# Patient Record
Sex: MALE | Race: WHITE | NOT HISPANIC OR LATINO | Employment: OTHER | URBAN - METROPOLITAN AREA
[De-identification: names, ages, dates, MRNs, and addresses within clinical notes are randomized per-mention and may not be internally consistent; named-entity substitution may affect disease eponyms.]

---

## 2017-01-03 RX ORDER — AMLODIPINE BESYLATE 5 MG/1
TABLET ORAL
Qty: 30 TABLET | Refills: 1 | Status: SHIPPED | OUTPATIENT
Start: 2017-01-03 | End: 2017-10-05 | Stop reason: SDUPTHER

## 2017-01-31 RX ORDER — AMLODIPINE BESYLATE 5 MG/1
5 TABLET ORAL DAILY
Qty: 90 TABLET | Refills: 3 | Status: SHIPPED | OUTPATIENT
Start: 2017-01-31 | End: 2017-03-24 | Stop reason: SDUPTHER

## 2017-02-13 RX ORDER — ALLOPURINOL 300 MG/1
TABLET ORAL
Qty: 90 TABLET | Refills: 2 | Status: SHIPPED | OUTPATIENT
Start: 2017-02-13 | End: 2017-10-05 | Stop reason: SDUPTHER

## 2017-03-24 ENCOUNTER — OFFICE VISIT (OUTPATIENT)
Dept: FAMILY MEDICINE CLINIC | Facility: CLINIC | Age: 77
End: 2017-03-24

## 2017-03-24 VITALS
DIASTOLIC BLOOD PRESSURE: 70 MMHG | HEART RATE: 59 BPM | SYSTOLIC BLOOD PRESSURE: 110 MMHG | OXYGEN SATURATION: 96 % | BODY MASS INDEX: 29.23 KG/M2 | TEMPERATURE: 98.4 F | HEIGHT: 71 IN | WEIGHT: 208.8 LBS

## 2017-03-24 DIAGNOSIS — E78.49 OTHER HYPERLIPIDEMIA: ICD-10-CM

## 2017-03-24 DIAGNOSIS — R79.89 LOW VITAMIN D LEVEL: ICD-10-CM

## 2017-03-24 DIAGNOSIS — R05.9 COUGH: ICD-10-CM

## 2017-03-24 DIAGNOSIS — R13.10 DIFFICULTY SWALLOWING LIQUIDS: ICD-10-CM

## 2017-03-24 DIAGNOSIS — Z00.00 MEDICARE ANNUAL WELLNESS VISIT, SUBSEQUENT: Primary | ICD-10-CM

## 2017-03-24 DIAGNOSIS — I10 HYPERTENSION, ESSENTIAL: ICD-10-CM

## 2017-03-24 DIAGNOSIS — R13.10 DYSPHAGIA, UNSPECIFIED TYPE: ICD-10-CM

## 2017-03-24 DIAGNOSIS — N40.1 BENIGN NODULAR PROSTATIC HYPERPLASIA WITH LOWER URINARY TRACT SYMPTOMS: ICD-10-CM

## 2017-03-24 LAB
25(OH)D3 SERPL-MCNC: 34.3 NG/ML (ref 30–100)
ALBUMIN SERPL-MCNC: 4 G/DL (ref 3.5–5.2)
ALBUMIN/GLOB SERPL: 1.3 G/DL
ALP SERPL-CCNC: 109 U/L (ref 39–117)
ALT SERPL W P-5'-P-CCNC: 16 U/L (ref 1–41)
ANION GAP SERPL CALCULATED.3IONS-SCNC: 13.5 MMOL/L
AST SERPL-CCNC: 17 U/L (ref 1–40)
BILIRUB SERPL-MCNC: 0.8 MG/DL (ref 0.1–1.2)
BUN BLD-MCNC: 19 MG/DL (ref 8–23)
BUN/CREAT SERPL: 17.9 (ref 7–25)
CALCIUM SPEC-SCNC: 9.7 MG/DL (ref 8.6–10.5)
CHLORIDE SERPL-SCNC: 103 MMOL/L (ref 98–107)
CHOLEST SERPL-MCNC: 118 MG/DL (ref 0–200)
CO2 SERPL-SCNC: 22.5 MMOL/L (ref 22–29)
CREAT BLD-MCNC: 1.06 MG/DL (ref 0.76–1.27)
GFR SERPL CREATININE-BSD FRML MDRD: 68 ML/MIN/1.73
GLOBULIN UR ELPH-MCNC: 3.1 GM/DL
GLUCOSE BLD-MCNC: 100 MG/DL (ref 65–99)
HDLC SERPL-MCNC: 37 MG/DL (ref 40–60)
LDLC SERPL CALC-MCNC: 64 MG/DL (ref 0–100)
LDLC/HDLC SERPL: 1.74 {RATIO}
POTASSIUM BLD-SCNC: 4.2 MMOL/L (ref 3.5–5.2)
PROT SERPL-MCNC: 7.1 G/DL (ref 6–8.5)
SODIUM BLD-SCNC: 139 MMOL/L (ref 136–145)
TRIGL SERPL-MCNC: 83 MG/DL (ref 0–150)
VLDLC SERPL-MCNC: 16.6 MG/DL (ref 5–40)

## 2017-03-24 PROCEDURE — G0439 PPPS, SUBSEQ VISIT: HCPCS | Performed by: INTERNAL MEDICINE

## 2017-03-24 PROCEDURE — 90670 PCV13 VACCINE IM: CPT | Performed by: INTERNAL MEDICINE

## 2017-03-24 PROCEDURE — 80053 COMPREHEN METABOLIC PANEL: CPT | Performed by: INTERNAL MEDICINE

## 2017-03-24 PROCEDURE — 71020 XR CHEST PA AND LATERAL: CPT | Performed by: INTERNAL MEDICINE

## 2017-03-24 PROCEDURE — 90471 IMMUNIZATION ADMIN: CPT | Performed by: INTERNAL MEDICINE

## 2017-03-24 PROCEDURE — 80061 LIPID PANEL: CPT | Performed by: INTERNAL MEDICINE

## 2017-03-24 PROCEDURE — 99213 OFFICE O/P EST LOW 20 MIN: CPT | Performed by: INTERNAL MEDICINE

## 2017-03-24 PROCEDURE — 82306 VITAMIN D 25 HYDROXY: CPT | Performed by: INTERNAL MEDICINE

## 2017-03-24 NOTE — PATIENT INSTRUCTIONS
Medicare Wellness  Personal Prevention Plan of Service     Date of Office Visit:  2017  Encounter Provider:  Stanton Sotomayor MD  Place of Service:  [facility] INTERNAL [facility]  Patient Name: Javier Grant YOB: 1940    As part of the Medicare Wellness portion of your visit today, we are providing you with this personalized preventive plan of services (PPPS). This plan is based upon recommendations of the [place] Preventive Services Task Force (USPSTF) and the Advisory Committee on Immunization Practices (ACIP).    This lists the preventive care services that should be considered, and provides dates of when you are due. Items listed as completed are up-to-date and do not require any further intervention.    Health Maintenance   Topic Date Due   • TDAP/TD VACCINES (1 - Tdap) 1959   • PNEUMOCOCCAL VACCINES (65+ LOW/MEDIUM RISK) (2 of 2 - PPSV23) 2013   • ZOSTER VACCINE  2017   • MEDICARE ANNUAL WELLNESS  2017   • LIPID PANEL  2017              Medicare Wellness  Personal Prevention Plan of Service     Date of Office Visit:  2017  Encounter Provider:  Stanton Sotomayor MD  Place of Service:  [facility]  Patient Name: Javier Grant  :  1940    As part of the Medicare Wellness portion of your visit today, we are providing you with this personalized preventive plan of services (PPPS). This plan is based upon recommendations of the [place] Preventive Services Task Force (USPSTF) and the Advisory Committee on Immunization Practices (ACIP).    This lists the preventive care services that should be considered, and provides dates of when you are due. Items listed as completed are up-to-date and do not require any further intervention.    Health Maintenance   Topic Date Due   • TDAP/TD VACCINES (1 - Tdap) 1959   • PNEUMOCOCCAL VACCINES (65+ LOW/MEDIUM RISK) (2 of 2 - PPSV23) 2013   •  ZOSTER VACCINE  03/22/2017   • MEDICARE ANNUAL WELLNESS  03/22/2017   • LIPID PANEL  11/19/2017

## 2017-03-24 NOTE — PROGRESS NOTES
QUICK REFERENCE INFORMATION:  The ABCs of the Annual Wellness Visit    Subsequent Medicare Wellness Visit    HEALTH RISK ASSESSMENT    1940    Recent Hospitalizations:  No recent hospitalization(s)..        Current Medical Providers:  Patient Care Team:  Stanton Sotomayor Jr., MD as PCP - General  No Known Provider as PCP - Family Medicine        Smoking Status:  History   Smoking Status   • Not on file   Smokeless Tobacco   • Not on file       Alcohol Consumption:  History   Alcohol use Not on file       Depression Screen:   PHQ-9 Depression Screening 3/24/2017   Little interest or pleasure in doing things 0   Feeling down, depressed, or hopeless 0   Trouble falling or staying asleep, or sleeping too much 3   Feeling tired or having little energy 0   Poor appetite or overeating 0   Feeling bad about yourself - or that you are a failure or have let yourself or your family down 0   Trouble concentrating on things, such as reading the newspaper or watching television 0   Moving or speaking so slowly that other people could have noticed. Or the opposite - being so fidgety or restless that you have been moving around a lot more than usual 0   Thoughts that you would be better off dead, or of hurting yourself in some way 0   PHQ-9 Total Score 3   If you checked off any problems, how difficult have these problems made it for you to do your work, take care of things at home, or get along with other people? Somewhat difficult       Health Habits and Functional and Cognitive Screening:  No flowsheet data found.           Does the patient have evidence of cognitive impairment? No    Asprin use counseling:yes      Recent Lab Results:  CMP:  Lab Results   Component Value Date    BUN 19 11/19/2016    CREATININE 1.20 11/19/2016    EGFRIFNONA 59 (L) 11/19/2016    BCR 15.8 11/19/2016     11/19/2016    K 4.4 11/19/2016    CO2 23.1 11/19/2016    CALCIUM 9.8 11/19/2016    ALBUMIN 4.20 11/19/2016    LABIL2 1.5 11/19/2016     BILITOT 1.0 11/19/2016    ALKPHOS 111 11/19/2016    AST 24 11/19/2016    ALT 21 11/19/2016     Lipid Panel:  Lab Results   Component Value Date    CHLPL 113 10/01/2015    TRIG 83 11/19/2016    HDL 41 11/19/2016    VLDL 16.6 11/19/2016    LDL 56 10/01/2015     LDL:     HbA1c:     Urine Microalbumin:     Visual Acuity:  No exam data present    Age-appropriate Screening Schedule:  Refer to the list below for future screening recommendations based on patient's age, sex and/or medical conditions. Orders for these recommended tests are listed in the plan section. The patient has been provided with a written plan.    Health Maintenance   Topic Date Due   • TDAP/TD VACCINES (1 - Tdap) 04/12/1959   • PNEUMOCOCCAL VACCINES (65+ LOW/MEDIUM RISK) (2 of 2 - PPSV23) 06/01/2013   • ZOSTER VACCINE  03/22/2017   • LIPID PANEL  11/19/2017        Subjective   History of Present Illness    Javier Grant is a 76 y.o. male who presents for an Subsequent Wellness Visit.    The following portions of the patient's history were reviewed and updated as appropriate: allergies, current medications, past family history, past medical history, past social history, past surgical history and problem list.    Outpatient Medications Prior to Visit   Medication Sig Dispense Refill   • allopurinol (ZYLOPRIM) 300 MG tablet TAKE 1 TABLET BY MOUTH EVERY DAY 90 tablet 2   • amLODIPine (NORVASC) 5 MG tablet TAKE ONE TABLET BY MOUTH DAILY 30 tablet 1   • aspirin 81 MG tablet Take  by mouth daily.     • atorvastatin (LIPITOR) 80 MG tablet TAKE 1 TABLET AT BEDTIME 90 tablet 3   • carvedilol (COREG) 3.125 MG tablet TAKE ONE TABLET BY MOUTH TWICE A DAY WITH FOOD 180 tablet 2   • clopidogrel (PLAVIX) 75 MG tablet TAKE 4 TABLETS BY MOUTH STARTING TOMORROW MORNING, THEN TAKE ONE TABLET BY MOUTH DAILY 90 tablet 2   • Famotidine (PEPCID AC MAXIMUM STRENGTH) 20 MG chewable tablet Chew.     • nitroglycerin (NITROSTAT) 0.4 MG SL tablet Place  under the tongue.     • omega-3  "acid ethyl esters (LOVAZA) 1 G capsule TAKE 4 CAPSULES DAILY 360 capsule 3   • potassium chloride (MICRO-K) 10 MEQ CR capsule TAKE 1 CAPSULE DAILY 90 capsule 3   • ramipril (ALTACE) 10 MG capsule TAKE 1 CAPSULE DAILY 90 capsule 3   • amLODIPine (NORVASC) 5 MG tablet Take 1 tablet by mouth Daily. 90 tablet 3   • Cholecalciferol crystals        No facility-administered medications prior to visit.        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    Review of Systems   All other systems reviewed and are negative.      Compared to one year ago, the patient feels his physical health is the same.  Compared to one year ago, the patient feels his mental health is the same.    Objective     Physical Exam    Vitals:    03/24/17 0828   BP: 110/70   BP Location: Left arm   Patient Position: Sitting   Cuff Size: Large Adult   Pulse: 59   Temp: 98.4 °F (36.9 °C)   TempSrc: Oral   SpO2: 96%   Weight: 208 lb 12.8 oz (94.7 kg)   Height: 71\" (180.3 cm)   PainSc: 0-No pain       Body mass index is 29.12 kg/(m^2).  Discussed the patient's BMI with him. The BMI is above average; BMI management plan is completed.    Assessment/Plan   Patient Self-Management and Personalized Health Advice  The patient has been provided with information about: diet, exercise, weight management and prevention of cardiac or vascular disease and preventive services including:   · Exercise counseling provided, Influenza vaccine, Urinary Incontinence " assessment done, Zostavax vaccine (Herpes Zoster).    Visit Diagnoses:  No diagnosis found.    No orders of the defined types were placed in this encounter.      Outpatient Encounter Prescriptions as of 3/24/2017   Medication Sig Dispense Refill   • allopurinol (ZYLOPRIM) 300 MG tablet TAKE 1 TABLET BY MOUTH EVERY DAY 90 tablet 2   • amLODIPine (NORVASC) 5 MG tablet TAKE ONE TABLET BY MOUTH DAILY 30 tablet 1   • aspirin 81 MG tablet Take  by mouth daily.     • atorvastatin (LIPITOR) 80 MG tablet TAKE 1 TABLET AT BEDTIME 90 tablet 3   • carvedilol (COREG) 3.125 MG tablet TAKE ONE TABLET BY MOUTH TWICE A DAY WITH FOOD 180 tablet 2   • clopidogrel (PLAVIX) 75 MG tablet TAKE 4 TABLETS BY MOUTH STARTING TOMORROW MORNING, THEN TAKE ONE TABLET BY MOUTH DAILY 90 tablet 2   • Famotidine (PEPCID AC MAXIMUM STRENGTH) 20 MG chewable tablet Chew.     • MULTIPLE VITAMIN PO Take  by mouth Daily.     • nitroglycerin (NITROSTAT) 0.4 MG SL tablet Place  under the tongue.     • omega-3 acid ethyl esters (LOVAZA) 1 G capsule TAKE 4 CAPSULES DAILY 360 capsule 3   • potassium chloride (MICRO-K) 10 MEQ CR capsule TAKE 1 CAPSULE DAILY 90 capsule 3   • ramipril (ALTACE) 10 MG capsule TAKE 1 CAPSULE DAILY 90 capsule 3   • [DISCONTINUED] amLODIPine (NORVASC) 5 MG tablet Take 1 tablet by mouth Daily. 90 tablet 3   • [DISCONTINUED] Cholecalciferol crystals        No facility-administered encounter medications on file as of 3/24/2017.        Reviewed use of high risk medication in the elderly: yes  Reviewed for potential of harmful drug interactions in the elderly: yes    Follow Up:  No Follow-up on file.     An After Visit Summary and PPPS with all of these plans were given to the patient.

## 2017-03-24 NOTE — PROGRESS NOTES
Subjective   aJvier Grant is a 76 y.o. male.   High blood pressure lipids low vitamin D  History of Present Illness   Hypertension, lipids, low vitamin D, history coronary disease doing well stable a Justine having some troubles with both swallowing liquids seem to get choked a little bit and have a separate issue where he has things hang mid chest we'll hang from minute to 2 minutes for goes through.  This is a new problem does have mild GERD but no history of esophageal stricture etc.    Review of Systems    Objective   Vitals:    03/24/17 0828   BP: 110/70   Pulse: 59   Temp: 98.4 °F (36.9 °C)   SpO2: 96%   Weight: 208 lb 12.8 oz (94.7 kg)     Physical Exam   Constitutional: He appears well-developed and well-nourished.   Cardiovascular: Normal rate, regular rhythm and normal heart sounds.    Pulmonary/Chest: Effort normal and breath sounds normal.   Abdominal: Soft. Bowel sounds are normal.   Nursing note and vitals reviewed.      Lab Results   Component Value Date    INR 1.0 10/08/2015       Procedures  Chest x-ray PA and lateral obtained.  No comparison available.  No active parenchymal infiltrates.  Cardiomegaly present evidence of prior sternotomy present no other abnormalities noted.  Assessment/Plan   1.  Hypertension controlled plan continue present medicine follow-up in 6-12 months    2.  Hyperlipidemia plan await lab recheck 6 months if satisfactory.    3.  Low vitamin D plan await lab for further disposition    4.  BPH with bowel symptoms trial of saw palmetto.  With further follow-up with urology if not improvement    5.  Dysphagia symptoms food hanging up mid chest we'll refer to GI DrRm renal    6.  Mild choking with swallowing plan speech therapy evaluation.    Much of this encounter note is an electronic transcription/translation of spoken language to printed text.  The electronic translation of spoken language may permit erroneous, or at times, nonsensical words or phrases to be inadvertently  transcribed.  Although I have reviewed the note for such errors, some may still exist.

## 2017-03-30 RX ORDER — ATORVASTATIN CALCIUM 80 MG/1
TABLET, FILM COATED ORAL
Qty: 90 TABLET | Refills: 2 | Status: SHIPPED | OUTPATIENT
Start: 2017-03-30 | End: 2017-10-05 | Stop reason: SDUPTHER

## 2017-04-18 ENCOUNTER — OFFICE VISIT (OUTPATIENT)
Dept: GASTROENTEROLOGY | Facility: CLINIC | Age: 77
End: 2017-04-18

## 2017-04-18 VITALS
BODY MASS INDEX: 29.54 KG/M2 | HEIGHT: 71 IN | SYSTOLIC BLOOD PRESSURE: 148 MMHG | WEIGHT: 211 LBS | DIASTOLIC BLOOD PRESSURE: 100 MMHG

## 2017-04-18 DIAGNOSIS — K21.9 GASTROESOPHAGEAL REFLUX DISEASE, ESOPHAGITIS PRESENCE NOT SPECIFIED: ICD-10-CM

## 2017-04-18 DIAGNOSIS — K63.5 COLON POLYPS: ICD-10-CM

## 2017-04-18 DIAGNOSIS — R13.10 DYSPHAGIA, UNSPECIFIED TYPE: Primary | ICD-10-CM

## 2017-04-18 PROCEDURE — 99214 OFFICE O/P EST MOD 30 MIN: CPT | Performed by: INTERNAL MEDICINE

## 2017-04-18 RX ORDER — SODIUM CHLORIDE 0.9 % (FLUSH) 0.9 %
1-10 SYRINGE (ML) INJECTION AS NEEDED
Status: CANCELLED | OUTPATIENT
Start: 2017-04-18

## 2017-04-18 RX ORDER — SODIUM CHLORIDE, SODIUM LACTATE, POTASSIUM CHLORIDE, CALCIUM CHLORIDE 600; 310; 30; 20 MG/100ML; MG/100ML; MG/100ML; MG/100ML
30 INJECTION, SOLUTION INTRAVENOUS CONTINUOUS
Status: CANCELLED | OUTPATIENT
Start: 2017-04-18

## 2017-04-18 NOTE — PROGRESS NOTES
Chief Complaint   Patient presents with   • Difficulty Swallowing   • Heartburn   • Cough        Javier Grant is a  77 y.o. male here for a follow up visit for Dysphagia, GERD, cough, history of polyps    HPI This 77-year-old white male patient of Dr. Stanton Sotomayor returns in follow-up since his colonoscopic examination of February 2015.  That study was performed due to his history of colon polyps.  He had diverticulosis and hemorrhoids but otherwise negative examination.  He presents now with several months of reflux-like issues and associated cough.  He is concerned with aspiration and potential pneumonia.  This occurs with liquids about every 2 weeks and has been more difficult to clear his lungs then in the past.  He has complained of some dysphagia to solid foods usually meats in the past and now this seems to occur every 2-3 months when he is eating too fast.  His requires belching and eventual clearance but he does not require regurgitation or vomiting.  His weight is been stable.  He's been on Pepcid for reflux that affords variable relief.  He mentioned a family history of gastric cancer involving a brother at the age of 39.  We discussed possible option for evaluation to include video fluoroscopy swallow study and upper endoscopy.    Past Medical History:   Diagnosis Date   • Arthritis    • CAD (coronary artery disease)    • Colon polyp    • Gastritis    • Hyperlipidemia    • Hypertension    • Myocardial infarction    • Sleep apnea    • Vitamin D deficiency        Current Outpatient Prescriptions   Medication Sig Dispense Refill   • allopurinol (ZYLOPRIM) 300 MG tablet TAKE 1 TABLET BY MOUTH EVERY DAY 90 tablet 2   • amLODIPine (NORVASC) 5 MG tablet TAKE ONE TABLET BY MOUTH DAILY 30 tablet 1   • aspirin 81 MG tablet Take  by mouth daily.     • atorvastatin (LIPITOR) 80 MG tablet TAKE 1 TABLET AT BEDTIME 90 tablet 2   • carvedilol (COREG) 3.125 MG tablet TAKE ONE TABLET BY MOUTH TWICE A DAY WITH FOOD 180  tablet 2   • clopidogrel (PLAVIX) 75 MG tablet TAKE 4 TABLETS BY MOUTH STARTING TOMORROW MORNING, THEN TAKE ONE TABLET BY MOUTH DAILY 90 tablet 2   • Famotidine (PEPCID AC MAXIMUM STRENGTH) 20 MG chewable tablet Chew.     • MULTIPLE VITAMIN PO Take  by mouth Daily.     • nitroglycerin (NITROSTAT) 0.4 MG SL tablet Place  under the tongue.     • omega-3 acid ethyl esters (LOVAZA) 1 G capsule TAKE 4 CAPSULES DAILY 360 capsule 3   • potassium chloride (MICRO-K) 10 MEQ CR capsule TAKE 1 CAPSULE DAILY 90 capsule 3   • ramipril (ALTACE) 10 MG capsule TAKE 1 CAPSULE DAILY 90 capsule 3     No current facility-administered medications for this visit.        PRN Meds:.    Allergies   Allergen Reactions   • Ciprofloxacin        Social History     Social History   • Marital status:      Spouse name: N/A   • Number of children: N/A   • Years of education: N/A     Occupational History   • Not on file.     Social History Main Topics   • Smoking status: Former Smoker     Types: Cigarettes   • Smokeless tobacco: Not on file   • Alcohol use No   • Drug use: Not on file   • Sexual activity: Not on file     Other Topics Concern   • Not on file     Social History Narrative       Family History   Problem Relation Age of Onset   • Depression Mother    • Heart attack Father    • Aneurysm Father    • Stomach cancer Brother        Review of Systems   Constitutional: Negative for activity change, appetite change, chills, fatigue, fever and unexpected weight change.   HENT: Positive for postnasal drip. Negative for congestion, ear pain, facial swelling, rhinorrhea, sore throat, trouble swallowing and voice change.    Eyes: Negative for photophobia and visual disturbance.   Respiratory: Positive for cough. Negative for choking, shortness of breath and wheezing.    Cardiovascular: Negative for chest pain.   Gastrointestinal: Negative for abdominal distention, abdominal pain, anal bleeding, blood in stool, constipation, diarrhea, nausea,  rectal pain and vomiting.        GERD  Dysphagia   Endocrine: Negative for polyphagia.   Musculoskeletal: Negative for arthralgias, gait problem, joint swelling and myalgias.   Skin: Negative for color change, pallor and rash.   Allergic/Immunologic: Negative for food allergies.   Neurological: Negative for speech difficulty and headaches.   Hematological: Does not bruise/bleed easily.   Psychiatric/Behavioral: Negative for agitation, confusion and sleep disturbance.       Vitals:    04/18/17 0940   BP: 148/100       Physical Exam   Constitutional: He is oriented to person, place, and time. He appears well-developed and well-nourished.   HENT:   Head: Normocephalic.   Mouth/Throat: Oropharynx is clear and moist.   Eyes: Conjunctivae and EOM are normal.   Neck: Normal range of motion.   Cardiovascular: Normal rate and regular rhythm.    Pulmonary/Chest: Breath sounds normal.   Abdominal: Soft. Bowel sounds are normal.   Musculoskeletal: Normal range of motion.   Neurological: He is alert and oriented to person, place, and time.   Skin: Skin is warm and dry.   Psychiatric: He has a normal mood and affect. His behavior is normal.       ASSESSMENT   #1 dysphagia to solids: Chronic issue occurring every several months may be some component of esophageal stricture  #2 transfer dysphagia to liquids:  cough with concern of possible aspiration.  #3 history of polyps      PLAN  Schedule EGD  Schedule video fluoroscopy swallow study with speech pathology      ICD-10-CM ICD-9-CM   1. Dysphagia, unspecified type R13.10 787.20   2. Gastroesophageal reflux disease, esophagitis presence not specified K21.9 530.81   3. Colon polyps K63.5 211.3          Answers for HPI/ROS submitted by the patient on 4/18/2017   Cough  Chronicity: chronic  Onset: more than 1 month ago  Progression since onset: unchanged  Cough characteristics: productive of sputum  ear congestion: No  heartburn: Yes  hemoptysis: No  nasal congestion: No  sweats:  No  weight loss: No  Aggravated by: other

## 2017-04-25 ENCOUNTER — HOSPITAL ENCOUNTER (OUTPATIENT)
Dept: GENERAL RADIOLOGY | Facility: HOSPITAL | Age: 77
Discharge: HOME | End: 2017-04-25
Attending: INTERNAL MEDICINE | Admitting: INTERNAL MEDICINE

## 2017-04-25 DIAGNOSIS — K21.9 GASTROESOPHAGEAL REFLUX DISEASE, ESOPHAGITIS PRESENCE NOT SPECIFIED: ICD-10-CM

## 2017-04-25 DIAGNOSIS — R13.10 DYSPHAGIA, UNSPECIFIED TYPE: ICD-10-CM

## 2017-04-25 PROCEDURE — G8997 SWALLOW GOAL STATUS: HCPCS

## 2017-04-25 PROCEDURE — 92611 MOTION FLUOROSCOPY/SWALLOW: CPT

## 2017-04-25 PROCEDURE — 74230 X-RAY XM SWLNG FUNCJ C+: CPT

## 2017-04-25 PROCEDURE — G8998 SWALLOW D/C STATUS: HCPCS

## 2017-04-25 PROCEDURE — G8996 SWALLOW CURRENT STATUS: HCPCS

## 2017-04-25 NOTE — PROGRESS NOTES
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 Dysphagia, unspecified type R13.10 787.20   2. Gastroesophageal reflux disease, esophagitis presence not specified K21.9 530.81        SPEECH-LANGUAGE PATHOLOGY EVALUTION - VFSS  Subjective: The patient was seen on this date for a VFSS(Videofluoroscopic Swallowing Study).  Patient was alert and cooperative.    Objective: Risks/benefits were reviewed with the patient, and consent was obtained. The study was completed with SLP present and Radiologist review. The patient was seen in lateral view(s). Textures given included thin liquid, nectar thick liquid, puree consistency, mechanical soft consistency and regular consistency.  Assessment: Difficulties were noted with none of the above consistencies. Pt presents with functional swallow. Pt with trace transient penetration X1 consecutive sips. Oral residue with mech soft mixed consistency, suspect under dentures. Pt able to clear oral residue independently. Esophageal scan WFL.  SLP Findings:  Patient presents with functional swallow.   Recommendations: Diet Textures: thin liquid, regular consistency food. Medications should be taken whole with thin liquids.   Recommended Strategies: Upright for PO, small bites and sips and double swallow with solids, check for oral residue.. Oral care before breakfast, after all meals and PRN.  Other Recommended Evaluations:     Dysphagia therapy is not recommended. Rationale: Not warranted at this time.                Adult Dysphagia - 04/25/17 0327     Adult Dysphagia Background    Patient Description of Complaint Coughing, difficulty coughing  -OC    Current Diet (Solids) Regular  -OC    Diet Level (Liquids) Thin Liquid  -OC    SLP Communication to Radiology    Severity Level of Dysphagia other (see comments)   Functional swallow  -OC    Consistencies Aspirated/Penetrated penetrated:;thin   trace transient X1  -OC    Summary Statement Pt presents with functional swallow. Pt with trace transient penetration X1 and oral residue with mech soft mixed consistency, suspect under dentures. Pt able to clear oral residue.  -OC      User Key  (r) = Recorded By, (t) = Taken By, (c) = Cosigned By    Initials Name Provider Type    OC [facility] Speech and Language Pathologist                            OP SLP Education       04/25/17 1351    Education    Barriers to Learning No barriers identified  -OC    Education Provided Described results of evaluation;Patient expressed understanding of evaluation  -OC    Assessed Learning needs;Learning motivation;Learning preferences  -OC    Learning Motivation Strong  -OC    Learning Method Explanation  -OC    Teaching Response Verbalized understanding  -OC      User Key  (r) = Recorded By, (t) = Taken By, (c) = Cosigned By    Initials Name Effective Dates    OC Constanza Templeton MA, CCC-SLP 04/05/17 -                     OP SLP Assessment/Plan - 04/25/17 1351     SLP Assessment    Functional Problems Swallowing  -OC    Impact on  Function: Swallowing Risk of aspiration;Risk of pneumonia  -OC    Clinical Impression: Swallowing WNL  -OC    Please refer to paper survey for additional self-reported information No  -OC    Please refer to items scanned into chart for additional diagnostic informaiton and handouts as provided by clinician No  -OC    Prognosis Excellent (comment)  -OC    Patient/caregiver participated in establishment of treatment plan and goals Yes  -OC    Patient would benefit from skilled therapy intervention No  -OC      User Key  (r) = Recorded By, (t) = Taken By, (c) = Cosigned By    Initials Name Provider Type    OC [facility] Speech and Language Pathologist              SLP Outcome Measures (last 72 hours)      SLP Outcome Measures       04/25/17 1351          SLP Outcome Measures    Outcome Measure Used? Adult NOMS  -OC      FCM Scores    FCM Chosen Swallowing  -OC      Swallowing FCM Score 7  -OC        User Key  (r) = Recorded By, (t) = Taken By, (c) = Cosigned By    Initials Name Effective Dates    SUDHEER Templeton MA, CCC-SLP 04/05/17 -                Time Calculation:   SLP Start Time: 1310  SLP Stop Time: 1355  SLP Time Calculation (min): 45 min    Therapy Charges for Today     Code Description Service Date Service Provider Modifiers Qty    38015176561 HC ST SWALLOWING CURRENT STATUS 4/25/2017 Constanza Templeton MA, CCC-SLP GN, CH 1    50623850805 HC ST SWALLOWING PROJECTED 4/25/2017 Constanza Templeton MACCC-SLP GN, CH 1    15601919808 HC ST SWALLOWING DISCHARGE 4/25/2017 Constanza Templeton MA, CCC-SLP GN, CH 1    41995267847 HC ST MOTION FLUORO EVAL SWALLOW 3 4/25/2017 Constanza Templeton MA, CCC-SLP GN, KX 1          SLP G-Codes  SLP NOMS Used?: Yes  Functional Limitations: Swallowing  Swallow Current Status (): 0 percent impaired, limited or restricted  Swallow Goal Status (): 0 percent impaired, limited or restricted  Swallow Discharge Status (): 0 percent impaired, limited or restricted        Constanza Templeton  MA,CCC-SLP  4/25/2017

## 2017-04-26 ENCOUNTER — TELEPHONE (OUTPATIENT)
Dept: GASTROENTEROLOGY | Facility: CLINIC | Age: 77
End: 2017-04-26

## 2017-04-26 NOTE — TELEPHONE ENCOUNTER
----- Message from Jerry SNOWDEN MD sent at 4/26/2017 12:31 PM EDT -----  Regarding: VFSS results  Okay to call results, would proceed with upper endoscopy as planned.  ----- Message -----     From: Dora, Rad Results [place]     Sent: 4/26/2017   6:14 AM       To: Jerry SNOWDEN MD

## 2017-04-26 NOTE — TELEPHONE ENCOUNTER
Call to pt. Advise per VFSS findings that swallowing mechanism is WNL.  Advise per Dr Hogue to proceed with EGD as planned.  PT verb understanding.

## 2017-05-26 ENCOUNTER — ANESTHESIA (OUTPATIENT)
Dept: GASTROENTEROLOGY | Facility: HOSPITAL | Age: 77
End: 2017-05-26

## 2017-05-26 ENCOUNTER — HOSPITAL ENCOUNTER (OUTPATIENT)
Facility: HOSPITAL | Age: 77
Setting detail: HOSPITAL OUTPATIENT SURGERY
Discharge: HOME | End: 2017-05-26
Attending: INTERNAL MEDICINE | Admitting: INTERNAL MEDICINE

## 2017-05-26 ENCOUNTER — ANESTHESIA EVENT (OUTPATIENT)
Dept: GASTROENTEROLOGY | Facility: HOSPITAL | Age: 77
End: 2017-05-26

## 2017-05-26 VITALS
HEIGHT: 71 IN | WEIGHT: 206 LBS | TEMPERATURE: 98.5 F | OXYGEN SATURATION: 95 % | RESPIRATION RATE: 15 BRPM | DIASTOLIC BLOOD PRESSURE: 81 MMHG | HEART RATE: 57 BPM | SYSTOLIC BLOOD PRESSURE: 111 MMHG | BODY MASS INDEX: 28.84 KG/M2

## 2017-05-26 DIAGNOSIS — K21.9 GASTROESOPHAGEAL REFLUX DISEASE, ESOPHAGITIS PRESENCE NOT SPECIFIED: ICD-10-CM

## 2017-05-26 DIAGNOSIS — R13.10 DYSPHAGIA, UNSPECIFIED TYPE: ICD-10-CM

## 2017-05-26 PROCEDURE — 43249 ESOPH EGD DILATION <30 MM: CPT | Performed by: INTERNAL MEDICINE

## 2017-05-26 PROCEDURE — 88305 TISSUE EXAM BY PATHOLOGIST: CPT | Performed by: INTERNAL MEDICINE

## 2017-05-26 PROCEDURE — 88312 SPECIAL STAINS GROUP 1: CPT | Performed by: INTERNAL MEDICINE

## 2017-05-26 PROCEDURE — C1726 CATH, BAL DIL, NON-VASCULAR: HCPCS | Performed by: INTERNAL MEDICINE

## 2017-05-26 PROCEDURE — S0260 H&P FOR SURGERY: HCPCS | Performed by: INTERNAL MEDICINE

## 2017-05-26 PROCEDURE — 43239 EGD BIOPSY SINGLE/MULTIPLE: CPT | Performed by: INTERNAL MEDICINE

## 2017-05-26 PROCEDURE — 25010000002 PROPOFOL 10 MG/ML EMULSION: Performed by: ANESTHESIOLOGY

## 2017-05-26 PROCEDURE — 87081 CULTURE SCREEN ONLY: CPT | Performed by: INTERNAL MEDICINE

## 2017-05-26 PROCEDURE — 25010000002 MIDAZOLAM PER 1 MG: Performed by: ANESTHESIOLOGY

## 2017-05-26 RX ORDER — LIDOCAINE HYDROCHLORIDE 20 MG/ML
INJECTION, SOLUTION INFILTRATION; PERINEURAL AS NEEDED
Status: DISCONTINUED | OUTPATIENT
Start: 2017-05-26 | End: 2017-05-26 | Stop reason: SURG

## 2017-05-26 RX ORDER — SODIUM CHLORIDE 0.9 % (FLUSH) 0.9 %
1-10 SYRINGE (ML) INJECTION AS NEEDED
Status: DISCONTINUED | OUTPATIENT
Start: 2017-05-26 | End: 2017-05-26 | Stop reason: HOSPADM

## 2017-05-26 RX ORDER — MIDAZOLAM HYDROCHLORIDE 1 MG/ML
INJECTION INTRAMUSCULAR; INTRAVENOUS AS NEEDED
Status: DISCONTINUED | OUTPATIENT
Start: 2017-05-26 | End: 2017-05-26 | Stop reason: SURG

## 2017-05-26 RX ORDER — PROPOFOL 10 MG/ML
VIAL (ML) INTRAVENOUS AS NEEDED
Status: DISCONTINUED | OUTPATIENT
Start: 2017-05-26 | End: 2017-05-26 | Stop reason: SURG

## 2017-05-26 RX ORDER — PROPOFOL 10 MG/ML
VIAL (ML) INTRAVENOUS CONTINUOUS PRN
Status: DISCONTINUED | OUTPATIENT
Start: 2017-05-26 | End: 2017-05-26 | Stop reason: SURG

## 2017-05-26 RX ORDER — SODIUM CHLORIDE, SODIUM LACTATE, POTASSIUM CHLORIDE, CALCIUM CHLORIDE 600; 310; 30; 20 MG/100ML; MG/100ML; MG/100ML; MG/100ML
30 INJECTION, SOLUTION INTRAVENOUS CONTINUOUS
Status: DISCONTINUED | OUTPATIENT
Start: 2017-05-26 | End: 2017-05-26 | Stop reason: HOSPADM

## 2017-05-26 RX ADMIN — MIDAZOLAM HYDROCHLORIDE 1 MG: 1 INJECTION, SOLUTION INTRAMUSCULAR; INTRAVENOUS at 14:14

## 2017-05-26 RX ADMIN — ALFENTANIL HYDROCHLORIDE 250 MCG: 500 INJECTION, SOLUTION INTRAVENOUS at 14:14

## 2017-05-26 RX ADMIN — PROPOFOL 100 MCG/KG/MIN: 10 INJECTION, EMULSION INTRAVENOUS at 14:15

## 2017-05-26 RX ADMIN — LIDOCAINE HYDROCHLORIDE 60 MG: 20 INJECTION, SOLUTION INFILTRATION; PERINEURAL at 14:15

## 2017-05-26 RX ADMIN — PROPOFOL 100 MG: 10 INJECTION, EMULSION INTRAVENOUS at 14:15

## 2017-05-26 RX ADMIN — SODIUM CHLORIDE, POTASSIUM CHLORIDE, SODIUM LACTATE AND CALCIUM CHLORIDE 30 ML/HR: 600; 310; 30; 20 INJECTION, SOLUTION INTRAVENOUS at 13:05

## 2017-05-28 LAB — UREASE TISS QL: NEGATIVE

## 2017-05-30 LAB
CYTO UR: NORMAL
LAB AP CASE REPORT: NORMAL
Lab: NORMAL
PATH REPORT.FINAL DX SPEC: NORMAL
PATH REPORT.GROSS SPEC: NORMAL

## 2017-06-08 ENCOUNTER — TELEPHONE (OUTPATIENT)
Dept: GASTROENTEROLOGY | Facility: CLINIC | Age: 77
End: 2017-06-08

## 2017-06-08 NOTE — TELEPHONE ENCOUNTER
----- Message from Jerry SNOWDEN MD sent at 6/1/2017  6:18 PM EDT -----  Regarding: Biopsy results  Okay to call results, if swallowing difficulties persist can offer esophageal manometry.  Patient can follow-up in office to discuss options if he wishes  ----- Message -----     From: Lab, Background User     Sent: 5/28/2017   8:21 AM       To: Jerry SNOWDEN MD

## 2017-06-08 NOTE — TELEPHONE ENCOUNTER
Called pt and spoke with pt's wife Mirella and advised per Dr Hogue that her husbands duodenum bx was normal and was negative for celiac sprue.  The stomach bx showed mild chroninc gastritis and was neg for h pylori.  The ge junction bx showed min chronic inflammation and was neg for intestinal metaplasia.  He recommends if swallowing difficulties persist can offer esophageal manometry or can f/u to discuss .  Pt's wife verb understanding and reports her  has been doing very well since his egd.

## 2017-07-17 RX ORDER — CLOPIDOGREL BISULFATE 75 MG/1
TABLET ORAL
Qty: 90 TABLET | Refills: 1 | Status: SHIPPED | OUTPATIENT
Start: 2017-07-17 | End: 2017-10-05 | Stop reason: SDUPTHER

## 2017-07-18 RX ORDER — CARVEDILOL 3.12 MG/1
TABLET ORAL
Qty: 180 TABLET | Refills: 1 | Status: SHIPPED | OUTPATIENT
Start: 2017-07-18 | End: 2017-10-05 | Stop reason: SDUPTHER

## 2017-07-31 RX ORDER — OMEGA-3-ACID ETHYL ESTERS 1 G/1
CAPSULE, LIQUID FILLED ORAL
Qty: 360 CAPSULE | Refills: 2 | Status: SHIPPED | OUTPATIENT
Start: 2017-07-31 | End: 2017-10-05 | Stop reason: SDUPTHER

## 2017-09-12 ENCOUNTER — OFFICE VISIT (OUTPATIENT)
Dept: FAMILY MEDICINE CLINIC | Facility: CLINIC | Age: 77
End: 2017-09-12

## 2017-09-12 VITALS
DIASTOLIC BLOOD PRESSURE: 76 MMHG | OXYGEN SATURATION: 98 % | BODY MASS INDEX: 29.68 KG/M2 | HEIGHT: 71 IN | HEART RATE: 62 BPM | WEIGHT: 212 LBS | SYSTOLIC BLOOD PRESSURE: 148 MMHG | TEMPERATURE: 97.9 F

## 2017-09-12 DIAGNOSIS — K42.9 UMBILICAL HERNIA WITHOUT OBSTRUCTION AND WITHOUT GANGRENE: Primary | ICD-10-CM

## 2017-09-12 PROCEDURE — 99213 OFFICE O/P EST LOW 20 MIN: CPT | Performed by: INTERNAL MEDICINE

## 2017-09-12 NOTE — PROGRESS NOTES
Subjective   Javier Grant is a 77 y.o. male.   umbillical hernia with increasing pain  History of Present Illness   Had umbilical hernia 15 yrs, now w pain bulge for the last 3 weeks bulge and is comfortable disc.  Patient lays down.  Is not having physically push the visible hernia into position.  Review of Systems   Constitutional: Negative.    HENT: Negative.    Eyes: Negative.    Respiratory: Negative.    Cardiovascular: Negative.    Gastrointestinal: Positive for abdominal pain.   Endocrine: Negative.    Genitourinary: Positive for frequency.   Musculoskeletal: Negative.    Skin: Negative.    Allergic/Immunologic: Negative.    Neurological: Negative.    Hematological: Negative.    Psychiatric/Behavioral: Negative.        Objective   Vitals:    09/12/17 0853   BP: 148/76   Pulse: 62   Temp: 97.9 °F (36.6 °C)   SpO2: 98%   Weight: 212 lb (96.2 kg)     Physical Exam   Constitutional: He appears well-developed and well-nourished.   Cardiovascular: Normal rate, regular rhythm and normal heart sounds.    Pulmonary/Chest: Effort normal and breath sounds normal.   Abdominal: Soft. Bowel sounds are normal.   Easily palpated umbilical hernia just minimally above the umbilicus measuring about 3 cm with this most easily palpated and patient standing supine there is a lesser bulge with Valsalva.  No evidence of incarceration this point in time.   Nursing note and vitals reviewed.      Lab Results   Component Value Date    INR 1.0 10/08/2015       Procedures    Assessment/Plan   Umbilical hernia plan refer to surgeon patient request Dr. Ayush Reynolds check see if he is currently practicing or if he retired he has retired we have an alternative.    We did discuss umbilical arteries what look for doubt this is likely within the next few months time.    Much of this encounter note is an electronic transcription/translation of spoken language to printed text.  The electronic translation of spoken language may permit erroneous, or  at times, nonsensical words or phrases to be inadvertently transcribed.  Although I have reviewed the note for such errors, some may still exist.

## 2017-09-26 ENCOUNTER — OFFICE VISIT (OUTPATIENT)
Dept: SURGERY | Facility: CLINIC | Age: 77
End: 2017-09-26

## 2017-09-26 VITALS
OXYGEN SATURATION: 97 % | DIASTOLIC BLOOD PRESSURE: 72 MMHG | BODY MASS INDEX: 29.31 KG/M2 | HEART RATE: 60 BPM | SYSTOLIC BLOOD PRESSURE: 140 MMHG | HEIGHT: 71 IN | WEIGHT: 209.4 LBS

## 2017-09-26 DIAGNOSIS — K42.9 UMBILICAL HERNIA WITHOUT OBSTRUCTION AND WITHOUT GANGRENE: Primary | ICD-10-CM

## 2017-09-26 PROCEDURE — 99203 OFFICE O/P NEW LOW 30 MIN: CPT | Performed by: SURGERY

## 2017-09-26 RX ORDER — CEFAZOLIN SODIUM 2 G/100ML
2 INJECTION, SOLUTION INTRAVENOUS ONCE
Status: CANCELLED | OUTPATIENT
Start: 2017-10-09 | End: 2017-09-26

## 2017-09-26 NOTE — PROGRESS NOTES
Subjective   Javier Grant is a 77 y.o. male who presents to the office in surgical consultation from Stanton Sotomayor MD for an umbilical hernia.    History of Present Illness     The patient has developed a bulge just above the umbilicus that is slowly getting larger and increasingly symptomatic.  He has pain with activity and that pain is progressively worsening.  He has had no change in his bowel or bladder function.    Review of Systems   Constitutional: Negative for activity change, appetite change, fatigue and fever.   HENT: Negative for trouble swallowing and voice change.    Respiratory: Negative for chest tightness and shortness of breath.    Cardiovascular: Negative for chest pain and palpitations.   Gastrointestinal: Positive for abdominal pain. Negative for blood in stool, constipation, diarrhea, nausea and vomiting.   Endocrine: Negative for cold intolerance and heat intolerance.   Genitourinary: Negative for dysuria and flank pain.   Neurological: Negative for dizziness and light-headedness.   Hematological: Negative for adenopathy. Does not bruise/bleed easily.   Psychiatric/Behavioral: Negative for agitation and confusion.     Past Medical History:   Diagnosis Date   • Arthritis    • CAD (coronary artery disease)    • Colon polyp    • Gastritis    • Gout    • Hyperlipidemia    • Hypertension    • Myocardial infarction    • Sleep apnea    • Umbilical hernia    • Vitamin D deficiency      Past Surgical History:   Procedure Laterality Date   • CARDIAC SURGERY  1990    triple bypass   • CORONARY ANGIOPLASTY WITH STENT PLACEMENT     • ENDOSCOPY N/A 5/26/2017    Procedure: ESOPHAGOGASTRODUODENOSCOPY WITH COLD BIOIPSIES AND BALLOON DILITATION SIZE 15, 16.5, 18;  Surgeon: Jerry SNOWDEN MD;  Location: [facility] ENDOSCOPY;  Service:    • TONSILLECTOMY       Family History   Problem Relation Age of Onset   • Depression Mother    • Colon polyps Mother    • Heart attack Father    • Aneurysm Father    •  Stomach cancer Brother      Social History     Social History   • Marital status:      Spouse name: N/A   • Number of children: N/A   • Years of education: N/A     Occupational History   • retired      Social History Main Topics   • Smoking status: Former Smoker     Types: Cigarettes   • Smokeless tobacco: Never Used   • Alcohol use No   • Drug use: No   • Sexual activity: Defer     Other Topics Concern   • Not on file     Social History Narrative       Objective   Physical Exam   Constitutional: He is oriented to person, place, and time. He appears well-developed and well-nourished.  Non-toxic appearance.   Eyes: EOM are normal. No scleral icterus.   Pulmonary/Chest: Effort normal. No respiratory distress.   Abdominal: Soft. Normal appearance. There is no tenderness. A hernia is present. Hernia confirmed positive in the ventral area (Moderately sized reducible umbilical hernia).   Neurological: He is alert and oriented to person, place, and time.   Skin: Skin is warm and dry.   Psychiatric: He has a normal mood and affect. His behavior is normal. Judgment and thought content normal.       Assessment/Plan       The encounter diagnosis was Umbilical hernia without obstruction and without gangrene.    The patient has a symptomatic umbilical hernia that is getting larger.  He has been scheduled for an umbilical hernia repair with mesh.  The patient understands the indications, alternatives, risks, and benefits of the procedure and wishes to proceed.

## 2017-10-05 ENCOUNTER — APPOINTMENT (OUTPATIENT)
Dept: PREADMISSION TESTING | Facility: HOSPITAL | Age: 77
End: 2017-10-05

## 2017-10-05 VITALS
DIASTOLIC BLOOD PRESSURE: 80 MMHG | HEART RATE: 53 BPM | WEIGHT: 208.8 LBS | HEIGHT: 72 IN | OXYGEN SATURATION: 98 % | SYSTOLIC BLOOD PRESSURE: 153 MMHG | TEMPERATURE: 96.8 F | BODY MASS INDEX: 28.28 KG/M2

## 2017-10-05 LAB
ANION GAP SERPL CALCULATED.3IONS-SCNC: 13.8 MMOL/L
BUN BLD-MCNC: 16 MG/DL (ref 8–23)
BUN/CREAT SERPL: 15 (ref 7–25)
CALCIUM SPEC-SCNC: 10 MG/DL (ref 8.6–10.5)
CHLORIDE SERPL-SCNC: 102 MMOL/L (ref 98–107)
CO2 SERPL-SCNC: 25.2 MMOL/L (ref 22–29)
CREAT BLD-MCNC: 1.07 MG/DL (ref 0.76–1.27)
DEPRECATED RDW RBC AUTO: 53.5 FL (ref 37–54)
ERYTHROCYTE [DISTWIDTH] IN BLOOD BY AUTOMATED COUNT: 14.6 % (ref 11.5–14.5)
GFR SERPL CREATININE-BSD FRML MDRD: 67 ML/MIN/1.73
GLUCOSE BLD-MCNC: 87 MG/DL (ref 65–99)
HCT VFR BLD AUTO: 45.5 % (ref 40.4–52.2)
HGB BLD-MCNC: 15 G/DL (ref 13.7–17.6)
MCH RBC QN AUTO: 32.8 PG (ref 27–32.7)
MCHC RBC AUTO-ENTMCNC: 33 G/DL (ref 32.6–36.4)
MCV RBC AUTO: 99.3 FL (ref 79.8–96.2)
PLATELET # BLD AUTO: 210 10*3/MM3 (ref 140–500)
PMV BLD AUTO: 10.9 FL (ref 6–12)
POTASSIUM BLD-SCNC: 4.8 MMOL/L (ref 3.5–5.2)
RBC # BLD AUTO: 4.58 10*6/MM3 (ref 4.6–6)
SODIUM BLD-SCNC: 141 MMOL/L (ref 136–145)
WBC NRBC COR # BLD: 7.75 10*3/MM3 (ref 4.5–10.7)

## 2017-10-05 PROCEDURE — 85027 COMPLETE CBC AUTOMATED: CPT | Performed by: SURGERY

## 2017-10-05 PROCEDURE — 36415 COLL VENOUS BLD VENIPUNCTURE: CPT

## 2017-10-05 PROCEDURE — 93010 ELECTROCARDIOGRAM REPORT: CPT | Performed by: INTERNAL MEDICINE

## 2017-10-05 PROCEDURE — 80048 BASIC METABOLIC PNL TOTAL CA: CPT | Performed by: SURGERY

## 2017-10-05 PROCEDURE — 93005 ELECTROCARDIOGRAM TRACING: CPT

## 2017-10-05 RX ORDER — RAMIPRIL 10 MG/1
10 CAPSULE ORAL EVERY EVENING
COMMUNITY
End: 2020-12-23 | Stop reason: SDUPTHER

## 2017-10-05 RX ORDER — AMLODIPINE BESYLATE 5 MG/1
5 TABLET ORAL EVERY EVENING
COMMUNITY
End: 2020-07-31

## 2017-10-05 RX ORDER — OMEGA-3-ACID ETHYL ESTERS 1 G/1
4 CAPSULE, LIQUID FILLED ORAL DAILY
COMMUNITY
End: 2020-12-23 | Stop reason: SDUPTHER

## 2017-10-05 RX ORDER — CHOLECALCIFEROL (VITAMIN D3) 125 MCG
2000 TABLET ORAL DAILY
COMMUNITY
End: 2018-12-14

## 2017-10-05 RX ORDER — ATORVASTATIN CALCIUM 80 MG/1
80 TABLET, FILM COATED ORAL NIGHTLY
COMMUNITY
End: 2020-12-23 | Stop reason: SDUPTHER

## 2017-10-05 RX ORDER — POTASSIUM CHLORIDE 750 MG/1
10 CAPSULE, EXTENDED RELEASE ORAL DAILY
COMMUNITY
End: 2020-12-23 | Stop reason: SDUPTHER

## 2017-10-05 RX ORDER — ALLOPURINOL 300 MG/1
300 TABLET ORAL DAILY
COMMUNITY
End: 2017-11-20 | Stop reason: SDUPTHER

## 2017-10-05 RX ORDER — FAMOTIDINE 20 MG/1
20 TABLET, FILM COATED ORAL EVERY EVENING
COMMUNITY
End: 2021-02-18

## 2017-10-05 RX ORDER — CARVEDILOL 3.12 MG/1
3.12 TABLET ORAL 2 TIMES DAILY WITH MEALS
COMMUNITY
End: 2020-12-23 | Stop reason: SDUPTHER

## 2017-10-05 RX ORDER — CLOPIDOGREL BISULFATE 75 MG/1
75 TABLET ORAL DAILY
COMMUNITY
End: 2018-10-23

## 2017-10-05 NOTE — DISCHARGE INSTRUCTIONS
Take the following medications the morning of surgery with a small sip of water:  COREG    Arrive to hospital on your day of surgery at 11:30 AM.    General Instructions:  • Do not eat solid food after midnight the night before surgery.  • You may drink clear liquids day of surgery but must stop at least one hour before your hospital arrival time.  • It is beneficial for you to have a clear drink that contains carbohydrates the day of surgery.  We suggest a 20 ounce bottle of Gatorade or Powerade for non-diabetic patients or a 20 ounce bottle of G2 or Powerade Zero for diabetic patients. (Pediatric patients, are not advised to drink a 20 ounce carbohydrate drink)    Clear liquids are liquids you can see through.  Nothing red in color.     Plain water                               Sports drinks  Sodas                                   Gelatin (Jell-O)  Fruit juices without pulp such as white grape juice and apple juice  Popsicles that contain no fruit or yogurt  Tea or coffee (no cream or milk added)  Gatorade / Powerade  G2 / Powerade Zero    • Infants may have breast milk up to four hours before surgery.  • Infants drinking formula may drink formula up to six hours before surgery.   • Patients who avoid smoking, chewing tobacco and alcohol for 4 weeks prior to surgery have a reduced risk of post-operative complications.  Quit smoking as many days before surgery as you can.  • Do not smoke, use chewing tobacco or drink alcohol the day of surgery.   • If applicable bring your C-PAP/ BI-PAP machine.  • Bring any papers given to you in the doctor’s office.  • Wear clean comfortable clothes and socks.  • Do not wear contact lenses or make-up.  Bring a case for your glasses.   • Bring crutches or walker if applicable.  • Leave all other valuables and jewelry at home.  • The Pre-Admission Testing nurse will instruct you to bring medications if unable to obtain an accurate list in Pre-Admission Testing.        If you were  given a blood bank ID arm band remember to bring it with you the day of surgery.    Preventing a Surgical Site Infection:  • For 2 to 3 days before surgery, avoid shaving with a razor because the razor can irritate skin and make it easier to develop an infection.  • The night prior to surgery sleep in a clean bed with clean clothing.  Do not allow pets to sleep with you.  • Shower on the morning of surgery using a fresh bar of anti-bacterial soap (such as Dial) and clean washcloth.  Dry with a clean towel and dress in clean clothing.  • Ask your surgeon if you will be receiving antibiotics prior to surgery.  • Make sure you, your family, and all healthcare providers clean their hands with soap and water or an alcohol based hand  before caring for you or your wound.    Day of surgery:  Upon arrival, a Pre-op nurse and Anesthesiologist will review your health history, obtain vital signs, and answer questions you may have.  The only belongings needed at this time will be your home medications and if applicable your C-PAP/BI-PAP machine.  If you are staying overnight your family can leave the rest of your belongings in the car and bring them to your room later.  A Pre-op nurse will start an IV and you may receive medication in preparation for surgery, including something to help you relax.  Your family will be able to see you in the Pre-op area.  While you are in surgery your family should notify the waiting room  if they leave the waiting room area and provide a contact phone number.    Please be aware that surgery does come with discomfort.  We want to make every effort to control your discomfort so please discuss any uncontrolled symptoms with your nurse.   Your doctor will most likely have prescribed pain medications.      If you are going home after surgery you will receive individualized written care instructions before being discharged.  A responsible adult must drive you to and from the  hospital on the day of your surgery and stay with you for 24 hours.    If you are staying overnight following surgery, you will be transported to your hospital room following the recovery period.  [facility] has all private rooms.    If you have any questions please call Pre-Admission Testing at [phone].  Deductibles and co-payments are collected on the day of service. Please be prepared to pay the required co-pay, deductible or deposit on the day of service as defined by your plan.

## 2017-10-09 ENCOUNTER — ANESTHESIA EVENT (OUTPATIENT)
Dept: PERIOP | Facility: HOSPITAL | Age: 77
End: 2017-10-09

## 2017-10-09 ENCOUNTER — ANESTHESIA (OUTPATIENT)
Dept: PERIOP | Facility: HOSPITAL | Age: 77
End: 2017-10-09

## 2017-10-09 ENCOUNTER — HOSPITAL ENCOUNTER (OUTPATIENT)
Facility: HOSPITAL | Age: 77
Setting detail: HOSPITAL OUTPATIENT SURGERY
Discharge: HOME | End: 2017-10-09
Attending: SURGERY | Admitting: SURGERY

## 2017-10-09 VITALS
TEMPERATURE: 97.2 F | RESPIRATION RATE: 16 BRPM | SYSTOLIC BLOOD PRESSURE: 121 MMHG | HEART RATE: 54 BPM | DIASTOLIC BLOOD PRESSURE: 79 MMHG | OXYGEN SATURATION: 98 %

## 2017-10-09 DIAGNOSIS — K42.9 UMBILICAL HERNIA WITHOUT OBSTRUCTION AND WITHOUT GANGRENE: ICD-10-CM

## 2017-10-09 PROCEDURE — 49585 PR REPAIR UMBILICAL HERN,5+Y/O,REDUC: CPT | Performed by: SURGERY

## 2017-10-09 PROCEDURE — 25010000002 PROPOFOL 10 MG/ML EMULSION: Performed by: NURSE ANESTHETIST, CERTIFIED REGISTERED

## 2017-10-09 PROCEDURE — 25010000002 SUCCINYLCHOLINE PER 20 MG: Performed by: NURSE ANESTHETIST, CERTIFIED REGISTERED

## 2017-10-09 PROCEDURE — C1781 MESH (IMPLANTABLE): HCPCS | Performed by: SURGERY

## 2017-10-09 PROCEDURE — 25010000002 ONDANSETRON PER 1 MG: Performed by: NURSE ANESTHETIST, CERTIFIED REGISTERED

## 2017-10-09 PROCEDURE — 25010000002 NEOSTIGMINE PER 0.5 MG: Performed by: NURSE ANESTHETIST, CERTIFIED REGISTERED

## 2017-10-09 PROCEDURE — 25010000002 DEXAMETHASONE PER 1 MG: Performed by: NURSE ANESTHETIST, CERTIFIED REGISTERED

## 2017-10-09 PROCEDURE — 25010000002 FENTANYL CITRATE (PF) 100 MCG/2ML SOLUTION: Performed by: NURSE ANESTHETIST, CERTIFIED REGISTERED

## 2017-10-09 PROCEDURE — 25010000002 MIDAZOLAM PER 1 MG

## 2017-10-09 PROCEDURE — 25010000002 PHENYLEPHRINE PER 1 ML: Performed by: NURSE ANESTHETIST, CERTIFIED REGISTERED

## 2017-10-09 DEVICE — VENTRALEX ST HERNIA PATCH
Type: IMPLANTABLE DEVICE | Site: ABDOMEN | Status: FUNCTIONAL
Brand: VENTRALEX ST HERNIA PATCH

## 2017-10-09 RX ORDER — PROMETHAZINE HYDROCHLORIDE 25 MG/1
12.5 TABLET ORAL ONCE AS NEEDED
Status: DISCONTINUED | OUTPATIENT
Start: 2017-10-09 | End: 2017-10-09 | Stop reason: HOSPADM

## 2017-10-09 RX ORDER — FAMOTIDINE 10 MG/ML
INJECTION, SOLUTION INTRAVENOUS
Status: COMPLETED
Start: 2017-10-09 | End: 2017-10-09

## 2017-10-09 RX ORDER — OXYCODONE AND ACETAMINOPHEN 7.5; 325 MG/1; MG/1
1 TABLET ORAL ONCE AS NEEDED
Status: COMPLETED | OUTPATIENT
Start: 2017-10-09 | End: 2017-10-09

## 2017-10-09 RX ORDER — ROCURONIUM BROMIDE 10 MG/ML
INJECTION, SOLUTION INTRAVENOUS AS NEEDED
Status: DISCONTINUED | OUTPATIENT
Start: 2017-10-09 | End: 2017-10-09 | Stop reason: SURG

## 2017-10-09 RX ORDER — PROPOFOL 10 MG/ML
VIAL (ML) INTRAVENOUS AS NEEDED
Status: DISCONTINUED | OUTPATIENT
Start: 2017-10-09 | End: 2017-10-09 | Stop reason: SURG

## 2017-10-09 RX ORDER — GLYCOPYRROLATE 0.2 MG/ML
INJECTION INTRAMUSCULAR; INTRAVENOUS AS NEEDED
Status: DISCONTINUED | OUTPATIENT
Start: 2017-10-09 | End: 2017-10-09 | Stop reason: SURG

## 2017-10-09 RX ORDER — ONDANSETRON 2 MG/ML
INJECTION INTRAMUSCULAR; INTRAVENOUS AS NEEDED
Status: DISCONTINUED | OUTPATIENT
Start: 2017-10-09 | End: 2017-10-09 | Stop reason: SURG

## 2017-10-09 RX ORDER — OXYCODONE HYDROCHLORIDE AND ACETAMINOPHEN 5; 325 MG/1; MG/1
0.5 TABLET ORAL ONCE
Status: COMPLETED | OUTPATIENT
Start: 2017-10-09 | End: 2017-10-09

## 2017-10-09 RX ORDER — FENTANYL CITRATE 50 UG/ML
50 INJECTION, SOLUTION INTRAMUSCULAR; INTRAVENOUS
Status: DISCONTINUED | OUTPATIENT
Start: 2017-10-09 | End: 2017-10-09 | Stop reason: HOSPADM

## 2017-10-09 RX ORDER — PROMETHAZINE HYDROCHLORIDE 25 MG/1
25 TABLET ORAL ONCE AS NEEDED
Status: DISCONTINUED | OUTPATIENT
Start: 2017-10-09 | End: 2017-10-09 | Stop reason: HOSPADM

## 2017-10-09 RX ORDER — FENTANYL CITRATE 50 UG/ML
INJECTION, SOLUTION INTRAMUSCULAR; INTRAVENOUS AS NEEDED
Status: DISCONTINUED | OUTPATIENT
Start: 2017-10-09 | End: 2017-10-09 | Stop reason: SURG

## 2017-10-09 RX ORDER — EPHEDRINE SULFATE 50 MG/ML
5 INJECTION, SOLUTION INTRAVENOUS ONCE AS NEEDED
Status: DISCONTINUED | OUTPATIENT
Start: 2017-10-09 | End: 2017-10-09 | Stop reason: HOSPADM

## 2017-10-09 RX ORDER — HYDRALAZINE HYDROCHLORIDE 20 MG/ML
5 INJECTION INTRAMUSCULAR; INTRAVENOUS
Status: DISCONTINUED | OUTPATIENT
Start: 2017-10-09 | End: 2017-10-09 | Stop reason: HOSPADM

## 2017-10-09 RX ORDER — DEXAMETHASONE SODIUM PHOSPHATE 10 MG/ML
INJECTION INTRAMUSCULAR; INTRAVENOUS AS NEEDED
Status: DISCONTINUED | OUTPATIENT
Start: 2017-10-09 | End: 2017-10-09 | Stop reason: SURG

## 2017-10-09 RX ORDER — MIDAZOLAM HYDROCHLORIDE 1 MG/ML
2 INJECTION INTRAMUSCULAR; INTRAVENOUS
Status: DISCONTINUED | OUTPATIENT
Start: 2017-10-09 | End: 2017-10-09 | Stop reason: HOSPADM

## 2017-10-09 RX ORDER — MAGNESIUM HYDROXIDE 1200 MG/15ML
LIQUID ORAL AS NEEDED
Status: DISCONTINUED | OUTPATIENT
Start: 2017-10-09 | End: 2017-10-09 | Stop reason: HOSPADM

## 2017-10-09 RX ORDER — OXYCODONE HYDROCHLORIDE AND ACETAMINOPHEN 5; 325 MG/1; MG/1
TABLET ORAL
Qty: 30 TABLET | Refills: 0 | Status: SHIPPED | OUTPATIENT
Start: 2017-10-09 | End: 2017-10-24

## 2017-10-09 RX ORDER — FAMOTIDINE 10 MG/ML
20 INJECTION, SOLUTION INTRAVENOUS ONCE
Status: COMPLETED | OUTPATIENT
Start: 2017-10-09 | End: 2017-10-09

## 2017-10-09 RX ORDER — BUPIVACAINE HYDROCHLORIDE AND EPINEPHRINE 5; 5 MG/ML; UG/ML
INJECTION, SOLUTION PERINEURAL AS NEEDED
Status: DISCONTINUED | OUTPATIENT
Start: 2017-10-09 | End: 2017-10-09 | Stop reason: HOSPADM

## 2017-10-09 RX ORDER — PROMETHAZINE HYDROCHLORIDE 25 MG/ML
12.5 INJECTION, SOLUTION INTRAMUSCULAR; INTRAVENOUS ONCE AS NEEDED
Status: DISCONTINUED | OUTPATIENT
Start: 2017-10-09 | End: 2017-10-09 | Stop reason: HOSPADM

## 2017-10-09 RX ORDER — PROMETHAZINE HYDROCHLORIDE 25 MG/1
25 SUPPOSITORY RECTAL ONCE AS NEEDED
Status: DISCONTINUED | OUTPATIENT
Start: 2017-10-09 | End: 2017-10-09 | Stop reason: HOSPADM

## 2017-10-09 RX ORDER — SODIUM CHLORIDE 0.9 % (FLUSH) 0.9 %
1-10 SYRINGE (ML) INJECTION AS NEEDED
Status: DISCONTINUED | OUTPATIENT
Start: 2017-10-09 | End: 2017-10-09 | Stop reason: HOSPADM

## 2017-10-09 RX ORDER — FLUMAZENIL 0.1 MG/ML
0.2 INJECTION INTRAVENOUS AS NEEDED
Status: DISCONTINUED | OUTPATIENT
Start: 2017-10-09 | End: 2017-10-09 | Stop reason: HOSPADM

## 2017-10-09 RX ORDER — HYDROCODONE BITARTRATE AND ACETAMINOPHEN 7.5; 325 MG/1; MG/1
1 TABLET ORAL ONCE AS NEEDED
Status: DISCONTINUED | OUTPATIENT
Start: 2017-10-09 | End: 2017-10-09 | Stop reason: HOSPADM

## 2017-10-09 RX ORDER — NALOXONE HCL 0.4 MG/ML
0.2 VIAL (ML) INJECTION AS NEEDED
Status: DISCONTINUED | OUTPATIENT
Start: 2017-10-09 | End: 2017-10-09 | Stop reason: HOSPADM

## 2017-10-09 RX ORDER — MIDAZOLAM HYDROCHLORIDE 1 MG/ML
1 INJECTION INTRAMUSCULAR; INTRAVENOUS
Status: DISCONTINUED | OUTPATIENT
Start: 2017-10-09 | End: 2017-10-09 | Stop reason: HOSPADM

## 2017-10-09 RX ORDER — HYDROMORPHONE HYDROCHLORIDE 1 MG/ML
0.5 INJECTION, SOLUTION INTRAMUSCULAR; INTRAVENOUS; SUBCUTANEOUS
Status: DISCONTINUED | OUTPATIENT
Start: 2017-10-09 | End: 2017-10-09 | Stop reason: HOSPADM

## 2017-10-09 RX ORDER — SODIUM CHLORIDE, SODIUM LACTATE, POTASSIUM CHLORIDE, CALCIUM CHLORIDE 600; 310; 30; 20 MG/100ML; MG/100ML; MG/100ML; MG/100ML
9 INJECTION, SOLUTION INTRAVENOUS CONTINUOUS
Status: DISCONTINUED | OUTPATIENT
Start: 2017-10-09 | End: 2017-10-09 | Stop reason: HOSPADM

## 2017-10-09 RX ORDER — SUCCINYLCHOLINE CHLORIDE 20 MG/ML
INJECTION INTRAMUSCULAR; INTRAVENOUS AS NEEDED
Status: DISCONTINUED | OUTPATIENT
Start: 2017-10-09 | End: 2017-10-09 | Stop reason: SURG

## 2017-10-09 RX ORDER — ONDANSETRON 2 MG/ML
4 INJECTION INTRAMUSCULAR; INTRAVENOUS ONCE AS NEEDED
Status: DISCONTINUED | OUTPATIENT
Start: 2017-10-09 | End: 2017-10-09 | Stop reason: HOSPADM

## 2017-10-09 RX ORDER — CEFAZOLIN SODIUM 2 G/100ML
2 INJECTION, SOLUTION INTRAVENOUS ONCE
Status: DISCONTINUED | OUTPATIENT
Start: 2017-10-09 | End: 2017-10-09 | Stop reason: HOSPADM

## 2017-10-09 RX ORDER — MIDAZOLAM HYDROCHLORIDE 1 MG/ML
INJECTION INTRAMUSCULAR; INTRAVENOUS
Status: COMPLETED
Start: 2017-10-09 | End: 2017-10-09

## 2017-10-09 RX ORDER — LABETALOL HYDROCHLORIDE 5 MG/ML
5 INJECTION, SOLUTION INTRAVENOUS
Status: DISCONTINUED | OUTPATIENT
Start: 2017-10-09 | End: 2017-10-09 | Stop reason: HOSPADM

## 2017-10-09 RX ORDER — DIPHENHYDRAMINE HYDROCHLORIDE 50 MG/ML
12.5 INJECTION INTRAMUSCULAR; INTRAVENOUS
Status: DISCONTINUED | OUTPATIENT
Start: 2017-10-09 | End: 2017-10-09 | Stop reason: HOSPADM

## 2017-10-09 RX ADMIN — ONDANSETRON 4 MG: 2 INJECTION INTRAMUSCULAR; INTRAVENOUS at 12:53

## 2017-10-09 RX ADMIN — PROPOFOL 120 MG: 10 INJECTION, EMULSION INTRAVENOUS at 12:18

## 2017-10-09 RX ADMIN — PHENYLEPHRINE HYDROCHLORIDE 100 MCG: 10 INJECTION INTRAVENOUS at 12:35

## 2017-10-09 RX ADMIN — PHENYLEPHRINE HYDROCHLORIDE 100 MCG: 10 INJECTION INTRAVENOUS at 12:30

## 2017-10-09 RX ADMIN — DEXAMETHASONE SODIUM PHOSPHATE 8 MG: 10 INJECTION INTRAMUSCULAR; INTRAVENOUS at 12:29

## 2017-10-09 RX ADMIN — MIDAZOLAM 1 MG: 1 INJECTION INTRAMUSCULAR; INTRAVENOUS at 12:06

## 2017-10-09 RX ADMIN — GLYCOPYRROLATE 0.4 MG: 0.2 INJECTION INTRAMUSCULAR; INTRAVENOUS at 12:53

## 2017-10-09 RX ADMIN — SODIUM CHLORIDE, POTASSIUM CHLORIDE, SODIUM LACTATE AND CALCIUM CHLORIDE 9 ML/HR: 600; 310; 30; 20 INJECTION, SOLUTION INTRAVENOUS at 12:04

## 2017-10-09 RX ADMIN — MIDAZOLAM HYDROCHLORIDE 1 MG: 1 INJECTION INTRAMUSCULAR; INTRAVENOUS at 12:06

## 2017-10-09 RX ADMIN — OXYCODONE HYDROCHLORIDE AND ACETAMINOPHEN 1 TABLET: 7.5; 325 TABLET ORAL at 14:05

## 2017-10-09 RX ADMIN — ROCURONIUM BROMIDE 5 MG: 10 INJECTION INTRAVENOUS at 12:18

## 2017-10-09 RX ADMIN — OXYCODONE HYDROCHLORIDE AND ACETAMINOPHEN 0.5 TABLET: 5; 325 TABLET ORAL at 15:30

## 2017-10-09 RX ADMIN — FAMOTIDINE 20 MG: 10 INJECTION INTRAVENOUS at 12:05

## 2017-10-09 RX ADMIN — FENTANYL CITRATE 50 MCG: 50 INJECTION INTRAMUSCULAR; INTRAVENOUS at 12:30

## 2017-10-09 RX ADMIN — SUCCINYLCHOLINE CHLORIDE 100 MG: 20 INJECTION, SOLUTION INTRAMUSCULAR; INTRAVENOUS; PARENTERAL at 12:18

## 2017-10-09 RX ADMIN — FAMOTIDINE 20 MG: 10 INJECTION, SOLUTION INTRAVENOUS at 12:05

## 2017-10-09 RX ADMIN — ROCURONIUM BROMIDE 15 MG: 10 INJECTION INTRAVENOUS at 12:26

## 2017-10-09 RX ADMIN — NEOSTIGMINE METHYLSULFATE 2 MG: 1 INJECTION INTRAMUSCULAR; INTRAVENOUS; SUBCUTANEOUS at 12:53

## 2017-10-09 NOTE — PLAN OF CARE
Problem: Patient Care Overview (Adult)  Goal: Plan of Care Review  Outcome: Outcome(s) achieved Date Met:  10/09/17    10/09/17 1619   Coping/Psychosocial Response Interventions   Plan Of Care Reviewed With patient;spouse   Patient Care Overview   Progress progress toward functional goals as expected   Outcome Evaluation   Outcome Summary/Follow up Plan ready for discharge       Goal: Adult Individualization and Mutuality  Outcome: Outcome(s) achieved Date Met:  10/09/17  Goal: Discharge Needs Assessment  Outcome: Outcome(s) achieved Date Met:  10/09/17    10/09/17 1619   Discharge Needs Assessment   Concerns To Be Addressed no discharge needs identified         Problem: Perioperative Period (Adult)  Goal: Signs and Symptoms of Listed Potential Problems Will be Absent or Manageable (Perioperative Period)  Outcome: Outcome(s) achieved Date Met:  10/09/17    10/09/17 1619   Perioperative Period   Problems Assessed (Perioperative Period) all   Problems Present (Perioperative Period) other (see comments)  (pt discharged with mild lip swelling. see notes)

## 2017-10-09 NOTE — PERIOPERATIVE NURSING NOTE
Dr. Morris notified that patient has swelling to lips. He is at bedside to evaluate pt. Dr. Morris believes the lips are swollen due to oral airway use during the procedure. Orders received to administer 2.5 mg percocet and apply ice pack to lips. Nurse to call Dr. Morris at 16:00 to reevaluate pt.

## 2017-10-09 NOTE — H&P (VIEW-ONLY)
Subjective   Javier Grant is a 77 y.o. male who presents to the office in surgical consultation from Stanton Sotomayor MD for an umbilical hernia.    History of Present Illness     The patient has developed a bulge just above the umbilicus that is slowly getting larger and increasingly symptomatic.  He has pain with activity and that pain is progressively worsening.  He has had no change in his bowel or bladder function.    Review of Systems   Constitutional: Negative for activity change, appetite change, fatigue and fever.   HENT: Negative for trouble swallowing and voice change.    Respiratory: Negative for chest tightness and shortness of breath.    Cardiovascular: Negative for chest pain and palpitations.   Gastrointestinal: Positive for abdominal pain. Negative for blood in stool, constipation, diarrhea, nausea and vomiting.   Endocrine: Negative for cold intolerance and heat intolerance.   Genitourinary: Negative for dysuria and flank pain.   Neurological: Negative for dizziness and light-headedness.   Hematological: Negative for adenopathy. Does not bruise/bleed easily.   Psychiatric/Behavioral: Negative for agitation and confusion.     Past Medical History:   Diagnosis Date   • Arthritis    • CAD (coronary artery disease)    • Colon polyp    • Gastritis    • Gout    • Hyperlipidemia    • Hypertension    • Myocardial infarction    • Sleep apnea    • Umbilical hernia    • Vitamin D deficiency      Past Surgical History:   Procedure Laterality Date   • CARDIAC SURGERY  1990    triple bypass   • CORONARY ANGIOPLASTY WITH STENT PLACEMENT     • ENDOSCOPY N/A 5/26/2017    Procedure: ESOPHAGOGASTRODUODENOSCOPY WITH COLD BIOIPSIES AND BALLOON DILITATION SIZE 15, 16.5, 18;  Surgeon: Jerry SNOWDEN MD;  Location: [facility] ENDOSCOPY;  Service:    • TONSILLECTOMY       Family History   Problem Relation Age of Onset   • Depression Mother    • Colon polyps Mother    • Heart attack Father    • Aneurysm Father    •  Stomach cancer Brother      Social History     Social History   • Marital status:      Spouse name: N/A   • Number of children: N/A   • Years of education: N/A     Occupational History   • retired      Social History Main Topics   • Smoking status: Former Smoker     Types: Cigarettes   • Smokeless tobacco: Never Used   • Alcohol use No   • Drug use: No   • Sexual activity: Defer     Other Topics Concern   • Not on file     Social History Narrative       Objective   Physical Exam   Constitutional: He is oriented to person, place, and time. He appears well-developed and well-nourished.  Non-toxic appearance.   Eyes: EOM are normal. No scleral icterus.   Pulmonary/Chest: Effort normal. No respiratory distress.   Abdominal: Soft. Normal appearance. There is no tenderness. A hernia is present. Hernia confirmed positive in the ventral area (Moderately sized reducible umbilical hernia).   Neurological: He is alert and oriented to person, place, and time.   Skin: Skin is warm and dry.   Psychiatric: He has a normal mood and affect. His behavior is normal. Judgment and thought content normal.       Assessment/Plan       The encounter diagnosis was Umbilical hernia without obstruction and without gangrene.    The patient has a symptomatic umbilical hernia that is getting larger.  He has been scheduled for an umbilical hernia repair with mesh.  The patient understands the indications, alternatives, risks, and benefits of the procedure and wishes to proceed.

## 2017-10-09 NOTE — ANESTHESIA PROCEDURE NOTES
Airway  Urgency: elective    Date/Time: 10/9/2017 12:19 PM  Airway not difficult    General Information and Staff    Patient location during procedure: OR  Anesthesiologist: SORAIDA BOBBY  CRNA: MISHA GRIFFITH    Indications and Patient Condition  Indications for airway management: airway protection    Preoxygenated: yes  Mask difficulty assessment: 1 - vent by mask    Final Airway Details  Final airway type: endotracheal airway      Successful airway: ETT  Cuffed: yes   Successful intubation technique: direct laryngoscopy  Facilitating devices/methods: intubating stylet  Blade: Bajwa  Blade size: #2  ETT size: 8.0 mm  Cormack-Lehane Classification: grade I - full view of glottis  Placement verified by: chest auscultation and capnometry   Cuff volume (mL): 7  Measured from: lips  ETT to lips (cm): 22  Number of attempts at approach: 1

## 2017-10-09 NOTE — NURSING NOTE
Patient arrived to OR room with dentures in, removed and placed in denture cup. Returned with patient to recovery room.

## 2017-10-09 NOTE — ANESTHESIA POSTPROCEDURE EVALUATION
Patient: Javier Grant    Procedure Summary     Date Anesthesia Start Anesthesia Stop Room / Location    10/09/17 1212 1316  [facility] / [facility]       Procedure Diagnosis Surgeon Provider    UMBILICAL HERNIA REPAIR (N/A Abdomen) Umbilical hernia without obstruction and without gangrene  (Umbilical hernia without obstruction and without gangrene [K42.9]) MD Shelton Sky Jr., MD          Anesthesia Type: general  Last vitals  BP   141/81 (10/09/17 1445)    Temp   36.2 °C (97.2 °F) (10/09/17 1430)    Pulse   54 (10/09/17 1445)   Resp   16 (10/09/17 1445)    SpO2   99 % (10/09/17 1445)      Post Anesthesia Care and Evaluation    Patient location during evaluation: PACU  Patient participation: complete - patient participated  Level of consciousness: awake and alert  Pain management: adequate  Airway patency: patent  Anesthetic complications: No anesthetic complications    Cardiovascular status: acceptable  Respiratory status: acceptable  Hydration status: acceptable    Comments: --------------------            10/09/17               1445     --------------------   BP:       141/81     Pulse:      54       Resp:       16       Temp:                SpO2:      99%      --------------------

## 2017-10-09 NOTE — ANESTHESIA PREPROCEDURE EVALUATION
Anesthesia Evaluation     no history of anesthetic complications:         Airway   Mallampati: II  no difficulty expected  Dental    (+) lower dentures and upper dentures    Pulmonary - normal exam   (+) a smoker Former, shortness of breath, sleep apnea,   (-) COPD, asthma    PE comment: nonlabored  Cardiovascular - normal exam    Rhythm: regular  Rate: normal    (+) hypertension well controlled, valvular problems/murmurs (non-rheumatic AV stenosis-->moderate) AS, past MI , CAD, CABG (LV EF 52%), cardiac stents (2yrsago) dysrhythmias (RAFB), PVD, hyperlipidemia  (-) angina      Neuro/Psych- negative ROS  (-) seizures, TIA, CVA  GI/Hepatic/Renal/Endo    (+)  hiatal hernia,   (-) GERD, liver disease, diabetes, hypothyroidism, hyperthyroidism    Musculoskeletal     (+) arthralgias,   Abdominal    Substance History      OB/GYN          Other   (+) arthritis                                   Anesthesia Plan    ASA 3     general     intravenous induction   Anesthetic plan and risks discussed with patient.

## 2017-10-09 NOTE — DISCHARGE INSTRUCTIONS
Dr. Blake Kelly and Dr. Ayush Reynolds  [address]  [place] [address]  [phone]    Discharge Instructions for Hernia Surgery      1. Go home, rest and take it easy today; however, you should get up and move about several times today to reduce the risk of developing a clot in your legs.      2. You may experience some dizziness or memory loss from the anesthesia.  This may last for the next 24 hours.  Someone should plan on staying with you for the first 24 hours for your safety.    3. Do not make any important legal decisions or sign any legal papers for the next 24 hours.      4. Eat and drink lightly today.  Start off with liquids, jello, soup, crackers or other bland foods at first. You may advance your diet tomorrow as tolerated as long as you do not experience any nausea or vomiting.     5. You may remove your outer dressings in 2 days.  The white tapes called steri-strips should stay in place.  They will fall off on their own in 1-2 weeks.  Do not worry if they come off sooner.      6. You may notice some bleeding/drainage on your outer dressings. A little bloody drainage is normal. If the bleeding/drainage is such that the bandage cannot absorb it, remove the dressing, apply clean gauze and apply firm pressure for a full 15 minutes.  If the bleeding continues, please call me.    7. You may shower tomorrow.  No tub baths until your incisions are completely healed.     8. No lifting > 20 lbs. until you are seen at your follow-up visit.         9. You have received a prescription for a narcotic pain medicine, as you will have some pain following surgery.   You will not be totally pain free, but your pain medicine should make the pain tolerable.  Please take your pain medicine as prescribed and always take your pills with food to prevent nausea. If you are having severe pain that cannot be controlled by the pain medicine, please contact me.      10. If you had a laparoscopic surgery, it is  not unusual to experience pain/discomfort in your shoulders or under your ribs after surgery.  It is from the gas used during the laparoscopic procedure and usually lasts 1-3 days.  The prescription pain medicine is used to treat the surgical pain and does not typically alleviate this “gassy” pain.     11. No driving for 24 hours and for as long as you are taking your prescription pain medicine.      12. You will need to call the office at [phone] to schedule a follow-up appointment in 2 weeks.           13. Remember to contact me for any of the following:    • Fever > 101 degrees  • Severe pain that cannot be controlled by taking your pain pills  • Severe nausea or vomiting   • Significant bleeding of your incisions  • Drainage that has a bad smell or is yellow or green in appearance  • Any other questions or concerns

## 2017-10-09 NOTE — PLAN OF CARE
Problem: Patient Care Overview (Adult)  Goal: Plan of Care Review  Outcome: Ongoing (interventions implemented as appropriate)    10/09/17 1158   Coping/Psychosocial Response Interventions   Plan Of Care Reviewed With patient   Patient Care Overview   Progress no change       Goal: Adult Individualization and Mutuality  Outcome: Ongoing (interventions implemented as appropriate)    10/09/17 1158   Individualization   Patient Specific Preferences PT GOES BY RODGER.       Goal: Discharge Needs Assessment  Outcome: Ongoing (interventions implemented as appropriate)    Problem: Perioperative Period (Adult)  Goal: Signs and Symptoms of Listed Potential Problems Will be Absent or Manageable (Perioperative Period)  Outcome: Ongoing (interventions implemented as appropriate)    10/09/17 1158   Perioperative Period   Problems Assessed (Perioperative Period) pain;infection   Problems Present (Perioperative Period) none

## 2017-10-09 NOTE — PLAN OF CARE
Problem: Patient Care Overview (Adult)  Goal: Plan of Care Review  Outcome: Ongoing (interventions implemented as appropriate)    10/09/17 1431   Coping/Psychosocial Response Interventions   Plan Of Care Reviewed With patient   Patient Care Overview   Progress improving   Outcome Evaluation   Outcome Summary/Follow up Plan VSS, Dressing CDI, pain level 2, tolerating ginger ale and crackers and first pain pill- pt ready to transport to phase 2         Problem: Perioperative Period (Adult)  Goal: Signs and Symptoms of Listed Potential Problems Will be Absent or Manageable (Perioperative Period)  Outcome: Ongoing (interventions implemented as appropriate)

## 2017-10-11 ENCOUNTER — APPOINTMENT (OUTPATIENT)
Dept: SLEEP MEDICINE | Facility: HOSPITAL | Age: 77
End: 2017-10-11

## 2017-10-24 ENCOUNTER — OFFICE VISIT (OUTPATIENT)
Dept: SURGERY | Facility: CLINIC | Age: 77
End: 2017-10-24

## 2017-10-24 VITALS
DIASTOLIC BLOOD PRESSURE: 78 MMHG | HEART RATE: 56 BPM | OXYGEN SATURATION: 98 % | WEIGHT: 208.2 LBS | HEIGHT: 71 IN | SYSTOLIC BLOOD PRESSURE: 123 MMHG | BODY MASS INDEX: 29.15 KG/M2

## 2017-10-24 DIAGNOSIS — Z48.89 POSTOPERATIVE VISIT: Primary | ICD-10-CM

## 2017-10-24 PROCEDURE — 99024 POSTOP FOLLOW-UP VISIT: CPT | Performed by: SURGERY

## 2017-10-24 NOTE — PROGRESS NOTES
Rosetta Grant is a 77 y.o. male who returns to the office after undergoing an umbilical hernia repair with mesh on 10/9/2017.     History of Present Illness     The patient is recovering well with no significant postop symptoms.  He is having no abdominal pain.  He has a good appetite and normal bowel function.  His energy level is good.      Review of Systems   Constitutional: Negative for activity change, appetite change, fatigue and fever.   Respiratory: Negative for chest tightness and shortness of breath.    Cardiovascular: Negative for chest pain and palpitations.   Gastrointestinal: Negative for abdominal pain, constipation, diarrhea and nausea.   Skin: Negative for rash and wound.   Psychiatric/Behavioral: Negative for agitation and confusion.       Objective   Physical Exam   Constitutional:  Non-toxic appearance. He does not appear ill. No distress.   Pulmonary/Chest: Effort normal. No respiratory distress.   Abdominal: Soft. Normal appearance. There is no tenderness.   Neurological: He is alert.   Skin:   Incision: intact with no evidence of infection.   Psychiatric: He has a normal mood and affect. His behavior is normal.       Assessment/Plan   The encounter diagnosis was Postoperative visit.    The patient is recovering well from his umbilical hernia repair with mesh.  He was advised to avoid heavy lifting for another 1 month.  He will follow-up on an as-needed basis.

## 2017-11-20 ENCOUNTER — OFFICE VISIT (OUTPATIENT)
Dept: SLEEP MEDICINE | Facility: HOSPITAL | Age: 77
End: 2017-11-20
Attending: INTERNAL MEDICINE

## 2017-11-20 VITALS
HEART RATE: 55 BPM | WEIGHT: 209 LBS | SYSTOLIC BLOOD PRESSURE: 153 MMHG | DIASTOLIC BLOOD PRESSURE: 70 MMHG | BODY MASS INDEX: 29.26 KG/M2 | HEIGHT: 71 IN

## 2017-11-20 DIAGNOSIS — Z99.89 OSA ON CPAP: Primary | Chronic | ICD-10-CM

## 2017-11-20 DIAGNOSIS — I10 ESSENTIAL HYPERTENSION: ICD-10-CM

## 2017-11-20 DIAGNOSIS — E66.9 OBESITY (BMI 30-39.9): ICD-10-CM

## 2017-11-20 DIAGNOSIS — G47.14 HYPERSOMNIA DUE TO MEDICAL CONDITION: Chronic | ICD-10-CM

## 2017-11-20 DIAGNOSIS — G47.33 OSA ON CPAP: Primary | Chronic | ICD-10-CM

## 2017-11-20 PROCEDURE — G0463 HOSPITAL OUTPT CLINIC VISIT: HCPCS

## 2017-11-20 RX ORDER — ALLOPURINOL 300 MG/1
300 TABLET ORAL DAILY
Qty: 90 TABLET | Refills: 1 | Status: SHIPPED | OUTPATIENT
Start: 2017-11-20 | End: 2018-05-19 | Stop reason: SDUPTHER

## 2018-05-21 RX ORDER — ALLOPURINOL 300 MG/1
TABLET ORAL
Qty: 90 TABLET | Refills: 1 | Status: SHIPPED | OUTPATIENT
Start: 2018-05-21 | End: 2018-11-17 | Stop reason: SDUPTHER

## 2018-07-04 ENCOUNTER — HOSPITAL ENCOUNTER (EMERGENCY)
Facility: HOSPITAL | Age: 78
Discharge: HOME | End: 2018-07-04
Attending: EMERGENCY MEDICINE | Admitting: EMERGENCY MEDICINE

## 2018-07-04 VITALS
TEMPERATURE: 98.4 F | RESPIRATION RATE: 18 BRPM | HEIGHT: 71 IN | HEART RATE: 69 BPM | BODY MASS INDEX: 28.7 KG/M2 | OXYGEN SATURATION: 98 % | SYSTOLIC BLOOD PRESSURE: 141 MMHG | WEIGHT: 205 LBS | DIASTOLIC BLOOD PRESSURE: 84 MMHG

## 2018-07-04 DIAGNOSIS — R04.0 EPISTAXIS: Primary | ICD-10-CM

## 2018-07-04 PROCEDURE — 99283 EMERGENCY DEPT VISIT LOW MDM: CPT

## 2018-07-04 RX ADMIN — PHENYLEPHRINE HYDROCHLORIDE 2 SPRAY: 0.5 SPRAY NASAL at 14:11

## 2018-07-04 NOTE — DISCHARGE INSTRUCTIONS
Use saline nasal spray 2 times daily and lanolin daily for 1 week then as needed to keep nose moist  Little Noses or Ocean Nasal Spray are both options for the saline nasal spray  Drink plenty of fluids  Avoid blowing or picking nose. Do not stick anything in the nose.   If bleeding resumes, apply afrin spray to the nose and clamp for 20 minutes. If bleeding does not stop or is severe, go to the emergency department  Follow up with your ENT doctor   Return if worse or new concerns

## 2018-07-04 NOTE — ED PROVIDER NOTES
EMERGENCY DEPARTMENT ENCOUNTER    CHIEF COMPLAINT  Chief Complaint: Epistaxis  History given by:Patient  History limited by:None  Room Number: 04/04  PMD: Stanton Sotomayor MD      HPI:  Pt is a 78 y.o. male who presents w/ cc of epistaxis onset 2 hr pta. It began on the right side and then began bleeding on the left side as well. He tried afrin and clamping it without success. Hx of frequent epistaxis, takes plavix and asp. Dr Macdonald is his ENT. No additional symptoms. No syncope, weakness or dizziness.     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 10/29/2016   • Nonrheumatic aortic valve stenosis 11/16/2016   • Nonrheumatic aortic valve insufficiency 11/16/2016   • Cor pulmonale (CMS/HCC) 11/16/2016   • Umbilical hernia without obstruction and without gangrene 09/26/2017   • Obesity (BMI 30-39.9) 11/20/2017     Resolved Ambulatory Problems     Diagnosis Date Noted   • No Resolved Ambulatory Problems     Past Medical History:   Diagnosis Date   • Arthritis    • Bleeds easily (CMS/HCC)    • CAD (coronary artery disease)    • Colon polyp    • Enlarged prostate    • Gastritis    • Gout    • Hiatal hernia    • History of MI (myocardial infarction)    • Hyperlipidemia    • Hypertension    • Sleep apnea    • Umbilical hernia    • Vitamin D deficiency        PAST SURGICAL HISTORY  Past Surgical History:   Procedure Laterality Date   • CARDIAC SURGERY  1990    triple bypass   • CATARACT EXTRACTION Bilateral    • CORONARY ANGIOPLASTY WITH STENT PLACEMENT  2015   • CORONARY ARTERY BYPASS GRAFT  1990    3 VESSELS   •  ENDOSCOPY N/A 5/26/2017    Procedure: ESOPHAGOGASTRODUODENOSCOPY WITH COLD BIOIPSIES AND BALLOON DILITATION SIZE 15, 16.5, 18;  Surgeon: Jerry SNOWDEN MD;  Location: [facility] ENDOSCOPY;  Service:    • MCCOY'S NEUROMA EXCISION Right    • TONSILLECTOMY     • UMBILICAL HERNIA REPAIR N/A 10/9/2017    Procedure: UMBILICAL HERNIA REPAIR;  Surgeon: Blake Kelly Jr., MD;  Location: [facility] MAIN OR;  Service:        FAMILY HISTORY  Family History   Problem Relation Age of Onset   • Depression Mother    • Colon polyps Mother    • Heart attack Father    • Aneurysm Father    • Stomach cancer Brother    • Malig Hyperthermia Neg Hx        SOCIAL HISTORY  Social History     Social History   • Marital status:      Spouse name: N/A   • Number of children: N/A   • Years of education: N/A     Occupational History   • retired      Social History Main Topics   • Smoking status: Former Smoker     Packs/day: 2.00     Years: 20.00     Types: Cigarettes     Quit date: 1980   • Smokeless tobacco: Never Used   • Alcohol use No   • Drug use: No   • Sexual activity: Defer     Other Topics Concern   • Not on file     Social History Narrative   • No narrative on file       ALLERGIES  Ciprofloxacin    REVIEW OF SYSTEMS  Review of Systems   Constitutional: Negative for fatigue and fever.   HENT: Positive for nosebleeds.    Respiratory: Negative for cough, shortness of breath and wheezing.    Cardiovascular: Negative for chest pain.   Gastrointestinal: Negative for abdominal pain, diarrhea, nausea and vomiting.   Genitourinary: Negative for dysuria, frequency and urgency.   Musculoskeletal: Negative for arthralgias, back pain and myalgias.   Skin: Negative for rash.   Neurological: Negative for dizziness, syncope, weakness and headaches.   Psychiatric/Behavioral: Negative for confusion and self-injury. The patient is not nervous/anxious.        PHYSICAL EXAM  ED Triage Vitals   Temp Heart Rate Resp BP SpO2   07/04/18 1415 07/04/18 1400  "07/04/18 1400 07/04/18 1415 07/04/18 1400   98.4 °F (36.9 °C) 69 18 141/84 98 %      Temp src Heart Rate Source Patient Position BP Location FiO2 (%)   07/04/18 1415 07/04/18 1400 07/04/18 1415 07/04/18 1415 --   Tympanic Monitor Sitting Right arm        Physical Exam   Constitutional: He is well-developed, well-nourished, and in no distress.   HENT:   Head: Normocephalic and atraumatic.   Blood in bilateral nares, slowly oozing. Blood present in posterior OP   Eyes: Pupils are equal, round, and reactive to light.   Neck: Neck supple.   Cardiovascular: Normal rate and regular rhythm.    Pulmonary/Chest: Effort normal and breath sounds normal.         PROCEDURES      COURSE & MEDICAL DECISION MAKING  Pertinent Labs and Imaging studies that were ordered and reviewed are noted above.  Results were reviewed/discussed with the patient and they were also made aware of online assess.  Pt also made aware that some labs, such as cultures, will not be resulted during ER visit and follow up with PMD is necessary.       PROGRESS AND CONSULTS    Progress Notes:  1400 Discussed pt with Dr Jacob.  1410 Dr Jacob at bedside   1435 afrin applied, nose clamped.  1500. No s/s bleeding.   1515 no bleeding. Pt awake, alert. No distress or current complaints. Discussed plan for discharge. Continue Lanolin as ENT recommends, may also use saline nasal spray. Recommend using both for the next few days to 1 week and f/u with ENT. Return for bleeding. He states understanding.       MEDICATIONS GIVEN IN ER  Medications   phenylephrine (MOE-SYNEPHRINE) 0.5 % nasal spray 2 spray (2 sprays Nasal Given by Other 7/4/18 1411)       /84 (BP Location: Right arm, Patient Position: Sitting)   Pulse 69   Temp 98.4 °F (36.9 °C) (Tympanic)   Resp 18   Ht 180.3 cm (71\")   Wt 93 kg (205 lb)   SpO2 98%   BMI 28.59 kg/m²       DIAGNOSIS  Final diagnoses:   Epistaxis       FOLLOW UP   Stanton Sotomayor Jr., MD  [address] [place] [address] " [id number]  [phone]            RX     Medication List      No changes were made to your prescriptions during this visit.          Anamika Huitron, APRN  07/04/18 1689

## 2018-07-04 NOTE — ED PROVIDER NOTES
Pt presents to the ED c/o epistaxis which began on the R side and then began bleeding on the L side for the past 2 hours. Pt reports he tried to control the bleeding with a clamp without relief. Pt reports hx of epistaxis and states he is anticoagulated on plavix and ASA.  Pt f/u with ENT.  Pt has no other complaints at this time.     On exam, pt's heart was RRR and lungs are CTAB.  Pt's nose has small amount dried blood to R nostril, scant amount L nostril.  Pt's mouth has minimal blood to his posterior oropharynx.  Pt's neck is supple with no lymphadenopathy.    Plan to use nancy-synephrine drops and clamp and reassess to evaluate if nasal packing is required if bleeding persists.    MD ATTESTATION NOTE    The MATT and I have discussed this patient's history, physical exam, and treatment plan.  I have reviewed the documentation and personally had a face to face interaction with the patient. I affirm the documentation and agree with the treatment and plan.  The attached note describes my personal findings.      Documentation assistance provided by fiordaliza Adames for Dr. Jacob Information recorded by the fiordaliza was done at my direction and has been verified and validated by me.     Maine Adames  07/04/18 7044       Bala Jacob MD  07/04/18 6097

## 2018-07-04 NOTE — ED NOTES
Went in to pt's room to complete triage. Pt not in room. No personal belongings noted in room. Discharge papers placed at triage in case pt returns.      Isamar Regan RN  07/04/18 9558

## 2018-07-06 ENCOUNTER — TELEPHONE (OUTPATIENT)
Dept: SOCIAL WORK | Facility: HOSPITAL | Age: 78
End: 2018-07-06

## 2018-10-23 ENCOUNTER — HOSPITAL ENCOUNTER (EMERGENCY)
Facility: HOSPITAL | Age: 78
Discharge: HOME | End: 2018-10-23
Attending: EMERGENCY MEDICINE | Admitting: EMERGENCY MEDICINE

## 2018-10-23 VITALS
DIASTOLIC BLOOD PRESSURE: 85 MMHG | BODY MASS INDEX: 28.98 KG/M2 | OXYGEN SATURATION: 97 % | WEIGHT: 207 LBS | SYSTOLIC BLOOD PRESSURE: 140 MMHG | HEART RATE: 60 BPM | TEMPERATURE: 96.6 F | HEIGHT: 71 IN | RESPIRATION RATE: 16 BRPM

## 2018-10-23 DIAGNOSIS — R04.0 EPISTAXIS: Primary | ICD-10-CM

## 2018-10-23 PROCEDURE — 99283 EMERGENCY DEPT VISIT LOW MDM: CPT

## 2018-10-23 RX ORDER — AMOXICILLIN 875 MG/1
875 TABLET, COATED ORAL 2 TIMES DAILY
Qty: 10 TABLET | Refills: 0 | Status: SHIPPED | OUTPATIENT
Start: 2018-10-23 | End: 2018-12-14

## 2018-10-23 RX ADMIN — COCAINE HYDROCHLORIDE: 40 SOLUTION TOPICAL at 09:41

## 2018-10-23 NOTE — ED PROVIDER NOTES
EMERGENCY DEPARTMENT ENCOUNTER    CHIEF COMPLAINT  Chief Complaint: epistaxis  History given by: patient  History limited by: nothing  Room Number: 12/12  PMD: Stanton Sotomayor Jr., MD      HPI:  Pt is a 78 y.o. male who presents complaining of a nosebleed that began last night. Pt was able to get it to stop before he went to bed last night by using afrin and applying pressure. However, when he woke up this morning, it began again. Afrin did not stop the bleeding at this time so he came to the ER. He is bleeding from both nares, however it is worse on the right side. Pt was in the ER for a nosebleed in July but did not follow up with an ENT at that time. He began using nasal saline after his nosebleed and had only one or two moderate nosebleeds between now and then. Pt denies fever, CP, and SOA. He takes plavix and ASA daily but he did not take his plavix last night.     Duration/Onset/Timing: several hours, sudden, constant  Location: bilateral nares, right greater than left  Radiation: none  Quality: bleeding  Intensity/Severity: moderate  Associated Symptoms: none  Aggravating or Alleviating Factors: none  Previous Episodes: Pt was seen in the ER in July for a nosebleed.      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 10/29/2016   • Nonrheumatic aortic valve stenosis 11/16/2016   • Nonrheumatic aortic valve insufficiency 11/16/2016   • Cor pulmonale (CMS/HCC) 11/16/2016   • Umbilical hernia without obstruction and without gangrene 09/26/2017   • Obesity (BMI 30-39.9) 11/20/2017     Resolved Ambulatory Problems     Diagnosis Date Noted    • No Resolved Ambulatory Problems     Past Medical History:   Diagnosis Date   • Arthritis    • Bleeds easily (CMS/HCC)    • CAD (coronary artery disease)    • Colon polyp    • Enlarged prostate    • Gastritis    • Gout    • Hiatal hernia    • History of MI (myocardial infarction)    • Hyperlipidemia    • Hypertension    • Sleep apnea    • Umbilical hernia    • Vitamin D deficiency        PAST SURGICAL HISTORY  Past Surgical History:   Procedure Laterality Date   • CARDIAC SURGERY  1990    triple bypass   • CATARACT EXTRACTION Bilateral    • CORONARY ANGIOPLASTY WITH STENT PLACEMENT  2015   • CORONARY ARTERY BYPASS GRAFT  1990    3 VESSELS   • ENDOSCOPY N/A 5/26/2017    Procedure: ESOPHAGOGASTRODUODENOSCOPY WITH COLD BIOIPSIES AND BALLOON DILITATION SIZE 15, 16.5, 18;  Surgeon: Jerry SNOWDEN MD;  Location: [facility] ENDOSCOPY;  Service:    • MCCOY'S NEUROMA EXCISION Right    • TONSILLECTOMY     • UMBILICAL HERNIA REPAIR N/A 10/9/2017    Procedure: UMBILICAL HERNIA REPAIR;  Surgeon: Blake Kelly Jr., MD;  Location: [facility] MAIN OR;  Service:        FAMILY HISTORY  Family History   Problem Relation Age of Onset   • Depression Mother    • Colon polyps Mother    • Heart attack Father    • Aneurysm Father    • Stomach cancer Brother    • Malig Hyperthermia Neg Hx        SOCIAL HISTORY  Social History     Social History   • Marital status:      Spouse name: N/A   • Number of children: N/A   • Years of education: N/A     Occupational History   • retired      Social History Main Topics   • Smoking status: Former Smoker     Packs/day: 2.00     Years: 20.00     Types: Cigarettes     Quit date: 1980   • Smokeless tobacco: Never Used   • Alcohol use No   • Drug use: No   • Sexual activity: Defer     Other Topics Concern   • Not on file     Social History Narrative   • No narrative on file       ALLERGIES  Ciprofloxacin    REVIEW OF SYSTEMS  Review of Systems   Constitutional: Negative for fever.   HENT: Positive  for nosebleeds (bilateral nares).    Respiratory: Negative for shortness of breath.    Cardiovascular: Negative for chest pain.       PHYSICAL EXAM  ED Triage Vitals [10/23/18 0936]   Temp Heart Rate Resp BP SpO2   96.6 °F (35.9 °C) 68 18 -- 97 %      Temp src Heart Rate Source Patient Position BP Location FiO2 (%)   Tympanic Monitor -- -- --       Physical Exam   Constitutional: No distress.   HENT:   Head: Normocephalic and atraumatic.   Nose: Epistaxis (both nares) is observed.   Cardiovascular: Regular rhythm.    Pulmonary/Chest: No respiratory distress.   Abdominal: There is no tenderness.   Musculoskeletal: He exhibits no tenderness.   Skin: No rash noted.   Nursing note and vitals reviewed.      PROCEDURES  Epistaxis Management  Date/Time: 10/23/2018 9:46 AM  Performed by: MARIELA NOGUEIRA  Authorized by: MARIELA NOGUEIRA     Consent:     Consent obtained:  Verbal    Consent given by:  Patient    Risks discussed:  Bleeding, nasal injury and pain  Anesthesia (see MAR for exact dosages):     Anesthesia method:  Topical application    Topical anesthetic:  Cocaine  Procedure details:     Treatment site:  R septum    Treatment method:  Nasal balloon    Treatment complexity:  Limited    Treatment episode: recurring    Post-procedure details:     Patient tolerance of procedure:  Tolerated well, no immediate complications            PROGRESS AND CONSULTS     0948  Discussed plans to numb patient's nose and then re-evaluate the status of his nosebleed. Then we can determine which treatment plan will be best for him. Pt understands and agrees to all plans. All questions answered.     1020  Rechecked Pt who is resting comfortably but his nose has continued to bleed. Discussed more aggressive forms of treatment including a nasal balloon.    1025  Placement of nasal balloon complete. Discussed plan to discharge Pt and instructed him to follow up with his ENT and his cardiologist to determine treatment plan regarding his  nosebleeds and use of blood thinners.     1040  Rechecked Pt and he is doing well with the nasal balloon in place. Discussed plans for discharge and instructed him to see his ENT in 2-3 days to have the nasal balloon removed. Pt understands and agrees to all plans. All questions answered.     1042  Placed call to cardiology for consult.     1053  Discussed Pt's case with Dr. Kingston (Cardiology) who thinks it is ok for Pt to stop taking the plavix.     1056  Rechecked Pt who is resting comfortably. Informed Pt that Dr. Kingston is ok with discontinuing the plavix but continuing the ASA. He will see Pt in follow up as needed. Pt understands and agrees to all plans. All questions answered.       MEDICAL DECISION MAKING  Results were reviewed/discussed with the patient and they were also made aware of online access. Pt also made aware that some labs, such as cultures, will not be resulted during ER visit and follow up with PMD is necessary.     MDM  Number of Diagnoses or Management Options  Epistaxis:   Patient Progress  Patient progress: stable         DIAGNOSIS  Final diagnoses:   Epistaxis       DISPOSITION  DISCHARGE    Patient discharged in stable condition.    Reviewed implications of results, diagnosis, meds, responsibility to follow up, warning signs and symptoms of possible worsening, potential complications and reasons to return to ER, including new or worsening symptoms.    Patient/Family voiced understanding of above instructions.    Discussed plan for discharge, as there is no emergent indication for admission. Patient referred to primary care provider for BP management due to today's BP. Pt/family is agreeable and understands need for follow up and repeat testing.  Pt is aware that discharge does not mean that nothing is wrong but it indicates no emergency is present that requires admission and they must continue care with follow-up as given below or physician of their choice.     FOLLOW-UP  Johanne Leal  MD Willy  [address]  [address]  [facility] [id number] [phone]    In 2 days  for removal of nasal balloon    Robin Kingston Jr., MD  [address]  [address]  [facility] [id number] [phone]      As needed to discuss use of blood thinners         Medication List      No changes were made to your prescriptions during this visit.           Latest Documented Vital Signs:  As of 10:58 AM  BP- 161/92 HR- 68 Temp- 96.6 °F (35.9 °C) (Tympanic) O2 sat- 97%    --  Documentation assistance provided by fiordaliza Josue for Dr. Langston.  Information recorded by the scribe was done at my direction and has been verified and validated by me.       Adry Josue  10/23/18 1100       Leonard Langston MD  10/23/18 7844

## 2018-11-19 RX ORDER — ALLOPURINOL 300 MG/1
TABLET ORAL
Qty: 90 TABLET | Refills: 1 | Status: SHIPPED | OUTPATIENT
Start: 2018-11-19 | End: 2019-05-18 | Stop reason: SDUPTHER

## 2018-12-14 ENCOUNTER — APPOINTMENT (OUTPATIENT)
Dept: CT IMAGING | Facility: HOSPITAL | Age: 78
End: 2018-12-14

## 2018-12-14 ENCOUNTER — HOSPITAL ENCOUNTER (OUTPATIENT)
Facility: HOSPITAL | Age: 78
Setting detail: OBSERVATION
Discharge: HOME | End: 2018-12-15
Attending: EMERGENCY MEDICINE | Admitting: PHYSICIAN ASSISTANT

## 2018-12-14 DIAGNOSIS — R42 DIZZINESS: Primary | ICD-10-CM

## 2018-12-14 DIAGNOSIS — R26.89 BALANCE DISORDER: ICD-10-CM

## 2018-12-14 DIAGNOSIS — I10 HYPERTENSION, UNSPECIFIED TYPE: ICD-10-CM

## 2018-12-14 DIAGNOSIS — R42 VERTIGO: ICD-10-CM

## 2018-12-14 LAB
ALBUMIN SERPL-MCNC: 4.1 G/DL (ref 3.5–5.2)
ALBUMIN/GLOB SERPL: 1.4 G/DL
ALP SERPL-CCNC: 118 U/L (ref 39–117)
ALT SERPL W P-5'-P-CCNC: 18 U/L (ref 1–41)
ANION GAP SERPL CALCULATED.3IONS-SCNC: 13.2 MMOL/L
AST SERPL-CCNC: 23 U/L (ref 1–40)
BASOPHILS # BLD AUTO: 0.03 10*3/MM3 (ref 0–0.2)
BASOPHILS NFR BLD AUTO: 0.3 % (ref 0–1.5)
BILIRUB SERPL-MCNC: 0.9 MG/DL (ref 0.1–1.2)
BUN BLD-MCNC: 17 MG/DL (ref 8–23)
BUN/CREAT SERPL: 16.2 (ref 7–25)
CALCIUM SPEC-SCNC: 9.1 MG/DL (ref 8.6–10.5)
CHLORIDE SERPL-SCNC: 104 MMOL/L (ref 98–107)
CO2 SERPL-SCNC: 24.8 MMOL/L (ref 22–29)
CREAT BLD-MCNC: 1.05 MG/DL (ref 0.76–1.27)
DEPRECATED RDW RBC AUTO: 51.5 FL (ref 37–54)
EOSINOPHIL # BLD AUTO: 0.42 10*3/MM3 (ref 0–0.7)
EOSINOPHIL NFR BLD AUTO: 4.5 % (ref 0.3–6.2)
ERYTHROCYTE [DISTWIDTH] IN BLOOD BY AUTOMATED COUNT: 14.6 % (ref 11.5–14.5)
GFR SERPL CREATININE-BSD FRML MDRD: 68 ML/MIN/1.73
GLOBULIN UR ELPH-MCNC: 2.9 GM/DL
GLUCOSE BLD-MCNC: 89 MG/DL (ref 65–99)
HCT VFR BLD AUTO: 39.3 % (ref 40.4–52.2)
HGB BLD-MCNC: 13.4 G/DL (ref 13.7–17.6)
IMM GRANULOCYTES # BLD: 0.02 10*3/MM3 (ref 0–0.03)
IMM GRANULOCYTES NFR BLD: 0.2 % (ref 0–0.5)
LYMPHOCYTES # BLD AUTO: 2.08 10*3/MM3 (ref 0.9–4.8)
LYMPHOCYTES NFR BLD AUTO: 22.5 % (ref 19.6–45.3)
MCH RBC QN AUTO: 32.8 PG (ref 27–32.7)
MCHC RBC AUTO-ENTMCNC: 34.1 G/DL (ref 32.6–36.4)
MCV RBC AUTO: 96.1 FL (ref 79.8–96.2)
MONOCYTES # BLD AUTO: 0.62 10*3/MM3 (ref 0.2–1.2)
MONOCYTES NFR BLD AUTO: 6.7 % (ref 5–12)
NEUTROPHILS # BLD AUTO: 6.1 10*3/MM3 (ref 1.9–8.1)
NEUTROPHILS NFR BLD AUTO: 66 % (ref 42.7–76)
NRBC BLD MANUAL-RTO: 0 /100 WBC (ref 0–0)
PLATELET # BLD AUTO: 192 10*3/MM3 (ref 140–500)
PMV BLD AUTO: 10.8 FL (ref 6–12)
POTASSIUM BLD-SCNC: 4 MMOL/L (ref 3.5–5.2)
PROT SERPL-MCNC: 7 G/DL (ref 6–8.5)
RBC # BLD AUTO: 4.09 10*6/MM3 (ref 4.6–6)
SODIUM BLD-SCNC: 142 MMOL/L (ref 136–145)
TROPONIN T SERPL-MCNC: <0.01 NG/ML (ref 0–0.03)
WBC NRBC COR # BLD: 9.25 10*3/MM3 (ref 4.5–10.7)

## 2018-12-14 PROCEDURE — 93010 ELECTROCARDIOGRAM REPORT: CPT | Performed by: INTERNAL MEDICINE

## 2018-12-14 PROCEDURE — 85025 COMPLETE CBC W/AUTO DIFF WBC: CPT | Performed by: PHYSICIAN ASSISTANT

## 2018-12-14 PROCEDURE — 93005 ELECTROCARDIOGRAM TRACING: CPT | Performed by: PHYSICIAN ASSISTANT

## 2018-12-14 PROCEDURE — 80053 COMPREHEN METABOLIC PANEL: CPT | Performed by: PHYSICIAN ASSISTANT

## 2018-12-14 PROCEDURE — 70450 CT HEAD/BRAIN W/O DYE: CPT

## 2018-12-14 PROCEDURE — G0378 HOSPITAL OBSERVATION PER HR: HCPCS

## 2018-12-14 PROCEDURE — 84484 ASSAY OF TROPONIN QUANT: CPT | Performed by: PHYSICIAN ASSISTANT

## 2018-12-14 PROCEDURE — 99284 EMERGENCY DEPT VISIT MOD MDM: CPT

## 2018-12-14 PROCEDURE — 36415 COLL VENOUS BLD VENIPUNCTURE: CPT

## 2018-12-14 RX ORDER — SODIUM CHLORIDE 0.9 % (FLUSH) 0.9 %
10 SYRINGE (ML) INJECTION AS NEEDED
Status: DISCONTINUED | OUTPATIENT
Start: 2018-12-14 | End: 2018-12-15 | Stop reason: HOSPADM

## 2018-12-14 RX ORDER — ACETAMINOPHEN 325 MG/1
650 TABLET ORAL NIGHTLY
COMMUNITY

## 2018-12-14 RX ADMIN — SODIUM CHLORIDE 500 ML: 9 INJECTION, SOLUTION INTRAVENOUS at 23:14

## 2018-12-15 ENCOUNTER — APPOINTMENT (OUTPATIENT)
Dept: MRI IMAGING | Facility: HOSPITAL | Age: 78
End: 2018-12-15

## 2018-12-15 VITALS
TEMPERATURE: 97.1 F | OXYGEN SATURATION: 93 % | SYSTOLIC BLOOD PRESSURE: 129 MMHG | DIASTOLIC BLOOD PRESSURE: 75 MMHG | HEART RATE: 60 BPM | HEIGHT: 71 IN | RESPIRATION RATE: 16 BRPM | BODY MASS INDEX: 28.52 KG/M2 | WEIGHT: 203.71 LBS

## 2018-12-15 PROBLEM — I67.4 HYPERTENSIVE ENCEPHALOPATHY: Status: RESOLVED | Noted: 2018-12-15 | Resolved: 2018-12-15

## 2018-12-15 PROBLEM — R42 VERTIGO: Status: ACTIVE | Noted: 2018-12-15

## 2018-12-15 PROBLEM — R42 DIZZINESS: Status: ACTIVE | Noted: 2018-12-15

## 2018-12-15 PROBLEM — I67.4 HYPERTENSIVE ENCEPHALOPATHY: Status: ACTIVE | Noted: 2018-12-15

## 2018-12-15 LAB
BILIRUB UR QL STRIP: NEGATIVE
CHOLEST SERPL-MCNC: 100 MG/DL (ref 0–200)
CLARITY UR: CLEAR
COLOR UR: YELLOW
GLUCOSE BLDC GLUCOMTR-MCNC: 114 MG/DL (ref 70–130)
GLUCOSE UR STRIP-MCNC: NEGATIVE MG/DL
HBA1C MFR BLD: 5.3 % (ref 4.8–5.6)
HDLC SERPL-MCNC: 35 MG/DL (ref 40–60)
HGB UR QL STRIP.AUTO: NEGATIVE
KETONES UR QL STRIP: NEGATIVE
LDLC SERPL CALC-MCNC: 50 MG/DL (ref 0–100)
LDLC/HDLC SERPL: 1.43 {RATIO}
LEUKOCYTE ESTERASE UR QL STRIP.AUTO: NEGATIVE
NITRITE UR QL STRIP: NEGATIVE
PH UR STRIP.AUTO: 6.5 [PH] (ref 5–8)
PROT UR QL STRIP: NEGATIVE
SP GR UR STRIP: 1.01 (ref 1–1.03)
TRIGL SERPL-MCNC: 75 MG/DL (ref 0–150)
UROBILINOGEN UR QL STRIP: NORMAL
VLDLC SERPL-MCNC: 15 MG/DL (ref 5–40)

## 2018-12-15 PROCEDURE — 99204 OFFICE O/P NEW MOD 45 MIN: CPT | Performed by: NURSE PRACTITIONER

## 2018-12-15 PROCEDURE — 97161 PT EVAL LOW COMPLEX 20 MIN: CPT

## 2018-12-15 PROCEDURE — G8979 MOBILITY GOAL STATUS: HCPCS

## 2018-12-15 PROCEDURE — 82962 GLUCOSE BLOOD TEST: CPT

## 2018-12-15 PROCEDURE — 70547 MR ANGIOGRAPHY NECK W/O DYE: CPT

## 2018-12-15 PROCEDURE — 83036 HEMOGLOBIN GLYCOSYLATED A1C: CPT | Performed by: HOSPITALIST

## 2018-12-15 PROCEDURE — 70551 MRI BRAIN STEM W/O DYE: CPT

## 2018-12-15 PROCEDURE — 81003 URINALYSIS AUTO W/O SCOPE: CPT | Performed by: PHYSICIAN ASSISTANT

## 2018-12-15 PROCEDURE — G0378 HOSPITAL OBSERVATION PER HR: HCPCS

## 2018-12-15 PROCEDURE — G8978 MOBILITY CURRENT STATUS: HCPCS

## 2018-12-15 PROCEDURE — 70544 MR ANGIOGRAPHY HEAD W/O DYE: CPT

## 2018-12-15 PROCEDURE — G8980 MOBILITY D/C STATUS: HCPCS

## 2018-12-15 PROCEDURE — 80061 LIPID PANEL: CPT | Performed by: HOSPITALIST

## 2018-12-15 RX ORDER — ACETAMINOPHEN 325 MG/1
650 TABLET ORAL NIGHTLY
Status: DISCONTINUED | OUTPATIENT
Start: 2018-12-15 | End: 2018-12-15 | Stop reason: HOSPADM

## 2018-12-15 RX ORDER — ACETAMINOPHEN 650 MG/1
650 SUPPOSITORY RECTAL EVERY 4 HOURS PRN
Status: DISCONTINUED | OUTPATIENT
Start: 2018-12-15 | End: 2018-12-15 | Stop reason: HOSPADM

## 2018-12-15 RX ORDER — ASPIRIN 81 MG/1
81 TABLET, CHEWABLE ORAL DAILY
Status: DISCONTINUED | OUTPATIENT
Start: 2018-12-16 | End: 2018-12-15 | Stop reason: HOSPADM

## 2018-12-15 RX ORDER — SODIUM CHLORIDE 0.9 % (FLUSH) 0.9 %
3 SYRINGE (ML) INJECTION EVERY 12 HOURS SCHEDULED
Status: DISCONTINUED | OUTPATIENT
Start: 2018-12-15 | End: 2018-12-15 | Stop reason: HOSPADM

## 2018-12-15 RX ORDER — ATORVASTATIN CALCIUM 80 MG/1
80 TABLET, FILM COATED ORAL NIGHTLY
Status: DISCONTINUED | OUTPATIENT
Start: 2018-12-15 | End: 2018-12-15

## 2018-12-15 RX ORDER — ACETAMINOPHEN 325 MG/1
650 TABLET ORAL EVERY 4 HOURS PRN
Status: DISCONTINUED | OUTPATIENT
Start: 2018-12-15 | End: 2018-12-15 | Stop reason: HOSPADM

## 2018-12-15 RX ORDER — SODIUM CHLORIDE 0.9 % (FLUSH) 0.9 %
3-10 SYRINGE (ML) INJECTION AS NEEDED
Status: DISCONTINUED | OUTPATIENT
Start: 2018-12-15 | End: 2018-12-15 | Stop reason: HOSPADM

## 2018-12-15 RX ORDER — CARVEDILOL 3.12 MG/1
3.12 TABLET ORAL 2 TIMES DAILY WITH MEALS
Status: DISCONTINUED | OUTPATIENT
Start: 2018-12-15 | End: 2018-12-15 | Stop reason: HOSPADM

## 2018-12-15 RX ORDER — CARVEDILOL 3.12 MG/1
3.12 TABLET ORAL ONCE
Status: COMPLETED | OUTPATIENT
Start: 2018-12-15 | End: 2018-12-15

## 2018-12-15 RX ORDER — CLONIDINE HYDROCHLORIDE 0.1 MG/1
0.2 TABLET ORAL ONCE
Status: DISCONTINUED | OUTPATIENT
Start: 2018-12-15 | End: 2018-12-15 | Stop reason: HOSPADM

## 2018-12-15 RX ORDER — AMLODIPINE BESYLATE 5 MG/1
5 TABLET ORAL ONCE
Status: COMPLETED | OUTPATIENT
Start: 2018-12-15 | End: 2018-12-15

## 2018-12-15 RX ORDER — AMLODIPINE BESYLATE 5 MG/1
5 TABLET ORAL EVERY EVENING
Status: DISCONTINUED | OUTPATIENT
Start: 2018-12-15 | End: 2018-12-15 | Stop reason: HOSPADM

## 2018-12-15 RX ORDER — ALLOPURINOL 300 MG/1
300 TABLET ORAL DAILY
Status: DISCONTINUED | OUTPATIENT
Start: 2018-12-15 | End: 2018-12-15 | Stop reason: HOSPADM

## 2018-12-15 RX ORDER — ASPIRIN 300 MG/1
300 SUPPOSITORY RECTAL DAILY
Status: DISCONTINUED | OUTPATIENT
Start: 2018-12-15 | End: 2018-12-15

## 2018-12-15 RX ORDER — FAMOTIDINE 20 MG/1
20 TABLET, FILM COATED ORAL EVERY EVENING
Status: DISCONTINUED | OUTPATIENT
Start: 2018-12-15 | End: 2018-12-15 | Stop reason: HOSPADM

## 2018-12-15 RX ORDER — RAMIPRIL 10 MG/1
10 CAPSULE ORAL EVERY EVENING
Status: DISCONTINUED | OUTPATIENT
Start: 2018-12-15 | End: 2018-12-15 | Stop reason: HOSPADM

## 2018-12-15 RX ORDER — ASPIRIN 325 MG
325 TABLET ORAL DAILY
Status: DISCONTINUED | OUTPATIENT
Start: 2018-12-15 | End: 2018-12-15

## 2018-12-15 RX ORDER — ATORVASTATIN CALCIUM 20 MG/1
40 TABLET, FILM COATED ORAL NIGHTLY
Status: DISCONTINUED | OUTPATIENT
Start: 2018-12-15 | End: 2018-12-15 | Stop reason: HOSPADM

## 2018-12-15 RX ADMIN — ASPIRIN 325 MG: 325 TABLET ORAL at 10:57

## 2018-12-15 RX ADMIN — ALLOPURINOL 300 MG: 300 TABLET ORAL at 10:57

## 2018-12-15 RX ADMIN — CARVEDILOL 3.12 MG: 3.12 TABLET, FILM COATED ORAL at 10:57

## 2018-12-15 RX ADMIN — Medication 3 ML: at 11:07

## 2018-12-15 RX ADMIN — CARVEDILOL 3.12 MG: 3.12 TABLET, FILM COATED ORAL at 04:20

## 2018-12-15 RX ADMIN — ACETAMINOPHEN 650 MG: 325 TABLET, FILM COATED ORAL at 04:11

## 2018-12-15 RX ADMIN — AMLODIPINE BESYLATE 5 MG: 5 TABLET ORAL at 04:20

## 2018-12-15 NOTE — ED PROVIDER NOTES
Pt presents to the ED c/o dizziness that began this evening while he was driving. Pt describes the dizziness as a spinning sensation, and an off-balance sensation with walking. He states that currently the dizziness has improved. He denies CP, SOA, or any other associated sx. On exam, Pt is resting comfortably, in no distress, and without focal neurologic deficit. Cardiovascular exam is within normal limits with a 3/6 systolic murmur present. Plan for labs and CT head..     Attestation:  The MATT and I have discussed this patient's history, physical exam, and treatment plan.  I have reviewed the documentation and personally had a face to face interaction with the patient. I affirm the documentation and agree with the treatment and plan.  The attached note describes my personal findings.      Documentation assistance provided by fiordaliza Sultana for Dr Bajwa. Information recorded by the scribe was done at my direction and has been verified and validated by me.     Mahnaz Sultana  12/14/18 [id number]       Tra Bajwa MD  12/15/18 1868

## 2018-12-15 NOTE — PLAN OF CARE
Problem: Patient Care Overview  Goal: Plan of Care Review  Outcome: Ongoing (interventions implemented as appropriate)   12/15/18 0623   Coping/Psychosocial   Plan of Care Reviewed With patient   Plan of Care Review   Progress no change   OTHER   Outcome Summary meds per order, see nih, no falls, see vs and labs     Goal: Individualization and Mutuality  Outcome: Ongoing (interventions implemented as appropriate)    Goal: Discharge Needs Assessment  Outcome: Ongoing (interventions implemented as appropriate)    Goal: Interprofessional Rounds/Family Conf  Outcome: Ongoing (interventions implemented as appropriate)      Problem: Fall Risk (Adult)  Goal: Identify Related Risk Factors and Signs and Symptoms  Outcome: Outcome(s) achieved Date Met: 12/15/18    Goal: Absence of Fall  Outcome: Ongoing (interventions implemented as appropriate)      Problem: Stroke (Ischemic) (Adult)  Goal: Signs and Symptoms of Listed Potential Problems Will be Absent, Minimized or Managed (Stroke)  Outcome: Ongoing (interventions implemented as appropriate)

## 2018-12-15 NOTE — SIGNIFICANT NOTE
12/15/18 0929   Rehab Time/Intention   Evaluation Not Performed other (see comments)  (Pt PATY for testing, passed RN SS. SLP to follow up as time permits. )   Rehab Treatment   Discipline speech language pathologist

## 2018-12-15 NOTE — PLAN OF CARE
Problem: Patient Care Overview  Goal: Plan of Care Review   12/15/18 0901   Coping/Psychosocial   Plan of Care Reviewed With patient;daughter   Plan of Care Review   Progress improving   Pt APPEARS TO BE PROGRESSING WELL AND IS FUNCTIONING AT HIS PRE-ADMIT STATUS.  HE. NOR HIS DAUGTHER, HAVE CONCERNS ABOUT HIS MOBILITY AT THIS TIME. MOST SYMPTOMS APPEAR TO HAVE BEEN RESOLVED.

## 2018-12-15 NOTE — SIGNIFICANT NOTE
12/15/18 1040   Rehab Time/Intention   Evaluation Not Performed other (see comments)  (MRI negative for acute infarct per report. Pt passed RN SS. SLP to sign off. SLP discussed with RNFrancisco. )   Rehab Treatment   Discipline speech language pathologist

## 2018-12-15 NOTE — THERAPY EVALUATION
Acute Care - Physical Therapy Initial Evaluation  [facility]     Patient Name: Javier Grant  : 1940  MRN: [id number]  Today's Date: 12/15/2018   Onset of Illness/Injury or Date of Surgery: 18  Date of Referral to PT: 18  Referring Physician: DR. WORKMAN      Admit Date: 2018    Visit Dx:     ICD-10-CM ICD-9-CM   1. Dizziness R42 780.4   2. Hypertension, unspecified type I10 401.9   3. Vertigo R42 780.4   4. Balance disorder R26.89 781.99     Patient Active Problem List   Diagnosis   • Abnormal electrocardiogram   • Disorder of aorta (CMS/HCC)   • Coronary arteriosclerosis in native artery   • Dyspnea on exertion   • Hypertension   • Right fascicular block   • Hyperlipidemia   • CRISTY on autoCPAP   • Hypersomnia due to medical condition - CRISTY   • Nonrheumatic aortic valve stenosis   • Nonrheumatic aortic valve insufficiency   • Cor pulmonale (CMS/HCC)   • Umbilical hernia without obstruction and without gangrene   • Obesity (BMI 30-39.9)   • Dizziness   • Vertigo     Past Medical History:   Diagnosis Date   • Arthritis    • Bleeds easily (CMS/HCC)     WAS TOLD THAT AFTER CABG   • CAD (coronary artery disease)    • Colon polyp    • Enlarged prostate    • Gastritis    • Gout    • Hiatal hernia    • History of MI (myocardial infarction)        • Hyperlipidemia    • Hypertension    • Sleep apnea     USES CPAP   • Umbilical hernia    • Vitamin D deficiency      Past Surgical History:   Procedure Laterality Date   • CARDIAC SURGERY      triple bypass   • CATARACT EXTRACTION Bilateral    • CORONARY ANGIOPLASTY WITH STENT PLACEMENT     • CORONARY ARTERY BYPASS GRAFT      3 VESSELS   • MCCOY'S NEUROMA EXCISION Right    • TONSILLECTOMY          PT ASSESSMENT (last 12 hours)      Physical Therapy Evaluation     Row Name 12/15/18 0852          PT Evaluation Time/Intention    Subjective Information  no complaints  -JR     Document Type  evaluation  -JR     Mode of Treatment  physical  therapy  -JR     Total Evaluation Minutes, Physical Therapy  20  -JR     Patient Effort  good  -JR     Symptoms Noted During/After Treatment  none  -JR     Comment  -- NO REPORT OF DIZZINESS OR WEAKNESS REPORTED IN ANY EXTREMITY  -JR     Row Name 12/15/18 0852          General Information    Patient Profile Reviewed?  yes  -JR     Onset of Illness/Injury or Date of Surgery  12/14/18  -JR     Referring Physician  DR. WORKMAN  -JR     Patient Observations  alert;cooperative;agree to therapy  -JR     General Observations of Patient  SUPINE IN BED, REQUESTING TO GO TO THE BATHROOM.   -JR     Prior Level of Function  independent:  -JR     Equipment Currently Used at Home  none  -JR     Pertinent History of Current Functional Problem  HAS HAD PAST HISTORY OF CAD.  ON PLAVIX.    -JR     Existing Precautions/Restrictions  no known precautions/restrictions  -JR     Risks Reviewed  patient and family:;LOB;dizziness;nausea/vomiting;increased discomfort;change in vital signs;increased drainage;lines disloged  -JR     Benefits Reviewed  patient and family:;improve function;increase independence;increase strength;increase balance;decrease pain;decrease risk of DVT;improve skin integrity;increase knowledge  -JR     Barriers to Rehab  none identified  -JR     Row Name 12/15/18 0852          Relationship/Environment    Primary Source of Support/Comfort  child(dontrell);spouse  -JR     Lives With  spouse  -JR     Row Name 12/15/18 0852          Cognitive Assessment/Intervention- PT/OT    Orientation Status (Cognition)  oriented x 4  -JR     Follows Commands (Cognition)  WNL  -JR     Row Name 12/15/18 0852          Bed Mobility Assessment/Treatment    Bed Mobility Assessment/Treatment  bed mobility (all) activities  -JR     [place] Level (Bed Mobility)  independent  -     Row Name 12/15/18 0852          Transfer Assessment/Treatment    Transfer Assessment/Treatment  sit-stand transfer;stand-sit transfer  -JR     Sit-Stand [place]  (Transfers)  independent  -     Stand-Sit [place] (Transfers)  independent  -[facility] Name 12/15/18 0852          Gait/Stairs Assessment/Training    Gait/Stairs Assessment/Training  gait/ambulation independence;distance ambulated;gait pattern;gait deviations  -     [place] Level (Gait)  independent  -     Distance in Feet (Gait)  50 AMBULATED TO BATHROOM INDEPENDENTLY.  NO SYMPTOMS REPORTED.  -[facility] Name 12/15/18 0852          General ROM    GENERAL ROM COMMENTS  WFLs FOR ALL EXTREMITIES.   -[facility] Name 12/15/18 0852          MMT (Manual Muscle Testing)    General MMT Comments  5/5 IN ALL EXTREMITIES.   -[facility] Name 12/15/18 0852          Dynamic Standing Balance    Level of [place], Reaches Outside Midline (Standing, Dynamic Balance)  independent  -     Time Able to Maintain Position, Reaches Outside Midline (Standing, Dynamic Balance)  3 to 4 minutes  -JR     Row Name 12/15/18 0852          Pain Scale: Numbers Pre/Post-Treatment    Pain Scale: Numbers, Pretreatment  0/10 - no pain  -     Pain Scale: Numbers, Post-Treatment  0/10 - no pain  -JR     Row Name 12/15/18 0852          Plan of Care Review    Plan of Care Reviewed With  patient;sibling  -JR     Row Name 12/15/18 0852          Physical Therapy Clinical Impression    Date of Referral to PT  12/14/18  -     PT Diagnosis (PT Clinical Impression)  BALANCE DISORDER.   -     Functional Level at Time of Evaluation (PT Clinical Impression)  INDEPENDENT.   -     Patient/Family Goals Statement (PT Clinical Impression)  GO HOME AND RETURN TO PRIOR FUNCTION.   -     Criteria for Skilled Interventions Met (PT Clinical Impression)  no;other (see comments) FUNCTIONING AT BASELINE LEVEL NOW.   -JR     Predicted Duration of Therapy (PT)  ONE TIME VISIT.   -JR     Patient/Family Concerns, Anticipated Discharge Disposition (PT)  NO CONCERNS FROM DAUGHTER OR PATIENT.    -[facility] Name 12/15/18 0852          Vital Signs    O2  Delivery Pre Treatment  room air  -     Row Name 12/15/18 0852          Positioning and Restraints    Pre-Treatment Position  in bed  -     Post Treatment Position  other  -JR     Other Position  -- TRANSPORT PRESENT TO TAKE PATIENT TO MRI.   -       User Key  (r) = Recorded By, (t) = Taken By, (c) = Cosigned By    Initials Name Provider Type    Corbin Weber, PT Physical Therapist        Physical Therapy Education     Title: PT OT SLP Therapies (Done)     Topic: Physical Therapy (Done)     Point: Mobility training (Done)     Learning Progress Summary           Patient Acceptance, E,D, VU,DU by  at 12/15/2018  9:00 AM                   Point: Home exercise program (Done)     Learning Progress Summary           Patient Acceptance, E,D, VU,DU by  at 12/15/2018  9:00 AM                   Point: Body mechanics (Done)     Learning Progress Summary           Patient Acceptance, E,D, VU,DU by  at 12/15/2018  9:00 AM                   Point: Precautions (Done)     Learning Progress Summary           Patient Acceptance, E,D, VU,DU by  at 12/15/2018  9:00 AM                               User Key     Initials Effective Dates Name Provider Type [facility] 04/03/18 -  Corbin Sullivan, PT Physical Therapist PT              PT Recommendation and Plan  Anticipated Discharge Disposition (PT): home  Planned Therapy Interventions (PT Eval): other (see comments)(ONE TIME VISIT.  SKILLED PT IS NOT NECESSARY AT THIS TIME. )  Outcome Summary/Treatment Plan (PT)  Anticipated Discharge Disposition (PT): home  Patient/Family Concerns, Anticipated Discharge Disposition (PT): NO CONCERNS FROM DAUGHTER OR PATIENT.    Plan of Care Reviewed With: patient, daughter  Progress: improving  Outcome Measures     Row Name 12/15/18 0902             How much help from another person do you currently need...    Turning from your back to your side while in flat bed without using bedrails?  4  -JR      Moving from lying on back to  sitting on the side of a flat bed without bedrails?  4  -JR      Moving to and from a bed to a chair (including a wheelchair)?  4  -JR      Standing up from a chair using your arms (e.g., wheelchair, bedside chair)?  4  -JR      Climbing 3-5 steps with a railing?  4  -JR      To walk in hospital room?  4  -JR      AM-PAC 6 Clicks Score  24  -JR         Functional Assessment    Outcome Measure Options  AM-PAC 6 Clicks Basic Mobility (PT)  (Significant)   -JR        User Key  (r) = Recorded By, (t) = Taken By, (c) = Cosigned By    Initials Name Provider Type    Corbin Weber, PT Physical Therapist         Time Calculation:   PT Charges     Row Name 12/15/18 0902             Time Calculation    Start Time  0838  -JR      Stop Time  0902  -      Time Calculation (min)  24 min  -JR      PT Received On  12/15/18  -        User Key  (r) = Recorded By, (t) = Taken By, (c) = Cosigned By    Initials Name Provider Type    Corbin Weber, JOANNE Physical Therapist        Therapy Suggested Charges     Code   Minutes Charges    None           Therapy Charges for Today     Code Description Service Date Service Provider Modifiers Qty    98197949451 HC PT EVAL LOW COMPLEXITY 1 12/15/2018 Corbin Sullivan, PT GP 1          PT G-Codes  Outcome Measure Options: (S) AM-PAC 6 Clicks Basic Mobility (PT)  AM-PAC 6 Clicks Score: 24      Corbin Sullivan, PT  12/15/2018
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
## 2018-12-15 NOTE — CONSULTS
"Neurology Consult Note    Consult Date: 12/15/2018    Referring MD: Maikel Sams MD    Reason for Consult I have been asked to see the patient in neurological consultation to render advice and opinion regarding patient complaining of dizziness and unsteady gait and balance.    Javier Grant is a 78 y.o. male with PMH of CAD, HLD, CRISTY (wears CPAP) CABG, MI, previously on ASA and Plavix for his 2 cardiac stents but recently was taken off of Plavix per his Cardiologist 30 days ago due to having frequent nose bleeds.  He was admitted with c/o dizziness and unsteady balance and gait.  Yesterday around 5:00pm patient had left his daughter's house and on the way home he states he was coming up to a red light and as he was looking at the stoplight he noticed he had this overwhelming feeling of \"loss of focus\". He denies any numbness, weakness, slurred speech, or visual disturbance during this episode he just felt like he could not look at the light and became confused.  This lasted for a few seconds, he began to drive again and then he had another episode that again lasted 4-5 seconds.  When he got home he states he was getting out of his car and noticed he was very unsteady and staggering while walking into his house.  He sat down and the unsteadiness went away but came back when he would stand up.  He checked his BP and it was 197/96.  He also had a mild headache all of yesterday and has been having mild headaches the past couple of weeks.  He takes a couple of Tylenol tablets and it usually goes away.  His BP has also been running a little high the last couple of days.  He states he takes all of his medication everyday and has not missed a dose. He did notice yesterday he had some heart palpitations around the time of the episode.  Last night after he was admitted in the hospital he noticed some left arm tingling while he was lying in bed but this has since resolved.      Past Medical/Surgical Hx:  Past Medical History: "   Diagnosis Date   • Arthritis    • Bleeds easily (CMS/HCC)     WAS TOLD THAT AFTER CABG   • CAD (coronary artery disease)    • Colon polyp    • Enlarged prostate    • Gastritis    • Gout    • Hiatal hernia    • History of MI (myocardial infarction)     1990   • Hyperlipidemia    • Hypertension    • Sleep apnea     USES CPAP   • Umbilical hernia    • Vitamin D deficiency      Past Surgical History:   Procedure Laterality Date   • CARDIAC SURGERY  1990    triple bypass   • CATARACT EXTRACTION Bilateral    • CORONARY ANGIOPLASTY WITH STENT PLACEMENT  2015   • CORONARY ARTERY BYPASS GRAFT  1990    3 VESSELS   • MCCOY'S NEUROMA EXCISION Right    • TONSILLECTOMY         Medications On Admission  Medications Prior to Admission   Medication Sig Dispense Refill Last Dose   • acetaminophen (TYLENOL) 325 MG tablet Take 650 mg by mouth Every Night.      • allopurinol (ZYLOPRIM) 300 MG tablet TAKE 1 TABLET DAILY 90 tablet 1    • amLODIPine (NORVASC) 5 MG tablet Take 5 mg by mouth Every Evening.   10/8/2017 at 2100   • aspirin 81 MG tablet Take 81 mg by mouth Daily.   10/3/2017   • atorvastatin (LIPITOR) 80 MG tablet Take 80 mg by mouth Every Night.   10/8/2017 at 2100   • carvedilol (COREG) 3.125 MG tablet Take 3.125 mg by mouth 2 (Two) Times a Day With Meals.   10/9/2017 at 0730   • Cholecalciferol 2000 units capsule Take 2,000 Units by mouth Daily.      • famotidine (PEPCID) 20 MG tablet Take 20 mg by mouth Every Evening.   10/8/2017 at 2100   • Multiple Vitamin (MULTI VITAMIN DAILY PO) Take 10 tablets by mouth Daily.      • nitroglycerin (NITROSTAT) 0.4 MG SL tablet Take 0.4 mg by mouth Every 5 (Five) Minutes As Needed.   Taking   • omega-3 acid ethyl esters (LOVAZA) 1 g capsule Take 4 g by mouth Daily. PT TO HOLD MED PRIOR TO OR   10/3/2017   • potassium chloride (MICRO-K) 10 MEQ CR capsule Take 10 mEq by mouth Daily.   10/8/2017 at 0900   • ramipril (ALTACE) 10 MG capsule Take 10 mg by mouth Every Evening.   10/8/2017 at  "       Allergies:  Allergies   Allergen Reactions   • Ciprofloxacin Diarrhea       Social Hx:  Social History     Socioeconomic History   • Marital status:      Spouse name: Not on file   • Number of children: Not on file   • Years of education: Not on file   • Highest education level: Not on file   Social Needs   • Financial resource strain: Not on file   • Food insecurity - worry: Not on file   • Food insecurity - inability: Not on file   • Transportation needs - medical: Not on file   • Transportation needs - non-medical: Not on file   Occupational History   • Occupation: retired   Tobacco Use   • Smoking status: Former Smoker     Packs/day: 2.00     Years: 20.00     Pack years: 40.00     Types: Cigarettes     Last attempt to quit: 1980     Years since quittin.9   • Smokeless tobacco: Never Used   Substance and Sexual Activity   • Alcohol use: No   • Drug use: No   • Sexual activity: Defer   Other Topics Concern   • Not on file   Social History Narrative   • Not on file       Family Hx:  Family History   Problem Relation Age of Onset   • Depression Mother    • Colon polyps Mother    • Heart attack Father    • Aneurysm Father    • Stomach cancer Brother    • Malig Hyperthermia Neg Hx        Review of Systems   Constitutional: Negative.    HENT: Negative.    Eyes: Negative.    Respiratory: Negative.    Cardiovascular: Negative.    Gastrointestinal: Negative.    Endocrine: Negative.    Genitourinary: Negative.    Musculoskeletal: Negative.    Skin: Negative.    Allergic/Immunologic: Negative.    Neurological: Negative.    Hematological: Negative.    Psychiatric/Behavioral: Negative.      Exam    /75 (BP Location: Right arm, Patient Position: Lying)   Pulse 60   Temp 97.1 °F (36.2 °C) (Oral)   Resp 16   Ht 180.3 cm (71\")   Wt 92.4 kg (203 lb 11.3 oz)   SpO2 93%   BMI 28.41 kg/m²   gen: NAD, vitals reviewed  Eyes: fundus sharp with no papilledema or retinal hemorrhages  CVS: RRR, S1, " S2  MS: oriented x3, recent/remote memory intact, normal attention/concentration, language intact, no neglect, normal fund of knowledge  CN: visual acuity grossly normal, visual fields full, PERRL, EOMI, facial sensation equal, no facial droop, hearing symmetric, palate elevates symmetrically, shoulder shrug equal, tongue midline  Motor: 5/5 throughout upper and lower extremities, normal tone  Sensation: intact to vibration and temperature throughout  Reflexes: 2+ throughout upper and lower extremities, downgoing plantars  Coordination: no dysmetria with finger to nose bilaterally  Gait: no ataxia    DATA:    Lab Results   Component Value Date    GLUCOSE 89 12/14/2018    CALCIUM 9.1 12/14/2018     12/14/2018    K 4.0 12/14/2018    CO2 24.8 12/14/2018     12/14/2018    BUN 17 12/14/2018    CREATININE 1.05 12/14/2018    EGFRIFNONA 68 12/14/2018    BCR 16.2 12/14/2018    ANIONGAP 13.2 12/14/2018     Lab Results   Component Value Date    WBC 9.25 12/14/2018    HGB 13.4 (L) 12/14/2018    HCT 39.3 (L) 12/14/2018    MCV 96.1 12/14/2018     12/14/2018     Lab Results   Component Value Date    CHOL 100 12/15/2018    CHOL 118 03/24/2017    CHOL 122 11/19/2016     Lab Results   Component Value Date    HDL 35 (L) 12/15/2018    HDL 37 (L) 03/24/2017    HDL 41 11/19/2016     Lab Results   Component Value Date    LDL 50 12/15/2018    LDL 64 03/24/2017    LDL 64 11/19/2016     Lab Results   Component Value Date    TRIG 75 12/15/2018    TRIG 83 03/24/2017    TRIG 83 11/19/2016     Lab Results   Component Value Date    HGBA1C 5.30 12/15/2018     Lab Results   Component Value Date    INR 1.0 10/08/2015    PROTIME 11.0 10/08/2015     No results found for: [id number]  Lab Results   Component Value Date    TSH 1.360 11/19/2016       Lab review: I have reviewed all laboratory results.    Imaging review: Ct Head Without Contrast    Result Date: 12/14/2018  No acute intracranial process identified.  Radiation dose  reduction techniques were utilized, including automated exposure control and exposure modulation based on body size.  This report was finalized on 12/14/2018 11:39 PM by Dr. Bia Vidal M.D.      MRI/MRA head/neck pending    Impression:  1) TIA  2) Hypertensive Encephalopathy  3) Ataxia  4) Dizziness    Comment: This is a 79 yo male with PMH of CAD, MI s/p CABG with 2 stents on ASA 325mg, HTN, HLD who was admitted with c/o dizziness and unsteady gait and balance.  He has recently been taken off of his Plavix due to frequent nose bleeds. Has been having some hypertension for the past couple of days as well as headaches for a couple of weeks.  CT head negative for any acute intracranial process. On exam today, all of his symptoms have resolved.  He was able to walk to the bathroom unassisted with no gait or balance issues as well as no c/o dizziness.  I suspect patient had hypertensive encephalopathy vs. TIA. Given patient was hypertensive and became confused with balance issue but symptoms resolved when patient sat down and became normotensive. Given his history, he may have had a small vessel TIA due to his HTN. We will see what the MRI/MRA head and neck and 2D Echo show.  Will keep patient on ASA 325mg and Lipitor 80mg for now.     PLAN:   MRI/MRA pending  2D echo pending  Normalize BP slowly, Goal sys. 120-140  Continue ASA 325mg  Continue Lipitor 80mg  Neurochecks per stroke protocol  PT/OT/ST  TEDS/SCDS  Falls precautions.  Will follow.    COLLIN Garza    Addendum: I personally saw this patient and confirmed the history. I completed the neuro exam (documented above) myself. I personally reviewed his MRI/MRA which appear essentially normal except for mild left ICA stenosis. Radiology reports reviewed. History is consistent with hypertensive encephalopathy. I do not think this was a TIA. I would recommend he continue ASA 81mg and Lipitor for primary stroke prevention. Will decrease Lipitor to 40mg. No  further stroke workup needed and he is cleared for discharge from a neurology standpoint.    Chema Doshi MD

## 2018-12-15 NOTE — H&P
HISTORY AND PHYSICAL   [facility]        Patient Identification:  Name: Javier Grant  Age: 78 y.o.  Sex: male  :  1940  MRN: [id number]                     Primary Care Physician: Stanton Sotomayor Jr., MD    Chief Complaint:  vertigo  History of Present Illness:         The patient 78-year-old white male with history of arthritis, coronary artery disease, gastritis, gout, hiatal hernia, history of MI, hypertension, hyperlipidemia and sleep apnea whose admitted with history of having some vertigo and dizziness on the day of admission so Bianka was driving and then when he was changing position up walking around.  He also notices blood pressure was very high home.  He been having a lot of nosebleeds recently any stopped taking his Plavix.  He does take aspirin.  The patient was brought to emergency room for further evaluation and his dizziness seemed to have cleared up quite a bit and he was feeling better.  He is being admitted for further evaluation treatment for workup for possible stroke or TIA symptoms with vertigo and dizziness.  He denies having any chest pain or shortness of air.  He's not had any vomiting but a little bit of nausea.  He did have some headaches.    Past Medical History:  Past Medical History:   Diagnosis Date   • Arthritis    • Bleeds easily (CMS/HCC)     WAS TOLD THAT AFTER CABG   • CAD (coronary artery disease)    • Colon polyp    • Enlarged prostate    • Gastritis    • Gout    • Hiatal hernia    • History of MI (myocardial infarction)        • Hyperlipidemia    • Hypertension    • Sleep apnea     USES CPAP   • Umbilical hernia    • Vitamin D deficiency      Past Surgical History:  Past Surgical History:   Procedure Laterality Date   • CARDIAC SURGERY      triple bypass   • CATARACT EXTRACTION Bilateral    • CORONARY ANGIOPLASTY WITH STENT PLACEMENT     • CORONARY ARTERY BYPASS GRAFT      3 VESSELS   • MCCOY'S NEUROMA EXCISION Right    • TONSILLECTOMY         Home Meds:  Medications Prior to Admission   Medication Sig Dispense Refill Last Dose   • acetaminophen (TYLENOL) 325 MG tablet Take 650 mg by mouth Every Night.      • allopurinol (ZYLOPRIM) 300 MG tablet TAKE 1 TABLET DAILY 90 tablet 1    • amLODIPine (NORVASC) 5 MG tablet Take 5 mg by mouth Every Evening.   10/8/2017 at 2100   • aspirin 81 MG tablet Take 81 mg by mouth Daily.   10/3/2017   • atorvastatin (LIPITOR) 80 MG tablet Take 80 mg by mouth Every Night.   10/8/2017 at 2100   • carvedilol (COREG) 3.125 MG tablet Take 3.125 mg by mouth 2 (Two) Times a Day With Meals.   10/9/2017 at 0730   • Cholecalciferol 2000 units capsule Take 2,000 Units by mouth Daily.      • famotidine (PEPCID) 20 MG tablet Take 20 mg by mouth Every Evening.   10/8/2017 at 2100   • Multiple Vitamin (MULTI VITAMIN DAILY PO) Take 10 tablets by mouth Daily.      • nitroglycerin (NITROSTAT) 0.4 MG SL tablet Take 0.4 mg by mouth Every 5 (Five) Minutes As Needed.   Taking   • omega-3 acid ethyl esters (LOVAZA) 1 g capsule Take 4 g by mouth Daily. PT TO HOLD MED PRIOR TO OR   10/3/2017   • potassium chloride (MICRO-K) 10 MEQ CR capsule Take 10 mEq by mouth Daily.   10/8/2017 at 0900   • ramipril (ALTACE) 10 MG capsule Take 10 mg by mouth Every Evening.   10/8/2017 at 2100     Current meds    Current Facility-Administered Medications:   •  CloNIDine (CATAPRES) tablet 0.2 mg, 0.2 mg, Oral, Once, Sav Marshall III, PA  •  [COMPLETED] Insert peripheral IV, , , Once **AND** sodium chloride 0.9 % flush 10 mL, 10 mL, Intravenous, PRN, Sav Marshall III, PA  Allergies:  Allergies   Allergen Reactions   • Ciprofloxacin Diarrhea     Immunizations:  Immunization History   Administered Date(s) Administered   • Influenza, Unspecified 09/01/2016   • PEDS-Pneumococcal Conjugate (PCV7) 06/01/2012   • Pneumococcal Conjugate 13-Valent (PCV13) 03/24/2017     Social History:   Social History     Social History Narrative   • Not on file      Social History     Socioeconomic History   • Marital status:      Spouse name: Not on file   • Number of children: Not on file   • Years of education: Not on file   • Highest education level: Not on file   Social Needs   • Financial resource strain: Not on file   • Food insecurity - worry: Not on file   • Food insecurity - inability: Not on file   • Transportation needs - medical: Not on file   • Transportation needs - non-medical: Not on file   Occupational History   • Occupation: retired   Tobacco Use   • Smoking status: Former Smoker     Packs/day: 2.00     Years: 20.00     Pack years: 40.00     Types: Cigarettes     Last attempt to quit:      Years since quittin.9   • Smokeless tobacco: Never Used   Substance and Sexual Activity   • Alcohol use: No   • Drug use: No   • Sexual activity: Defer   Other Topics Concern   • Not on file   Social History Narrative   • Not on file       Family History:  Family History   Problem Relation Age of Onset   • Depression Mother    • Colon polyps Mother    • Heart attack Father    • Aneurysm Father    • Stomach cancer Brother    • Malig Hyperthermia Neg Hx         Review of Systems  See history of present illness and past medical history.  Patient denies syncope, falls, trauma, change in vision, change in hearing, change in taste, changes in weight, changes in appetite, focal weakness, numbness, or paresthesia.  Patient denies chest pain, palpitations, dyspnea, orthopnea, PND, cough, sinus pressure, rhinorrhea, epistaxis, hemoptysis, nausea, vomiting, hematemesis, diarrhea, constipation or hematchezia.  Denies cold or heat intolerance, polydipsia, polyuria, polyphagia. Denies hematuria, pyuria, dysuria, hesitancy, frequency or urgency. Denies fever, chills, sweats, night sweats.  Denies missing any routine medications. Remainder of ROS is negative.    Objective:  tMax 24 hrs: Temp (24hrs), Av.2 °F (36.8 °C), Min:97.8 °F (36.6 °C), Max:98.6 °F (37  "°C)    Vitals Ranges:   Temp:  [97.8 °F (36.6 °C)-98.6 °F (37 °C)] 97.8 °F (36.6 °C)  Heart Rate:  [64-77] 76  Resp:  [16-18] 16  BP: (152-198)/() 159/84      Exam:  /84 (BP Location: Left arm, Patient Position: Lying)   Pulse 76   Temp 97.8 °F (36.6 °C) (Oral)   Resp 16   Ht 180.3 cm (71\")   Wt 92.4 kg (203 lb 11.3 oz)   SpO2 95%   BMI 28.41 kg/m²     General Appearance:    Alert, cooperative, no distress, appears stated age   Head:    Normocephalic, without obvious abnormality, atraumatic   Eyes:    PERRL, conjunctiva/corneas clear, EOM's intact, both eyes   Ears:    Normal external ear canals, both ears   Nose:   Nares normal, septum midline, mucosa normal, no drainage    or sinus tenderness   Throat:   Lips, mucosa, and tongue normal   Neck:   Supple, symmetrical, trachea midline, no adenopathy;     thyroid:  no enlargement/tenderness/nodules; no carotid    bruit or JVD   Back:     Symmetric, no curvature, ROM normal, no CVA tenderness   Lungs:     Clear to auscultation bilaterally, respirations unlabored   Chest Wall:    No tenderness or deformity    Heart:    Regular rate and rhythm, S1 and S2 normal, no murmur, rub   or gallop   Abdomen:     Soft, non-tender, bowel sounds active all four quadrants,     no masses, no hepatomegaly, no splenomegaly   Extremities:   Extremities normal, atraumatic, no cyanosis or edema   Pulses:   2+ and symmetric all extremities   Skin:   Skin color, texture, turgor normal, no rashes or lesions   Lymph nodes:   Cervical, supraclavicular, and axillary nodes normal   Neurologic:   CNII-XII intact, normal strength, sensation intact throughout. some nystagmus on lateral gaze and he is a little bit off balance when he tries to walk.        .    Data Review:  Lab Results (last 72 hours)     Procedure Component Value Units Date/Time    Urinalysis With Microscopic If Indicated (No Culture) - Urine, Clean Catch [[id number]]  (Normal) Collected:  12/15/18 0005    " Specimen:  Urine, Clean Catch Updated:  12/15/18 0013     Color, UA Yellow     Appearance, UA Clear     pH, UA 6.5     Specific Gravity, UA 1.013     Glucose, UA Negative     Ketones, UA Negative     Bilirubin, UA Negative     Blood, UA Negative     Protein, UA Negative     Leuk Esterase, UA Negative     Nitrite, UA Negative     Urobilinogen, UA 1.0 E.U./dL    Narrative:       Urine microscopic not indicated.    Comprehensive Metabolic Panel [[id number]]  (Abnormal) Collected:  12/14/18 2244    Specimen:  Blood Updated:  12/14/18 2320     Glucose 89 mg/dL      BUN 17 mg/dL      Creatinine 1.05 mg/dL      Sodium 142 mmol/L      Potassium 4.0 mmol/L      Chloride 104 mmol/L      CO2 24.8 mmol/L      Calcium 9.1 mg/dL      Total Protein 7.0 g/dL      Albumin 4.10 g/dL      ALT (SGPT) 18 U/L      AST (SGOT) 23 U/L      Alkaline Phosphatase 118 U/L      Total Bilirubin 0.9 mg/dL      eGFR Non African Amer 68 mL/min/1.73      Globulin 2.9 gm/dL      A/G Ratio 1.4 g/dL      BUN/Creatinine Ratio 16.2     Anion Gap 13.2 mmol/L     Narrative:       The MDRD GFR formula is only valid for adults with stable renal function between ages 18 and 70.    Troponin [[id number]]  (Normal) Collected:  12/14/18 2244    Specimen:  Blood Updated:  12/14/18 2320     Troponin T <0.010 ng/mL     Narrative:       Troponin T Reference Ranges:  Less than 0.03 ng/mL:    Negative for AMI  0.03 to 0.09 ng/mL:      Indeterminant for AMI  Greater than 0.09 ng/mL: Positive for AMI    CBC & Differential [[id number]] Collected:  12/14/18 2244    Specimen:  Blood Updated:  12/14/18 2307    Narrative:       The following orders were created for panel order CBC & Differential.  Procedure                               Abnormality         Status                     ---------                               -----------         ------                     CBC Auto Differential[[id number]]        Abnormal            Final result                 Please view results for  these tests on the individual orders.    CBC Auto Differential [[id number]]  (Abnormal) Collected:  12/14/18 2244    Specimen:  Blood Updated:  12/14/18 2307     WBC 9.25 10*3/mm3      RBC 4.09 10*6/mm3      Hemoglobin 13.4 g/dL      Hematocrit 39.3 %      MCV 96.1 fL      MCH 32.8 pg      MCHC 34.1 g/dL      RDW 14.6 %      RDW-SD 51.5 fl      MPV 10.8 fL      Platelets 192 10*3/mm3      Neutrophil % 66.0 %      Lymphocyte % 22.5 %      Monocyte % 6.7 %      Eosinophil % 4.5 %      Basophil % 0.3 %      Immature Grans % 0.2 %      Neutrophils, Absolute 6.10 10*3/mm3      Lymphocytes, Absolute 2.08 10*3/mm3      Monocytes, Absolute 0.62 10*3/mm3      Eosinophils, Absolute 0.42 10*3/mm3      Basophils, Absolute 0.03 10*3/mm3      Immature Grans, Absolute 0.02 10*3/mm3      nRBC 0.0 /100 WBC                    Imaging Results (all)     Procedure Component Value Units Date/Time    CT Head Without Contrast [[id number]] Collected:  12/14/18 2335     Updated:  12/14/18 2342    Narrative:       CT OF THE HEAD WITHOUT CONTRAST     HISTORY: Hypertension and dizziness     COMPARISON: None available.     TECHNIQUE: Axial CT imaging was obtained from the vertex of the skull to  the skull base. No IV contrast was administered.     FINDINGS:  No acute intracranial hemorrhage is identified. There is diffuse  cerebral atrophy, with compensatory ventricular dilatation, in keeping  with the age of 78. There is some periventricular and deep white matter  microangiopathic disease. There is no midline shift or mass effect. No  focal areas of decreased attenuation are identified. There is  atherosclerotic involvement of the cavernous carotid arteries. There is  mucosal thickening identified within the ethmoid sinuses. There is some  partial opacification of right mastoid air cells.. No aggressive osseous  abnormalities are seen.       Impression:       No acute intracranial process identified.     Radiation dose reduction techniques were  utilized, including automated  exposure control and exposure modulation based on body size.     This report was finalized on 12/14/2018 11:39 PM by Dr. Bia Vidal M.D.           Past Medical History:   Diagnosis Date   • Arthritis    • Bleeds easily (CMS/HCC)     WAS TOLD THAT AFTER CABG   • CAD (coronary artery disease)    • Colon polyp    • Enlarged prostate    • Gastritis    • Gout    • Hiatal hernia    • History of MI (myocardial infarction)     1990   • Hyperlipidemia    • Hypertension    • Sleep apnea     USES CPAP   • Umbilical hernia    • Vitamin D deficiency        Assessment:  Active Hospital Problems    Diagnosis Date Noted   • **Dizziness [R42] 12/15/2018   • Vertigo [R42] 12/15/2018   • Obesity (BMI 30-39.9) [E66.9] 11/20/2017   • Cor pulmonale (CMS/HCC) [I27.81] 11/16/2016   • CRISTY on autoCPAP [G47.33, Z99.89] 10/29/2016   • Hypertension [I10] 05/16/2016   • Coronary arteriosclerosis in native artery [I25.10] 05/16/2016   • Disorder of aorta (CMS/HCC) [I77.9] 05/16/2016   • Hyperlipidemia [E78.5] 05/16/2016      Resolved Hospital Problems   No resolved problems to display.       Plan:  The patient's admitted to hospital check MRI brain and MRA of head and neck.  We'll continue with aspirin and statin and also check echocardiogram.  We'll get PT OT and speech therapy evaluation.    Maikel Sams MD  12/15/2018  3:26 AM

## 2018-12-15 NOTE — ED NOTES
Nursing report ED to floor  Javier Grant  78 y.o.  male    HPI (triage note):   Chief Complaint   Patient presents with   • Dizziness     Patient arrived [facility]. Patient called due to feeling dizzy and weak. Symtoms started about 2 hours PTA. EMS states that patient would found to be hypertensive 211/107.        Admitting doctor:   Maikel Sams MD    Admitting diagnosis:   The primary encounter diagnosis was Dizziness. Diagnoses of Hypertension, unspecified type and Vertigo were also pertinent to this visit.    Code status:   Current Code Status     This patient does not have a recorded code status. Please follow your organizational policy for patients in this situation.          Allergies:   Ciprofloxacin    Weight:       12/14/18  2244   Weight: 92.6 kg (204 lb 3.2 oz)       Most recent vitals:   Vitals:    12/14/18 2328 12/14/18 2359 12/15/18 0028 12/15/18 0057   BP: (!) 183/98 (!) 177/123 170/92 173/94   BP Location:  Right arm Right arm    Patient Position:  Sitting Sitting    Pulse: 71 77 70 64   Resp:       Temp:       TempSrc:       SpO2: 95% 94% 92% 93%   Weight:       Height:           Active LDAs/IV Access:   Lines, Drains & Airways    Active LDAs     Name:   Placement date:   Placement time:   Site:   Days:    Peripheral IV 12/14/18 2145 Left Hand   12/14/18 2145    Hand   less than 1                Labs (abnormal labs have a star):   Labs Reviewed   COMPREHENSIVE METABOLIC PANEL - Abnormal; Notable for the following components:       Result Value    Alkaline Phosphatase 118 (*)     All other components within normal limits    Narrative:     The MDRD GFR formula is only valid for adults with stable renal function between ages 18 and 70.   CBC WITH AUTO DIFFERENTIAL - Abnormal; Notable for the following components:    RBC 4.09 (*)     Hemoglobin 13.4 (*)     Hematocrit 39.3 (*)     MCH 32.8 (*)     RDW 14.6 (*)     All other components within normal limits   TROPONIN (IN-HOUSE) - Normal     Narrative:     Troponin T Reference Ranges:  Less than 0.03 ng/mL:    Negative for AMI  0.03 to 0.09 ng/mL:      Indeterminant for AMI  Greater than 0.09 ng/mL: Positive for AMI   URINALYSIS W/ MICROSCOPIC IF INDICATED (NO CULTURE) - Normal    Narrative:     Urine microscopic not indicated.   CBC AND DIFFERENTIAL    Narrative:     The following orders were created for panel order CBC & Differential.  Procedure                               Abnormality         Status                     ---------                               -----------         ------                     CBC Auto Differential[[id number]]        Abnormal            Final result                 Please view results for these tests on the individual orders.       EKG:   ECG 12 Lead             Meds given in ED:   Medications   sodium chloride 0.9 % flush 10 mL (not administered)   CloNIDine (CATAPRES) tablet 0.2 mg (not administered)   sodium chloride 0.9 % bolus 500 mL (0 mL Intravenous Stopped 12/15/18 0002)       Imaging results:  Ct Head Without Contrast    Result Date: 12/14/2018  No acute intracranial process identified.  Radiation dose reduction techniques were utilized, including automated exposure control and exposure modulation based on body size.  This report was finalized on 12/14/2018 11:39 PM by Dr. Bia Vidal M.D.        Ambulatory status:   - up ad zoila    Social issues:   Social History     Socioeconomic History   • Marital status:      Spouse name: Not on file   • Number of children: Not on file   • Years of education: Not on file   • Highest education level: Not on file   Social Needs   • Financial resource strain: Not on file   • Food insecurity - worry: Not on file   • Food insecurity - inability: Not on file   • Transportation needs - medical: Not on file   • Transportation needs - non-medical: Not on file   Occupational History   • Occupation: retired   Tobacco Use   • Smoking status: Former Smoker     Packs/day: 2.00      Years: 20.00     Pack years: 40.00     Types: Cigarettes     Last attempt to quit:      Years since quittin.9   • Smokeless tobacco: Never Used   Substance and Sexual Activity   • Alcohol use: No   • Drug use: No   • Sexual activity: Defer   Other Topics Concern   • Not on file   Social History Narrative   • Not on file        Leelee Gilliland RN  12/15/18 0137

## 2018-12-15 NOTE — ED PROVIDER NOTES
" EMERGENCY DEPARTMENT ENCOUNTER    CHIEF COMPLAINT  Chief Complaint: Dizziness  History given by: Patient  History limited by:   Room Number: 09/09  PMD: Stanton Sotomayor Jr., MD      HPI:  Pt is a 78 y.o. male who presents complaining of dizziness that occurs when attempting to ambulate today at 1830. He describes the dizziness as \"unsure of myself.\" Pt reports that prior to the onset of dizziness he was having some nausea and a spinning sensation that was caused by headlights in traffic. While at home pt states checking his BP and was found to be HTN. Pt denies any CP, SOA. He was previously on Plavix, but was stopped due to recurrent epistaxis. Pt has not taken Plavix in 30 days. Pt also reports some generalized weakness, but denies any focal weakness. He denies any facial droop or speech problems.    Duration: 4 hours  Onset: sudden  Timing: intermittent  Location: generalized  Radiation: none  Quality: \"unsure of myself\"  Intensity/Severity: moderate  Progression: unchanged  Associated Symptoms: nausea, generalized weakness  Aggravating Factors: ambulation  Alleviating Factors: none  Previous Episodes: none  Treatment before arrival: none    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 10/29/2016   • Nonrheumatic aortic valve stenosis 11/16/2016   • Nonrheumatic aortic valve insufficiency 11/16/2016   • Cor pulmonale (CMS/HCC) 11/16/2016   • Umbilical hernia without obstruction and without gangrene 09/26/2017   • Obesity (BMI 30-39.9) 11/20/2017     Resolved Ambulatory Problems     Diagnosis Date Noted   • No Resolved Ambulatory " Problems     Past Medical History:   Diagnosis Date   • Arthritis    • Bleeds easily (CMS/HCC)    • CAD (coronary artery disease)    • Colon polyp    • Enlarged prostate    • Gastritis    • Gout    • Hiatal hernia    • History of MI (myocardial infarction)    • Hyperlipidemia    • Hypertension    • Sleep apnea    • Umbilical hernia    • Vitamin D deficiency        PAST SURGICAL HISTORY  Past Surgical History:   Procedure Laterality Date   • CARDIAC SURGERY      triple bypass   • CATARACT EXTRACTION Bilateral    • CORONARY ANGIOPLASTY WITH STENT PLACEMENT     • CORONARY ARTERY BYPASS GRAFT      3 VESSELS   • MCCOY'S NEUROMA EXCISION Right    • TONSILLECTOMY         FAMILY HISTORY  Family History   Problem Relation Age of Onset   • Depression Mother    • Colon polyps Mother    • Heart attack Father    • Aneurysm Father    • Stomach cancer Brother    • Malig Hyperthermia Neg Hx        SOCIAL HISTORY  Social History     Socioeconomic History   • Marital status:      Spouse name: Not on file   • Number of children: Not on file   • Years of education: Not on file   • Highest education level: Not on file   Social Needs   • Financial resource strain: Not on file   • Food insecurity - worry: Not on file   • Food insecurity - inability: Not on file   • Transportation needs - medical: Not on file   • Transportation needs - non-medical: Not on file   Occupational History   • Occupation: retired   Tobacco Use   • Smoking status: Former Smoker     Packs/day: 2.00     Years: 20.00     Pack years: 40.00     Types: Cigarettes     Last attempt to quit: 1980     Years since quittin.9   • Smokeless tobacco: Never Used   Substance and Sexual Activity   • Alcohol use: No   • Drug use: No   • Sexual activity: Defer   Other Topics Concern   • Not on file   Social History Narrative   • Not on file       ALLERGIES  Ciprofloxacin    REVIEW OF SYSTEMS  Review of Systems   Constitutional: Negative for activity change,  appetite change and fever.   HENT: Negative for congestion and sore throat.    Eyes: Negative.    Respiratory: Negative for cough and shortness of breath.    Cardiovascular: Negative for chest pain and leg swelling.   Gastrointestinal: Positive for nausea. Negative for abdominal pain, diarrhea and vomiting.   Endocrine: Negative.    Genitourinary: Negative for decreased urine volume and dysuria.   Musculoskeletal: Negative for neck pain.   Skin: Negative for rash and wound.   Allergic/Immunologic: Negative.    Neurological: Positive for dizziness and weakness (generalized). Negative for numbness and headaches.   Hematological: Negative.    Psychiatric/Behavioral: Negative.    All other systems reviewed and are negative.      PHYSICAL EXAM  ED Triage Vitals [12/14/18 2146]   Temp Heart Rate Resp BP SpO2   -- 65 16 152/95 97 %      Temp src Heart Rate Source Patient Position BP Location FiO2 (%)   -- Monitor Lying Right arm --       Physical Exam   Constitutional: He is oriented to person, place, and time. No distress.   HENT:   Head: Normocephalic and atraumatic.   Eyes: EOM are normal. Pupils are equal, round, and reactive to light.   No visual field deficit   Neck: Normal range of motion. Neck supple.   Cardiovascular: Normal rate, regular rhythm and normal heart sounds.   Pulmonary/Chest: Effort normal and breath sounds normal. No respiratory distress.   Abdominal: Soft. There is no tenderness. There is no rebound and no guarding.   Musculoskeletal: Normal range of motion. He exhibits no edema.   Neurological: He is alert and oriented to person, place, and time. He has normal sensation, normal strength and intact cranial nerves. He displays no weakness, facial symmetry and normal speech. No sensory deficit.   Skin: Skin is warm and dry.   Psychiatric: Mood and affect normal.   Nursing note and vitals reviewed.      LAB RESULTS  Lab Results (last 24 hours)     Procedure Component Value Units Date/Time    CBC &  Differential [[id number]] Collected:  12/14/18 2244    Specimen:  Blood Updated:  12/14/18 2307    Narrative:       The following orders were created for panel order CBC & Differential.  Procedure                               Abnormality         Status                     ---------                               -----------         ------                     CBC Auto Differential[[id number]]        Abnormal            Final result                 Please view results for these tests on the individual orders.    Comprehensive Metabolic Panel [[id number]]  (Abnormal) Collected:  12/14/18 2244    Specimen:  Blood Updated:  12/14/18 2320     Glucose 89 mg/dL      BUN 17 mg/dL      Creatinine 1.05 mg/dL      Sodium 142 mmol/L      Potassium 4.0 mmol/L      Chloride 104 mmol/L      CO2 24.8 mmol/L      Calcium 9.1 mg/dL      Total Protein 7.0 g/dL      Albumin 4.10 g/dL      ALT (SGPT) 18 U/L      AST (SGOT) 23 U/L      Alkaline Phosphatase 118 U/L      Total Bilirubin 0.9 mg/dL      eGFR Non African Amer 68 mL/min/1.73      Globulin 2.9 gm/dL      A/G Ratio 1.4 g/dL      BUN/Creatinine Ratio 16.2     Anion Gap 13.2 mmol/L     Narrative:       The MDRD GFR formula is only valid for adults with stable renal function between ages 18 and 70.    Troponin [[id number]]  (Normal) Collected:  12/14/18 2244    Specimen:  Blood Updated:  12/14/18 2320     Troponin T <0.010 ng/mL     Narrative:       Troponin T Reference Ranges:  Less than 0.03 ng/mL:    Negative for AMI  0.03 to 0.09 ng/mL:      Indeterminant for AMI  Greater than 0.09 ng/mL: Positive for AMI    CBC Auto Differential [[id number]]  (Abnormal) Collected:  12/14/18 2244    Specimen:  Blood Updated:  12/14/18 2307     WBC 9.25 10*3/mm3      RBC 4.09 10*6/mm3      Hemoglobin 13.4 g/dL      Hematocrit 39.3 %      MCV 96.1 fL      MCH 32.8 pg      MCHC 34.1 g/dL      RDW 14.6 %      RDW-SD 51.5 fl      MPV 10.8 fL      Platelets 192 10*3/mm3      Neutrophil % 66.0 %       Lymphocyte % 22.5 %      Monocyte % 6.7 %      Eosinophil % 4.5 %      Basophil % 0.3 %      Immature Grans % 0.2 %      Neutrophils, Absolute 6.10 10*3/mm3      Lymphocytes, Absolute 2.08 10*3/mm3      Monocytes, Absolute 0.62 10*3/mm3      Eosinophils, Absolute 0.42 10*3/mm3      Basophils, Absolute 0.03 10*3/mm3      Immature Grans, Absolute 0.02 10*3/mm3      nRBC 0.0 /100 WBC     Urinalysis With Microscopic If Indicated (No Culture) - Urine, Clean Catch [[id number]]  (Normal) Collected:  12/15/18 0005    Specimen:  Urine, Clean Catch Updated:  12/15/18 0013     Color, UA Yellow     Appearance, UA Clear     pH, UA 6.5     Specific Gravity, UA 1.013     Glucose, UA Negative     Ketones, UA Negative     Bilirubin, UA Negative     Blood, UA Negative     Protein, UA Negative     Leuk Esterase, UA Negative     Nitrite, UA Negative     Urobilinogen, UA 1.0 E.U./dL    Narrative:       Urine microscopic not indicated.          I ordered the above labs and reviewed the results    RADIOLOGY  CT Head Without Contrast   Final Result   No acute intracranial process identified.       Radiation dose reduction techniques were utilized, including automated   exposure control and exposure modulation based on body size.       This report was finalized on 12/14/2018 11:39 PM by Dr. Bia Vidal M.D.               I ordered the above noted radiological studies. Interpreted by radiologist. Reviewed by me in PACS.       PROCEDURES  Procedures      PROGRESS AND CONSULTS     10:18 PM  Ordered CT head, EKG, blood work, and UA. Pt is not a tPA candidate. NIHSS of 0.  11:26 AM  Reviewed pt's history and workup with Dr. Bajwa.  After a bedside evaluation; Dr Bajwa agrees with the plan of care  Time 12:53 AM  Discussed case with Dr Sams (Hospitalist)  Reviewed history, exam, results and treatments.  Discussed concerns and plan of care. Dr Sams accepts pt to be admitted to telemetry.          MEDICAL DECISION MAKING  Results were  reviewed/discussed with the patient and they were also made aware of online access. Pt also made aware that some labs, such as cultures, will not be resulted during ER visit and follow up with PMD is necessary.     MDM  Number of Diagnoses or Management Options     Amount and/or Complexity of Data Reviewed  Clinical lab tests: reviewed and ordered (unremarkable)  Tests in the radiology section of CPT®: ordered and reviewed (negative)  Discuss the patient with other providers: yes (Dr. Sams)           DIAGNOSIS  Final diagnoses:   Dizziness   Hypertension, unspecified type   Vertigo       DISPOSITION  DISCHARGE    Patient discharged in stable condition.    Reviewed implications of results, diagnosis, meds, responsibility to follow up, warning signs and symptoms of possible worsening, potential complications and reasons to return to ER.    Patient/Family voiced understanding of above instructions.    Discussed plan for discharge, as there is no emergent indication for admission. Patient referred to primary care provider for BP management due to today's BP. Pt/family is agreeable and understands need for follow up and repeat testing.  Pt is aware that discharge does not mean that nothing is wrong but it indicates no emergency is present that requires admission and they must continue care with follow-up as given below or physician of their choice.     FOLLOW-UP  No follow-up provider specified.       Medication List      No changes were made to your prescriptions during this visit.           Latest Documented Vital Signs:  As of 1:11 AM  BP- 170/92 HR- 70 Temp- 98.6 °F (37 °C) (Oral) O2 sat- 92%    --  Documentation assistance provided by fiordaliza Gibbs for Jong Marshall PA-C.  Information recorded by the scribe was done at my direction and has been verified and validated by me.     Heladio Gibbs  12/14/18 2222       Heladio Gibbs  12/15/18 0111       Sav Marshall III, PA  12/15/18 9013

## 2018-12-17 NOTE — PROGRESS NOTES
Case Management Discharge Note    Final Note: Pt was dc'd home over weekend    Destination      No service has been selected for the patient.      Durable Medical Equipment      No service has been selected for the patient.      Dialysis/Infusion      No service has been selected for the patient.      Home Medical Care      No service has been selected for the patient.      Community Resources      No service has been selected for the patient.        Other: Other    Final Discharge Disposition Code: 01 - home or self-care

## 2018-12-18 ENCOUNTER — TELEPHONE (OUTPATIENT)
Dept: FAMILY MEDICINE CLINIC | Facility: CLINIC | Age: 78
End: 2018-12-18

## 2018-12-18 ENCOUNTER — OFFICE VISIT (OUTPATIENT)
Dept: FAMILY MEDICINE CLINIC | Facility: CLINIC | Age: 78
End: 2018-12-18

## 2018-12-18 VITALS
BODY MASS INDEX: 29.12 KG/M2 | HEIGHT: 71 IN | DIASTOLIC BLOOD PRESSURE: 72 MMHG | WEIGHT: 208 LBS | TEMPERATURE: 97.8 F | HEART RATE: 58 BPM | SYSTOLIC BLOOD PRESSURE: 168 MMHG | OXYGEN SATURATION: 98 %

## 2018-12-18 DIAGNOSIS — E78.49 OTHER HYPERLIPIDEMIA: ICD-10-CM

## 2018-12-18 DIAGNOSIS — H35.3110 NONEXUDATIVE AGE-RELATED MACULAR DEGENERATION OF RIGHT EYE, UNSPECIFIED STAGE: ICD-10-CM

## 2018-12-18 DIAGNOSIS — Z00.00 MEDICARE ANNUAL WELLNESS VISIT, SUBSEQUENT: Primary | ICD-10-CM

## 2018-12-18 DIAGNOSIS — I25.10 CORONARY ARTERIOSCLEROSIS IN NATIVE ARTERY: ICD-10-CM

## 2018-12-18 DIAGNOSIS — R42 DIZZINESS: ICD-10-CM

## 2018-12-18 DIAGNOSIS — I67.4 HYPERTENSIVE ENCEPHALOPATHY: ICD-10-CM

## 2018-12-18 DIAGNOSIS — I25.2 HISTORY OF HEART ATTACK: ICD-10-CM

## 2018-12-18 DIAGNOSIS — I10 ESSENTIAL HYPERTENSION: ICD-10-CM

## 2018-12-18 PROBLEM — E66.9 OBESITY (BMI 30-39.9): Status: RESOLVED | Noted: 2017-11-20 | Resolved: 2018-12-18

## 2018-12-18 PROCEDURE — 99214 OFFICE O/P EST MOD 30 MIN: CPT | Performed by: NURSE PRACTITIONER

## 2018-12-18 PROCEDURE — G0439 PPPS, SUBSEQ VISIT: HCPCS | Performed by: NURSE PRACTITIONER

## 2018-12-18 NOTE — PATIENT INSTRUCTIONS
medicare wellness today, reviewed [facility] admission records, imaging and labs, check BP at home and increase amlodipine 10 mg daily, will call Dr Kingston cardiology regarding fluctuating BP, Current outpatient and discharge medications have been reconciled for the patient.  Reviewed by: COLILN Vila  Medicare Wellness  Personal Prevention Plan of Service     Date of Office Visit:  2018  Encounter Provider:  COLLIN Vila  Place of Service:  [facility]  Patient Name: Javier Grant  :  1940    As part of the Medicare Wellness portion of your visit today, we are providing you with this personalized preventive plan of services (PPPS). This plan is based upon recommendations of the [place] Preventive Services Task Force (USPSTF) and the Advisory Committee on Immunization Practices (ACIP).    This lists the preventive care services that should be considered, and provides dates of when you are due. Items listed as completed are up-to-date and do not require any further intervention.    Health Maintenance   Topic Date Due   • HEPATITIS A VACCINE ADULT (1 of 2) 1958   • TDAP/TD VACCINES (1 - Tdap) 1959   • ZOSTER VACCINE (2 of 2) 2012   • MEDICARE ANNUAL WELLNESS  2018   • LIPID PANEL  12/15/2019   • PNEUMOCOCCAL VACCINES (65+ LOW/MEDIUM RISK)  Completed       No orders of the defined types were placed in this encounter.      No Follow-up on file.

## 2018-12-18 NOTE — PROGRESS NOTES
Rosetta Grant is a 78 y.o. male.     History of Present Illness   Here to FU on chronic HTN, chol, CAD s/p MI with 2 stents and CRISTY on amlodipine 5 mg, ASA 81 mg, coreg 3.125 mg BID, ramipril 10 mg, lipitor 80 mg, with BP checks at home ranges 129/73 -176/94, 186/92 this AM, Recently went to [facility], [place], operated by [facility] 12/14/18-12/15/18 admitted for dizziness, vision change, unsteady balance, and elevated BP, but no CP HA LE edema, with normal CT head, MRI and MRA head and seen by Dr Doshi neuro eval possible TIA but negative and dx hypertensive encephalopathy, notes was recently taken off plavix d/t nosebleeds by cardiology Dr Kingston and pending FU 02/19, labs CMP troponin A1Cand chol normal, notes was dx macular degeneration last week and pending apt with retinal specialist later this week and some increased stress, hasn't had to use nitroglycerin and dizziness now resolved    The following portions of the patient's history were reviewed and updated as appropriate: allergies, current medications, past family history, past medical history, past social history, past surgical history and problem list.    Review of Systems   Constitutional: Negative for fever.   Eyes: Positive for visual disturbance.   Respiratory: Negative for cough, shortness of breath and wheezing.    Cardiovascular: Negative for chest pain, palpitations and leg swelling.   Musculoskeletal: Positive for gait problem. Negative for arthralgias, back pain, joint swelling, myalgias, neck pain and neck stiffness.   Neurological: Positive for dizziness and headaches. Negative for tremors, seizures, syncope, facial asymmetry, speech difficulty, weakness, light-headedness and numbness.   Psychiatric/Behavioral: Negative for agitation, behavioral problems, confusion, decreased concentration, dysphoric mood, hallucinations, self-injury, sleep disturbance and suicidal ideas. The patient is nervous/anxious. The patient is not hyperactive.    All other systems  reviewed and are negative.      Objective   Physical Exam   Constitutional: He is oriented to person, place, and time. He appears well-developed and well-nourished.   HENT:   Head: Normocephalic and atraumatic.   Eyes: Conjunctivae and EOM are normal. Pupils are equal, round, and reactive to light.   Cardiovascular: Normal rate and regular rhythm.   Murmur heard.  Pulmonary/Chest: Effort normal and breath sounds normal.   Musculoskeletal: Normal range of motion.   Neurological: He is alert and oriented to person, place, and time.   Skin: Skin is warm and dry.   Psychiatric: He has a normal mood and affect. His behavior is normal. Judgment and thought content normal.   Vitals reviewed.      Assessment/Plan   Javier was seen today for hypertension.    Diagnoses and all orders for this visit:    Medicare annual wellness visit, subsequent    Hypertensive encephalopathy    Essential hypertension    Other hyperlipidemia    Dizziness    History of heart attack    Coronary arteriosclerosis in native artery    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, unspecified stage    medicare wellness today, reviewed [facility] admission records, imaging and labs, check BP at home and increase amlodipine 10 mg daily, will call Dr Kingston cardiology regarding fluctuating BP, Current outpatient and discharge medications have been reconciled for the patient.  Reviewed by: COLLIN Vila

## 2018-12-18 NOTE — TELEPHONE ENCOUNTER
PATIENT WAS IN [facility] OVER THE WEEKEND FOR BLOOD PRESSURE BEING LOW AND ITS HIGH. PATIENT WANTS TO KNOW IF HE CAN BE SEEN TODAY

## 2018-12-18 NOTE — PROGRESS NOTES
..QUICK REFERENCE INFORMATION:  The ABCs of the Annual Wellness Visit    Subsequent Medicare Wellness Visit    HEALTH RISK ASSESSMENT    1940    Recent Hospitalizations:  Recently treated at the following:  [facility] 18    Current Medical Providers:  Patient Care Team:  Stanton Sotomayor Jr., MD as PCP - General (Internal Medicine)  Robin Kingston Jr., MD as Consulting Physician (Cardiology)  Suzanna Perry MD (Inactive) as Consulting Physician (Pediatric Urology)  Leonard Byrne MD as Consulting Physician (Ophthalmology)    Smoking Status:  Social History     Tobacco Use   Smoking Status Former Smoker   • Packs/day: 2.00   • Years: 20.00   • Pack years: 40.00   • Types: Cigarettes   • Last attempt to quit:    • Years since quittin.9   Smokeless Tobacco Never Used     Alcohol Consumption:  Social History     Substance and Sexual Activity   Alcohol Use No     Depression Screen:   PHQ-2/PHQ-9 Depression Screening 2018   Little interest or pleasure in doing things 0   Feeling down, depressed, or hopeless 0   Trouble falling or staying asleep, or sleeping too much 2   Feeling tired or having little energy 2   Poor appetite or overeating 2   Feeling bad about yourself - or that you are a failure or have let yourself or your family down 0   Trouble concentrating on things, such as reading the newspaper or watching television 0   Moving or speaking so slowly that other people could have noticed. Or the opposite - being so fidgety or restless that you have been moving around a lot more than usual 0   Thoughts that you would be better off dead, or of hurting yourself in some way 0   Total Score 6   If you checked off any problems, how difficult have these problems made it for you to do your work, take care of things at home, or get along with other people? Not difficult at all       Health Habits and Functional and Cognitive Screening:  Functional & Cognitive Status 2018    Do you have difficulty preparing food and eating? No   Do you have difficulty bathing yourself, getting dressed or grooming yourself? No   Do you have difficulty using the toilet? No   Do you have difficulty moving around from place to place? No   Do you have trouble with steps or getting out of a bed or a chair? No   In the past year have you fallen or experienced a near fall? No   Current Diet Well Balanced Diet   Dental Exam Up to date   Eye Exam Up to date   Exercise (times per week) 3 times per week   Current Exercise Activities Include Walking   Do you need help using the phone?  No   Are you deaf or do you have serious difficulty hearing?  Yes   Do you need help with transportation? No   Do you need help shopping? No   Do you need help preparing meals?  No   Do you need help with housework?  -   Do you need help with laundry? No   Do you need help taking your medications? No   Do you need help managing money? No   Do you ever drive or ride in a car without wearing a seat belt? No   Have you felt unusual stress, anger or loneliness in the last month? No   Who do you live with? Spouse   If you need help, do you have trouble finding someone available to you? No   Have you been bothered in the last four weeks by sexual problems? No   Do you have difficulty concentrating, remembering or making decisions? No       Does the patient have evidence of cognitive impairment? No    Aspirin use counseling: Taking ASA appropriately as indicated      Recent Lab Results:  CMP:  Lab Results   Component Value Date    BUN 17 12/14/2018    CREATININE 1.05 12/14/2018    EGFRIFNONA 68 12/14/2018    BCR 16.2 12/14/2018     12/14/2018    K 4.0 12/14/2018    CO2 24.8 12/14/2018    CALCIUM 9.1 12/14/2018    ALBUMIN 4.10 12/14/2018    BILITOT 0.9 12/14/2018    ALKPHOS 118 (H) 12/14/2018    AST 23 12/14/2018    ALT 18 12/14/2018     Lipid Panel:  Lab Results   Component Value Date    CHOL 100 12/15/2018    TRIG 75 12/15/2018     HDL 35 (L) 12/15/2018    VLDL 15 12/15/2018    LDLHDL 1.43 12/15/2018     HbA1c:  Lab Results   Component Value Date    HGBA1C 5.30 12/15/2018       Visual Acuity:  No exam data present    Age-appropriate Screening Schedule:  Refer to the list below for future screening recommendations based on patient's age, sex and/or medical conditions. Orders for these recommended tests are listed in the plan section. The patient has been provided with a written plan.    Health Maintenance   Topic Date Due   • TDAP/TD VACCINES (1 - Tdap) 04/12/1959   • ZOSTER VACCINE (2 of 2) 02/26/2012   • LIPID PANEL  12/15/2019   • PNEUMOCOCCAL VACCINES (65+ LOW/MEDIUM RISK)  Completed        Subjective   History of Present Illness    Javier Grant is a 78 y.o. male who presents for an Subsequent Wellness Visit.    The following portions of the patient's history were reviewed and updated as appropriate: allergies, current medications, past family history, past medical history, past social history, past surgical history and problem list.    Outpatient Medications Prior to Visit   Medication Sig Dispense Refill   • acetaminophen (TYLENOL) 325 MG tablet Take 650 mg by mouth Every Night.     • allopurinol (ZYLOPRIM) 300 MG tablet TAKE 1 TABLET DAILY 90 tablet 1   • amLODIPine (NORVASC) 5 MG tablet Take 5 mg by mouth Every Evening.     • aspirin 81 MG tablet Take 81 mg by mouth Daily.     • atorvastatin (LIPITOR) 80 MG tablet Take 80 mg by mouth Every Night.     • carvedilol (COREG) 3.125 MG tablet Take 3.125 mg by mouth 2 (Two) Times a Day With Meals.     • Cholecalciferol 2000 units capsule Take 2,000 Units by mouth Daily.     • famotidine (PEPCID) 20 MG tablet Take 20 mg by mouth Every Evening.     • Multiple Vitamin (MULTI VITAMIN DAILY PO) Take 10 tablets by mouth Daily.     • nitroglycerin (NITROSTAT) 0.4 MG SL tablet Take 0.4 mg by mouth Every 5 (Five) Minutes As Needed.     • omega-3 acid ethyl esters (LOVAZA) 1 g capsule Take 4 g by mouth  "Daily. PT TO HOLD MED PRIOR TO OR     • potassium chloride (MICRO-K) 10 MEQ CR capsule Take 10 mEq by mouth Daily.     • ramipril (ALTACE) 10 MG capsule Take 10 mg by mouth Every Evening.       No facility-administered medications prior to visit.        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    Review of Systems    Compared to one year ago, the patient feels his physical health is worse.  Compared to one year ago, the patient feels his mental health is the same.    Objective     Physical Exam    Vitals:    12/18/18 1708   BP: 168/72   BP Location: Left arm   Patient Position: Sitting   Cuff Size: Large Adult   Pulse: 58   Temp: 97.8 °F (36.6 °C)   TempSrc: Oral   SpO2: 98%   Weight: 94.3 kg (208 lb)   Height: 180.3 cm (71\")   PainSc: 0-No pain       Patient's Body mass index is 29.01 kg/m². BMI is above normal parameters. Recommendations include: exercise counseling and nutrition counseling.      Assessment/Plan   Patient Self-Management and Personalized Health Advice  The patient has been provided with information about: diet, exercise, weight management and prevention of cardiac or vascular disease and preventive services including:   · Fall Risk " assessment done.    Visit Diagnoses:    ICD-10-CM ICD-9-CM   1. Medicare annual wellness visit, subsequent Z00.00 V70.0   2. Essential hypertension I10 401.9   3. Other hyperlipidemia E78.49 272.4   4. Dizziness R42 780.4   5. History of heart attack I25.2 412   6. Coronary arteriosclerosis in native artery I25.10 414.01   7. Hypertensive encephalopathy I67.4 437.2   8. 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      Outpatient Encounter Medications as of 12/18/2018   Medication Sig Dispense Refill   • acetaminophen (TYLENOL) 325 MG tablet Take 650 mg by mouth Every Night.     • allopurinol (ZYLOPRIM) 300 MG tablet TAKE 1 TABLET DAILY 90 tablet 1   • amLODIPine (NORVASC) 5 MG tablet Take 5 mg by mouth Every Evening.     • aspirin 81 MG tablet Take 81 mg by mouth Daily.     • atorvastatin (LIPITOR) 80 MG tablet Take 80 mg by mouth Every Night.     • carvedilol (COREG) 3.125 MG tablet Take 3.125 mg by mouth 2 (Two) Times a Day With Meals.     • Cholecalciferol 2000 units capsule Take 2,000 Units by mouth Daily.     • famotidine (PEPCID) 20 MG tablet Take 20 mg by mouth Every Evening.     • Multiple Vitamin (MULTI VITAMIN DAILY PO) Take 10 tablets by mouth Daily.     • nitroglycerin (NITROSTAT) 0.4 MG SL tablet Take 0.4 mg by mouth Every 5 (Five) Minutes As Needed.     • omega-3 acid ethyl esters (LOVAZA) 1 g capsule Take 4 g by mouth Daily. PT TO HOLD MED PRIOR TO OR     • potassium chloride (MICRO-K) 10 MEQ CR capsule Take 10 mEq by mouth Daily.     • ramipril (ALTACE) 10 MG capsule Take 10 mg by mouth Every Evening.       No facility-administered encounter medications on file as of 12/18/2018.        Reviewed use of high risk medication in the elderly: yes  Reviewed for potential of harmful drug interactions in the elderly: yes    Follow Up:  No Follow-up on file.     An After Visit Summary and PPPS  with all of these plans were given to the patient.

## 2018-12-19 ENCOUNTER — TELEPHONE (OUTPATIENT)
Dept: FAMILY MEDICINE CLINIC | Facility: CLINIC | Age: 78
End: 2018-12-19

## 2018-12-19 NOTE — TELEPHONE ENCOUNTER
Called [facility] 4:15 to discuss care plan with Dr Kingston who is on call, awaiting call back regarding increase amlodipine and pending FU apt

## 2018-12-21 ENCOUNTER — TELEPHONE (OUTPATIENT)
Dept: FAMILY MEDICINE CLINIC | Facility: CLINIC | Age: 78
End: 2018-12-21

## 2019-05-20 RX ORDER — ALLOPURINOL 300 MG/1
TABLET ORAL
Qty: 90 TABLET | Refills: 1 | Status: SHIPPED | OUTPATIENT
Start: 2019-05-20 | End: 2019-12-26

## 2019-08-01 ENCOUNTER — OFFICE VISIT (OUTPATIENT)
Dept: SLEEP MEDICINE | Facility: HOSPITAL | Age: 79
End: 2019-08-01

## 2019-08-01 VITALS
OXYGEN SATURATION: 98 % | HEART RATE: 60 BPM | SYSTOLIC BLOOD PRESSURE: 166 MMHG | DIASTOLIC BLOOD PRESSURE: 77 MMHG | WEIGHT: 207.8 LBS | BODY MASS INDEX: 29.09 KG/M2 | HEIGHT: 71 IN

## 2019-08-01 DIAGNOSIS — G47.14 HYPERSOMNIA DUE TO MEDICAL CONDITION: Primary | Chronic | ICD-10-CM

## 2019-08-01 DIAGNOSIS — Z99.89 OSA ON CPAP: Chronic | ICD-10-CM

## 2019-08-01 DIAGNOSIS — G47.33 OSA ON CPAP: Chronic | ICD-10-CM

## 2019-08-01 PROCEDURE — 99214 OFFICE O/P EST MOD 30 MIN: CPT | Performed by: INTERNAL MEDICINE

## 2019-08-01 PROCEDURE — G0463 HOSPITAL OUTPT CLINIC VISIT: HCPCS

## 2019-08-01 NOTE — PROGRESS NOTES
Follow Up Sleep Disorders Center Note     Chief Complaint:  CRISTY     Primary Care Physician: Stanton Sotomayor Jr., MD    Interval History:   I last saw the patient in 2016.  Patient was seen in the sleep disorder center 2017.  The patient is here for reevaluation and updated supplies.  He goes to bed at 10 PM and awakens at 5:30 AM.  If he awakens after 4:30 AM, he has difficulty going back to sleep.  He has somewhat tired upon awakening.  [place] Sleepiness Scale is borderline abnormal at 8.  He does have a dry mouth in the morning.  He will have sudden episodes of sleep during the daytime.  He has difficulty staying asleep and his sleep is generally restless.  He will use the restroom once or twice during the nighttime.    The patient states he was diagnosed with macular degeneration since last seen.    Review of Systems:    A complete review of systems was done and all were negative with the exception of frequent urination, occasional nosebleed, nasal congestion and postnasal drip, painful joints, swollen ankles toward the end of the day, SKELTON, at times he feels faint    I reviewed his integrated medical records for past medical history and family history.    Social History:    Social History     Socioeconomic History   • Marital status:      Spouse name: Not on file   • Number of children: Not on file   • Years of education: Not on file   • Highest education level: Not on file   Occupational History   • Occupation: retired   Tobacco Use   • Smoking status: Former Smoker     Packs/day: 2.00     Years: 20.00     Pack years: 40.00     Types: Cigarettes     Last attempt to quit: 1980     Years since quittin.6   • Smokeless tobacco: Never Used   Substance and Sexual Activity   • Alcohol use: No   • Drug use: No   • Sexual activity: Defer       Allergies:  Ciprofloxacin     Medication Review: His list was reviewed.      Vital Signs:    Vitals:    19 0900   BP: 166/77   BP Location: Left  "arm   Patient Position: Sitting   Pulse: 60   SpO2: 98%   Weight: 94.3 kg (207 lb 12.8 oz)   Height: 180.3 cm (71\")     Body mass index is 28.98 kg/m².    Physical Exam:    Constitutional:  Well developed 79 y.o. male that appears in no apparent distress.  Awake & oriented times 3.  Normal mood with normal recent and remote memory and normal judgement.  Eyes:  Conjunctivae normal.  Oropharynx:  moist mucous membranes without exudate and a large tongue and uvula and narrow posterior pharyngeal opening  Neck: Trachea midline  Respiratory: Effort not labored  Musculoskeletal: Gait appears normal with no evidence of clubbing and no significant edema presently     Results Review:  DME is [employer] and he uses a nasal mask.  Downloads between 5/3 and 7/31/2019 compliance 100%.  Average usage is 6 hours and 50 minutes.  Average AHI is normal without leak.  Average AutoCPAP pressure is 10.7 and his auto CPAP is 8-20.       Impression:   Obstructive sleep apnea adequately treated with auto CPAP with good compliance and usage and no some persistent complaints of hypersomnolence.    Plan:  Good sleep hygiene measures should be maintained.  Weight loss would be beneficial in this patient who is nearly obese by BMI.  The patient is benefiting from the treatment being provided.     I reviewed all with the patient.  The patient will continue auto CPAP.  When the patient takes a nap, I encouraged him to sleep with his CPAP.  A new order will be sent to his DME.    The patient will call for any problems and will follow up in 1 year.      Kofi Jackson MD  Sleep Medicine  08/01/19  10:21 AM        "

## 2019-08-17 ENCOUNTER — HOSPITAL ENCOUNTER (EMERGENCY)
Facility: HOSPITAL | Age: 79
Discharge: HOME | End: 2019-08-17
Attending: EMERGENCY MEDICINE | Admitting: EMERGENCY MEDICINE

## 2019-08-17 ENCOUNTER — APPOINTMENT (OUTPATIENT)
Dept: GENERAL RADIOLOGY | Facility: HOSPITAL | Age: 79
End: 2019-08-17

## 2019-08-17 VITALS
OXYGEN SATURATION: 96 % | DIASTOLIC BLOOD PRESSURE: 93 MMHG | HEART RATE: 66 BPM | WEIGHT: 208 LBS | SYSTOLIC BLOOD PRESSURE: 151 MMHG | BODY MASS INDEX: 29.12 KG/M2 | TEMPERATURE: 97.2 F | HEIGHT: 71 IN | RESPIRATION RATE: 18 BRPM

## 2019-08-17 DIAGNOSIS — R07.9 CHEST PAIN, UNSPECIFIED TYPE: Primary | ICD-10-CM

## 2019-08-17 LAB
ALBUMIN SERPL-MCNC: 4.2 G/DL (ref 3.5–5.2)
ALBUMIN/GLOB SERPL: 1.5 G/DL
ALP SERPL-CCNC: 112 U/L (ref 39–117)
ALT SERPL W P-5'-P-CCNC: 19 U/L (ref 1–41)
ANION GAP SERPL CALCULATED.3IONS-SCNC: 13.7 MMOL/L (ref 5–15)
AST SERPL-CCNC: 23 U/L (ref 1–40)
BASOPHILS # BLD AUTO: 0.04 10*3/MM3 (ref 0–0.2)
BASOPHILS NFR BLD AUTO: 0.4 % (ref 0–1.5)
BILIRUB SERPL-MCNC: 0.6 MG/DL (ref 0.2–1.2)
BUN BLD-MCNC: 19 MG/DL (ref 8–23)
BUN/CREAT SERPL: 14.5 (ref 7–25)
CALCIUM SPEC-SCNC: 9.2 MG/DL (ref 8.6–10.5)
CHLORIDE SERPL-SCNC: 104 MMOL/L (ref 98–107)
CO2 SERPL-SCNC: 23.3 MMOL/L (ref 22–29)
CREAT BLD-MCNC: 1.31 MG/DL (ref 0.76–1.27)
DEPRECATED RDW RBC AUTO: 49.1 FL (ref 37–54)
EOSINOPHIL # BLD AUTO: 0.43 10*3/MM3 (ref 0–0.4)
EOSINOPHIL NFR BLD AUTO: 4.8 % (ref 0.3–6.2)
ERYTHROCYTE [DISTWIDTH] IN BLOOD BY AUTOMATED COUNT: 14 % (ref 12.3–15.4)
GFR SERPL CREATININE-BSD FRML MDRD: 53 ML/MIN/1.73
GLOBULIN UR ELPH-MCNC: 2.8 GM/DL
GLUCOSE BLD-MCNC: 120 MG/DL (ref 65–99)
HCT VFR BLD AUTO: 40.8 % (ref 37.5–51)
HGB BLD-MCNC: 13.5 G/DL (ref 13–17.7)
HOLD SPECIMEN: NORMAL
HOLD SPECIMEN: NORMAL
IMM GRANULOCYTES # BLD AUTO: 0.02 10*3/MM3 (ref 0–0.05)
IMM GRANULOCYTES NFR BLD AUTO: 0.2 % (ref 0–0.5)
LYMPHOCYTES # BLD AUTO: 1.66 10*3/MM3 (ref 0.7–3.1)
LYMPHOCYTES NFR BLD AUTO: 18.4 % (ref 19.6–45.3)
MCH RBC QN AUTO: 31.5 PG (ref 26.6–33)
MCHC RBC AUTO-ENTMCNC: 33.1 G/DL (ref 31.5–35.7)
MCV RBC AUTO: 95.3 FL (ref 79–97)
MONOCYTES # BLD AUTO: 0.7 10*3/MM3 (ref 0.1–0.9)
MONOCYTES NFR BLD AUTO: 7.8 % (ref 5–12)
NEUTROPHILS # BLD AUTO: 6.18 10*3/MM3 (ref 1.7–7)
NEUTROPHILS NFR BLD AUTO: 68.4 % (ref 42.7–76)
NRBC BLD AUTO-RTO: 0 /100 WBC (ref 0–0.2)
PLATELET # BLD AUTO: 226 10*3/MM3 (ref 140–450)
PMV BLD AUTO: 10.8 FL (ref 6–12)
POTASSIUM BLD-SCNC: 4.2 MMOL/L (ref 3.5–5.2)
PROT SERPL-MCNC: 7 G/DL (ref 6–8.5)
RBC # BLD AUTO: 4.28 10*6/MM3 (ref 4.14–5.8)
SODIUM BLD-SCNC: 141 MMOL/L (ref 136–145)
TROPONIN T SERPL-MCNC: <0.01 NG/ML (ref 0–0.03)
TROPONIN T SERPL-MCNC: <0.01 NG/ML (ref 0–0.03)
WBC NRBC COR # BLD: 9.03 10*3/MM3 (ref 3.4–10.8)
WHOLE BLOOD HOLD SPECIMEN: NORMAL
WHOLE BLOOD HOLD SPECIMEN: NORMAL

## 2019-08-17 PROCEDURE — 93005 ELECTROCARDIOGRAM TRACING: CPT | Performed by: EMERGENCY MEDICINE

## 2019-08-17 PROCEDURE — 99285 EMERGENCY DEPT VISIT HI MDM: CPT

## 2019-08-17 PROCEDURE — 85025 COMPLETE CBC W/AUTO DIFF WBC: CPT | Performed by: EMERGENCY MEDICINE

## 2019-08-17 PROCEDURE — 84484 ASSAY OF TROPONIN QUANT: CPT | Performed by: EMERGENCY MEDICINE

## 2019-08-17 PROCEDURE — 93010 ELECTROCARDIOGRAM REPORT: CPT | Performed by: INTERNAL MEDICINE

## 2019-08-17 PROCEDURE — 80053 COMPREHEN METABOLIC PANEL: CPT | Performed by: EMERGENCY MEDICINE

## 2019-08-17 PROCEDURE — 71045 X-RAY EXAM CHEST 1 VIEW: CPT

## 2019-08-17 RX ORDER — SODIUM CHLORIDE 0.9 % (FLUSH) 0.9 %
10 SYRINGE (ML) INJECTION AS NEEDED
Status: DISCONTINUED | OUTPATIENT
Start: 2019-08-17 | End: 2019-08-17 | Stop reason: HOSPADM

## 2019-08-17 RX ORDER — ASPIRIN 81 MG/1
324 TABLET, CHEWABLE ORAL ONCE
Status: COMPLETED | OUTPATIENT
Start: 2019-08-17 | End: 2019-08-17

## 2019-08-17 RX ADMIN — ASPIRIN 324 MG: 81 TABLET, CHEWABLE ORAL at 17:38

## 2019-08-17 NOTE — ED PROVIDER NOTES
EMERGENCY DEPARTMENT ENCOUNTER    CHIEF COMPLAINT  Chief Complaint: Chest pain  History given by: Patient  History limited by: Nothing  Room Number: 39/39  PMD: Stanton Sotomayor Jr., MD      HPI:  Pt is a 79 y.o. male who presents via EMS complaining of L sided chest pain that began around 1500 today. Pt denies radiation of the pain and reports the pain was around a 4/10.   Pt denies nausea, vomiting, and SOA. Pt reports he took one NTG at home before EMS arrived which improved chest pain. Pt reports he was at a football game earlier today and had similar chest pain around 1000 but the pain resolved. Pt reports he had a triple bypass in 1990 and then had another stent placed in the last several years due to a blockage.     Duration:  Since 1500 today   Onset: Sudden  Timing: Constant  Location: L side chest   Radiation: None  Quality: Pain  Intensity/Severity: Moderate (4/10)  Progression: Improved  Associated Symptoms: None  Aggravating Factors: None  Alleviating Factors: None  Previous Episodes: Pt has a hx of blockages and stents being placed  Treatment before arrival: NTG at home which improved pain    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 10/29/2016   • Nonrheumatic aortic valve stenosis 11/16/2016   • Nonrheumatic aortic valve insufficiency 11/16/2016   • Cor pulmonale (CMS/HCC) 11/16/2016   • Umbilical hernia without obstruction and without gangrene 09/26/2017   • Dizziness 12/15/2018   • Vertigo 12/15/2018   • History of heart attack 12/18/2018   • 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/18/2018      Resolved Ambulatory Problems     Diagnosis Date Noted   • Obesity (BMI 30-39.9) 2017   • Hypertensive encephalopathy 12/15/2018     Past Medical History:   Diagnosis Date   • Arthritis    • Bleeds easily (CMS/HCC)    • CAD (coronary artery disease)    • Colon polyp    • Enlarged prostate    • Gastritis    • Gout    • Hiatal hernia    • History of MI (myocardial infarction)    • Hyperlipidemia    • Hypertension    • Sleep apnea    • Umbilical hernia    • Vitamin D deficiency        PAST SURGICAL HISTORY  Past Surgical History:   Procedure Laterality Date   • CARDIAC SURGERY      triple bypass   • CATARACT EXTRACTION Bilateral    • CORONARY ANGIOPLASTY WITH STENT PLACEMENT     • CORONARY ARTERY BYPASS GRAFT      3 VESSELS   • ENDOSCOPY N/A 2017    Procedure: ESOPHAGOGASTRODUODENOSCOPY WITH COLD BIOIPSIES AND BALLOON DILITATION SIZE 15, 16.5, 18;  Surgeon: Jerry SNOWDEN MD;  Location: [facility] ENDOSCOPY;  Service:    • MCCOY'S NEUROMA EXCISION Right    • TONSILLECTOMY     • UMBILICAL HERNIA REPAIR N/A 10/9/2017    Procedure: UMBILICAL HERNIA REPAIR;  Surgeon: Blake Kelly Jr., MD;  Location: [facility] MAIN OR;  Service:        FAMILY HISTORY  Family History   Problem Relation Age of Onset   • Depression Mother    • Colon polyps Mother    • Heart attack Father    • Aneurysm Father    • Stomach cancer Brother    • Malig Hyperthermia Neg Hx        SOCIAL HISTORY  Social History     Socioeconomic History   • Marital status:      Spouse name: Not on file   • Number of children: Not on file   • Years of education: Not on file   • Highest education level: Not on file   Occupational History   • Occupation: retired   Tobacco Use   • Smoking status: Former Smoker     Packs/day: 2.00     Years: 20.00     Pack years: 40.00     Types: Cigarettes     Last attempt to quit: 1980     Years since quittin.6   • Smokeless tobacco: Never Used   Substance and Sexual Activity   • Alcohol use: No    • Drug use: No   • Sexual activity: Defer       ALLERGIES  Ciprofloxacin    REVIEW OF SYSTEMS  Review of Systems   Constitutional: Negative for activity change, appetite change and fever.   HENT: Negative for congestion and sore throat.    Eyes: Negative.    Respiratory: Negative for cough and shortness of breath.    Cardiovascular: Positive for chest pain (L sided). Negative for leg swelling.   Gastrointestinal: Negative for abdominal pain, diarrhea, nausea and vomiting.   Endocrine: Negative.    Genitourinary: Negative for decreased urine volume and dysuria.   Musculoskeletal: Negative for neck pain.   Skin: Negative for rash and wound.   Allergic/Immunologic: Negative.    Neurological: Negative for weakness, numbness and headaches.   Hematological: Negative.    Psychiatric/Behavioral: Negative.    All other systems reviewed and are negative.      PHYSICAL EXAM  ED Triage Vitals [08/17/19 1705]   Temp Heart Rate Resp BP SpO2   97.2 °F (36.2 °C) 70 18 138/92 96 %      Temp src Heart Rate Source Patient Position BP Location FiO2 (%)   Tympanic Monitor Lying -- --       Physical Exam   Constitutional: He is oriented to person, place, and time and well-developed, well-nourished, and in no distress. No distress.   HENT:   Head: Normocephalic and atraumatic.   Eyes: Pupils are equal, round, and reactive to light.   Neck: Normal range of motion. Neck supple. No JVD present. No tracheal deviation present. No thyromegaly present.   Cardiovascular: Normal rate, regular rhythm and normal heart sounds. Exam reveals no gallop and no friction rub.   No murmur heard.  Pulmonary/Chest: Effort normal and breath sounds normal. No stridor. No respiratory distress. He has no wheezes. He has no rales.   Abdominal: Soft. Bowel sounds are normal. He exhibits no distension. There is no tenderness. There is no rebound and no guarding.   Musculoskeletal: Normal range of motion. He exhibits no edema, tenderness or deformity.   Mild pedal  edema   Neurological: He is alert and oriented to person, place, and time. No cranial nerve deficit. GCS score is 15.   Skin: Skin is warm and dry. No rash noted. He is not diaphoretic. No erythema.   Psychiatric: Mood, affect and judgment normal.   Nursing note and vitals reviewed.      LAB RESULTS  Lab Results (last 24 hours)     Procedure Component Value Units Date/Time    CBC & Differential [[id number]] Collected:  08/17/19 1720    Specimen:  Blood Updated:  08/17/19 1738    Narrative:       The following orders were created for panel order CBC & Differential.  Procedure                               Abnormality         Status                     ---------                               -----------         ------                     CBC Auto Differential[[id number]]        Abnormal            Final result                 Please view results for these tests on the individual orders.    Comprehensive Metabolic Panel [[id number]]  (Abnormal) Collected:  08/17/19 1720    Specimen:  Blood from Arm, Right Updated:  08/17/19 1818     Glucose 120 mg/dL      BUN 19 mg/dL      Creatinine 1.31 mg/dL      Sodium 141 mmol/L      Potassium 4.2 mmol/L      Chloride 104 mmol/L      CO2 23.3 mmol/L      Calcium 9.2 mg/dL      Total Protein 7.0 g/dL      Albumin 4.20 g/dL      ALT (SGPT) 19 U/L      AST (SGOT) 23 U/L      Alkaline Phosphatase 112 U/L      Total Bilirubin 0.6 mg/dL      eGFR Non African Amer 53 mL/min/1.73      Globulin 2.8 gm/dL      A/G Ratio 1.5 g/dL      BUN/Creatinine Ratio 14.5     Anion Gap 13.7 mmol/L     Narrative:       GFR Normal >60  Chronic Kidney Disease <60  Kidney Failure <15    CBC Auto Differential [[id number]]  (Abnormal) Collected:  08/17/19 1720    Specimen:  Blood from Arm, Right Updated:  08/17/19 1738     WBC 9.03 10*3/mm3      RBC 4.28 10*6/mm3      Hemoglobin 13.5 g/dL      Hematocrit 40.8 %      MCV 95.3 fL      MCH 31.5 pg      MCHC 33.1 g/dL      RDW 14.0 %      RDW-SD 49.1 fl      MPV 10.8  fL      Platelets 226 10*3/mm3      Neutrophil % 68.4 %      Lymphocyte % 18.4 %      Monocyte % 7.8 %      Eosinophil % 4.8 %      Basophil % 0.4 %      Immature Grans % 0.2 %      Neutrophils, Absolute 6.18 10*3/mm3      Lymphocytes, Absolute 1.66 10*3/mm3      Monocytes, Absolute 0.70 10*3/mm3      Eosinophils, Absolute 0.43 10*3/mm3      Basophils, Absolute 0.04 10*3/mm3      Immature Grans, Absolute 0.02 10*3/mm3      nRBC 0.0 /100 WBC     Troponin [[id number]]  (Normal) Collected:  08/17/19 1720    Specimen:  Blood from Arm, Right Updated:  08/17/19 1818     Troponin T <0.010 ng/mL     Narrative:       Troponin T Reference Range:  <= 0.03 ng/mL-   Negative for AMI  >0.03 ng/mL-     Abnormal for myocardial necrosis.  Clinicians would have to utilize clinical acumen, EKG, Troponin and serial changes to determine if it is an Acute Myocardial Infarction or myocardial injury due to an underlying chronic condition.     Troponin [[id number]]  (Normal) Collected:  08/17/19 1933    Specimen:  Blood Updated:  08/17/19 2010     Troponin T <0.010 ng/mL     Narrative:       Troponin T Reference Range:  <= 0.03 ng/mL-   Negative for AMI  >0.03 ng/mL-     Abnormal for myocardial necrosis.  Clinicians would have to utilize clinical acumen, EKG, Troponin and serial changes to determine if it is an Acute Myocardial Infarction or myocardial injury due to an underlying chronic condition.           I ordered the above labs and reviewed the results    RADIOLOGY  XR Chest 1 View   Final Result   No evidence for active disease in the chest.       This report was finalized on 8/17/2019 7:13 PM by Dr. Darius Dc M.D.               I ordered the above noted radiological studies. Interpreted by radiologist. Reviewed by me in PACS.       PROCEDURES  Procedures    EKG          EKG time: 1710  Rhythm/Rate: SR 72  P waves and NM: Nml  QRS, axis: RBBB   ST and T waves: Nml     Interpreted Contemporaneously by me, independently  viewed  Unchanged compared to prior 12/14/118      PROGRESS AND CONSULTS  ED Course as of Aug 17 2017   Sat Aug 17, 2019   2010 Labs, EKG and imaging as above.  My clinical suspicion for a serious underlying pulmonary or cardiac etiology is low.  There is no evidence to suggest pulmonary embolism, aortic dissection or acute coronary syndrome.  The patient was advised to return to the emergency department should the symptoms worsen or for any new concerns.  The patient can be safely followed as an outpatient for further workup as indicated.    Troponin negative X 2.  EKG unremarkable.  He was out in the heat for 2+ hours today watching his great grandson play football - I cautioned him strongly against this.  Can see Dr. Kingston as an outpatient.  [WC]      ED Course User Index  [WC] Bala Cox MD   1706: Ordered EKG for further evaluation    1723: Ordered asa for chest pain. Ordered XR chest and blood work for further evaluation.     1945: Rechecked pt who is resting comfortably and in NAD. Pt reports his chest pain has improved. Discussed plan to check second troponin for further evaluation.     2012: Rechecked pt who is resting comfortably and in NAD. Informed pt that both troponin's were negative. Discussed plan to discharge and follow up with cardiology. RTER pre-cautions given. Pt understands and agrees with the plan, all questions answered.        MEDICAL DECISION MAKING  Results were reviewed/discussed with the patient and they were also made aware of online access. Pt also made aware that some labs, such as cultures, will not be resulted during ER visit and follow up with PMD is necessary.     MDM  Number of Diagnoses or Management Options  Chest pain, unspecified type:      Amount and/or Complexity of Data Reviewed  Clinical lab tests: ordered and reviewed (Troponin negative x 2 )  Tests in the radiology section of CPT®: ordered and reviewed (Chest XR negative)  Tests in the medicine section of CPT®:  reviewed and ordered (See procedure notes)  Decide to obtain previous medical records or to obtain history from someone other than the patient: yes  Independent visualization of images, tracings, or specimens: yes    Patient Progress  Patient progress: stable         DIAGNOSIS  Final diagnoses:   Chest pain, unspecified type       DISPOSITION  DISCHARGE    Patient discharged in stable condition.    Reviewed implications of results, diagnosis, meds, responsibility to follow up, warning signs and symptoms of possible worsening, potential complications and reasons to return to ER, including.    Patient/Family voiced understanding of above instructions.    Discussed plan for discharge, as there is no emergent indication for admission. Patient referred to primary care provider for BP management due to today's BP. Pt/family is agreeable and understands need for follow up and repeat testing.  Pt is aware that discharge does not mean that nothing is wrong but it indicates no emergency is present that requires admission and they must continue care with follow-up as given below or physician of their choice.     FOLLOW-UP  Robin Kingston Jr., MD  [address] [facility]  [address] [place] [address] [id number] [phone]    Schedule an appointment as soon as possible for a visit       [facility] Emergency Department  [address] [facility] [address] [phone]    As needed, If symptoms worsen         Medication List      No changes were made to your prescriptions during this visit.           Latest Documented Vital Signs:  As of 8:17 PM  BP- 154/85 HR- 66 Temp- 97.2 °F (36.2 °C) (Tympanic) O2 sat- 98%    --  Documentation assistance provided by fiordaliza Potter for .  Information recorded by the scribe was done at my direction and has been verified and validated by me.         Rachelle Potter  08/17/19 2017       Bala Cox MD  08/17/19 7648

## 2019-08-19 ENCOUNTER — OFFICE VISIT (OUTPATIENT)
Dept: FAMILY MEDICINE CLINIC | Facility: CLINIC | Age: 79
End: 2019-08-19

## 2019-08-19 VITALS
BODY MASS INDEX: 29.12 KG/M2 | DIASTOLIC BLOOD PRESSURE: 70 MMHG | OXYGEN SATURATION: 96 % | HEART RATE: 53 BPM | HEIGHT: 71 IN | SYSTOLIC BLOOD PRESSURE: 130 MMHG | WEIGHT: 208 LBS | TEMPERATURE: 98.2 F

## 2019-08-19 DIAGNOSIS — H61.23 BILATERAL IMPACTED CERUMEN: ICD-10-CM

## 2019-08-19 DIAGNOSIS — I25.10 CORONARY ARTERY DISEASE INVOLVING NATIVE CORONARY ARTERY, ANGINA PRESENCE UNSPECIFIED, UNSPECIFIED WHETHER NATIVE OR TRANSPLANTED HEART: Primary | ICD-10-CM

## 2019-08-19 PROCEDURE — 99213 OFFICE O/P EST LOW 20 MIN: CPT | Performed by: INTERNAL MEDICINE

## 2019-08-19 PROCEDURE — 69209 REMOVE IMPACTED EAR WAX UNI: CPT | Performed by: INTERNAL MEDICINE

## 2019-08-19 RX ORDER — ISOSORBIDE MONONITRATE 30 MG/1
30 TABLET, EXTENDED RELEASE ORAL DAILY
Qty: 30 TABLET | Refills: 0 | Status: SHIPPED | OUTPATIENT
Start: 2019-08-19 | End: 2020-12-18

## 2019-08-19 NOTE — PROGRESS NOTES
Subjective   Javier Grant is a 79 y.o. male.   Patient with 2 reasons for visit was outside at a ball game when chest pain had taken nitroglycerin went to ER without specific findings they did very prominent x2 and EKG without findings there.  No recent exertional chest pain complaints also thinks he is ears are plugged with wax again recurrent cerumen impaction difficulties  History of Present Illness   Recent anginal type chest pain responded with one nitroglycerin leading to ER visit without specific findings he is going to follow-up with cardiologist he seen Dr. Kingston will wish to switch over to someone failure with [facility] we will route him to Dr. Calderón for this also no further chest pains did advise he get avoid getting overheated or be outside sized stays hot for any period of time I am going to add Imdur 30 mg to his regimen take that if he does have further chest pain either see us go to ER or we can expedite referral with Dr. Brothers's group.  Regarding the ears will check he generally does periodically blocked up with Serevent.    Review of Systems   All other systems reviewed and are negative.      Objective   Vitals:    08/19/19 1124   BP: 130/70   Pulse: 53   Temp: 98.2 °F (36.8 °C)   SpO2: 96%   Weight: 94.3 kg (208 lb)     Physical Exam   Constitutional: He appears well-developed and well-nourished.   HENT:   Head: Normocephalic and atraumatic.   Both ear canals blocked by cerumen irrigated with H2O with resolution of cerumen impaction.  Minimal cerumen impaction remaining right ear canal but is not clinically significant.   Eyes: Conjunctivae are normal. Pupils are equal, round, and reactive to light.   Cardiovascular: Normal rate, regular rhythm and normal heart sounds.   Pulmonary/Chest: Effort normal and breath sounds normal.   Neurological: He is alert.   Unremarkable gait and station   Skin: Skin is warm and dry.   Nursing note and vitals reviewed.      Lab Results   Component Value Date     INR 1.0 10/08/2015       Ear Cerumen Removal  Date/Time: 8/19/2019 1:15 PM  Performed by: Stanton Sotomayor Jr., MD  Authorized by: Stanton Sotomayor Jr., MD   Consent: Verbal consent obtained.  Consent given by: patient  Patient understanding: patient states understanding of the procedure being performed  Patient identity confirmed: verbally with patient  Location details: left ear and right ear  Patient tolerance: Patient tolerated the procedure well with no immediate complications  Comments: Patient with history of recurrent cerumen impaction presents with complaint of decreased hearing and sensation this is occurred again right ear is blocked by wax as was left both ears irrigated with H2O procedure tolerated well with basic resolution of cerumen impaction regarding left ear there is minimal irritation of the ear canal inferiorly at about 6:00 no overt bleeding TM is intact cerumen impaction resolved regarding the right ear no irritation ear canal noted minimal residual cerumen of a nonclinical nature remains at about 5:00 somewhat proximally.  Without further treatment recommended at this point in time procedure tolerated well will follow-up on as-needed basis as this is a recurrent problem for patient.  Procedure type: irrigation   Sedation:  Patient sedated: no          Irrigate both ears  Assessment/Plan   ..  1.  Coronary disease plan add Imdur 30 mg daily to medical regimen refer to Dr. Brothers patient was switched care to another cardiology well associate with [facility].  We will do this if he has further chest pain he is let us know we will go to ER.  We will try to expedite cardiology referral    2.  Bilateral cerumen impaction both ears irrigated with H2O with clinical resolution of cerumen impaction follow-up on as-needed basis.    Much of this encounter note is an electronic transcription/translation of spoken language to printed text.  The electronic translation of spoken language may permit  erroneous, or at times, nonsensical words or phrases to be inadvertently transcribed.  Although I have reviewed the note for such errors, some may still exist. If there are questions or for further clarification, please contact me.    Much of this encounter note is an electronic transcription/translation of spoken language to printed text.  The electronic translation of spoken language may permit erroneous, or at times, nonsensical words or phrases to be inadvertently transcribed.  Although I have reviewed the note for such errors, some may still exist. If there are questions or for further clarification, please contact me.

## 2019-10-26 NOTE — NURSING NOTE
Acute rehab referral received via stroke order set. Patient noted to be at baseline. Will sign off.    full weight-bearing

## 2019-12-26 RX ORDER — ALLOPURINOL 300 MG/1
TABLET ORAL
Qty: 90 TABLET | Refills: 4 | Status: SHIPPED | OUTPATIENT
Start: 2019-12-26 | End: 2020-07-31 | Stop reason: SDUPTHER

## 2019-12-26 RX ORDER — ALLOPURINOL 300 MG/1
TABLET ORAL
Qty: 90 TABLET | Refills: 4 | OUTPATIENT
Start: 2019-12-26

## 2020-07-23 ENCOUNTER — TRANSCRIBE ORDERS (OUTPATIENT)
Dept: ADMINISTRATIVE | Facility: HOSPITAL | Age: 80
End: 2020-07-23

## 2020-07-23 DIAGNOSIS — R79.89 ELEVATED D-DIMER: ICD-10-CM

## 2020-07-23 DIAGNOSIS — M79.604 PAIN IN BOTH LOWER EXTREMITIES: Primary | ICD-10-CM

## 2020-07-23 DIAGNOSIS — M79.605 PAIN IN BOTH LOWER EXTREMITIES: Primary | ICD-10-CM

## 2020-07-24 ENCOUNTER — HOSPITAL ENCOUNTER (OUTPATIENT)
Dept: CARDIOLOGY | Facility: HOSPITAL | Age: 80
Discharge: HOME | End: 2020-07-24
Admitting: INTERNAL MEDICINE

## 2020-07-24 DIAGNOSIS — M79.604 PAIN IN BOTH LOWER EXTREMITIES: ICD-10-CM

## 2020-07-24 DIAGNOSIS — M79.605 PAIN IN BOTH LOWER EXTREMITIES: ICD-10-CM

## 2020-07-24 DIAGNOSIS — R79.89 ELEVATED D-DIMER: ICD-10-CM

## 2020-07-24 LAB
BH CV LOWER VASCULAR LEFT COMMON FEMORAL AUGMENT: NORMAL
BH CV LOWER VASCULAR LEFT COMMON FEMORAL COMPETENT: NORMAL
BH CV LOWER VASCULAR LEFT COMMON FEMORAL COMPRESS: NORMAL
BH CV LOWER VASCULAR LEFT COMMON FEMORAL PHASIC: NORMAL
BH CV LOWER VASCULAR LEFT COMMON FEMORAL SPONT: NORMAL
BH CV LOWER VASCULAR LEFT DISTAL FEMORAL COMPRESS: NORMAL
BH CV LOWER VASCULAR LEFT GASTRONEMIUS COMPRESS: NORMAL
BH CV LOWER VASCULAR LEFT GREATER SAPH BK COMPRESS: NORMAL
BH CV LOWER VASCULAR LEFT MID FEMORAL AUGMENT: NORMAL
BH CV LOWER VASCULAR LEFT MID FEMORAL COMPETENT: NORMAL
BH CV LOWER VASCULAR LEFT MID FEMORAL COMPRESS: NORMAL
BH CV LOWER VASCULAR LEFT MID FEMORAL PHASIC: NORMAL
BH CV LOWER VASCULAR LEFT MID FEMORAL SPONT: NORMAL
BH CV LOWER VASCULAR LEFT PERONEAL COMPRESS: NORMAL
BH CV LOWER VASCULAR LEFT POPLITEAL AUGMENT: NORMAL
BH CV LOWER VASCULAR LEFT POPLITEAL COMPETENT: NORMAL
BH CV LOWER VASCULAR LEFT POPLITEAL COMPRESS: NORMAL
BH CV LOWER VASCULAR LEFT POPLITEAL PHASIC: NORMAL
BH CV LOWER VASCULAR LEFT POPLITEAL SPONT: NORMAL
BH CV LOWER VASCULAR LEFT POSTERIOR TIBIAL COMPRESS: NORMAL
BH CV LOWER VASCULAR LEFT PROFUNDA FEMORAL COMPRESS: NORMAL
BH CV LOWER VASCULAR LEFT PROXIMAL FEMORAL COMPRESS: NORMAL
BH CV LOWER VASCULAR LEFT SAPHENOFEMORAL JUNCTION COMPRESS: NORMAL
BH CV LOWER VASCULAR LEFT SOLEAL COMPRESS: NORMAL
BH CV LOWER VASCULAR RIGHT COMMON FEMORAL AUGMENT: NORMAL
BH CV LOWER VASCULAR RIGHT COMMON FEMORAL COMPETENT: NORMAL
BH CV LOWER VASCULAR RIGHT COMMON FEMORAL COMPRESS: NORMAL
BH CV LOWER VASCULAR RIGHT COMMON FEMORAL PHASIC: NORMAL
BH CV LOWER VASCULAR RIGHT COMMON FEMORAL SPONT: NORMAL
BH CV LOWER VASCULAR RIGHT DISTAL FEMORAL COMPRESS: NORMAL
BH CV LOWER VASCULAR RIGHT GASTRONEMIUS COMPRESS: NORMAL
BH CV LOWER VASCULAR RIGHT GREATER SAPH BK COMPRESS: NORMAL
BH CV LOWER VASCULAR RIGHT MID FEMORAL AUGMENT: NORMAL
BH CV LOWER VASCULAR RIGHT MID FEMORAL COMPETENT: NORMAL
BH CV LOWER VASCULAR RIGHT MID FEMORAL COMPRESS: NORMAL
BH CV LOWER VASCULAR RIGHT MID FEMORAL PHASIC: NORMAL
BH CV LOWER VASCULAR RIGHT MID FEMORAL SPONT: NORMAL
BH CV LOWER VASCULAR RIGHT PERONEAL COMPRESS: NORMAL
BH CV LOWER VASCULAR RIGHT POPLITEAL AUGMENT: NORMAL
BH CV LOWER VASCULAR RIGHT POPLITEAL COMPETENT: NORMAL
BH CV LOWER VASCULAR RIGHT POPLITEAL COMPRESS: NORMAL
BH CV LOWER VASCULAR RIGHT POPLITEAL PHASIC: NORMAL
BH CV LOWER VASCULAR RIGHT POPLITEAL SPONT: NORMAL
BH CV LOWER VASCULAR RIGHT POSTERIOR TIBIAL COMPRESS: NORMAL
BH CV LOWER VASCULAR RIGHT PROFUNDA FEMORAL COMPRESS: NORMAL
BH CV LOWER VASCULAR RIGHT PROXIMAL FEMORAL COMPRESS: NORMAL
BH CV LOWER VASCULAR RIGHT SAPHENOFEMORAL JUNCTION COMPRESS: NORMAL
BH CV LOWER VASCULAR RIGHT SOLEAL COMPRESS: NORMAL

## 2020-07-24 PROCEDURE — 93970 EXTREMITY STUDY: CPT

## 2020-07-30 ENCOUNTER — OFFICE VISIT (OUTPATIENT)
Dept: SLEEP MEDICINE | Facility: HOSPITAL | Age: 80
End: 2020-07-30

## 2020-07-30 VITALS — HEIGHT: 71 IN | BODY MASS INDEX: 28.84 KG/M2 | WEIGHT: 206 LBS

## 2020-07-30 DIAGNOSIS — Z99.89 OSA ON CPAP: Primary | Chronic | ICD-10-CM

## 2020-07-30 DIAGNOSIS — G47.33 OSA ON CPAP: Primary | Chronic | ICD-10-CM

## 2020-07-30 PROCEDURE — 99213 OFFICE O/P EST LOW 20 MIN: CPT | Performed by: INTERNAL MEDICINE

## 2020-07-30 PROCEDURE — G0463 HOSPITAL OUTPT CLINIC VISIT: HCPCS

## 2020-07-31 ENCOUNTER — OFFICE VISIT (OUTPATIENT)
Dept: FAMILY MEDICINE CLINIC | Facility: CLINIC | Age: 80
End: 2020-07-31

## 2020-07-31 VITALS
HEIGHT: 71 IN | RESPIRATION RATE: 20 BRPM | BODY MASS INDEX: 28.7 KG/M2 | OXYGEN SATURATION: 98 % | HEART RATE: 64 BPM | DIASTOLIC BLOOD PRESSURE: 70 MMHG | TEMPERATURE: 97.3 F | SYSTOLIC BLOOD PRESSURE: 130 MMHG | WEIGHT: 205 LBS

## 2020-07-31 DIAGNOSIS — Z00.00 MEDICARE ANNUAL WELLNESS VISIT, SUBSEQUENT: Primary | ICD-10-CM

## 2020-07-31 DIAGNOSIS — I10 HYPERTENSION, ESSENTIAL: ICD-10-CM

## 2020-07-31 DIAGNOSIS — D64.9 ANEMIA, UNSPECIFIED TYPE: ICD-10-CM

## 2020-07-31 DIAGNOSIS — F32.A DEPRESSION, UNSPECIFIED DEPRESSION TYPE: ICD-10-CM

## 2020-07-31 PROCEDURE — 99213 OFFICE O/P EST LOW 20 MIN: CPT | Performed by: INTERNAL MEDICINE

## 2020-07-31 PROCEDURE — G0439 PPPS, SUBSEQ VISIT: HCPCS | Performed by: INTERNAL MEDICINE

## 2020-07-31 RX ORDER — NIFEDIPINE 30 MG/1
30 TABLET, EXTENDED RELEASE ORAL DAILY
COMMUNITY
Start: 2020-07-07 | End: 2020-12-23 | Stop reason: SDUPTHER

## 2020-07-31 RX ORDER — ALLOPURINOL 300 MG/1
300 TABLET ORAL DAILY
Qty: 90 TABLET | Refills: 3 | Status: SHIPPED | OUTPATIENT
Start: 2020-07-31

## 2020-07-31 RX ORDER — ESCITALOPRAM OXALATE 10 MG/1
TABLET ORAL
Qty: 30 TABLET | Refills: 0 | Status: SHIPPED | OUTPATIENT
Start: 2020-07-31 | End: 2020-09-29

## 2020-07-31 NOTE — PROGRESS NOTES
The ABCs of the Annual Wellness Visit  Subsequent Medicare Wellness Visit    Chief Complaint   Patient presents with   • Anemia     referred by cardiology to have evaluated   • Medicare Wellness-subsequent     would prefer cologuard if possible   • discuss uses of CBD oil       Subjective   History of Present Illness:  Javier Grant is a 80 y.o. male who presents for a Subsequent Medicare Wellness Visit.    HEALTH RISK ASSESSMENT    Recent Hospitalizations:  No hospitalization(s) within the last year.    Current Medical Providers:  Patient Care Team:  Stanton Sotomayor Jr., MD as PCP - General (Internal Medicine)  Robin Kingston Jr., MD as Consulting Physician (Cardiology)  Leonard Byrne MD as Consulting Physician (Ophthalmology)    Smoking Status:  Social History     Tobacco Use   Smoking Status Former Smoker   • Packs/day: 2.00   • Years: 20.00   • Pack years: 40.00   • Types: Cigarettes   • Last attempt to quit:    • Years since quittin.6   Smokeless Tobacco Never Used       Alcohol Consumption:  Social History     Substance and Sexual Activity   Alcohol Use No       Depression Screen:   PHQ-2/PHQ-9 Depression Screening 2020   Little interest or pleasure in doing things 0   Feeling down, depressed, or hopeless 1   Trouble falling or staying asleep, or sleeping too much 0   Feeling tired or having little energy 1   Poor appetite or overeating 0   Feeling bad about yourself - or that you are a failure or have let yourself or your family down 0   Trouble concentrating on things, such as reading the newspaper or watching television 0   Moving or speaking so slowly that other people could have noticed. Or the opposite - being so fidgety or restless that you have been moving around a lot more than usual 0   Thoughts that you would be better off dead, or of hurting yourself in some way 0   Total Score 2   If you checked off any problems, how difficult have these problems made it for you to do your work,  take care of things at home, or get along with other people? Somewhat difficult       Fall Risk Screen:  SOURAV Fall Risk Assessment has not been completed.    Health Habits and Functional and Cognitive Screening:  Functional & Cognitive Status 7/31/2020   Do you have difficulty preparing food and eating? No   Do you have difficulty bathing yourself, getting dressed or grooming yourself? No   Do you have difficulty using the toilet? No   Do you have difficulty moving around from place to place? No   Do you have trouble with steps or getting out of a bed or a chair? No   Current Diet Well Balanced Diet   Dental Exam Up to date   Eye Exam Up to date   Exercise (times per week) 5 times per week   Current Exercise Activities Include Cardiovasular Workout on Exercise Equipment   Do you need help using the phone?  No   Are you deaf or do you have serious difficulty hearing?  Yes   Do you need help with transportation? No   Do you need help shopping? No   Do you need help preparing meals?  No   Do you need help with housework?  No   Do you need help with laundry? No   Do you need help taking your medications? No   Do you need help managing money? No   Do you ever drive or ride in a car without wearing a seat belt? No   Have you felt unusual stress, anger or loneliness in the last month? No   Who do you live with? Spouse   If you need help, do you have trouble finding someone available to you? No   Have you been bothered in the last four weeks by sexual problems? No   Do you have difficulty concentrating, remembering or making decisions? No         Does the patient have evidence of cognitive impairment?     Asprin use counseling:Taking ASA appropriately as indicated    Age-appropriate Screening Schedule:  Refer to the list below for future screening recommendations based on patient's age, sex and/or medical conditions. Orders for these recommended tests are listed in the plan section. The patient has been provided with a  written plan.    Health Maintenance   Topic Date Due   • TDAP/TD VACCINES (1 - Tdap) 04/12/1951   • ZOSTER VACCINE (2 of 2) 02/26/2012   • LIPID PANEL  07/17/2021          The following portions of the patient's history were reviewed and updated as appropriate: allergies, current medications, past family history, past medical history, past social history and problem list.    Outpatient Medications Prior to Visit   Medication Sig Dispense Refill   • acetaminophen (TYLENOL) 325 MG tablet Take 650 mg by mouth Every Night.     • allopurinol (ZYLOPRIM) 300 MG tablet TAKE 1 TABLET DAILY 90 tablet 4   • aspirin 81 MG tablet Take 81 mg by mouth Daily.     • atorvastatin (LIPITOR) 80 MG tablet Take 80 mg by mouth Every Night.     • carvedilol (COREG) 3.125 MG tablet Take 3.125 mg by mouth 2 (Two) Times a Day With Meals.     • Cholecalciferol 2000 units capsule Take 2,000 Units by mouth Daily.     • famotidine (PEPCID) 20 MG tablet Take 20 mg by mouth Every Evening.     • isosorbide mononitrate (IMDUR) 30 MG 24 hr tablet Take 1 tablet by mouth Daily. 30 tablet 0   • NIFEdipine XL (PROCARDIA XL) 30 MG 24 hr tablet Take 30 mg by mouth Daily.     • nitroglycerin (NITROSTAT) 0.4 MG SL tablet Take 0.4 mg by mouth Every 5 (Five) Minutes As Needed.     • omega-3 acid ethyl esters (LOVAZA) 1 g capsule Take 4 g by mouth Daily. PT TO HOLD MED PRIOR TO OR     • potassium chloride (MICRO-K) 10 MEQ CR capsule Take 10 mEq by mouth Daily.     • ramipril (ALTACE) 10 MG capsule Take 10 mg by mouth Every Evening.     • amLODIPine (NORVASC) 5 MG tablet Take 5 mg by mouth Every Evening.       No facility-administered medications prior to visit.        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 Patient has an advance directive (not in EMR), copy requested.    Review of Systems    Compared to one year ago, the patient feels his physical health is worse.  Compared to one year ago, the patient feels his mental health is worse.    Reviewed chart for potential of high risk medication in the elderly: yes  Reviewed chart for potential of harmful drug interactions in the elderly:yes    Objective         Vitals:    07/31/20 1015   BP: 130/70   BP Location: Left arm   Patient Position: Sitting   Pulse: 64   Resp: 20   Temp: 97.3 °F (36.3 °C)   TempSrc: Infrared   SpO2: 98%   Weight: 93 kg (205 lb)   Height: 180.3 cm (71\")   PainSc: 0-No pain       Body mass index is 28.59 kg/m².  Discussed the patient's BMI with him. The BMI is in the acceptable range.    Physical Exam    Lab Results   Component Value Date    GLU 95 07/17/2020    CHLPL 120 07/17/2020    TRIG 93 07/17/2020    HDL 36 (L) 07/17/2020    LDL 65 07/17/2020    VLDL 19 07/17/2020        Assessment/Plan   Medicare Risks and Personalized Health Plan  CMS Preventative Services Quick Reference  Advance Directive Discussion  Fall Risk  Immunizations Discussed/Encouraged (specific immunizations; Shingrix )    The above risks/problems have been discussed with the patient.  Pertinent information has been shared with the patient in the After Visit Summary.  Follow up plans and orders are seen below in the Assessment/Plan Section.    There are no diagnoses linked to this encounter.  Follow Up:  No follow-ups on file.     An After Visit Summary and PPPS were given to the patient.             "

## 2020-07-31 NOTE — PATIENT INSTRUCTIONS
Medicare Wellness  Personal Prevention Plan of Service     Date of Office Visit:  2020  Encounter Provider:  Stanton Sotomayor MD  Place of Service:  [facility]  Patient Name: Javier Grant  :  1940    As part of the Medicare Wellness portion of your visit today, we are providing you with this personalized preventive plan of services (PPPS). This plan is based upon recommendations of the [place] Preventive Services Task Force (USPSTF) and the Advisory Committee on Immunization Practices (ACIP).    This lists the preventive care services that should be considered, and provides dates of when you are due. Items listed as completed are up-to-date and do not require any further intervention.    Health Maintenance   Topic Date Due   • TDAP/TD VACCINES (1 - Tdap) 1951   • ZOSTER VACCINE (2 of 2) 2012   • MEDICARE ANNUAL WELLNESS  2019   • LIPID PANEL  2021   • Pneumococcal Vaccine Once at 65 Years Old  Completed       Orders Placed This Encounter   Procedures   • Ferritin   • Iron Profile   • Vitamin B12 & Folate   • Occult Blood X 1, Stool - Stool, Per Rectum     Order Specific Question:   Is this occult blood testing for Screening purposes?     Answer:   No   • CBC & Differential     Order Specific Question:   Manual Differential     Answer:   No       No follow-ups on file.

## 2020-07-31 NOTE — PROGRESS NOTES
Subjective   Javire Grant is a 80 y.o. male.  Medicare wellness visit subsequent also follow-up on anemia saw his cardiologist recently was borderline anemic with hemoglobin 13.0  Body mass index is 28.59 kg/m².  History of Present Illness   Medicare wellness visit subsequent borderline anemia very mild no history anemia seen in Dr. Kingston his cardiologist office he is doing well with his medicine of note they did switch him from his amlodipine to nifedipine thinking the former might be causing ankle edema as a medication change it looks like they check lipids in their office as well.  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 now has dementia he is feeling somewhat stressed and actually feeling little bit down there is a family history of depression we will which his mother dealt with that he is feeling down certainly no thoughts hurting self hurting others but we did discuss possibly doing antianxiety antidepressant medicine he is concerned that the medicines that might be used because a heavy degree of sedation and side effects did with his mother advised him that is unlikely with the new generation medication so we will use we will let him try little Lexapro see how well it works if there are side effects occurring stop and let us know otherwise let us know his status in 1 month's time with this I did send a prescription.  He still going back for the imperative start that but I deem it appropriate for him regarding mild anemia we will do anemia studies and when to get a Hemoccult on him he tentatively is due for colonoscopy we had colon polyps to him at age 50 he has had multiple colonoscopies since that time every 5 years they have all been negative no change in bowel habits or blood in stools or black stools there is no family history of colon cancer or colon polyps he is wondering if he can get a Cologuard instead I will probably run that through Dr. Rouse will be his gastroenterologist  at age 80 if you do a Cologuard with personal history of 2 polyps 30 years ago literally.  No other complaints no chest pain doing well from cardiac perspective per patient  Review of Systems   All other systems reviewed and are negative.      Objective   Vitals:    07/31/20 1015   BP: 130/70   Pulse: 64   Resp: 20   Temp: 97.3 °F (36.3 °C)   SpO2: 98%   Weight: 93 kg (205 lb)     Physical Exam   Constitutional: He appears well-developed and well-nourished.   HENT:   Head: Normocephalic and atraumatic.   Right Ear: External ear normal.   Left Ear: External ear normal.   Eyes: Pupils are equal, round, and reactive to light. Conjunctivae are normal.   EOMs within normal limits without nystagmus or sensation of dizziness.   Neck:   No carotid bruits   Cardiovascular: Normal rate, regular rhythm and normal heart sounds.   Pulmonary/Chest: Effort normal and breath sounds normal.   Abdominal: Soft. Bowel sounds are normal.   Neurological: He is alert.   Unremarkable gait and station going down hallway.  Negative Romberg   Skin: Skin is warm and dry.   Nursing note and vitals reviewed.      Lab Results   Component Value Date    INR 1.0 10/08/2015       Procedures    Assessment/Plan     1.  Medicare wellness visit subsequent current on most things is suggest patient consider Shingrix vaccine which would be available at pharmacy might be cost that she is 70 check with pharmacist cost as well    2.  Hypertension recent blood pressure medicine change blood pressure is excellent today continue present medicine recheck 6 months    3.  Depression mild the patient try Lexapro 10 mg half tablet daily for 1 week's time the whole tablet daily #30 with 3 refills call regarding status 1 month's time    4.  Anemia await lab we are getting a Hemoccult as well.  Further recommendations to follow due for colonoscopy update we will run that decision referenced above to by Dr. Hogue     Much of this encounter note is an electronic  transcription/translation of spoken language to printed text.  The electronic translation of spoken language may permit erroneous, or at times, nonsensical words or phrases to be inadvertently transcribed.  Although I have reviewed the note for such errors, some may still exist. If there are questions or for further clarification, please contact me.

## 2020-08-07 ENCOUNTER — TELEPHONE (OUTPATIENT)
Dept: FAMILY MEDICINE CLINIC | Facility: CLINIC | Age: 80
End: 2020-08-07

## 2020-08-07 NOTE — TELEPHONE ENCOUNTER
SPOKE WITH PT, HE SAYS HE WILL GET THE COLONOSCOPY DONE BUT HE DOESN'T WANT TO DO IT RIGHT NOW  ----- Message from Stanton Sotomayor Jr., MD sent at 8/6/2020  5:50 PM EDT -----  Okay thanks so patient does need colonoscopy if agreeable to do so  ----- Message -----  From: Bonnie Briscoe MA  Sent: 8/5/2020   3:34 PM EDT  To: Stanton Sotomayor Jr., MD        ----- Message -----  From: Jerry Hogue MD  Sent: 8/5/2020   3:27 PM EDT  To: Bonnie Briscoe MA    Hi Bonnie, in answer to your question, by definition, having had a history of polyps albeit none in the recent past, he should consider colonoscopic evaluation.  If he has medical issues that preclude this, then a Cologuard test would be an option although, if it tests positive then we are committed to endoscopic evaluation.  If he had no risk factors, then Cologuard would again be a viable option.  Would be happy to discuss this with the patient if he wishes.   ----- Message -----  From: Bonnie Briscoe MA  Sent: 7/31/2020  11:07 AM EDT  To: Jerry SNOWDEN MD    Hi Dr. Hogue, Dr. Sotomayor wanted me to check with you regarding Mr. Grant getting a colonoscopy. He said he had a colonoscopy at age 50 that showed 2 polyps but has had multiple since that did not show any polyps at all. He has no family history. He wants to know if you think he needs another colonoscopy or if you think a cologuard test would be ok?

## 2020-08-17 ENCOUNTER — PREP FOR SURGERY (OUTPATIENT)
Dept: OTHER | Facility: HOSPITAL | Age: 80
End: 2020-08-17

## 2020-08-17 DIAGNOSIS — Z86.010 HX OF COLONIC POLYPS: Primary | ICD-10-CM

## 2020-08-18 ENCOUNTER — TELEPHONE (OUTPATIENT)
Dept: GASTROENTEROLOGY | Facility: CLINIC | Age: 80
End: 2020-08-18

## 2020-08-18 NOTE — TELEPHONE ENCOUNTER
----- Message from Jerry SNOWDEN MD sent at 8/17/2020  3:05 PM EDT -----  Regarding: RE: RECALL PAPERWORK  Would encourage patient to schedule office follow-up to discuss the risk and benefits of further colonoscopic evaluation at this point.  ----- Message -----  From: Odalys Galvez RN  Sent: 8/17/2020   9:35 AM EDT  To: Jerry SNOWDEN MD  Subject: RECALL PAPERWORK                                 Open Access/Recall paperwork scanned in under the media tab.  Please review and return to Odalys.

## 2020-08-18 NOTE — TELEPHONE ENCOUNTER
Spoke with patient and informed him that Dr Hogue recommends an office appointment to discuss the risks vs benefits of further colonoscopic evaluation at this point.  Patient said that he has reconsidered and really does not want to have a colonoscopy at present secondary to the covid 19 virus. Insturcted patient to call and make an appointment with Dr Hogue if he decides he does want a colonoscopy.  Voiced understanding.

## 2020-08-20 ENCOUNTER — LAB (OUTPATIENT)
Dept: FAMILY MEDICINE CLINIC | Facility: CLINIC | Age: 80
End: 2020-08-20

## 2020-08-20 LAB
ERYTHROCYTE [DISTWIDTH] IN BLOOD BY AUTOMATED COUNT: 14.7 % (ref 12.3–15.4)
FERRITIN SERPL-MCNC: 383 NG/ML (ref 30–400)
FOLATE SERPL-MCNC: 9.13 NG/ML (ref 4.78–24.2)
HCT VFR BLD AUTO: 40.7 % (ref 37.5–51)
HGB BLD-MCNC: 13.2 G/DL (ref 13–17.7)
IRON 24H UR-MRATE: 111 MCG/DL (ref 59–158)
IRON SATN MFR SERPL: 39 % (ref 20–50)
LYMPHOCYTES # BLD AUTO: 1.6 10*3/MM3 (ref 0.7–3.1)
LYMPHOCYTES NFR BLD AUTO: 24.7 % (ref 19.6–45.3)
MCH RBC QN AUTO: 31.1 PG (ref 26.6–33)
MCHC RBC AUTO-ENTMCNC: 32.4 G/DL (ref 31.5–35.7)
MCV RBC AUTO: 96.2 FL (ref 79–97)
MONOCYTES # BLD AUTO: 0.3 10*3/MM3 (ref 0.1–0.9)
MONOCYTES NFR BLD AUTO: 4.5 % (ref 5–12)
NEUTROPHILS NFR BLD AUTO: 4.5 10*3/MM3 (ref 1.7–7)
NEUTROPHILS NFR BLD AUTO: 70.8 % (ref 42.7–76)
PLATELET # BLD AUTO: 197 10*3/MM3 (ref 140–450)
PMV BLD AUTO: 8.2 FL (ref 6–12)
RBC # BLD AUTO: 4.23 10*6/MM3 (ref 4.14–5.8)
TIBC SERPL-MCNC: 286 MCG/DL (ref 298–536)
TRANSFERRIN SERPL-MCNC: 192 MG/DL (ref 200–360)
VIT B12 BLD-MCNC: 458 PG/ML (ref 211–946)
WBC # BLD AUTO: 6.4 10*3/MM3 (ref 3.4–10.8)

## 2020-08-20 PROCEDURE — 82746 ASSAY OF FOLIC ACID SERUM: CPT | Performed by: INTERNAL MEDICINE

## 2020-08-20 PROCEDURE — 82607 VITAMIN B-12: CPT | Performed by: INTERNAL MEDICINE

## 2020-08-20 PROCEDURE — 36415 COLL VENOUS BLD VENIPUNCTURE: CPT | Performed by: INTERNAL MEDICINE

## 2020-08-20 PROCEDURE — 83540 ASSAY OF IRON: CPT | Performed by: INTERNAL MEDICINE

## 2020-08-20 PROCEDURE — 84466 ASSAY OF TRANSFERRIN: CPT | Performed by: INTERNAL MEDICINE

## 2020-08-20 PROCEDURE — 85025 COMPLETE CBC W/AUTO DIFF WBC: CPT | Performed by: INTERNAL MEDICINE

## 2020-08-20 PROCEDURE — 82728 ASSAY OF FERRITIN: CPT | Performed by: INTERNAL MEDICINE

## 2020-09-29 RX ORDER — ESCITALOPRAM OXALATE 10 MG/1
TABLET ORAL
Qty: 30 TABLET | Refills: 2 | Status: SHIPPED | OUTPATIENT
Start: 2020-09-29 | End: 2022-02-24

## 2020-12-18 ENCOUNTER — OFFICE VISIT (OUTPATIENT)
Dept: CARDIOLOGY | Facility: CLINIC | Age: 80
End: 2020-12-18

## 2020-12-18 VITALS
SYSTOLIC BLOOD PRESSURE: 114 MMHG | HEART RATE: 69 BPM | WEIGHT: 200 LBS | HEIGHT: 71 IN | OXYGEN SATURATION: 98 % | DIASTOLIC BLOOD PRESSURE: 72 MMHG | BODY MASS INDEX: 28 KG/M2

## 2020-12-18 DIAGNOSIS — I35.0 NONRHEUMATIC AORTIC VALVE STENOSIS: ICD-10-CM

## 2020-12-18 DIAGNOSIS — I45.10 RBBB (RIGHT BUNDLE BRANCH BLOCK): ICD-10-CM

## 2020-12-18 DIAGNOSIS — I10 ESSENTIAL HYPERTENSION: ICD-10-CM

## 2020-12-18 DIAGNOSIS — I25.810 CORONARY ARTERY DISEASE INVOLVING CORONARY BYPASS GRAFT OF NATIVE HEART WITHOUT ANGINA PECTORIS: Primary | ICD-10-CM

## 2020-12-18 PROCEDURE — 99204 OFFICE O/P NEW MOD 45 MIN: CPT | Performed by: INTERNAL MEDICINE

## 2020-12-23 RX ORDER — CARVEDILOL 3.12 MG/1
3.12 TABLET ORAL 2 TIMES DAILY WITH MEALS
Qty: 180 TABLET | Refills: 3 | Status: SHIPPED | OUTPATIENT
Start: 2020-12-23 | End: 2022-01-17

## 2020-12-23 RX ORDER — NIFEDIPINE 30 MG/1
30 TABLET, EXTENDED RELEASE ORAL DAILY
Qty: 90 TABLET | Refills: 3 | Status: SHIPPED | OUTPATIENT
Start: 2020-12-23 | End: 2021-12-30

## 2020-12-23 RX ORDER — ATORVASTATIN CALCIUM 80 MG/1
80 TABLET, FILM COATED ORAL NIGHTLY
Qty: 90 TABLET | Refills: 3 | Status: SHIPPED | OUTPATIENT
Start: 2020-12-23 | End: 2021-12-21

## 2020-12-23 RX ORDER — NITROGLYCERIN 0.4 MG/1
0.4 TABLET SUBLINGUAL
Qty: 25 TABLET | Refills: 6 | Status: SHIPPED | OUTPATIENT
Start: 2020-12-23 | End: 2022-07-07 | Stop reason: SDUPTHER

## 2020-12-23 RX ORDER — RAMIPRIL 10 MG/1
10 CAPSULE ORAL EVERY EVENING
Qty: 90 CAPSULE | Refills: 3 | Status: SHIPPED | OUTPATIENT
Start: 2020-12-23 | End: 2021-12-21

## 2020-12-23 RX ORDER — OMEGA-3-ACID ETHYL ESTERS 1 G/1
4 CAPSULE, LIQUID FILLED ORAL DAILY
Qty: 360 CAPSULE | Refills: 3 | Status: SHIPPED | OUTPATIENT
Start: 2020-12-23 | End: 2021-02-15 | Stop reason: SDUPTHER

## 2020-12-23 RX ORDER — POTASSIUM CHLORIDE 750 MG/1
10 CAPSULE, EXTENDED RELEASE ORAL DAILY
Qty: 90 CAPSULE | Refills: 3 | Status: SHIPPED | OUTPATIENT
Start: 2020-12-23 | End: 2022-02-24

## 2020-12-23 NOTE — TELEPHONE ENCOUNTER
12/23/20   Pt called [facility]   1) Nitroglycerin 0.4 mg.   2) Nifedipine 30 mg      [facility]  1) Ramipril 10 mg qd   2) Potassium 10 meq 1 qd  3)Carvedilol 3.125mb Bid.  4) omega -3  4 tablet qd   5) Atrovastatin 80 mg qhs   Mukund PORTILLO

## 2021-01-10 NOTE — PROGRESS NOTES
Date of Office Visit: 2020  Encounter Provider: Андрей Carmona MD  Place of Service: [facility] CARDIOLOGY  Patient Name: Javier Grant  :1940    Chief complaint: Coronary artery disease, right bundle branch block, aortic stenosis, and hypertension, PVC's.    History of Present Illness:    I had the pleasure of seeing the patient in cardiology office on 2020.  He is a very pleasant 80 year-old male with a history of coronary artery disease, right bundle branch block, aortic stenosis, and hypertension who presents to establish care with me.  The patient has formerly seen Dr. Robin Kingston with [facility].    The patient has a history of coronary artery disease, and had an MI around .  At that time, he underwent a three-vessel CABG with an SVG to LAD, SVG to OM1, and SVG to OM2.  His anginal equivalent has always been chest pain, mainly in the center of his chest.  He also underwent a left heart catheterization on 10/9/2015 which showed occlusion of all native vessels proximally, although there was a 70% stenosis in the mid SVG to OM2 he underwent placement of a 4.0 x 15 mm resolute JORGE to this lesion at that time, as well as placement of a 3.0 x 12 mm Xience Alpine JORGE to the anastomotic site of one of the grafts to the OM branches (although which one is not clear from the cath report).  The LAD and saphenous vein graft to LAD collateralized all 3 other major branches distally (circumflex, RCA, and ramus).  He did also have fairly severe nosebleeds on Plavix in the past.    The patient also has a history of a right bundle branch block with PVCs.  He is also had a history of aortic stenosis and hypertension.  He did have an echocardiogram and Lexiscan nuclear stress test performed on 2020.  His nuclear stress test was normal.  His echocardiogram showed an ejection fraction of 60%, grade 1A diastolic dysfunction, mild to moderate aortic  stenosis, and an aortic root mildly enlarged at 4.1 cm.    Again, the patient presents today to establish care with me.  He states that his blood pressure has been running about 130/70 at home.  He has not had any chest pain.  He denied any palpitations or significant shortness of breath.  His only other complaint is that he has had bilateral pain in his thighs when going up steps, but no claudication-like symptoms.    Past Medical History:   Diagnosis Date   • Arthritis    • Bleeds easily (CMS/[facility])     WAS TOLD THAT AFTER CABG   • CAD (coronary artery disease)    • Colon polyp    • Disorder of aorta (CMS/HCC)    • Dizziness    • SKELTON (dyspnea on exertion)    • Enlarged prostate    • Gastritis    • Gout    • Hiatal hernia    • History of MI (myocardial infarction)     1990   • HL (hearing loss)    • Hyperlipidemia    • Hypertension    • Macular degeneration     rt eye   • Nonrheumatic aortic (valve) insufficiency    • Nonrheumatic aortic (valve) stenosis    • RBBB (right bundle branch block)    • Sleep apnea     USES CPAP   • Umbilical hernia    • Vitamin D deficiency        Past Surgical History:   Procedure Laterality Date   • CARDIAC CATHETERIZATION Left 10/09/2015    Dr. Robin Kingston   • CARDIAC SURGERY  1990    triple bypass   • CATARACT EXTRACTION Bilateral    • CORONARY ANGIOPLASTY WITH STENT PLACEMENT  2015   • CORONARY ARTERY BYPASS GRAFT  1990    3 VESSELS   • ENDOSCOPY N/A 5/26/2017    Procedure: ESOPHAGOGASTRODUODENOSCOPY WITH COLD BIOIPSIES AND BALLOON DILITATION SIZE 15, 16.5, 18;  Surgeon: Jerry SNOWDEN MD;  Location: [facility] ENDOSCOPY;  Service:    • MCCOY'S NEUROMA EXCISION Right    • TONSILLECTOMY     • UMBILICAL HERNIA REPAIR N/A 10/9/2017    Procedure: UMBILICAL HERNIA REPAIR;  Surgeon: Blake Kelly Jr., MD;  Location: [facility] MAIN OR;  Service:        Current Outpatient Medications on File Prior to Visit   Medication Sig Dispense Refill   • acetaminophen (TYLENOL) 325 MG tablet Take 650  "mg by mouth Every Night.     • allopurinol (ZYLOPRIM) 300 MG tablet Take 1 tablet by mouth Daily. 90 tablet 3   • aspirin 81 MG tablet Take 81 mg by mouth Daily.     • Cholecalciferol 2000 units capsule Take 2,000 Units by mouth Daily.     • escitalopram (LEXAPRO) 10 MG tablet FOR STRESS TAKE HALF TABLETDAILY FOR 1 WEEK'S TIME    THEN GO TO  TABLET 30 tablet 2   • famotidine (PEPCID) 20 MG tablet Take 20 mg by mouth Every Evening.       No current facility-administered medications on file prior to visit.      Allergies as of 2020 - Reviewed 2020   Allergen Reaction Noted   • Ciprofloxacin Diarrhea 2016     Social History     Socioeconomic History   • Marital status:      Spouse name: Not on file   • Number of children: Not on file   • Years of education: Not on file   • Highest education level: Not on file   Occupational History   • Occupation: retired   Tobacco Use   • Smoking status: Former Smoker     Packs/day: 2.00     Years: 20.00     Pack years: 40.00     Types: Cigarettes     Quit date:      Years since quittin.0   • Smokeless tobacco: Never Used   Substance and Sexual Activity   • Alcohol use: No   • Drug use: No   • Sexual activity: Defer     Family History   Problem Relation Age of Onset   • Depression Mother    • Colon polyps Mother    • Heart attack Father    • Aneurysm Father    • Stomach cancer Brother    • Malig Hyperthermia Neg Hx        Review of Systems   Constitution: Positive for malaise/fatigue.   All other systems reviewed and are negative.     Objective:     Vitals:    20 1218   BP: 114/72   Pulse: 69   SpO2: 98%   Weight: 90.7 kg (200 lb)   Height: 180.3 cm (70.98\")     Body mass index is 27.91 kg/m².    Constitutional:       Appearance: Healthy appearance. Well-developed.   Eyes:      Conjunctiva/sclera: Conjunctivae normal.   HENT:      Head: Normocephalic and atraumatic.   Neck:      Musculoskeletal: Neck supple.   Pulmonary:      Effort: Pulmonary " effort is normal.      Breath sounds: Normal breath sounds.   Cardiovascular:      Normal rate. Regular rhythm.      Murmurs: There is a grade 3/6 harsh systolic murmur at the URSB and ULSB.      No gallop.   Edema:     Peripheral edema absent.   Abdominal:      Palpations: Abdomen is soft.      Tenderness: There is no abdominal tenderness.   Skin:     General: Skin is warm.   Neurological:      Mental Status: Alert and oriented to person, place, and time.   Psychiatric:         Behavior: Behavior normal.       Lab Review:   Procedures    Cardiac Procedures:  1.  Status post three-vessel CABG in 1990 (SVG to LAD, SVG to OM1, and SVG to OM2).  2.  Left heart catheterization ordered 10/9/2015: All native coronary arteries were 100% occluded proximally.  The SVG to LAD had no disease, and collateralized the distal aspects of the ramus, left circumflex, and RCA.  The SVG to OM1 had 60% distal disease.  The SVG to OM2 had 70% mid disease.  The patient underwent placement of a 3.0 x 12 mm Xience Alpine JORGE to the distal limb of one of the vein graft branches to the OMs (which one is not clear from the cath report).  The patient also underwent placement of a 4.0 x 15 mm Resolute JORGE to the mid SVG to OM2.  3.  Echocardiogram on 7/17/2020: The ejection fraction was 60%.  There was mild LVH.  There was grade 1A diastolic dysfunction.  There was mild LVOT obstruction.  There was mild to moderate left atrial enlargement.  The right ventricle was mildly enlarged.  There was mild to moderate aortic stenosis, although the mean gradient was only 12 mmHg and peak gradient was 22 mmHg.  The aortic root was mildly dilated at 4.1 cm.  4.  Lexiscan nuclear stress test on 7/17/2020: No evidence of ischemia.    Assessment:       Diagnosis Plan   1. Coronary artery disease involving coronary bypass graft of native heart without angina pectoris     2. Nonrheumatic aortic valve stenosis     3. Essential hypertension     4. RBBB (right  bundle branch block)       Plan:       Overall, he seems to be fairly stable.  Again, he had a stress test and echocardiogram in July 2020.  His stress test showed no ischemia.  His echocardiogram showed a normal ejection fraction with mild to moderate aortic stenosis.  His mean gradient was only 12 mmHg, and his peak gradient was 22 mmHg.  He has not had any symptoms to suggest progression of the aortic stenosis.    His coronary artery disease is stable.  He will continue on the aspirin for life.  He did tell me that he had substantial nosebleeds with Plavix in the past after his stents.  This would be important to remember for the future should he ever need further intervention.  He will continue on the Lovaza.  He is holding his Lipitor given his muscle pain in his thighs, to see if this helps.  If not, he will resume this.  Statin therapy is obviously important if at all possible.  His blood pressure has mainly been about 130/70 at home.  He will continue on the carvedilol, ramipril, and nifedipine.  He is asymptomatic from the PVCs.    For now, I made no new changes to his medications.  I will see him back in the office in 6 months unless other issues arise.

## 2021-01-14 ENCOUNTER — TELEPHONE (OUTPATIENT)
Dept: FAMILY MEDICINE CLINIC | Facility: CLINIC | Age: 81
End: 2021-01-14

## 2021-01-14 NOTE — TELEPHONE ENCOUNTER
PATIENT CALLED IN TO ADVISE THAT HE WOULD LIKE TO HAVE KARELY NAIR AS HIS PCP SINCE DR ZAMORA OFFBOARDING. THIS IS FOR HIS MEDICATION REFILLS AS WELL    PLEASE ADVISE    [phone]

## 2021-01-25 ENCOUNTER — TELEPHONE (OUTPATIENT)
Dept: CARDIOLOGY | Facility: CLINIC | Age: 81
End: 2021-01-25

## 2021-01-25 NOTE — TELEPHONE ENCOUNTER
01/25/2021   Pt called wanting to know if we could help he ans his wife get sined up for covid vaccine quicker that what they are able to do.   I spoke with Mrs Grant and let her know that we can knot get anyone in any sooner.   I let her know I would email her that sign up information for [facility].   I was not able to email through my chart so, I emailed info to their AOL email that is in his chart.   Mukund PORTILLO

## 2021-02-15 RX ORDER — OMEGA-3-ACID ETHYL ESTERS 1 G/1
4 CAPSULE, LIQUID FILLED ORAL DAILY
Qty: 360 CAPSULE | Refills: 3 | Status: SHIPPED | OUTPATIENT
Start: 2021-02-15 | End: 2021-08-26 | Stop reason: SDUPTHER

## 2021-02-18 NOTE — PROGRESS NOTES
"Reviewed/reconciled medication list after phone call for clarification about pt's lovaza instructions. After the signature it states \"hold prior to OR\" after discussing with Joann Robles called in clarification.    DA  "

## 2021-02-21 ENCOUNTER — APPOINTMENT (OUTPATIENT)
Dept: VACCINE CLINIC | Facility: HOSPITAL | Age: 81
End: 2021-02-21

## 2021-03-09 DIAGNOSIS — Z23 IMMUNIZATION DUE: ICD-10-CM

## 2021-06-11 ENCOUNTER — OFFICE VISIT (OUTPATIENT)
Dept: CARDIOLOGY | Facility: CLINIC | Age: 81
End: 2021-06-11

## 2021-06-11 VITALS
DIASTOLIC BLOOD PRESSURE: 78 MMHG | HEART RATE: 60 BPM | BODY MASS INDEX: 27.72 KG/M2 | HEIGHT: 71 IN | OXYGEN SATURATION: 98 % | SYSTOLIC BLOOD PRESSURE: 118 MMHG | WEIGHT: 198 LBS

## 2021-06-11 DIAGNOSIS — I49.3 PVC (PREMATURE VENTRICULAR CONTRACTION): ICD-10-CM

## 2021-06-11 DIAGNOSIS — I25.810 CORONARY ARTERY DISEASE INVOLVING CORONARY BYPASS GRAFT OF NATIVE HEART WITHOUT ANGINA PECTORIS: ICD-10-CM

## 2021-06-11 DIAGNOSIS — I45.10 RBBB (RIGHT BUNDLE BRANCH BLOCK): ICD-10-CM

## 2021-06-11 DIAGNOSIS — I35.0 NONRHEUMATIC AORTIC VALVE STENOSIS: Primary | ICD-10-CM

## 2021-06-11 DIAGNOSIS — I10 ESSENTIAL HYPERTENSION: ICD-10-CM

## 2021-06-11 DIAGNOSIS — R06.09 DOE (DYSPNEA ON EXERTION): ICD-10-CM

## 2021-06-11 PROCEDURE — 99214 OFFICE O/P EST MOD 30 MIN: CPT | Performed by: INTERNAL MEDICINE

## 2021-06-11 RX ORDER — TAMSULOSIN HYDROCHLORIDE 0.4 MG/1
CAPSULE ORAL
Status: ON HOLD | COMMUNITY
Start: 2021-05-27 | End: 2022-03-04

## 2021-06-16 ENCOUNTER — HOSPITAL ENCOUNTER (OUTPATIENT)
Dept: CARDIOLOGY | Facility: HOSPITAL | Age: 81
Discharge: HOME | End: 2021-06-16
Admitting: INTERNAL MEDICINE

## 2021-06-16 VITALS
BODY MASS INDEX: 27.72 KG/M2 | DIASTOLIC BLOOD PRESSURE: 78 MMHG | SYSTOLIC BLOOD PRESSURE: 118 MMHG | HEIGHT: 71 IN | HEART RATE: 54 BPM | WEIGHT: 198 LBS

## 2021-06-16 DIAGNOSIS — R06.09 DOE (DYSPNEA ON EXERTION): ICD-10-CM

## 2021-06-16 DIAGNOSIS — I35.0 NONRHEUMATIC AORTIC VALVE STENOSIS: ICD-10-CM

## 2021-06-16 PROCEDURE — 93306 TTE W/DOPPLER COMPLETE: CPT | Performed by: INTERNAL MEDICINE

## 2021-06-16 PROCEDURE — 93306 TTE W/DOPPLER COMPLETE: CPT

## 2021-06-17 LAB
AORTIC ARCH: 2.5 CM
AORTIC DIMENSIONLESS INDEX: 0.4 (DI)
BH CV ECHO MEAS - ACS: 1.4 CM
BH CV ECHO MEAS - AO MAX PG (FULL): 17.7 MMHG
BH CV ECHO MEAS - AO MAX PG: 21.3 MMHG
BH CV ECHO MEAS - AO MEAN PG (FULL): 10 MMHG
BH CV ECHO MEAS - AO MEAN PG: 12 MMHG
BH CV ECHO MEAS - AO ROOT AREA (BSA CORRECTED): 1.9
BH CV ECHO MEAS - AO ROOT AREA: 12.6 CM^2
BH CV ECHO MEAS - AO ROOT DIAM: 4 CM
BH CV ECHO MEAS - AO V2 MAX: 231 CM/SEC
BH CV ECHO MEAS - AO V2 MEAN: 159 CM/SEC
BH CV ECHO MEAS - AO V2 VTI: 58 CM
BH CV ECHO MEAS - AVA(I,A): 1.7 CM^2
BH CV ECHO MEAS - AVA(I,D): 1.7 CM^2
BH CV ECHO MEAS - AVA(V,A): 1.7 CM^2
BH CV ECHO MEAS - AVA(V,D): 1.7 CM^2
BH CV ECHO MEAS - BSA(HAYCOCK): 2.1 M^2
BH CV ECHO MEAS - BSA: 2.1 M^2
BH CV ECHO MEAS - BZI_BMI: 27.6 KILOGRAMS/M^2
BH CV ECHO MEAS - BZI_METRIC_HEIGHT: 180.3 CM
BH CV ECHO MEAS - BZI_METRIC_WEIGHT: 89.8 KG
BH CV ECHO MEAS - EDV(MOD-SP2): 84 ML
BH CV ECHO MEAS - EDV(MOD-SP4): 96 ML
BH CV ECHO MEAS - EDV(TEICH): 173.2 ML
BH CV ECHO MEAS - EF(CUBED): 80.9 %
BH CV ECHO MEAS - EF(MOD-BP): 61.4 %
BH CV ECHO MEAS - EF(MOD-SP2): 59.5 %
BH CV ECHO MEAS - EF(MOD-SP4): 60.4 %
BH CV ECHO MEAS - EF(TEICH): 72.6 %
BH CV ECHO MEAS - ESV(MOD-SP2): 34 ML
BH CV ECHO MEAS - ESV(MOD-SP4): 38 ML
BH CV ECHO MEAS - ESV(TEICH): 47.4 ML
BH CV ECHO MEAS - FS: 42.4 %
BH CV ECHO MEAS - IVS/LVPW: 1
BH CV ECHO MEAS - IVSD: 1.2 CM
BH CV ECHO MEAS - LAT PEAK E' VEL: 8.3 CM/SEC
BH CV ECHO MEAS - LV DIASTOLIC VOL/BSA (35-75): 45.7 ML/M^2
BH CV ECHO MEAS - LV MASS(C)D: 305.5 GRAMS
BH CV ECHO MEAS - LV MASS(C)DI: 145.5 GRAMS/M^2
BH CV ECHO MEAS - LV MAX PG: 3.7 MMHG
BH CV ECHO MEAS - LV MEAN PG: 2 MMHG
BH CV ECHO MEAS - LV SYSTOLIC VOL/BSA (12-30): 18.1 ML/M^2
BH CV ECHO MEAS - LV V1 MAX: 96.1 CM/SEC
BH CV ECHO MEAS - LV V1 MEAN: 66.6 CM/SEC
BH CV ECHO MEAS - LV V1 VTI: 24.3 CM
BH CV ECHO MEAS - LVIDD: 5.9 CM
BH CV ECHO MEAS - LVIDS: 3.4 CM
BH CV ECHO MEAS - LVLD AP2: 8.7 CM
BH CV ECHO MEAS - LVLD AP4: 8.2 CM
BH CV ECHO MEAS - LVLS AP2: 7.4 CM
BH CV ECHO MEAS - LVLS AP4: 7.4 CM
BH CV ECHO MEAS - LVOT AREA (M): 4.2 CM^2
BH CV ECHO MEAS - LVOT AREA: 4.2 CM^2
BH CV ECHO MEAS - LVOT DIAM: 2.3 CM
BH CV ECHO MEAS - LVPWD: 1.2 CM
BH CV ECHO MEAS - MED PEAK E' VEL: 8.5 CM/SEC
BH CV ECHO MEAS - MR MAX PG: 40.2 MMHG
BH CV ECHO MEAS - MR MAX VEL: 317 CM/SEC
BH CV ECHO MEAS - MV A DUR: 0.18 SEC
BH CV ECHO MEAS - MV A MAX VEL: 101 CM/SEC
BH CV ECHO MEAS - MV DEC SLOPE: 289 CM/SEC^2
BH CV ECHO MEAS - MV DEC TIME: 0.26 SEC
BH CV ECHO MEAS - MV E MAX VEL: 77.6 CM/SEC
BH CV ECHO MEAS - MV E/A: 0.77
BH CV ECHO MEAS - MV MAX PG: 5.3 MMHG
BH CV ECHO MEAS - MV MEAN PG: 2 MMHG
BH CV ECHO MEAS - MV P1/2T MAX VEL: 76.2 CM/SEC
BH CV ECHO MEAS - MV P1/2T: 77.2 MSEC
BH CV ECHO MEAS - MV V2 MAX: 115 CM/SEC
BH CV ECHO MEAS - MV V2 MEAN: 64.7 CM/SEC
BH CV ECHO MEAS - MV V2 VTI: 30.5 CM
BH CV ECHO MEAS - MVA P1/2T LCG: 2.9 CM^2
BH CV ECHO MEAS - MVA(P1/2T): 2.8 CM^2
BH CV ECHO MEAS - MVA(VTI): 3.3 CM^2
BH CV ECHO MEAS - PA MAX PG (FULL): 2.8 MMHG
BH CV ECHO MEAS - PA MAX PG: 4.4 MMHG
BH CV ECHO MEAS - PA V2 MAX: 105 CM/SEC
BH CV ECHO MEAS - PULM A REVS DUR: 0.11 SEC
BH CV ECHO MEAS - PULM A REVS VEL: 20 CM/SEC
BH CV ECHO MEAS - PULM DIAS VEL: 35.2 CM/SEC
BH CV ECHO MEAS - PULM S/D: 1.2
BH CV ECHO MEAS - PULM SYS VEL: 43.3 CM/SEC
BH CV ECHO MEAS - PVA(V,A): 1.9 CM^2
BH CV ECHO MEAS - PVA(V,D): 1.9 CM^2
BH CV ECHO MEAS - QP/QS: 0.49
BH CV ECHO MEAS - RAP SYSTOLE: 3 MMHG
BH CV ECHO MEAS - RV MAX PG: 1.6 MMHG
BH CV ECHO MEAS - RV MEAN PG: 1 MMHG
BH CV ECHO MEAS - RV V1 MAX: 63.3 CM/SEC
BH CV ECHO MEAS - RV V1 MEAN: 42.7 CM/SEC
BH CV ECHO MEAS - RV V1 VTI: 15.6 CM
BH CV ECHO MEAS - RVOT AREA: 3.1 CM^2
BH CV ECHO MEAS - RVOT DIAM: 2 CM
BH CV ECHO MEAS - RVSP: 24 MMHG
BH CV ECHO MEAS - SI(AO): 347.1 ML/M^2
BH CV ECHO MEAS - SI(CUBED): 79.1 ML/M^2
BH CV ECHO MEAS - SI(LVOT): 48.1 ML/M^2
BH CV ECHO MEAS - SI(MOD-SP2): 23.8 ML/M^2
BH CV ECHO MEAS - SI(MOD-SP4): 27.6 ML/M^2
BH CV ECHO MEAS - SI(TEICH): 59.9 ML/M^2
BH CV ECHO MEAS - SV(AO): 728.8 ML
BH CV ECHO MEAS - SV(CUBED): 166.1 ML
BH CV ECHO MEAS - SV(LVOT): 101 ML
BH CV ECHO MEAS - SV(MOD-SP2): 50 ML
BH CV ECHO MEAS - SV(MOD-SP4): 58 ML
BH CV ECHO MEAS - SV(RVOT): 49 ML
BH CV ECHO MEAS - SV(TEICH): 125.8 ML
BH CV ECHO MEAS - TAPSE (>1.6): 1.8 CM
BH CV ECHO MEAS - TR MAX VEL: 228 CM/SEC
BH CV ECHO MEASUREMENTS AVERAGE E/E' RATIO: 9.24
BH CV XLRA - RV BASE: 3.3 CM
BH CV XLRA - RV LENGTH: 8.4 CM
BH CV XLRA - RV MID: 2.6 CM
BH CV XLRA - TDI S': 7.9 CM/SEC
LEFT ATRIUM VOLUME INDEX: 31 ML/M2
LV EF 2D ECHO EST: 61 %
MAXIMAL PREDICTED HEART RATE: 139 BPM
SINUS: 4 CM
STJ: 3.5 CM
STRESS TARGET HR: 118 BPM

## 2021-06-28 NOTE — PROGRESS NOTES
Date of Office Visit:  2021  Encounter Provider: Андрей Carmona MD  Place of Service: [facility] CARDIOLOGY  Patient Name: Javier Grant  :1940    Chief complaint: Coronary artery disease, right bundle branch block, aortic stenosis, and hypertension, PVC's.    History of Present Illness:    I had the pleasure of seeing the patient in cardiology office on 2021.  He is a very pleasant 81 year-old male with a history of coronary artery disease, right bundle branch block, aortic stenosis, and hypertension who presents for follow-up.  The patient has formerly seen Dr. Robin Kingston with [facility], but established care with me on 2020.    The patient has a history of coronary artery disease, and had an MI around .  At that time, he underwent a three-vessel CABG with an SVG to LAD, SVG to OM1, and SVG to OM2.  His anginal equivalent has always been chest pain, mainly in the center of his chest.  He also underwent a left heart catheterization on 10/9/2015 which showed occlusion of all native vessels proximally, although there was a 70% stenosis in the mid SVG to OM2 he underwent placement of a 4.0 x 15 mm resolute JORGE to this lesion at that time, as well as placement of a 3.0 x 12 mm Xience Alpine JORGE to the anastomotic site of one of the grafts to the OM branches (although which one is not clear from the cath report).  The LAD and saphenous vein graft to LAD collateralized all 3 other major branches distally (circumflex, RCA, and ramus).  He did also have fairly severe nosebleeds on Plavix in the past.    The patient also has a history of a right bundle branch block with PVCs.  He is also had a history of aortic stenosis and hypertension.  He did have an echocardiogram and Lexiscan nuclear stress test performed on 2020.  His nuclear stress test was normal.  His echocardiogram showed an ejection fraction of 60%, grade 1a diastolic dysfunction,  mild to moderate aortic stenosis, and an aortic root mildly enlarged at 4.1 cm.    The patient presents today for follow-up.  He does tell me that over the last several months, he has had gradually worsening dyspnea on exertion.  He states that activities such as taking the trash out and then walking back will cause him to be short of breath.  He has not had any chest discomfort over this time.  His blood pressure is 118/78.    Past Medical History:   Diagnosis Date   • Arthritis    • Bleeds easily (CMS/HCC)     WAS TOLD THAT AFTER CABG   • CAD (coronary artery disease)    • Colon polyp    • Disorder of aorta (CMS/HCC)    • Dizziness    • SKELTON (dyspnea on exertion)    • Enlarged prostate    • Gastritis    • Gout    • Hiatal hernia    • History of MI (myocardial infarction)     1990   • HL (hearing loss)    • Hyperlipidemia    • Hypertension    • Macular degeneration     rt eye   • Nonrheumatic aortic (valve) insufficiency    • Nonrheumatic aortic (valve) stenosis    • RBBB (right bundle branch block)    • Sleep apnea     USES CPAP   • Umbilical hernia    • Vitamin D deficiency        Past Surgical History:   Procedure Laterality Date   • CARDIAC CATHETERIZATION Left 10/09/2015    Dr. Robin Kingston   • CARDIAC SURGERY  1990    triple bypass   • CATARACT EXTRACTION Bilateral    • CORONARY ANGIOPLASTY WITH STENT PLACEMENT  2015   • CORONARY ARTERY BYPASS GRAFT  1990    3 VESSELS   • ENDOSCOPY N/A 5/26/2017    Procedure: ESOPHAGOGASTRODUODENOSCOPY WITH COLD BIOIPSIES AND BALLOON DILITATION SIZE 15, 16.5, 18;  Surgeon: Jerry SNOWDEN MD;  Location: [facility] ENDOSCOPY;  Service:    • MCCOY'S NEUROMA EXCISION Right    • TONSILLECTOMY     • UMBILICAL HERNIA REPAIR N/A 10/9/2017    Procedure: UMBILICAL HERNIA REPAIR;  Surgeon: Blake Kelly Jr., MD;  Location: [facility] MAIN OR;  Service:        Current Outpatient Medications on File Prior to Visit   Medication Sig Dispense Refill   • Multiple Vitamins-Minerals  (MULTIVITAMIN ADULT, MINERALS, PO)      • acetaminophen (TYLENOL) 325 MG tablet Take 650 mg by mouth Every Night.     • allopurinol (ZYLOPRIM) 300 MG tablet Take 1 tablet by mouth Daily. 90 tablet 3   • aspirin 81 MG tablet Take 81 mg by mouth Daily.     • atorvastatin (LIPITOR) 80 MG tablet Take 1 tablet by mouth Every Night. 90 tablet 3   • carvedilol (COREG) 3.125 MG tablet Take 1 tablet by mouth 2 (Two) Times a Day With Meals. 180 tablet 3   • Cholecalciferol 2000 units capsule Take 2,000 Units by mouth Daily.     • escitalopram (LEXAPRO) 10 MG tablet FOR STRESS TAKE HALF TABLETDAILY FOR 1 WEEK'S TIME    THEN GO TO  TABLET 30 tablet 2   • NIFEdipine XL (PROCARDIA XL) 30 MG 24 hr tablet Take 1 tablet by mouth Daily. 90 tablet 3   • nitroglycerin (Nitrostat) 0.4 MG SL tablet Take 1 tablet by mouth Every 5 (Five) Minutes As Needed (CHEST PAIN: MAX DOSE 3 TABLETS). 25 tablet 6   • omega-3 acid ethyl esters (LOVAZA) 1 g capsule Take 4 capsules by mouth Daily. PT TO HOLD MED PRIOR TO  capsule 3   • potassium chloride (MICRO-K) 10 MEQ CR capsule Take 1 capsule by mouth Daily. 90 capsule 3   • ramipril (ALTACE) 10 MG capsule Take 1 capsule by mouth Every Evening. 90 capsule 3   • tamsulosin (FLOMAX) 0.4 MG capsule 24 hr capsule        No current facility-administered medications on file prior to visit.     Allergies as of 2021 - Reviewed 2021   Allergen Reaction Noted   • Ciprofloxacin Diarrhea 2016     Social History     Socioeconomic History   • Marital status:      Spouse name: Not on file   • Number of children: Not on file   • Years of education: Not on file   • Highest education level: Not on file   Tobacco Use   • Smoking status: Former Smoker     Packs/day: 2.00     Years: 20.00     Pack years: 40.00     Types: Cigarettes     Quit date:      Years since quittin.5   • Smokeless tobacco: Never Used   Substance and Sexual Activity   • Alcohol use: No   • Drug use: No   •  "Sexual activity: Defer     Family History   Problem Relation Age of Onset   • Depression Mother    • Colon polyps Mother    • Heart attack Father    • Aneurysm Father    • Stomach cancer Brother    • Malig Hyperthermia Neg Hx        Review of Systems   Constitutional: Positive for malaise/fatigue and weight loss.   Cardiovascular: Positive for dyspnea on exertion and leg swelling.   Musculoskeletal: Positive for myalgias.   Neurological: Positive for excessive daytime sleepiness.   Psychiatric/Behavioral: The patient is nervous/anxious.    All other systems reviewed and are negative.     Objective:     Vitals:    06/11/21 1104   BP: 118/78   Pulse: 60   SpO2: 98%   Weight: 89.8 kg (198 lb)   Height: 180.3 cm (70.98\")     Body mass index is 27.63 kg/m².    Constitutional:       Appearance: Healthy appearance. Well-developed.   Eyes:      Conjunctiva/sclera: Conjunctivae normal.   HENT:      Head: Normocephalic and atraumatic.   Pulmonary:      Effort: Pulmonary effort is normal.      Breath sounds: Normal breath sounds.   Cardiovascular:      Normal rate. Regular rhythm.      Murmurs: There is a grade 3/6 harsh systolic murmur at the URSB and ULSB.      No gallop.   Edema:     Peripheral edema absent.   Abdominal:      Palpations: Abdomen is soft.      Tenderness: There is no abdominal tenderness.   Musculoskeletal:      Cervical back: Neck supple. Skin:     General: Skin is warm.   Neurological:      Mental Status: Alert and oriented to person, place, and time.   Psychiatric:         Behavior: Behavior normal.       Lab Review:   Procedures    Cardiac Procedures:  1.  Status post three-vessel CABG in 1990 (SVG to LAD, SVG to OM1, and SVG to OM2).  2.  Left heart catheterization ordered 10/9/2015: All native coronary arteries were 100% occluded proximally.  The SVG to LAD had no disease, and collateralized the distal aspects of the ramus, left circumflex, and RCA.  The SVG to OM1 had 60% distal disease.  The SVG to " OM2 had 70% mid disease.  The patient underwent placement of a 3.0 x 12 mm Xience Alpine JORGE to the distal limb of one of the vein graft branches to the OMs (which one is not clear from the cath report).  The patient also underwent placement of a 4.0 x 15 mm Resolute JORGE to the mid SVG to OM2.  3.  Echocardiogram on 7/17/2020: The ejection fraction was 60%.  There was mild LVH.  There was grade 1A diastolic dysfunction.  There was mild LVOT obstruction.  There was mild to moderate left atrial enlargement.  The right ventricle was mildly enlarged.  There was mild to moderate aortic stenosis, although the mean gradient was only 12 mmHg and peak gradient was 22 mmHg.  The aortic root was mildly dilated at 4.1 cm.  4.  Lexiscan nuclear stress test on 7/17/2020: No evidence of ischemia.    Assessment:       Diagnosis Plan   1. Nonrheumatic aortic valve stenosis  Adult Transthoracic Echo Complete w/ Color, Spectral and Contrast if Necessary Per Protocol   2. SKELTON (dyspnea on exertion)  Adult Transthoracic Echo Complete w/ Color, Spectral and Contrast if Necessary Per Protocol   3. Coronary artery disease involving coronary bypass graft of native heart without angina pectoris     4. RBBB (right bundle branch block)     5. Essential hypertension     6. PVC (premature ventricular contraction)       Plan:       The patient did have a stress test in July 2020 which was normal.  At this point, I am going to recheck an echocardiogram to reevaluate the aortic stenosis and for any other potential structural heart disease which might be causing the shortness of breath.  If it worsens or he develops chest discomfort, we may need to proceed with a heart catheterization at some point.  His aortic stenosis was fairly mild in the past.    He will remain on aspirin for life.  He did tell me that he had substantial nosebleeds with Plavix in the past after his stents.  This would be important to remember in the future should he ever need  any other intervention.  He will continue on the Lovaza and atorvastatin.  His blood pressure is good today at 118/78.  He will continue on the carvedilol, nifedipine, and ramipril.  He is asymptomatic from his PVCs.  He should be due for repeat labs sometime within the next 1 to 2 months.  I will have him follow-up with me in 6 months unless other issues arise.

## 2021-07-29 ENCOUNTER — OFFICE VISIT (OUTPATIENT)
Dept: SLEEP MEDICINE | Facility: HOSPITAL | Age: 81
End: 2021-07-29

## 2021-07-29 VITALS — HEIGHT: 71 IN | OXYGEN SATURATION: 96 % | HEART RATE: 74 BPM | BODY MASS INDEX: 28 KG/M2 | WEIGHT: 200 LBS

## 2021-07-29 DIAGNOSIS — Z99.89 OSA ON CPAP: Primary | Chronic | ICD-10-CM

## 2021-07-29 DIAGNOSIS — G47.33 OSA ON CPAP: Primary | Chronic | ICD-10-CM

## 2021-07-29 PROCEDURE — 99213 OFFICE O/P EST LOW 20 MIN: CPT | Performed by: INTERNAL MEDICINE

## 2021-07-29 PROCEDURE — G0463 HOSPITAL OUTPT CLINIC VISIT: HCPCS

## 2021-08-12 ENCOUNTER — TELEPHONE (OUTPATIENT)
Dept: SLEEP MEDICINE | Facility: HOSPITAL | Age: 81
End: 2021-08-12

## 2021-08-27 RX ORDER — OMEGA-3-ACID ETHYL ESTERS 1 G/1
4 CAPSULE, LIQUID FILLED ORAL DAILY
Qty: 360 CAPSULE | Refills: 3 | Status: SHIPPED | OUTPATIENT
Start: 2021-08-27 | End: 2021-08-27 | Stop reason: SDUPTHER

## 2021-08-27 RX ORDER — OMEGA-3-ACID ETHYL ESTERS 1 G/1
4 CAPSULE, LIQUID FILLED ORAL DAILY
Qty: 360 CAPSULE | Refills: 3 | Status: SHIPPED | OUTPATIENT
Start: 2021-08-27 | End: 2022-08-09

## 2021-08-27 NOTE — TELEPHONE ENCOUNTER
Rx Refill Note  Requested Prescriptions     Pending Prescriptions Disp Refills    omega-3 acid ethyl esters (LOVAZA) 1 g capsule 360 capsule 3     Sig: Take 4 capsules by mouth Daily. PT TO HOLD MED PRIOR TO OR      Last office visit with prescribing clinician: 6/11/2021      Next office visit with prescribing clinician: 1/14/2022            Sammie Henley MA  08/27/21, 13:05 EDT

## 2021-11-17 ENCOUNTER — APPOINTMENT (OUTPATIENT)
Dept: SLEEP MEDICINE | Facility: HOSPITAL | Age: 81
End: 2021-11-17

## 2021-11-19 ENCOUNTER — OFFICE VISIT (OUTPATIENT)
Dept: SLEEP MEDICINE | Facility: HOSPITAL | Age: 81
End: 2021-11-19

## 2021-11-19 VITALS — OXYGEN SATURATION: 98 % | BODY MASS INDEX: 27.3 KG/M2 | WEIGHT: 195 LBS | HEIGHT: 71 IN | HEART RATE: 60 BPM

## 2021-11-19 DIAGNOSIS — G47.33 OSA ON CPAP: Primary | Chronic | ICD-10-CM

## 2021-11-19 DIAGNOSIS — Z99.89 OSA ON CPAP: Primary | Chronic | ICD-10-CM

## 2021-11-19 PROCEDURE — G0463 HOSPITAL OUTPT CLINIC VISIT: HCPCS

## 2021-11-19 PROCEDURE — 99213 OFFICE O/P EST LOW 20 MIN: CPT | Performed by: INTERNAL MEDICINE

## 2021-11-19 NOTE — PROGRESS NOTES
"Follow Up [facility] Note     Chief Complaint:  CRISTY     Primary Care Physician: Sherron Gray MD    Interval History:   The patient is a 81 y.o. male  who I last saw 2021 and that note was reviewed.  The patient obtained a new auto CPAP and is also trying to get used to his new mask.  Previously, he was using a nasal blue gel mask.  The patient goes to bed at 10 PM and awakens at 5:30 AM.  He wakes up because of hip pain and he does use the restroom    Self-administered Egan Sleepiness Scale test results: 7  0-5 Lower normal daytime sleepiness  6-10 Higher normal daytime sleepiness  11-12 Mild, 13-15 Moderate, & 16-24 Severe excessive daytime sleepiness    Review of Systems:    A complete review of systems was done and all were negative with the exception of some nasal congestion and postnasal drip, shortness of breath with exertion, and occasional cough    Social History:    Social History     Socioeconomic History   • Marital status:    Tobacco Use   • Smoking status: Former Smoker     Packs/day: 2.00     Years: 20.00     Pack years: 40.00     Types: Cigarettes     Quit date:      Years since quittin.9   • Smokeless tobacco: Never Used   Substance and Sexual Activity   • Alcohol use: No   • Drug use: No   • Sexual activity: Defer       Allergies:  Ciprofloxacin     Medication Review:  Reviewed.      Vital Signs:    Vitals:    21 1145   Pulse: 60   SpO2: 98%   Weight: 88.5 kg (195 lb)   Height: 180.3 cm (71\")     Body mass index is 27.2 kg/m².    Physical Exam:    Constitutional:  Well developed 81 y.o. male that appears in no apparent distress.  Awake & oriented times 3.  Normal mood with normal recent and remote memory and normal judgement.  Eyes:  Conjunctivae normal.  Oropharynx: Previously, moist mucous membranes without exudate and a large tongue and uvula and narrowed posterior pharyngeal opening, patient is wearing a facemask.     Downloaded PAP Data Reviewed For " Compliance:  DME is [facility] and he uses a nasal mask.  Downloads between 11/1 and 11/17/2021 compliance 100%.  Average usage is 6 hours and 40 minutes.  Average AHI is normal without a significant leak.  Average auto CPAP pressure is 14.4 and his auto CPAP is 8-20.    I have reviewed the above results and compared them with the patient's last downloads and reviewed with the patient.    Impression:   Obstructive sleep apnea adequately treated with ResMed auto CPAP. The patient appears to be at goal with good compliance and usage. The patient has no complaints of hypersomnolence.    Plan:  Good sleep hygiene measures should be maintained.  Some weight loss would be beneficial in this patient who is overweight by BMI.      After evaluating the patient and assessing results available, the patient is benefiting from the treatment being provided.     The patient will continue auto CPAP.  After clinical evaluation and review of downloads, I recommend no changes to the patient's pressures.  A new prescription will be sent to the patient's DME.    I answered all of the patient's questions.  The patient will call for any problems and will follow up 7/28/2022       Kofi Jackson MD  Sleep Medicine  11/19/21  12:01 EST

## 2021-11-22 ENCOUNTER — TELEPHONE (OUTPATIENT)
Dept: SLEEP MEDICINE | Facility: HOSPITAL | Age: 81
End: 2021-11-22

## 2021-12-21 RX ORDER — ATORVASTATIN CALCIUM 80 MG/1
TABLET, FILM COATED ORAL
Qty: 90 TABLET | Refills: 3 | Status: SHIPPED | OUTPATIENT
Start: 2021-12-21 | End: 2022-12-09

## 2021-12-21 RX ORDER — RAMIPRIL 10 MG/1
CAPSULE ORAL
Qty: 90 CAPSULE | Refills: 3 | Status: SHIPPED | OUTPATIENT
Start: 2021-12-21 | End: 2022-11-30 | Stop reason: SDUPTHER

## 2021-12-30 RX ORDER — NIFEDIPINE 30 MG/1
TABLET, EXTENDED RELEASE ORAL
Qty: 90 TABLET | Refills: 3 | Status: SHIPPED | OUTPATIENT
Start: 2021-12-30 | End: 2022-12-27

## 2022-01-17 RX ORDER — CARVEDILOL 3.12 MG/1
TABLET ORAL
Qty: 180 TABLET | Refills: 3 | Status: SHIPPED | OUTPATIENT
Start: 2022-01-17 | End: 2022-05-04 | Stop reason: SDUPTHER

## 2022-02-24 ENCOUNTER — OFFICE VISIT (OUTPATIENT)
Dept: CARDIOLOGY | Facility: CLINIC | Age: 82
End: 2022-02-24

## 2022-02-24 VITALS
SYSTOLIC BLOOD PRESSURE: 120 MMHG | HEART RATE: 66 BPM | WEIGHT: 192 LBS | BODY MASS INDEX: 26.88 KG/M2 | DIASTOLIC BLOOD PRESSURE: 86 MMHG | RESPIRATION RATE: 15 BRPM | OXYGEN SATURATION: 98 % | HEIGHT: 71 IN

## 2022-02-24 DIAGNOSIS — R06.02 SHORTNESS OF BREATH: ICD-10-CM

## 2022-02-24 DIAGNOSIS — R07.2 PRECORDIAL PAIN: ICD-10-CM

## 2022-02-24 DIAGNOSIS — R55 NEAR SYNCOPE: ICD-10-CM

## 2022-02-24 DIAGNOSIS — I45.10 RIGHT BUNDLE BRANCH BLOCK: ICD-10-CM

## 2022-02-24 DIAGNOSIS — I10 PRIMARY HYPERTENSION: ICD-10-CM

## 2022-02-24 DIAGNOSIS — I35.0 NONRHEUMATIC AORTIC VALVE STENOSIS: ICD-10-CM

## 2022-02-24 DIAGNOSIS — I49.3 PVCS (PREMATURE VENTRICULAR CONTRACTIONS): ICD-10-CM

## 2022-02-24 DIAGNOSIS — I25.810 CORONARY ARTERY DISEASE INVOLVING CORONARY BYPASS GRAFT OF NATIVE HEART WITHOUT ANGINA PECTORIS: Primary | ICD-10-CM

## 2022-02-24 PROCEDURE — 99214 OFFICE O/P EST MOD 30 MIN: CPT | Performed by: INTERNAL MEDICINE

## 2022-02-24 RX ORDER — TAMSULOSIN HYDROCHLORIDE 0.4 MG/1
0.4 CAPSULE ORAL DAILY
COMMUNITY
Start: 2021-06-06 | End: 2022-11-29

## 2022-02-24 NOTE — PROGRESS NOTES
Date of Office Visit:  2022  Encounter Provider: Андрей Carmona MD  Place of Service: [facility] CARDIOLOGY  Patient Name: Javier Grant  :1940    Chief complaint: Coronary artery disease, right bundle branch block, aortic stenosis, and hypertension, PVC's.    History of Present Illness:    I had the pleasure of seeing the patient in cardiology office on 2022.  He is a very pleasant 81 year-old male with a history of coronary artery disease, right bundle branch block, aortic stenosis, and hypertension who presents for follow-up.  The patient has formerly seen Dr. Robin Kingston with [facility], but established care with me on 2020.    The patient has a history of coronary artery disease, and had an MI around .  At that time, he underwent a three-vessel CABG with an SVG to LAD, SVG to OM1, and SVG to OM2.  His anginal equivalent has always been chest pain, mainly in the center of his chest.  He also underwent a left heart catheterization on 10/9/2015 which showed occlusion of all native vessels proximally, although there was a 70% stenosis in the mid SVG to OM2 he underwent placement of a 4.0 x 15 mm resolute JORGE to this lesion at that time, as well as placement of a 3.0 x 12 mm Xience Alpine JORGE to the anastomotic site of one of the grafts to the OM branches (although which one is not clear from the cath report).  The LAD and saphenous vein graft to LAD collateralized all 3 other major branches distally (circumflex, RCA, and ramus).  He did also have fairly severe nosebleeds on Plavix in the past.    The patient also has a history of a right bundle branch block with PVCs.  He is also had a history of aortic stenosis and hypertension.  He did have an echocardiogram and Lexiscan nuclear stress test performed on 2020.  His nuclear stress test was normal.  His echocardiogram showed an ejection fraction of 60%, grade 1a diastolic dysfunction,  mild to moderate aortic stenosis, and an aortic root mildly enlarged at 4.1 cm.    The patient presents today for follow-up.  He has a number of different complaints today.  He states that during the cold weather, he will often have a feeling in his throat walking to his mailbox like his previous angina.  He has had this on several occasions, and it typically is only in cold environments.  He has also had some increasing shortness of breath over the last 3 to 4 months.  He also told me that he has had multiple episodes where he will be sitting in his chair, and he will feel like he will need to pass out for several seconds, before the sensation goes away.  He has not actually had a syncopal episode thus far.      Past Medical History:   Diagnosis Date   • Arthritis    • Bleeds easily (HCC)     WAS TOLD THAT AFTER CABG   • CAD (coronary artery disease)    • Colon polyp    • Disorder of aorta (HCC)    • Dizziness    • SKELTON (dyspnea on exertion)    • Enlarged prostate    • Gastritis    • Gout    • Hiatal hernia    • History of MI (myocardial infarction)     1990   • HL (hearing loss)    • Hyperlipidemia    • Hypertension    • Macular degeneration     rt eye   • Nonrheumatic aortic (valve) insufficiency    • Nonrheumatic aortic (valve) stenosis    • RBBB (right bundle branch block)    • Sleep apnea     USES CPAP   • Umbilical hernia    • Vitamin D deficiency        Past Surgical History:   Procedure Laterality Date   • CARDIAC CATHETERIZATION Left 10/09/2015    Dr. Robin Kingston   • CARDIAC SURGERY  1990    triple bypass   • CATARACT EXTRACTION Bilateral    • CORONARY ANGIOPLASTY WITH STENT PLACEMENT  2015   • CORONARY ARTERY BYPASS GRAFT  1990    3 VESSELS   • ENDOSCOPY N/A 5/26/2017    Procedure: ESOPHAGOGASTRODUODENOSCOPY WITH COLD BIOIPSIES AND BALLOON DILITATION SIZE 15, 16.5, 18;  Surgeon: Jerry SNOWDEN MD;  Location: [facility] ENDOSCOPY;  Service:    • MCCOY'S NEUROMA EXCISION Right    • TONSILLECTOMY     •  UMBILICAL HERNIA REPAIR N/A 10/9/2017    Procedure: UMBILICAL HERNIA REPAIR;  Surgeon: Blake Kelly Jr., MD;  Location: [facility] OR;  Service:        Current Outpatient Medications on File Prior to Visit   Medication Sig Dispense Refill   • acetaminophen (TYLENOL) 325 MG tablet Take 650 mg by mouth Every Night.     • allopurinol (ZYLOPRIM) 300 MG tablet Take 1 tablet by mouth Daily. 90 tablet 3   • aspirin 81 MG tablet Take 81 mg by mouth Daily.     • atorvastatin (LIPITOR) 80 MG tablet TAKE 1 TABLET EVERY NIGHT 90 tablet 3   • carvedilol (COREG) 3.125 MG tablet TAKE 1 TABLET TWICE DAILY  WITH MEALS 180 tablet 3   • Cholecalciferol 2000 units capsule Take 2,000 Units by mouth Daily.     • NIFEdipine XL (PROCARDIA XL) 30 MG 24 hr tablet TAKE ONE TABLET BY MOUTH DAILY 90 tablet 3   • nitroglycerin (Nitrostat) 0.4 MG SL tablet Take 1 tablet by mouth Every 5 (Five) Minutes As Needed (CHEST PAIN: MAX DOSE 3 TABLETS). 25 tablet 6   • omega-3 acid ethyl esters (LOVAZA) 1 g capsule Take 4 capsules by mouth Daily. 360 capsule 3   • ramipril (ALTACE) 10 MG capsule TAKE 1 CAPSULE EVERY       EVENING 90 capsule 3   • tamsulosin (FLOMAX) 0.4 MG capsule 24 hr capsule      • tamsulosin (FLOMAX) 0.4 MG capsule 24 hr capsule Take 0.4 mg by mouth Daily.     • [DISCONTINUED] escitalopram (LEXAPRO) 10 MG tablet FOR STRESS TAKE HALF TABLETDAILY FOR 1 WEEK'S TIME    THEN GO TO  TABLET 30 tablet 2   • [DISCONTINUED] Multiple Vitamins-Minerals (MULTIVITAMIN ADULT, MINERALS, PO)      • [DISCONTINUED] potassium chloride (MICRO-K) 10 MEQ CR capsule Take 1 capsule by mouth Daily. 90 capsule 3     No current facility-administered medications on file prior to visit.     Allergies as of 02/24/2022 - Reviewed 02/24/2022   Allergen Reaction Noted   • Ciprofloxacin Diarrhea 05/16/2016     Social History     Socioeconomic History   • Marital status:    Tobacco Use   • Smoking status: Former Smoker     Packs/day: 2.00     Years: 20.00      "Pack years: 40.00     Types: Cigarettes     Quit date:      Years since quittin.1   • Smokeless tobacco: Never Used   Substance and Sexual Activity   • Alcohol use: No   • Drug use: No   • Sexual activity: Defer     Family History   Problem Relation Age of Onset   • Depression Mother    • Colon polyps Mother    • Heart attack Father    • Aneurysm Father    • Stomach cancer Brother    • Malig Hyperthermia Neg Hx        Review of Systems   Constitutional: Positive for malaise/fatigue and weight loss.   Cardiovascular: Positive for dyspnea on exertion and leg swelling.   Musculoskeletal: Positive for myalgias.   Neurological: Positive for excessive daytime sleepiness.   Psychiatric/Behavioral: The patient is nervous/anxious.    All other systems reviewed and are negative.     Objective:     Vitals:    22 0917   BP: 120/86   BP Location: Left arm   Patient Position: Sitting   Cuff Size: Adult   Pulse: 66   Resp: 15   SpO2: 98%   Weight: 87.1 kg (192 lb)   Height: 180.3 cm (70.98\")     Body mass index is 26.79 kg/m².    Constitutional:       Appearance: Healthy appearance. Well-developed.   Eyes:      Conjunctiva/sclera: Conjunctivae normal.   HENT:      Head: Normocephalic and atraumatic.   Pulmonary:      Effort: Pulmonary effort is normal.      Breath sounds: Normal breath sounds.   Cardiovascular:      Normal rate. Regular rhythm.      Murmurs: There is a grade 3/6 harsh systolic murmur at the URSB and ULSB.      No gallop.   Edema:     Peripheral edema absent.   Abdominal:      Palpations: Abdomen is soft.      Tenderness: There is no abdominal tenderness.   Musculoskeletal:      Cervical back: Neck supple. Skin:     General: Skin is warm.   Neurological:      Mental Status: Alert and oriented to person, place, and time.   Psychiatric:         Behavior: Behavior normal.       Lab Review:   Procedures    Cardiac Procedures:  1.  Status post three-vessel CABG in  (SVG to LAD, SVG to OM1, and SVG to " OM2).  2.  Left heart catheterization ordered 10/9/2015: All native coronary arteries were 100% occluded proximally.  The SVG to LAD had no disease, and collateralized the distal aspects of the ramus, left circumflex, and RCA.  The SVG to OM1 had 60% distal disease.  The SVG to OM2 had 70% mid disease.  The patient underwent placement of a 3.0 x 12 mm Xience Alpine JORGE to the distal limb of one of the vein graft branches to the OMs (which one is not clear from the cath report).  The patient also underwent placement of a 4.0 x 15 mm Resolute JORGE to the mid SVG to OM2.  3.  Echocardiogram on 7/17/2020: The ejection fraction was 60%.  There was mild LVH.  There was grade 1A diastolic dysfunction.  There was mild LVOT obstruction. There was mild to moderate left atrial enlargement.  The right ventricle was mildly enlarged.  There was mild to moderate aortic stenosis, although the mean gradient was only 12 mmHg and peak gradient was 22 mmHg.  The aortic root was mildly dilated at 4.1 cm.  4.  Lexiscan nuclear stress test on 7/17/2020: No evidence of ischemia.  5. Echocardiogram on 6/16/2021: Ejection fraction was 61%. There was mild LVH. There was mild aortic stenosis with a peak gradient of 23 mmHg and a mean gradient of 12 mmHg. There was mild tricuspid regurgitation. There was mild mitral regurgitation. The aortic root was mildly dilated at 4 cm.    Assessment:       Diagnosis Plan   1. Coronary artery disease involving coronary bypass graft of native heart without angina pectoris  Adult Transthoracic Echo Complete w/ Color, Spectral and Contrast if Necessary Per Protocol    Stress Test With Myocardial Perfusion One Day   2. Nonrheumatic aortic valve stenosis  Adult Transthoracic Echo Complete w/ Color, Spectral and Contrast if Necessary Per Protocol   3. Right bundle branch block  Adult Transthoracic Echo Complete w/ Color, Spectral and Contrast if Necessary Per Protocol   4. Primary hypertension     5. PVCs  (premature ventricular contractions)     6. Precordial pain  Adult Transthoracic Echo Complete w/ Color, Spectral and Contrast if Necessary Per Protocol    Stress Test With Myocardial Perfusion One Day   7. Shortness of breath  Adult Transthoracic Echo Complete w/ Color, Spectral and Contrast if Necessary Per Protocol    Stress Test With Myocardial Perfusion One Day   8. Near syncope  Adult Transthoracic Echo Complete w/ Color, Spectral and Contrast if Necessary Per Protocol    Stress Test With Myocardial Perfusion One Day    Holter Monitor - 24 Hour     Plan:       Again, he has had episodes of throat tightness and pain similar to his previous angina on walking to his mailbox, typically in cold weather.  He has also had gradually increasing shortness of breath over the last 3 to 4 months.  He also told me that he has had episodes of feeling like he is going to pass out for several seconds while seated on multiple occasions.    I am going to recheck an echocardiogram at this point to evaluate for any potential structural heart disease.  His aortic stenosis did not sound severe on exam, although with his change in clinical status, I would still like to verify this.  I am going to also check a Analizaiscan Myoview stress test at this point.  His last stress test was in July 2020, and he obviously has a history of significant coronary artery disease.  Given the episodes of near syncope while seated, I am most concerned about rhythm issues.  I will start with a 24-hour Holter monitor.    He will remain on aspirin for life.  He did tell me that he had substantial nosebleeds with Plavix in the past after his stents.  This would be important to remember in the future should he ever need any other intervention.  He will continue on the Lovaza and atorvastatin.  He will continue on the carvedilol, nifedipine, and ramipril.  He is asymptomatic from his PVCs.  Further plans and follow-up will be determined based on the above  testing.

## 2022-02-25 DIAGNOSIS — I45.5 SINUS PAUSE: ICD-10-CM

## 2022-02-25 DIAGNOSIS — R55 NEAR SYNCOPE: Primary | ICD-10-CM

## 2022-02-25 DIAGNOSIS — R00.1 BRADYCARDIA, SINUS: ICD-10-CM

## 2022-02-28 ENCOUNTER — TRANSCRIBE ORDERS (OUTPATIENT)
Dept: ADMINISTRATIVE | Facility: HOSPITAL | Age: 82
End: 2022-02-28

## 2022-02-28 DIAGNOSIS — Z01.818 OTHER SPECIFIED PRE-OPERATIVE EXAMINATION: Primary | ICD-10-CM

## 2022-02-28 PROBLEM — R55 NEAR SYNCOPE: Status: ACTIVE | Noted: 2022-02-28

## 2022-02-28 PROBLEM — I45.5 SINUS PAUSE: Status: ACTIVE | Noted: 2022-02-28

## 2022-02-28 PROBLEM — R00.1 BRADYCARDIA, SINUS: Status: ACTIVE | Noted: 2022-02-28

## 2022-03-01 ENCOUNTER — TRANSCRIBE ORDERS (OUTPATIENT)
Dept: CARDIOLOGY | Facility: CLINIC | Age: 82
End: 2022-03-01

## 2022-03-01 DIAGNOSIS — Z01.810 PREPROCEDURAL CARDIOVASCULAR EXAMINATION: ICD-10-CM

## 2022-03-01 DIAGNOSIS — Z01.818 OTHER SPECIFIED PRE-OPERATIVE EXAMINATION: ICD-10-CM

## 2022-03-01 DIAGNOSIS — Z13.6 SCREENING FOR CARDIOVASCULAR CONDITION: Primary | ICD-10-CM

## 2022-03-02 ENCOUNTER — LAB (OUTPATIENT)
Dept: LAB | Facility: HOSPITAL | Age: 82
End: 2022-03-02

## 2022-03-02 DIAGNOSIS — Z01.818 OTHER SPECIFIED PRE-OPERATIVE EXAMINATION: ICD-10-CM

## 2022-03-02 DIAGNOSIS — Z13.6 SCREENING FOR CARDIOVASCULAR CONDITION: ICD-10-CM

## 2022-03-02 DIAGNOSIS — Z01.810 PREPROCEDURAL CARDIOVASCULAR EXAMINATION: ICD-10-CM

## 2022-03-02 LAB
ANION GAP SERPL CALCULATED.3IONS-SCNC: 12.6 MMOL/L (ref 5–15)
BASOPHILS # BLD AUTO: 0.04 10*3/MM3 (ref 0–0.2)
BASOPHILS NFR BLD AUTO: 0.4 % (ref 0–1.5)
BUN SERPL-MCNC: 18 MG/DL (ref 8–23)
BUN/CREAT SERPL: 16.8 (ref 7–25)
CALCIUM SPEC-SCNC: 9.2 MG/DL (ref 8.6–10.5)
CHLORIDE SERPL-SCNC: 102 MMOL/L (ref 98–107)
CO2 SERPL-SCNC: 21.4 MMOL/L (ref 22–29)
CREAT SERPL-MCNC: 1.07 MG/DL (ref 0.76–1.27)
DEPRECATED RDW RBC AUTO: 46.3 FL (ref 37–54)
EGFRCR SERPLBLD CKD-EPI 2021: 69.7 ML/MIN/1.73
EOSINOPHIL # BLD AUTO: 0.58 10*3/MM3 (ref 0–0.4)
EOSINOPHIL NFR BLD AUTO: 6.4 % (ref 0.3–6.2)
ERYTHROCYTE [DISTWIDTH] IN BLOOD BY AUTOMATED COUNT: 13.8 % (ref 12.3–15.4)
GLUCOSE SERPL-MCNC: 128 MG/DL (ref 65–99)
HCT VFR BLD AUTO: 39.3 % (ref 37.5–51)
HGB BLD-MCNC: 13.3 G/DL (ref 13–17.7)
IMM GRANULOCYTES # BLD AUTO: 0.02 10*3/MM3 (ref 0–0.05)
IMM GRANULOCYTES NFR BLD AUTO: 0.2 % (ref 0–0.5)
LYMPHOCYTES # BLD AUTO: 1.64 10*3/MM3 (ref 0.7–3.1)
LYMPHOCYTES NFR BLD AUTO: 18 % (ref 19.6–45.3)
MCH RBC QN AUTO: 31.7 PG (ref 26.6–33)
MCHC RBC AUTO-ENTMCNC: 33.8 G/DL (ref 31.5–35.7)
MCV RBC AUTO: 93.6 FL (ref 79–97)
MONOCYTES # BLD AUTO: 0.62 10*3/MM3 (ref 0.1–0.9)
MONOCYTES NFR BLD AUTO: 6.8 % (ref 5–12)
NEUTROPHILS NFR BLD AUTO: 6.23 10*3/MM3 (ref 1.7–7)
NEUTROPHILS NFR BLD AUTO: 68.2 % (ref 42.7–76)
NRBC BLD AUTO-RTO: 0 /100 WBC (ref 0–0.2)
PLATELET # BLD AUTO: 199 10*3/MM3 (ref 140–450)
PMV BLD AUTO: 10.4 FL (ref 6–12)
POTASSIUM SERPL-SCNC: 4 MMOL/L (ref 3.5–5.2)
RBC # BLD AUTO: 4.2 10*6/MM3 (ref 4.14–5.8)
SARS-COV-2 ORF1AB RESP QL NAA+PROBE: NOT DETECTED
SODIUM SERPL-SCNC: 136 MMOL/L (ref 136–145)
WBC NRBC COR # BLD: 9.13 10*3/MM3 (ref 3.4–10.8)

## 2022-03-02 PROCEDURE — 36415 COLL VENOUS BLD VENIPUNCTURE: CPT

## 2022-03-02 PROCEDURE — C9803 HOPD COVID-19 SPEC COLLECT: HCPCS

## 2022-03-02 PROCEDURE — 85025 COMPLETE CBC W/AUTO DIFF WBC: CPT

## 2022-03-02 PROCEDURE — 80048 BASIC METABOLIC PNL TOTAL CA: CPT

## 2022-03-02 PROCEDURE — U0004 COV-19 TEST NON-CDC HGH THRU: HCPCS

## 2022-03-04 ENCOUNTER — HOSPITAL ENCOUNTER (OUTPATIENT)
Facility: HOSPITAL | Age: 82
Setting detail: HOSPITAL OUTPATIENT SURGERY
Discharge: HOME | End: 2022-03-04
Attending: INTERNAL MEDICINE | Admitting: INTERNAL MEDICINE

## 2022-03-04 VITALS
BODY MASS INDEX: 26.88 KG/M2 | TEMPERATURE: 97.2 F | SYSTOLIC BLOOD PRESSURE: 133 MMHG | DIASTOLIC BLOOD PRESSURE: 67 MMHG | HEIGHT: 71 IN | HEART RATE: 64 BPM | WEIGHT: 192 LBS | OXYGEN SATURATION: 96 % | RESPIRATION RATE: 18 BRPM

## 2022-03-04 DIAGNOSIS — R00.1 BRADYCARDIA, SINUS: ICD-10-CM

## 2022-03-04 DIAGNOSIS — G89.18 POSTOPERATIVE PAIN: Primary | ICD-10-CM

## 2022-03-04 DIAGNOSIS — I45.5 SINUS PAUSE: ICD-10-CM

## 2022-03-04 DIAGNOSIS — R55 NEAR SYNCOPE: ICD-10-CM

## 2022-03-04 PROCEDURE — C1785 PMKR, DUAL, RATE-RESP: HCPCS | Performed by: INTERNAL MEDICINE

## 2022-03-04 PROCEDURE — 25010000002 FENTANYL CITRATE (PF) 50 MCG/ML SOLUTION: Performed by: INTERNAL MEDICINE

## 2022-03-04 PROCEDURE — 33208 INSRT HEART PM ATRIAL & VENT: CPT | Performed by: INTERNAL MEDICINE

## 2022-03-04 PROCEDURE — C1894 INTRO/SHEATH, NON-LASER: HCPCS | Performed by: INTERNAL MEDICINE

## 2022-03-04 PROCEDURE — 99153 MOD SED SAME PHYS/QHP EA: CPT | Performed by: INTERNAL MEDICINE

## 2022-03-04 PROCEDURE — C1898 LEAD, PMKR, OTHER THAN TRANS: HCPCS | Performed by: INTERNAL MEDICINE

## 2022-03-04 PROCEDURE — 25010000002 CEFAZOLIN IN DEXTROSE 2-4 GM/100ML-% SOLUTION: Performed by: INTERNAL MEDICINE

## 2022-03-04 PROCEDURE — 0 LIDOCAINE 1 % SOLUTION: Performed by: INTERNAL MEDICINE

## 2022-03-04 PROCEDURE — C1769 GUIDE WIRE: HCPCS | Performed by: INTERNAL MEDICINE

## 2022-03-04 PROCEDURE — 99152 MOD SED SAME PHYS/QHP 5/>YRS: CPT | Performed by: INTERNAL MEDICINE

## 2022-03-04 PROCEDURE — C1785 PMKR, DUAL, RATE-RESP: HCPCS

## 2022-03-04 PROCEDURE — 25010000002 MIDAZOLAM PER 1 MG: Performed by: INTERNAL MEDICINE

## 2022-03-04 DEVICE — PACE/SENSE LEAD
Type: IMPLANTABLE DEVICE | Site: CHEST | Status: FUNCTIONAL
Brand: INGEVITY™+

## 2022-03-04 DEVICE — PACEMAKER
Type: IMPLANTABLE DEVICE | Site: CHEST | Status: FUNCTIONAL
Brand: ACCOLADE™ MRI EL DR

## 2022-03-04 RX ORDER — SODIUM CHLORIDE 9 MG/ML
75 INJECTION, SOLUTION INTRAVENOUS CONTINUOUS
Status: DISCONTINUED | OUTPATIENT
Start: 2022-03-04 | End: 2022-03-04 | Stop reason: HOSPADM

## 2022-03-04 RX ORDER — SODIUM CHLORIDE 0.9 % (FLUSH) 0.9 %
10 SYRINGE (ML) INJECTION AS NEEDED
Status: DISCONTINUED | OUTPATIENT
Start: 2022-03-04 | End: 2022-03-04 | Stop reason: HOSPADM

## 2022-03-04 RX ORDER — CEFAZOLIN SODIUM 2 G/100ML
INJECTION, SOLUTION INTRAVENOUS CONTINUOUS PRN
Status: COMPLETED | OUTPATIENT
Start: 2022-03-04 | End: 2022-03-04

## 2022-03-04 RX ORDER — FAMOTIDINE 20 MG/1
20 TABLET, FILM COATED ORAL DAILY
COMMUNITY

## 2022-03-04 RX ORDER — MIDAZOLAM HYDROCHLORIDE 1 MG/ML
INJECTION INTRAMUSCULAR; INTRAVENOUS AS NEEDED
Status: DISCONTINUED | OUTPATIENT
Start: 2022-03-04 | End: 2022-03-04 | Stop reason: HOSPADM

## 2022-03-04 RX ORDER — FENTANYL CITRATE 50 UG/ML
INJECTION, SOLUTION INTRAMUSCULAR; INTRAVENOUS AS NEEDED
Status: DISCONTINUED | OUTPATIENT
Start: 2022-03-04 | End: 2022-03-04 | Stop reason: HOSPADM

## 2022-03-04 RX ORDER — LIDOCAINE HYDROCHLORIDE 10 MG/ML
INJECTION, SOLUTION INFILTRATION; PERINEURAL AS NEEDED
Status: DISCONTINUED | OUTPATIENT
Start: 2022-03-04 | End: 2022-03-04 | Stop reason: HOSPADM

## 2022-03-04 RX ORDER — SODIUM CHLORIDE 0.9 % (FLUSH) 0.9 %
3 SYRINGE (ML) INJECTION EVERY 12 HOURS SCHEDULED
Status: DISCONTINUED | OUTPATIENT
Start: 2022-03-04 | End: 2022-03-04 | Stop reason: HOSPADM

## 2022-03-04 RX ORDER — OXYCODONE HYDROCHLORIDE AND ACETAMINOPHEN 5; 325 MG/1; MG/1
1-2 TABLET ORAL EVERY 4 HOURS PRN
Qty: 10 TABLET | Refills: 0 | Status: SHIPPED | OUTPATIENT
Start: 2022-03-04 | End: 2022-10-15

## 2022-03-04 RX ADMIN — SODIUM CHLORIDE 75 ML/HR: 9 INJECTION, SOLUTION INTRAVENOUS at 11:43

## 2022-03-04 NOTE — DISCHARGE INSTRUCTIONS
"[facility]   [phone]    Post Pacemaker / Defibrillator Implant Instructions      1.  The dressing may be removed the next day.    2. If steri-strips were used, they should not be removed. Allow them to \"fall off\".      3. You may shower after the dressing is removed. Do not allow shower water to hit directly on incision.    4. No lotion/powder/ointment/cream on incision until it is healed.    5. Gently wash incision daily with soap and water and pat dry.    6. You may reapply a dressing if there is drainage, otherwise leave your incision open to air. If you reapply a dressing, please notify the pacemaker clinic.    7. No heavy lifting, pulling, or pushing.    8. Do not raise the affected arm over your head for a minimum of 1 month.    9. The pacemaker clinic will contact you (usually within 1 business day) to schedule a pacemaker/incision check. The check is usually done 7-10 days post-implant. If you have not heard from the pacemaker clinic within 3 days, please call the office.    10. Please call the office if you experience any of the following:   bleeding or drainage from your incision   swelling, redness, or opening of your incision   fever or chills   pain not relieved with medication   chest pain or difficulty breathing   lightheadness    11. For defibrillator patients only: If you receive a shock from your device, please call the office. If you receive 2 or more shocks within a 24 hour period OR if you receive 1 shock and feel poorly, you should be evaluated in the emergency room. Please DO NOT DRIVE if you have received a shock until your device has been checked.  "

## 2022-03-15 ENCOUNTER — CLINICAL SUPPORT NO REQUIREMENTS (OUTPATIENT)
Dept: CARDIOLOGY | Facility: CLINIC | Age: 82
End: 2022-03-15

## 2022-03-15 DIAGNOSIS — I49.5 SICK SINUS SYNDROME: Primary | ICD-10-CM

## 2022-03-15 PROCEDURE — 93280 PM DEVICE PROGR EVAL DUAL: CPT | Performed by: INTERNAL MEDICINE

## 2022-03-17 PROCEDURE — 93294 REM INTERROG EVL PM/LDLS PM: CPT | Performed by: INTERNAL MEDICINE

## 2022-03-17 PROCEDURE — 93296 REM INTERROG EVL PM/IDS: CPT | Performed by: INTERNAL MEDICINE

## 2022-04-15 ENCOUNTER — CLINICAL SUPPORT NO REQUIREMENTS (OUTPATIENT)
Dept: CARDIOLOGY | Facility: CLINIC | Age: 82
End: 2022-04-15

## 2022-04-15 ENCOUNTER — OFFICE VISIT (OUTPATIENT)
Dept: CARDIOLOGY | Facility: CLINIC | Age: 82
End: 2022-04-15

## 2022-04-15 VITALS
BODY MASS INDEX: 26.52 KG/M2 | WEIGHT: 189.4 LBS | HEIGHT: 71 IN | OXYGEN SATURATION: 98 % | HEART RATE: 64 BPM | SYSTOLIC BLOOD PRESSURE: 120 MMHG | DIASTOLIC BLOOD PRESSURE: 68 MMHG

## 2022-04-15 DIAGNOSIS — I45.10 RIGHT BUNDLE BRANCH BLOCK: ICD-10-CM

## 2022-04-15 DIAGNOSIS — I35.0 NONRHEUMATIC AORTIC VALVE STENOSIS: ICD-10-CM

## 2022-04-15 DIAGNOSIS — I25.810 CORONARY ARTERY DISEASE INVOLVING CORONARY BYPASS GRAFT OF NATIVE HEART WITHOUT ANGINA PECTORIS: ICD-10-CM

## 2022-04-15 DIAGNOSIS — I49.5 SICK SINUS SYNDROME: Primary | ICD-10-CM

## 2022-04-15 DIAGNOSIS — Z95.0 PACEMAKER: ICD-10-CM

## 2022-04-15 DIAGNOSIS — I10 PRIMARY HYPERTENSION: ICD-10-CM

## 2022-04-15 PROCEDURE — 99214 OFFICE O/P EST MOD 30 MIN: CPT | Performed by: NURSE PRACTITIONER

## 2022-04-15 NOTE — PROGRESS NOTES
"    CARDIOLOGY        Patient Name: Javier Grant  :1940  Age: 82 y.o.  Primary Cardiologist: Marques Carmona MD  Encounter Provider:  COLLIN Garcia    Date of Service: 04/15/22          CHIEF COMPLAINT / REASON FOR OFFICE VISIT     Coronary Artery Disease, Nonrheumatic aortic valve stenosis, and Hospital Follow Up Visit      HISTORY OF PRESENT ILLNESS       HPI  Javier Grant is a 82 y.o. male who presents today for hospital reevaluation.     Pt has a  history significant for CAD, RBBB, aortic valve stenosis, hypertension.    Patient presents today for reevaluation of chest tightness after pacemaker implantation.  Patient was experiencing chest tightness and lightheadedness.  24-hour Holter revealed sinus bradycardia with sinus pause.  Patient ultimately underwent pacemaker placement 3/4/2022.  Device interrogated prior to appointment.  Report reviewed.    Patient states that he has done well since pacemaker implantation.  Reports that symptoms of throat tightness and lightheadedness have completely resolved.  Reports that his energy level is also gradually improving.  He does not have any complaints regarding pain or potential infection at incision site.  States that he is tolerating all medications without any adverse effects.      The following portions of the patient's history were reviewed and updated as appropriate: allergies, current medications, past family history, past medical history, past social history, past surgical history and problem list.      VITAL SIGNS     Visit Vitals  /68 (BP Location: Left arm, Patient Position: Sitting)   Pulse 64   Ht 180.3 cm (71\")   Wt 85.9 kg (189 lb 6.4 oz)   SpO2 98%   BMI 26.42 kg/m²         Wt Readings from Last 3 Encounters:   04/15/22 85.9 kg (189 lb 6.4 oz)   22 87.1 kg (192 lb)   22 87.1 kg (192 lb)     Body mass index is 26.42 kg/m².      REVIEW OF SYSTEMS   Review of Systems   Constitutional: Negative for chills, fever, weight " gain and weight loss.   Cardiovascular: Negative for leg swelling.   Respiratory: Negative for cough, snoring and wheezing.    Hematologic/Lymphatic: Negative for bleeding problem. Does not bruise/bleed easily.   Skin: Negative for color change.   Musculoskeletal: Negative for falls, joint pain and myalgias.   Gastrointestinal: Negative for melena.   Genitourinary: Negative for hematuria.   Neurological: Negative for excessive daytime sleepiness.   Psychiatric/Behavioral: Negative for depression. The patient is not nervous/anxious.            PHYSICAL EXAMINATION     Constitutional:       Appearance: Normal appearance. Well-developed.   Eyes:      Conjunctiva/sclera: Conjunctivae normal.   Neck:      Vascular: No carotid bruit.   Pulmonary:      Effort: Pulmonary effort is normal.      Breath sounds: Normal breath sounds.   Cardiovascular:      Normal rate. Regular rhythm. Normal S1. Normal S2.      Murmurs: There is no murmur.      No gallop. No click. No rub.      Comments: Pacemaker incision healing well.  Site is well approximated, no erythema, wound dehiscence, swelling.  Edema:     Peripheral edema absent.   Musculoskeletal: Normal range of motion. Skin:     General: Skin is warm and dry.   Neurological:      Mental Status: Alert and oriented to person, place, and time.      GCS: GCS eye subscore is 4. GCS verbal subscore is 5. GCS motor subscore is 6.   Psychiatric:         Speech: Speech normal.         Behavior: Behavior normal.         Thought Content: Thought content normal.         Judgment: Judgment normal.           REVIEWED DATA     Procedures    Cardiac Procedures:  1. Three-vessel CABG 1990.  SVG to LAD, SVG to OM1, SVG to OM 2.  2. Cardiac catheterization 10/9/2015.  All native arteries were 100% occluded proximally.  SVGs LAD had no disease and collateralized at the distal aspects of the ramus, left circumflex and RCA.  SVG to OM1 had 60% distal disease.  SVG to OM 2 had 70% mid disease.  Patient  underwent placement of JORGE to the distal limb of one of the vein graft branches of the OM's.  Patient also had JORGE placement to the SVG to OM 2.  3. Echocardiogram 7/17/2020.  LVEF 60%.  Mild LVH.  Grade 1 diastolic dysfunction.  Mild LVOT obstruction.  Mild to moderate left atrial enlargement.  RV was mildly enlarged.  Mild to moderate aortic valve stenosis although mean gradient was only 12 mmHg and peak gradient 22 mmHg.  Aortic root dilated at 4.1 cm.  4. Nuclear stress test 7/17/2020.  No evidence of ischemia.  5. Echocardiogram 6/16/2021.  LVEF 61%.  Mild LVH.  Mild aortic stenosis with peak gradient 23 mmHg and mean gradient 12 mmHg.  Mild tricuspid valve regurgitation.  Aortic root mildly dilated at 4 cm.  6. 24-hour monitor 2/25/2022.  Significant sinus bradycardia in the afternoon hours.  Lowest heart rate 32 bpm.  Sinus pause 4.2 seconds.  7. Dual-chamber pacemaker implant 3/4/2022.      BUN   Date Value Ref Range Status   03/02/2022 18 8 - 23 mg/dL Final   05/25/2021 17 7 - 20 mg/dL Final     Creatinine   Date Value Ref Range Status   03/02/2022 1.07 0.76 - 1.27 mg/dL Final   05/25/2021 0.9 0.7 - 1.5 mg/dL Final     Potassium   Date Value Ref Range Status   03/02/2022 4.0 3.5 - 5.2 mmol/L Final   05/25/2021 4.5 3.5 - 5.1 mmol/L Final     ALT (SGPT)   Date Value Ref Range Status   05/25/2021 20 13 - 69 U/L Final     AST (SGOT)   Date Value Ref Range Status   05/25/2021 28 15 - 46 U/L Final           ASSESSMENT & PLAN      Diagnosis Plan   1. Sick sinus syndrome (HCC)     2. Pacemaker     3. Coronary artery disease involving coronary bypass graft of native heart without angina pectoris     4. Nonrheumatic aortic valve stenosis     5. Right bundle branch block     6. Primary hypertension           SUMMARY/DISCUSSION  1. SSS with PPM.  Patient now status post PPM.  Reports that symptoms of throat tightness and lightheadedness have resolved.  Patient states that energy level is gradually improving.  Incision  site is healing well with no signs of infection.  Device interrogated today and adjustments were made.  Please see full report for details.  2. CAD.  Patient with prior bypass grafting as well as coronary artery stenting.  Denies any angina, dyspnea at rest or with exertion.  He will continue GDMT with aspirin, atorvastatin, carvedilol, ramipril.  3. Aortic valve stenosis.  Stable on most recent echocardiogram.  4. RBBB  5. HTN.  Blood pressure controlled in clinic at 120/68.  Patient to continue carvedilol 3.125 mg twice per day, nifedipine 30 mg/day, ramipril 10 mg/day.  6. Keep previously scheduled appointment with Dr. Carmona 7/6/2022.        MEDICATIONS         Discharge Medications          Accurate as of April 15, 2022 10:29 AM. If you have any questions, ask your nurse or doctor.            Continue These Medications      Instructions Start Date   acetaminophen 325 MG tablet  Commonly known as: TYLENOL   650 mg, Oral, Nightly      allopurinol 300 MG tablet  Commonly known as: ZYLOPRIM   300 mg, Oral, Daily      aspirin 81 MG tablet   81 mg, Oral, Daily      atorvastatin 80 MG tablet  Commonly known as: LIPITOR   TAKE 1 TABLET EVERY NIGHT      carvedilol 3.125 MG tablet  Commonly known as: COREG   TAKE 1 TABLET TWICE DAILY  WITH MEALS      Cholecalciferol 50 MCG (2000 UT) capsule   2,000 Units, Oral, Daily      famotidine 20 MG tablet  Commonly known as: PEPCID   20 mg, Oral, Daily      NIFEdipine XL 30 MG 24 hr tablet  Commonly known as: PROCARDIA XL   TAKE ONE TABLET BY MOUTH DAILY      nitroglycerin 0.4 MG SL tablet  Commonly known as: Nitrostat   0.4 mg, Oral, Every 5 Minutes PRN      omega-3 acid ethyl esters 1 g capsule  Commonly known as: LOVAZA   4 g, Oral, Daily      oxyCODONE-acetaminophen 5-325 MG per tablet  Commonly known as: PERCOCET   1-2 tablets, Oral, Every 4 Hours PRN      PRESERVISION AREDS 2 PO   Oral, 2 Times Daily      ramipril 10 MG capsule  Commonly known as: ALTACE   TAKE 1 CAPSULE  EVERY       EVENING      tamsulosin 0.4 MG capsule 24 hr capsule  Commonly known as: FLOMAX   0.4 mg, Oral, Daily                 **Dragon Disclaimer:   Much of this encounter note is an electronic transcription/translation of spoken language to printed text. The electronic translation of spoken language may permit erroneous, or at times, nonsensical words or phrases to be inadvertently transcribed. Although I have reviewed the note for such errors, some may still exist.

## 2022-05-04 RX ORDER — CARVEDILOL 3.12 MG/1
3.12 TABLET ORAL 2 TIMES DAILY WITH MEALS
Qty: 180 TABLET | Refills: 3 | Status: SHIPPED | OUTPATIENT
Start: 2022-05-04

## 2022-06-08 ENCOUNTER — TELEPHONE (OUTPATIENT)
Dept: CARDIOLOGY | Facility: CLINIC | Age: 82
End: 2022-06-08

## 2022-06-08 NOTE — TELEPHONE ENCOUNTER
Pt with DDDR pacer and rountine remote transmission received 6/6/22 reviewed 6/8/22.     Report had 1 NST-VT event on 4/25/22 at 23:16  lasting 18 beats with avg V rate 161 BPM. This is first NST-VT event recorded since implant 3/4/22.    Pt has upcoming [place] appt and 3 month device check 7/6/22.

## 2022-06-08 NOTE — TELEPHONE ENCOUNTER
Thank you for letting me know.  I will probably continue current medical therapy on him for now.      SOREN

## 2022-07-06 ENCOUNTER — OFFICE VISIT (OUTPATIENT)
Dept: CARDIOLOGY | Facility: CLINIC | Age: 82
End: 2022-07-06

## 2022-07-06 ENCOUNTER — CLINICAL SUPPORT NO REQUIREMENTS (OUTPATIENT)
Dept: CARDIOLOGY | Facility: CLINIC | Age: 82
End: 2022-07-06

## 2022-07-06 VITALS
HEART RATE: 61 BPM | WEIGHT: 183 LBS | SYSTOLIC BLOOD PRESSURE: 128 MMHG | DIASTOLIC BLOOD PRESSURE: 80 MMHG | OXYGEN SATURATION: 98 % | HEIGHT: 71 IN | BODY MASS INDEX: 25.62 KG/M2

## 2022-07-06 DIAGNOSIS — I10 PRIMARY HYPERTENSION: ICD-10-CM

## 2022-07-06 DIAGNOSIS — I45.10 RBBB (RIGHT BUNDLE BRANCH BLOCK): ICD-10-CM

## 2022-07-06 DIAGNOSIS — I25.709 CORONARY ARTERY DISEASE INVOLVING CORONARY BYPASS GRAFT OF NATIVE HEART WITH ANGINA PECTORIS: Primary | ICD-10-CM

## 2022-07-06 DIAGNOSIS — I49.5 SICK SINUS SYNDROME: Primary | ICD-10-CM

## 2022-07-06 DIAGNOSIS — Z95.0 HISTORY OF PERMANENT CARDIAC PACEMAKER PLACEMENT: ICD-10-CM

## 2022-07-06 DIAGNOSIS — I35.0 NONRHEUMATIC AORTIC VALVE STENOSIS: ICD-10-CM

## 2022-07-06 DIAGNOSIS — I49.3 PVC'S (PREMATURE VENTRICULAR CONTRACTIONS): ICD-10-CM

## 2022-07-06 DIAGNOSIS — I49.5 SSS (SICK SINUS SYNDROME): ICD-10-CM

## 2022-07-06 PROCEDURE — 93280 PM DEVICE PROGR EVAL DUAL: CPT | Performed by: INTERNAL MEDICINE

## 2022-07-06 PROCEDURE — 99214 OFFICE O/P EST MOD 30 MIN: CPT | Performed by: INTERNAL MEDICINE

## 2022-07-07 RX ORDER — NITROGLYCERIN 0.4 MG/1
0.4 TABLET SUBLINGUAL
Qty: 25 TABLET | Refills: 6 | Status: SHIPPED | OUTPATIENT
Start: 2022-07-07

## 2022-07-13 ENCOUNTER — HOSPITAL ENCOUNTER (OUTPATIENT)
Dept: CARDIOLOGY | Facility: HOSPITAL | Age: 82
Discharge: HOME | End: 2022-07-13
Admitting: INTERNAL MEDICINE

## 2022-07-13 VITALS — WEIGHT: 185.19 LBS | HEIGHT: 71 IN | BODY MASS INDEX: 25.93 KG/M2

## 2022-07-13 DIAGNOSIS — I25.709 CORONARY ARTERY DISEASE INVOLVING CORONARY BYPASS GRAFT OF NATIVE HEART WITH ANGINA PECTORIS: ICD-10-CM

## 2022-07-13 LAB
BH CV NUCLEAR PRIOR STUDY: 3
BH CV REST NUCLEAR ISOTOPE DOSE: 10.6 MCI
BH CV STRESS BP STAGE 1: NORMAL
BH CV STRESS COMMENTS STAGE 1: NORMAL
BH CV STRESS DOSE REGADENOSON STAGE 1: 0.4
BH CV STRESS DURATION MIN STAGE 1: 0
BH CV STRESS DURATION SEC STAGE 1: 10
BH CV STRESS HR STAGE 1: 73
BH CV STRESS NUCLEAR ISOTOPE DOSE: 34.9 MCI
BH CV STRESS PROTOCOL 1: NORMAL
BH CV STRESS RECOVERY BP: NORMAL MMHG
BH CV STRESS RECOVERY HR: 76 BPM
BH CV STRESS STAGE 1: 1
LV EF NUC BP: 63 %
MAXIMAL PREDICTED HEART RATE: 138 BPM
PERCENT MAX PREDICTED HR: 52.9 %
STRESS BASELINE BP: NORMAL MMHG
STRESS BASELINE HR: 63 BPM
STRESS PERCENT HR: 62 %
STRESS POST EXERCISE DUR SEC: 10 SEC
STRESS POST PEAK BP: NORMAL MMHG
STRESS POST PEAK HR: 73 BPM
STRESS TARGET HR: 117 BPM

## 2022-07-13 PROCEDURE — 93016 CV STRESS TEST SUPVJ ONLY: CPT | Performed by: INTERNAL MEDICINE

## 2022-07-13 PROCEDURE — 93018 CV STRESS TEST I&R ONLY: CPT | Performed by: INTERNAL MEDICINE

## 2022-07-13 PROCEDURE — 93017 CV STRESS TEST TRACING ONLY: CPT

## 2022-07-13 PROCEDURE — 0 TECHNETIUM TETROFOSMIN KIT: Performed by: INTERNAL MEDICINE

## 2022-07-13 PROCEDURE — 78452 HT MUSCLE IMAGE SPECT MULT: CPT

## 2022-07-13 PROCEDURE — 25010000002 REGADENOSON 0.4 MG/5ML SOLUTION: Performed by: INTERNAL MEDICINE

## 2022-07-13 PROCEDURE — A9502 TC99M TETROFOSMIN: HCPCS | Performed by: INTERNAL MEDICINE

## 2022-07-13 PROCEDURE — 78452 HT MUSCLE IMAGE SPECT MULT: CPT | Performed by: INTERNAL MEDICINE

## 2022-07-13 RX ADMIN — TETROFOSMIN 1 DOSE: 1.38 INJECTION, POWDER, LYOPHILIZED, FOR SOLUTION INTRAVENOUS at 14:03

## 2022-07-13 RX ADMIN — REGADENOSON 0.4 MG: 0.08 INJECTION, SOLUTION INTRAVENOUS at 14:03

## 2022-07-13 RX ADMIN — TETROFOSMIN 1 DOSE: 1.38 INJECTION, POWDER, LYOPHILIZED, FOR SOLUTION INTRAVENOUS at 13:27

## 2022-07-14 NOTE — PROGRESS NOTES
Reviewed results with Javier Grant and patient verbalized understanding of results.    Encouraged patient to call office with issues or concerns prior to next appointment and patient stated he will do this.    Thank you,  Catalina Arthur RN  Triage Nurse MARY

## 2022-07-17 NOTE — PROGRESS NOTES
Date of Office Visit: 2022  Encounter Provider: Андрей Carmona MD  Place of Service: [facility] CARDIOLOGY  Patient Name: Javier Grant  :1940    Chief complaint: Coronary artery disease, right bundle branch block, aortic stenosis, hypertension, PVC's, sick sinus syndrome, pacemaker placement.    History of Present Illness:    I had the pleasure of seeing the patient in cardiology office on 2022.  He is a very pleasant 82 year-old male with a history of coronary artery disease, right bundle branch block, aortic stenosis, and hypertension who presents for follow-up.  The patient has formerly seen Dr. Robin Kingston with [facility], but established care with me on 2020.    The patient has a history of coronary artery disease, and had an MI around .  At that time, he underwent a three-vessel CABG with an SVG to LAD, SVG to OM1, and SVG to OM2.  His anginal equivalent has always been chest pain, mainly in the center of his chest.  He also underwent a left heart catheterization on 10/9/2015 which showed occlusion of all native vessels proximally, although there was a 70% stenosis in the mid SVG to OM2 he underwent placement of a 4.0 x 15 mm resolute JORGE to this lesion at that time, as well as placement of a 3.0 x 12 mm Xience Alpine JORGE to the anastomotic site of one of the grafts to the OM branches (although which one is not clear from the cath report).  The LAD and saphenous vein graft to LAD collateralized all 3 other major branches distally (circumflex, RCA, and ramus).  He did also have fairly severe nosebleeds on Plavix in the past.    The patient also has a history of a right bundle branch block with PVCs.  He is also had a history of aortic stenosis and hypertension.  He did have an echocardiogram and Lexiscan nuclear stress test performed on 2020.  His nuclear stress test was normal.  His echocardiogram showed an ejection fraction of  60%, grade 1a diastolic dysfunction, mild to moderate aortic stenosis, and an aortic root mildly enlarged at 4.1 cm.    At the patient's follow-up visit on 2/24/2022, he was complaining of episodes of near syncope, including while seated.  A follow-up 24-hour Holter monitor showed significant intermittent sinus bradycardia with rates as low as 32.  There were also pauses of up to 4.2 cm.  The patient subsequently underwent placement of a [employer] dual-chamber permanent pacemaker on 3/4/2022 by Dr. Francois.    The patient presents today for follow-up.  He has not had any issues with near syncope since placement of the pacemaker.  His device was interrogated today, and is discussed below.  He has still had some pressure in his throat at times when going up steps and when walking to his mailbox.  This does not happen all the time, although it does resolve with rest.  He did tell me that his energy level has also been better since having the pacemaker placed.  He has had several falls recently, and he is seeing neurology within the next several months.      Past Medical History:   Diagnosis Date   • Arthritis    • Bleeds easily (HCC)     WAS TOLD THAT AFTER CABG   • CAD (coronary artery disease)    • Colon polyp    • Disorder of aorta (HCC)    • Dizziness    • SKELTON (dyspnea on exertion)    • Enlarged prostate    • Gastritis    • Gout    • Hiatal hernia    • History of MI (myocardial infarction)     1990   • HL (hearing loss)    • Hyperlipidemia    • Hypertension    • Macular degeneration     rt eye   • Nonrheumatic aortic (valve) insufficiency    • Nonrheumatic aortic (valve) stenosis    • RBBB (right bundle branch block)    • Sleep apnea     USES CPAP   • Umbilical hernia    • Vitamin D deficiency        Past Surgical History:   Procedure Laterality Date   • CARDIAC CATHETERIZATION Left 10/09/2015    Dr. Robin Kingston   • CARDIAC ELECTROPHYSIOLOGY PROCEDURE N/A 3/4/2022    Procedure: Pacemaker DC new  [place];   Surgeon: Serafin Francois MD;  Location: [facility] CATH INVASIVE LOCATION;  Service: Cardiology;  Laterality: N/A;   • CARDIAC SURGERY  1990    triple bypass   • CATARACT EXTRACTION Bilateral    • CORONARY ANGIOPLASTY WITH STENT PLACEMENT  2015   • CORONARY ARTERY BYPASS GRAFT  1990    3 VESSELS   • ENDOSCOPY N/A 5/26/2017    Procedure: ESOPHAGOGASTRODUODENOSCOPY WITH COLD BIOIPSIES AND BALLOON DILITATION SIZE 15, 16.5, 18;  Surgeon: Jerry SNOWDEN MD;  Location: [facility] ENDOSCOPY;  Service:    • MCCOY'S NEUROMA EXCISION Right    • TONSILLECTOMY     • UMBILICAL HERNIA REPAIR N/A 10/9/2017    Procedure: UMBILICAL HERNIA REPAIR;  Surgeon: Blake Kelly Jr., MD;  Location: [facility] MAIN OR;  Service:        Current Outpatient Medications on File Prior to Visit   Medication Sig Dispense Refill   • acetaminophen (TYLENOL) 325 MG tablet Take 650 mg by mouth Every Night.     • allopurinol (ZYLOPRIM) 300 MG tablet Take 1 tablet by mouth Daily. 90 tablet 3   • aspirin 81 MG tablet Take 81 mg by mouth Daily.     • atorvastatin (LIPITOR) 80 MG tablet TAKE 1 TABLET EVERY NIGHT 90 tablet 3   • carvedilol (COREG) 3.125 MG tablet Take 1 tablet by mouth 2 (Two) Times a Day With Meals. 180 tablet 3   • Cholecalciferol 2000 units capsule Take 2,000 Units by mouth Daily.     • famotidine (PEPCID) 20 MG tablet Take 20 mg by mouth Daily.     • Multiple Vitamins-Minerals (PRESERVISION AREDS 2 PO) Take  by mouth 2 (Two) Times a Day.     • NIFEdipine XL (PROCARDIA XL) 30 MG 24 hr tablet TAKE ONE TABLET BY MOUTH DAILY 90 tablet 3   • omega-3 acid ethyl esters (LOVAZA) 1 g capsule Take 4 capsules by mouth Daily. 360 capsule 3   • oxyCODONE-acetaminophen (PERCOCET) 5-325 MG per tablet Take 1-2 tablets by mouth Every 4 (Four) Hours As Needed (Pain). 10 tablet 0   • ramipril (ALTACE) 10 MG capsule TAKE 1 CAPSULE EVERY       EVENING 90 capsule 3   • tamsulosin (FLOMAX) 0.4 MG capsule 24 hr capsule Take 0.4 mg by mouth Daily.       No  "current facility-administered medications on file prior to visit.     Allergies as of 2022 - Reviewed 04/15/2022   Allergen Reaction Noted   • Ciprofloxacin Diarrhea 2016     Social History     Socioeconomic History   • Marital status:    Tobacco Use   • Smoking status: Former Smoker     Packs/day: 2.00     Years: 20.00     Pack years: 40.00     Types: Cigarettes     Quit date:      Years since quittin.5   • Smokeless tobacco: Never Used   Substance and Sexual Activity   • Alcohol use: No   • Drug use: No   • Sexual activity: Defer     Family History   Problem Relation Age of Onset   • Depression Mother    • Colon polyps Mother    • Heart attack Father    • Aneurysm Father    • Stomach cancer Brother    • Malig Hyperthermia Neg Hx        Review of Systems   Constitutional: Positive for malaise/fatigue.   Cardiovascular: Positive for leg swelling.   Musculoskeletal: Positive for myalgias.   Neurological: Positive for excessive daytime sleepiness.   All other systems reviewed and are negative.     Objective:     Vitals:    22 1254   BP: 128/80   Pulse: 61   SpO2: 98%   Weight: 83 kg (183 lb)   Height: 180.3 cm (70.98\")     Body mass index is 25.53 kg/m².    Constitutional:       Appearance: Healthy appearance. Well-developed.   Eyes:      Conjunctiva/sclera: Conjunctivae normal.   HENT:      Head: Normocephalic and atraumatic.   Pulmonary:      Effort: Pulmonary effort is normal.      Breath sounds: Normal breath sounds.   Cardiovascular:      Normal rate. Regular rhythm.      Murmurs: There is a grade 3/6 harsh systolic murmur at the URSB and ULSB.      No gallop.   Edema:     Peripheral edema absent.   Abdominal:      Palpations: Abdomen is soft.      Tenderness: There is no abdominal tenderness.   Musculoskeletal:      Cervical back: Neck supple. Skin:     General: Skin is warm.   Neurological:      Mental Status: Alert and oriented to person, place, and time.   Psychiatric:        "  Behavior: Behavior normal.       Lab Review:   Procedures    Cardiac Procedures:  1.  Status post three-vessel CABG in 1990 (SVG to LAD, SVG to OM1, and SVG to OM2).  2.  Left heart catheterization ordered 10/9/2015: All native coronary arteries were 100% occluded proximally.  The SVG to LAD had no disease, and collateralized the distal aspects of the ramus, left circumflex, and RCA.  The SVG to OM1 had 60% distal disease.  The SVG to OM2 had 70% mid disease.  The patient underwent placement of a 3.0 x 12 mm Xience Alpine JORGE to the distal limb of one of the vein graft branches to the OMs (which one is not clear from the cath report).  The patient also underwent placement of a 4.0 x 15 mm Resolute JORGE to the mid SVG to OM2.  3.  Echocardiogram on 7/17/2020: The ejection fraction was 60%.  There was mild LVH.  There was grade 1A diastolic dysfunction.  There was mild LVOT obstruction. There was mild to moderate left atrial enlargement.  The right ventricle was mildly enlarged.  There was mild to moderate aortic stenosis, although the mean gradient was only 12 mmHg and peak gradient was 22 mmHg.  The aortic root was mildly dilated at 4.1 cm.  4.  Lexiscan nuclear stress test on 7/17/2020: No evidence of ischemia.  5. Echocardiogram on 6/16/2021: Ejection fraction was 61%. There was mild LVH. There was mild aortic stenosis with a peak gradient of 23 mmHg and a mean gradient of 12 mmHg. There was mild tricuspid regurgitation. There was mild mitral regurgitation. The aortic root was mildly dilated at 4 cm.  6.  24-hour Holter monitor on 2/24/2022: There was significant intermittent sinus bradycardia with the lowest rate of 32 bpm.  There were several pauses of up to 4.2 seconds.  7.  Status post placement of a [employer] dual-chamber permanent pacemaker on 3/4/2022 by Dr. Francois.    Assessment:       Diagnosis Plan   1. Coronary artery disease involving coronary bypass graft of native heart with angina pectoris  ([facility])  Stress Test With Myocardial Perfusion One Day   2. RBBB (right bundle branch block)     3. Nonrheumatic aortic valve stenosis     4. Primary hypertension     5. PVC's (premature ventricular contractions)       Plan:       Again, since placement of the pacemaker in March 2022, he has not had significant near syncopal episodes.  He had pauses up to 4.2 seconds prior to this.  His device was interrogated today, and he still has 8.5 years of battery life.  He is pacing from the atrium approximately 61% of the time.  His accelerometer was also turned on today.    He is still having some episodes of pressure in his throat with exertion with going up steps and walking to his mailbox.  This is similar to his prior angina.  It does not happen all the time, and he has not had any unstable symptoms.  However, I am going to check a Lexiscan Myoview stress test at this point to ensure that he does not have any active ischemia with his coronary artery disease.  His aortic stenosis was mild in June 2021, and this can be watched in the future.  I do not feel this is causing any issues currently.    He will remain on aspirin for life.  He did tell me that he had substantial nosebleeds with Plavix in the past after his stents.  This would be important to remember in the future should he ever need any other intervention.  He will continue on the Lovaza and atorvastatin.  He will continue on the carvedilol, nifedipine, and ramipril.  He is asymptomatic from his PVCs.  I will have him follow-up in the office in 6 months.  Further plans will be made after the Lexiscan Myoview stress test.

## 2022-07-28 ENCOUNTER — APPOINTMENT (OUTPATIENT)
Dept: SLEEP MEDICINE | Facility: HOSPITAL | Age: 82
End: 2022-07-28

## 2022-08-09 RX ORDER — OMEGA-3-ACID ETHYL ESTERS 1 G/1
CAPSULE, LIQUID FILLED ORAL
Qty: 360 CAPSULE | Refills: 3 | Status: SHIPPED | OUTPATIENT
Start: 2022-08-09

## 2022-09-15 PROCEDURE — 93294 REM INTERROG EVL PM/LDLS PM: CPT | Performed by: INTERNAL MEDICINE

## 2022-09-15 PROCEDURE — 93296 REM INTERROG EVL PM/IDS: CPT | Performed by: INTERNAL MEDICINE

## 2022-10-06 ENCOUNTER — OFFICE VISIT (OUTPATIENT)
Dept: SLEEP MEDICINE | Facility: HOSPITAL | Age: 82
End: 2022-10-06

## 2022-10-06 VITALS — OXYGEN SATURATION: 96 % | WEIGHT: 180.4 LBS | HEIGHT: 71 IN | BODY MASS INDEX: 25.26 KG/M2 | HEART RATE: 68 BPM

## 2022-10-06 DIAGNOSIS — G47.33 OSA ON CPAP: Primary | ICD-10-CM

## 2022-10-06 DIAGNOSIS — Z99.89 OSA ON CPAP: Primary | ICD-10-CM

## 2022-10-06 PROCEDURE — 99213 OFFICE O/P EST LOW 20 MIN: CPT | Performed by: INTERNAL MEDICINE

## 2022-10-06 PROCEDURE — G0463 HOSPITAL OUTPT CLINIC VISIT: HCPCS

## 2022-10-06 NOTE — PROGRESS NOTES
"Follow Up [facility] Disorders [facility] Note     Chief Complaint:  CRISTY     Primary Care Physician: Sherron Gray MD    Interval History:   The patient is a 82 y.o. male  who I last saw 2021 and that note was reviewed.  The patient reports he is doing well and is having some difficulty with mask locking.  The patient goes to bed at 10 PM and awakens at 5:30 AM.  He awakens due to leg pain.    The patient has succeeded in weight loss from 195 2021 to his present weight 180 pounds.    The patient has had a pacemaker placed since I last saw him    Review of Systems:    A complete review of systems was done and all were negative with the exception of some postnasal drip and occasional cough    Social History:    Social History     Socioeconomic History   • Marital status:    Tobacco Use   • Smoking status: Former     Packs/day: 0.00     Years: 20.00     Pack years: 0.00     Types: Cigarettes     Start date: 1957     Quit date: 1980     Years since quittin.8   • Smokeless tobacco: Never   Substance and Sexual Activity   • Alcohol use: No   • Drug use: Never   • Sexual activity: Yes     Partners: Female     Birth control/protection: Post-menopausal, None       Allergies:  Ciprofloxacin     Medication Review: His list was reviewed.      Vital Signs:    Vitals:    10/06/22 1100   Pulse: 68   SpO2: 96%   Weight: 81.8 kg (180 lb 6.4 oz)   Height: 180.3 cm (70.98\")     Body mass index is 25.17 kg/m².    Physical Exam:    Constitutional:  Well developed 82 y.o. male that appears in no apparent distress.  Awake & oriented times 3.  Normal mood with normal recent and remote memory and normal judgement.  Eyes:  Conjunctivae normal.  Oropharynx: Previously, moist mucous membranes without exudate and a large tongue and uvula and narrow posterior pharyngeal opening, patient is wearing a facemask.    Self-administered [place] Sleepiness Scale test results: 5  0-5 Lower normal daytime sleepiness  6-10 Higher " normal daytime sleepiness  11-12 Mild, 13-15 Moderate, & 16-24 Severe excessive daytime sleepiness     Downloaded PAP Data Reviewed For Compliance:  DME is [employer] and he uses a fullface mask.  Downloads between 7/7 and 10/4/2022 compliance 100%.  Average usage is 5 hours and 26 minutes.  Average AHI is normal without significant leak.  Average auto CPAP pressure is 14.4 and his ResMed auto CPAP is 8-16    I have reviewed the above results and compared them with the patient's last downloads and reviewed with the patient.    Impression:   Obstructive sleep apnea adequately treated with ResMed auto CPAP. The patient appears to be at goal with good compliance and usage. The patient has no complaints of hypersomnolence.    Plan:  Good sleep hygiene measures should be maintained.      After evaluating the patient and assessing results available, the patient is benefiting from the treatment being provided.     The patient will continue ResMed auto CPAP.  After clinical evaluation and review of downloads, I recommend no changes to the patient's pressures.  A new prescription will be sent to the patient's DME.    However, due to significant weight loss, home sleep study will be reevaluated.    I answered all of the patient's questions.  The patient will call for any problems and will follow up as determined by repeat home sleep study       Kofi Jackson MD  Sleep Medicine  10/15/22  12:32 EDT

## 2022-11-11 ENCOUNTER — HOSPITAL ENCOUNTER (OUTPATIENT)
Dept: SLEEP MEDICINE | Facility: HOSPITAL | Age: 82
End: 2022-11-11

## 2022-11-30 RX ORDER — RAMIPRIL 10 MG/1
10 CAPSULE ORAL EVERY EVENING
Qty: 90 CAPSULE | Refills: 3 | Status: SHIPPED | OUTPATIENT
Start: 2022-11-30

## 2022-12-09 RX ORDER — ATORVASTATIN CALCIUM 80 MG/1
TABLET, FILM COATED ORAL
Qty: 90 TABLET | Refills: 3 | Status: SHIPPED | OUTPATIENT
Start: 2022-12-09

## 2022-12-15 PROCEDURE — 93294 REM INTERROG EVL PM/LDLS PM: CPT | Performed by: INTERNAL MEDICINE

## 2022-12-15 PROCEDURE — 93296 REM INTERROG EVL PM/IDS: CPT | Performed by: INTERNAL MEDICINE

## 2022-12-16 ENCOUNTER — TELEPHONE (OUTPATIENT)
Dept: SLEEP MEDICINE | Facility: HOSPITAL | Age: 82
End: 2022-12-16

## 2022-12-27 RX ORDER — NIFEDIPINE 30 MG/1
TABLET, EXTENDED RELEASE ORAL
Qty: 90 TABLET | Refills: 3 | Status: SHIPPED | OUTPATIENT
Start: 2022-12-27

## 2023-01-13 ENCOUNTER — HOSPITAL ENCOUNTER (OUTPATIENT)
Dept: SLEEP MEDICINE | Facility: HOSPITAL | Age: 83
Discharge: HOME | End: 2023-01-13
Admitting: INTERNAL MEDICINE
Payer: MEDICARE

## 2023-01-13 PROCEDURE — 95806 SLEEP STUDY UNATT&RESP EFFT: CPT

## 2023-01-14 PROCEDURE — 95806 SLEEP STUDY UNATT&RESP EFFT: CPT | Performed by: INTERNAL MEDICINE

## 2023-01-25 ENCOUNTER — TELEPHONE (OUTPATIENT)
Dept: SLEEP MEDICINE | Facility: HOSPITAL | Age: 83
End: 2023-01-25
Payer: MEDICARE

## 2023-01-25 NOTE — TELEPHONE ENCOUNTER
Spoke with patient about sleep study results and recommendations , continue CPAP for positional sleep apnea and hypoxia, can consider OMAD if he does not have dentures , avoid supine sleep . Pt will call back when he decides how he would like to proceed

## 2023-02-08 ENCOUNTER — OFFICE VISIT (OUTPATIENT)
Dept: CARDIOLOGY | Facility: CLINIC | Age: 83
End: 2023-02-08
Payer: MEDICARE

## 2023-02-08 ENCOUNTER — CLINICAL SUPPORT NO REQUIREMENTS (OUTPATIENT)
Dept: CARDIOLOGY | Facility: CLINIC | Age: 83
End: 2023-02-08
Payer: MEDICARE

## 2023-02-08 VITALS
SYSTOLIC BLOOD PRESSURE: 120 MMHG | DIASTOLIC BLOOD PRESSURE: 80 MMHG | HEIGHT: 71 IN | BODY MASS INDEX: 24.98 KG/M2 | HEART RATE: 65 BPM | WEIGHT: 178.4 LBS

## 2023-02-08 DIAGNOSIS — I49.5 SSS (SICK SINUS SYNDROME): Primary | ICD-10-CM

## 2023-02-08 DIAGNOSIS — I35.0 NONRHEUMATIC AORTIC VALVE STENOSIS: ICD-10-CM

## 2023-02-08 DIAGNOSIS — I10 PRIMARY HYPERTENSION: ICD-10-CM

## 2023-02-08 DIAGNOSIS — I25.709 CORONARY ARTERY DISEASE INVOLVING CORONARY BYPASS GRAFT OF NATIVE HEART WITH ANGINA PECTORIS: Primary | ICD-10-CM

## 2023-02-08 DIAGNOSIS — I45.10 RBBB (RIGHT BUNDLE BRANCH BLOCK): ICD-10-CM

## 2023-02-08 PROCEDURE — 99214 OFFICE O/P EST MOD 30 MIN: CPT | Performed by: INTERNAL MEDICINE

## 2023-02-08 PROCEDURE — 93280 PM DEVICE PROGR EVAL DUAL: CPT | Performed by: INTERNAL MEDICINE

## 2023-02-08 PROCEDURE — 93000 ELECTROCARDIOGRAM COMPLETE: CPT | Performed by: INTERNAL MEDICINE

## 2023-02-08 NOTE — PROGRESS NOTES
Date of Office Visit: 23  Encounter Provider: Evelio Mcneal MD  Place of Service: [facility] CARDIOLOGY  Patient Name: Javier Grant  :1940    Chief complaint: Coronary artery disease, right bundle branch block, aortic stenosis, hypertension, PVC's, sick sinus syndrome, pacemaker placement.    History of Present Illness:  82 year-old male with a history of coronary artery disease, right bundle branch block, aortic valve stenosis, and hypertension who presents for follow-up.The patient has a history of coronary artery disease, and had an MI around .  At that time, he underwent a three-vessel CABG with an SVG to LAD, SVG to OM1, and SVG to OM2.  His anginal equivalent has always been chest pain, mainly in the center of his chest.  He also underwent a left heart catheterization on 10/9/2015 which showed occlusion of all native vessels proximally, although there was a 70% stenosis in the mid SVG to OM2 he underwent placement of a 4.0 x 15 mm resolute JORGE to this lesion at that time, as well as placement of a 3.0 x 12 mm Xience Alpine JORGE to the anastomotic site of one of the grafts to the OM branches (although which one is not clear from the cath report).  The LAD and saphenous vein graft to LAD collateralized all 3 other major branches distally (circumflex, RCA, and ramus).  He did also have fairly severe nosebleeds on Plavix in the past.    At the patient's follow-up visit on 2022, he was complaining of episodes of near syncope, including while seated.  A follow-up 24-hour Holter monitor showed significant intermittent sinus bradycardia with rates as low as 32.  There were also pauses of up to 4.2 cm.  The patient subsequently underwent placement of a [employer] dual-chamber permanent pacemaker on 3/4/2022 by Dr. Francois.    Since his last visit has been doing very well.  He denies any chest pain or dyspnea on exertion.  He is tolerating his current  medical regimen without difficulties.  Denies any orthopnea or PND.  He did undergo a prior stress test in July 2022 secondary to discomfort that was normal      Past Medical History:   Diagnosis Date   • Abnormal ECG 1980   • Arthritis    • Bleeds easily (HCC)     WAS TOLD THAT AFTER CABG   • CAD (coronary artery disease)    • Clotting disorder (HCC) 1990    Noted bu surgeon when I had the heart bypasses   • Colon polyp    • Disorder of aorta (HCC)    • Dizziness    • SKELTON (dyspnea on exertion)    • Enlarged prostate    • Gastritis    • Gout    • Heart murmur ?   • Hiatal hernia    • History of MI (myocardial infarction)     1990   • HL (hearing loss)    • Hyperlipidemia    • Hypertension    • Macular degeneration     rt eye   • Mitral valve prolapse 1990   • Myocardial infarction (HCC) 1990   • Nonrheumatic aortic (valve) insufficiency    • Nonrheumatic aortic (valve) stenosis    • RBBB (right bundle branch block)    • Sleep apnea     USES CPAP   • Umbilical hernia    • Vitamin D deficiency        Past Surgical History:   Procedure Laterality Date   • CARDIAC CATHETERIZATION Left 10/09/2015    Dr. Robin Kingston   • CARDIAC ELECTROPHYSIOLOGY PROCEDURE N/A 03/04/2022    Procedure: Pacemaker DC new  [place];  Surgeon: Serafin Francois MD;  Location: [facility] CATH INVASIVE LOCATION;  Service: Cardiology;  Laterality: N/A;   • CARDIAC SURGERY  1990    triple bypass   • CATARACT EXTRACTION Bilateral    • CORONARY ANGIOPLASTY WITH STENT PLACEMENT  2015   • CORONARY ARTERY BYPASS GRAFT  1990    3 VESSELS   • ENDOSCOPY N/A 05/26/2017    Procedure: ESOPHAGOGASTRODUODENOSCOPY WITH COLD BIOIPSIES AND BALLOON DILITATION SIZE 15, 16.5, 18;  Surgeon: Jerry SNOWDEN MD;  Location: [facility] ENDOSCOPY;  Service:    • INSERT / REPLACE / REMOVE PACEMAKER  03/05/2022   • MCCOY'S NEUROMA EXCISION Right    • TONSILLECTOMY     • UMBILICAL HERNIA REPAIR N/A 10/09/2017    Procedure: UMBILICAL HERNIA REPAIR;  Surgeon: Blake Kelly  MD As[place];  [facility] OR;  Service:        Current Outpatient Medications on File Prior to Visit   Medication Sig Dispense Refill   • acetaminophen (TYLENOL) 325 MG tablet Take 650 mg by mouth Every Night.     • allopurinol (ZYLOPRIM) 300 MG tablet Take 1 tablet by mouth Daily. 90 tablet 3   • aspirin 81 MG tablet Take 81 mg by mouth Daily.     • atorvastatin (LIPITOR) 80 MG tablet TAKE 1 TABLET EVERY NIGHT 90 tablet 3   • carvedilol (COREG) 3.125 MG tablet Take 1 tablet by mouth 2 (Two) Times a Day With Meals. 180 tablet 3   • Cholecalciferol 2000 units capsule Take 2,000 Units by mouth Daily.     • famotidine (PEPCID) 20 MG tablet Take 20 mg by mouth Daily.     • Multiple Vitamins-Minerals (PRESERVISION AREDS 2 PO) Take  by mouth 2 (Two) Times a Day.     • NIFEdipine XL (PROCARDIA XL) 30 MG 24 hr tablet TAKE ONE TABLET BY MOUTH DAILY 90 tablet 3   • nitroglycerin (Nitrostat) 0.4 MG SL tablet Take 1 tablet by mouth Every 5 (Five) Minutes As Needed (CHEST PAIN: MAX DOSE 3 TABLETS). 25 tablet 6   • omega-3 acid ethyl esters (LOVAZA) 1 g capsule TAKE 4 CAPSULES DAILY (TO  HOLD MEDICATION PRIOR TO   OPERATING ROOM) 360 capsule 3   • ramipril (ALTACE) 10 MG capsule Take 1 capsule by mouth Every Evening. 90 capsule 3     No current facility-administered medications on file prior to visit.     Allergies as of 2023 - Reviewed 2023   Allergen Reaction Noted   • Ciprofloxacin Diarrhea 2016     Social History     Socioeconomic History   • Marital status:    Tobacco Use   • Smoking status: Former     Packs/day: 0.00     Years: 15.00     Pack years: 0.00     Types: Cigarettes     Start date: 1957     Quit date: 1980     Years since quittin.1   • Smokeless tobacco: Never   Substance and Sexual Activity   • Alcohol use: No   • Drug use: Never   • Sexual activity: Not Currently     Partners: Female     Birth control/protection: Post-menopausal, None     Family History   Problem  "Relation Age of Onset   • Depression Mother    • Colon polyps Mother    • Heart attack Father    • Aneurysm Father    • Stomach cancer Brother    • Malig Hyperthermia Neg Hx        Review of Systems   Constitutional: Positive for malaise/fatigue. Negative for fever.   HENT: Negative for nosebleeds and sore throat.    Eyes: Negative for blurred vision and double vision.   Cardiovascular: Positive for leg swelling. Negative for chest pain, claudication, palpitations and syncope.   Respiratory: Negative for cough, shortness of breath and snoring.    Endocrine: Negative for cold intolerance, heat intolerance and polydipsia.   Skin: Negative for itching, poor wound healing and rash.   Musculoskeletal: Positive for myalgias. Negative for joint pain, joint swelling and muscle weakness.   Gastrointestinal: Negative for abdominal pain, melena, nausea and vomiting.   Neurological: Positive for excessive daytime sleepiness. Negative for light-headedness, loss of balance, seizures, vertigo and weakness.   Psychiatric/Behavioral: Negative for altered mental status and depression.   All other systems reviewed and are negative.     Objective:     Vitals:    02/08/23 0924   BP: 120/80   BP Location: Left arm   Patient Position: Sitting   Cuff Size: Adult   Pulse: 65   Weight: 80.9 kg (178 lb 6.4 oz)   Height: 180.3 cm (71\")     Body mass index is 24.88 kg/m².    Constitutional:       Appearance: Healthy appearance. Well-developed.   Eyes:      General: No scleral icterus.     Conjunctiva/sclera: Conjunctivae normal.   HENT:      Head: Normocephalic and atraumatic.   Neck:      Thyroid: No thyromegaly.      Vascular: Normal carotid pulses. No carotid bruit, hepatojugular reflux or JVD.      Trachea: No tracheal deviation.   Pulmonary:      Effort: Pulmonary effort is normal. No respiratory distress.      Breath sounds: Normal breath sounds. No decreased breath sounds. No wheezing. No rhonchi. No rales.   Chest:      Chest wall: Not " tender to palpatation.   Cardiovascular:      Normal rate. Regular rhythm.      Murmurs: There is a grade 3/6 harsh systolic murmur at the URSB and ULSB.      No gallop.   Pulses:     Carotid: 2+ bilaterally.     Radial: 2+ bilaterally.     Femoral: 2+ bilaterally.     Dorsalis pedis: 2+ bilaterally.     Posterior tibial: 2+ bilaterally.  Edema:     Peripheral edema absent.   Abdominal:      General: Bowel sounds are normal. There is no distension.      Palpations: Abdomen is soft.      Tenderness: There is no abdominal tenderness.   Musculoskeletal:         General: No deformity.      Cervical back: Normal range of motion and neck supple. Skin:     General: Skin is warm.      Findings: No erythema or rash.      Comments: Sternotomy   Neurological:      Mental Status: Alert and oriented to person, place, and time.      Sensory: No sensory deficit.   Psychiatric:         Behavior: Behavior normal.       Lab Review:         ECG 12 Lead    Date/Time: 2/8/2023 9:45 AM  Performed by: Evelio Mcneal MD  Authorized by: Evelio Mcneal MD   Comparison: compared with previous ECG from 1/20/2023  Similar to previous ECG  Rhythm: paced              Cardiac Procedures:  1.  Status post three-vessel CABG in 1990 (SVG to LAD, SVG to OM1, and SVG to OM2).  2.  Left heart catheterization ordered 10/9/2015: All native coronary arteries were 100% occluded proximally.  The SVG to LAD had no disease, and collateralized the distal aspects of the ramus, left circumflex, and RCA.  The SVG to OM1 had 60% distal disease.  The SVG to OM2 had 70% mid disease.  The patient underwent placement of a 3.0 x 12 mm Xience Alpine JORGE to the distal limb of one of the vein graft branches to the OMs (which one is not clear from the cath report).  The patient also underwent placement of a 4.0 x 15 mm Resolute JORGE to the mid SVG to OM2.  3. Echocardiogram on 6/16/2021: Ejection fraction was 61%. There was mild LVH. There was mild aortic  stenosis with a peak gradient of 23 mmHg and a mean gradient of 12 mmHg. There was mild tricuspid regurgitation. There was mild mitral regurgitation. The aortic root was mildly dilated at 4 cm.  4.  Status post placement of a [employer] dual-chamber permanent pacemaker on 3/4/2022 by Dr. Francois.  5.  Lexiscan: 7/13/22:Myocardial perfusion imaging indicates a normal myocardial perfusion study with no evidence of ischemia.      Assessment:      Plan:   Very pleasant 82-year-old male with medical history of coronary disease, prior CABG with an SVG to LAD, SVG to OM1, and SVG to OM 2, PCI of the SVG to OM 2, preserved ejection fraction, permanent pacemaker, hypertension and hyperlipidemia along with mild aortic valve stenosis who presents to me as a transfer of care.  He is doing very well.  He is tolerating his current medical regimen without difficulties.  Blood pressure and heart rate are well controlled.    1.  Coronary disease with prior CABG and PCI  - Continue aspirin 81 mg p.o. daily.  Patient has had significant issues with epistaxis in the setting of Plavix therapy and is no longer on that.  - Continue atorvastatin 80 mg p.o. nightly.  Secondary to cost he would like to come off of the Lovaza therapy and just be on over-the-counter fish oil which I think is reasonable and looking at his lab work.  - Continue medical therapy low-dose.     2.  Hyperlipidemia: Continue atorvastatin 80 mg p.o. nightly as above.  Moved to high-dose fish oil therapy.  He does not want to use Lovaza secondary to cost.    3.  Essential hypertension: Well-controlled.  Continue nifedipine along with carvedilol therapy and ramipril.  No changes.

## 2023-03-02 ENCOUNTER — HOSPITAL ENCOUNTER (OUTPATIENT)
Dept: CARDIOLOGY | Facility: HOSPITAL | Age: 83
Discharge: HOME | End: 2023-03-02
Admitting: INTERNAL MEDICINE
Payer: MEDICARE

## 2023-03-02 VITALS
DIASTOLIC BLOOD PRESSURE: 56 MMHG | BODY MASS INDEX: 24.92 KG/M2 | HEART RATE: 63 BPM | HEIGHT: 71 IN | SYSTOLIC BLOOD PRESSURE: 93 MMHG | WEIGHT: 178 LBS

## 2023-03-02 DIAGNOSIS — I35.0 NONRHEUMATIC AORTIC VALVE STENOSIS: ICD-10-CM

## 2023-03-02 PROCEDURE — 93306 TTE W/DOPPLER COMPLETE: CPT | Performed by: INTERNAL MEDICINE

## 2023-03-02 PROCEDURE — 93306 TTE W/DOPPLER COMPLETE: CPT

## 2023-03-03 LAB
AORTIC ARCH: 3.2 CM
AORTIC DIMENSIONLESS INDEX: 0.3 (DI)
ASCENDING AORTA: 3.4 CM
BH CV ECHO MEAS - AO MAX PG: 28 MMHG
BH CV ECHO MEAS - AO MEAN PG: 17.5 MMHG
BH CV ECHO MEAS - AO V2 MAX: 264.7 CM/SEC
BH CV ECHO MEAS - AO V2 VTI: 65 CM
BH CV ECHO MEAS - AVA(I,D): 1.38 CM2
BH CV ECHO MEAS - EDV(CUBED): 126 ML
BH CV ECHO MEAS - EDV(MOD-SP2): 127 ML
BH CV ECHO MEAS - EDV(MOD-SP4): 140 ML
BH CV ECHO MEAS - EF(MOD-BP): 60.1 %
BH CV ECHO MEAS - EF(MOD-SP2): 59.1 %
BH CV ECHO MEAS - EF(MOD-SP4): 62.9 %
BH CV ECHO MEAS - ESV(CUBED): 81.3 ML
BH CV ECHO MEAS - ESV(MOD-SP2): 52 ML
BH CV ECHO MEAS - ESV(MOD-SP4): 52 ML
BH CV ECHO MEAS - FS: 13.6 %
BH CV ECHO MEAS - IVS/LVPW: 0.9 CM
BH CV ECHO MEAS - IVSD: 1.15 CM
BH CV ECHO MEAS - LAT PEAK E' VEL: 8.4 CM/SEC
BH CV ECHO MEAS - LV DIASTOLIC VOL/BSA (35-75): 69.8 CM2
BH CV ECHO MEAS - LV MASS(C)D: 239.4 GRAMS
BH CV ECHO MEAS - LV MAX PG: 3.8 MMHG
BH CV ECHO MEAS - LV MEAN PG: 2.09 MMHG
BH CV ECHO MEAS - LV SYSTOLIC VOL/BSA (12-30): 25.9 CM2
BH CV ECHO MEAS - LV V1 MAX: 97.7 CM/SEC
BH CV ECHO MEAS - LV V1 VTI: 19.8 CM
BH CV ECHO MEAS - LVIDD: 5 CM
BH CV ECHO MEAS - LVIDS: 4.3 CM
BH CV ECHO MEAS - LVOT AREA: 4.5 CM2
BH CV ECHO MEAS - LVOT DIAM: 2.4 CM
BH CV ECHO MEAS - LVPWD: 1.28 CM
BH CV ECHO MEAS - MED PEAK E' VEL: 7.6 CM/SEC
BH CV ECHO MEAS - MR MAX PG: 19.1 MMHG
BH CV ECHO MEAS - MR MAX VEL: 218.5 CM/SEC
BH CV ECHO MEAS - MV A DUR: 0.21 SEC
BH CV ECHO MEAS - MV A MAX VEL: 87.4 CM/SEC
BH CV ECHO MEAS - MV DEC SLOPE: 293.5 CM/SEC2
BH CV ECHO MEAS - MV DEC TIME: 0.33 MSEC
BH CV ECHO MEAS - MV E MAX VEL: 60 CM/SEC
BH CV ECHO MEAS - MV E/A: 0.69
BH CV ECHO MEAS - MV MAX PG: 5 MMHG
BH CV ECHO MEAS - MV MEAN PG: 1.92 MMHG
BH CV ECHO MEAS - MV P1/2T: 78.6 MSEC
BH CV ECHO MEAS - MV V2 VTI: 27.1 CM
BH CV ECHO MEAS - MVA(P1/2T): 2.8 CM2
BH CV ECHO MEAS - MVA(VTI): 3.3 CM2
BH CV ECHO MEAS - PA V2 MAX: 158.8 CM/SEC
BH CV ECHO MEAS - QP/QS: 1.25
BH CV ECHO MEAS - RV MAX PG: 0.87 MMHG
BH CV ECHO MEAS - RV V1 MAX: 46.7 CM/SEC
BH CV ECHO MEAS - RV V1 VTI: 11.1 CM
BH CV ECHO MEAS - RVOT DIAM: 3.6 CM
BH CV ECHO MEAS - SI(MOD-SP2): 37.4 ML/M2
BH CV ECHO MEAS - SI(MOD-SP4): 43.8 ML/M2
BH CV ECHO MEAS - SV(LVOT): 89.6 ML
BH CV ECHO MEAS - SV(MOD-SP2): 75 ML
BH CV ECHO MEAS - SV(MOD-SP4): 88 ML
BH CV ECHO MEAS - SV(RVOT): 111.9 ML
BH CV ECHO MEAS - TAPSE (>1.6): 1.42 CM
BH CV ECHO MEASUREMENTS AVERAGE E/E' RATIO: 7.5
BH CV XLRA - RV BASE: 2.32 CM
BH CV XLRA - RV LENGTH: 8.2 CM
BH CV XLRA - RV MID: 2.6 CM
BH CV XLRA - TDI S': 6.5 CM/SEC
LEFT ATRIUM VOLUME INDEX: 28.4 ML/M2
MAXIMAL PREDICTED HEART RATE: 138 BPM
SINUS: 4 CM
STJ: 3 CM
STRESS TARGET HR: 117 BPM

## 2023-03-09 ENCOUNTER — TELEPHONE (OUTPATIENT)
Dept: CARDIOLOGY | Facility: CLINIC | Age: 83
End: 2023-03-09
Payer: MEDICARE

## 2023-03-09 NOTE — PROGRESS NOTES
Evelio Mcneal MD   3/8/2023  1:52 PM EST    Mild aortic valve stenosis.  We will plan on repeat echo in 2 to 3 years.  Please let him know.  ___________________________________________________________________    See Telephone Message.    Khadijah

## 2023-03-09 NOTE — TELEPHONE ENCOUNTER
Called pt and advised him of his echo results.    I also set him up for a 6 month follow up on 9/27/23 at 10:00am.    Khadijah

## 2023-03-09 NOTE — TELEPHONE ENCOUNTER
----- Message from Evelio Mcneal MD sent at 3/8/2023  1:52 PM EST -----  Mild aortic valve stenosis.  We will plan on repeat echo in 2 to 3 years.  Please let him know.

## 2023-03-16 PROCEDURE — 93294 REM INTERROG EVL PM/LDLS PM: CPT | Performed by: INTERNAL MEDICINE

## 2023-03-16 PROCEDURE — 93296 REM INTERROG EVL PM/IDS: CPT | Performed by: INTERNAL MEDICINE

## 2023-04-10 RX ORDER — CARVEDILOL 3.12 MG/1
TABLET ORAL
Qty: 180 TABLET | Refills: 3 | Status: SHIPPED | OUTPATIENT
Start: 2023-04-10

## 2023-05-12 ENCOUNTER — TELEPHONE (OUTPATIENT)
Dept: SLEEP MEDICINE | Facility: HOSPITAL | Age: 83
End: 2023-05-12
Payer: MEDICARE

## 2023-09-12 NOTE — OP NOTE
Neg strep culture- no change in treatment  Surgeon: Blake Kelly Jr., M.D.    Assistant: None    Pre-Operative Diagnosis: Umbilical hernia    Post-Operative Diagnosis: Umbilical hernia    Procedure Performed: Umbilical hernia repair with mesh    Indications:     The patient is a very pleasant 77-year-old white male with an umbilical hernia that is getting larger and increasingly symptomatic.  He presents for umbilical hernia repair with mesh.  The patient understands the indications, alternatives, risks, and benefits of the procedure and wishes to proceed.    Procedure:     The patient was identified and taken to the operating room where he was placed in the supine position on the operating table.  Monitors were placed and he underwent general endotracheal anesthesia and was appropriately monitored by the anesthesia personnel.  The abdomen and the angela-umbilical region was prepped and draped in the standard surgical fashion.  A periumbilical incision was made and carried through the skin into the subcutaneous tissue.  The subcutaneous tissue was divided using Bovie electrocautery down to the level of the fascia and the hernia was surrounded.  The umbilicus was then taken off the hernia sac using Bovie electrocautery.  The hernia sac was freed from all subcutaneous attachments all the way down to the fascial defect.  The hernia was freed at the fascial edge using Bovie electrocautery and completely reduced.  The hernia defect was about 2 cm in size.  The preperitoneal space was dissected to permit the mesh.  A piece of Ventralex ST mesh, size small, was selected and placed in a subfascial position in the preperitoneal space.  The fascia was then closed with 0 PDS sutures in an interrupted fashion grasping the legs of the mesh within the closure to secure the mesh in the proper position.  The entire area was then infiltrated with 0.5% Marcaine with epinephrine.  The umbilicus was tacked down to the fascia using 3-0 Vicryls suture in an interrupted fashion.   The subcutaneous tissue was then closed with 3-0 Vicryls suture in a running fashion.  The skin was then closed with a 4-0 Monocryl in a subcuticular fashion followed by benzoin and Steri-Strips.  A sterile dressing was applied.  The sponge, needle, and instrument counts were correct at the end the case.  The patient tolerated procedure well and was transferred to the recovery room in stable condition.

## 2023-10-02 RX ORDER — OMEGA-3-ACID ETHYL ESTERS 1 G/1
CAPSULE, LIQUID FILLED ORAL
Qty: 360 CAPSULE | Refills: 3 | Status: SHIPPED | OUTPATIENT
Start: 2023-10-02

## 2023-10-17 ENCOUNTER — OFFICE VISIT (OUTPATIENT)
Dept: CARDIOLOGY | Facility: CLINIC | Age: 83
End: 2023-10-17
Payer: MEDICARE

## 2023-10-17 ENCOUNTER — CLINICAL SUPPORT NO REQUIREMENTS (OUTPATIENT)
Age: 83
End: 2023-10-17
Payer: MEDICARE

## 2023-10-17 VITALS
SYSTOLIC BLOOD PRESSURE: 104 MMHG | HEART RATE: 65 BPM | WEIGHT: 163.2 LBS | HEIGHT: 71 IN | DIASTOLIC BLOOD PRESSURE: 58 MMHG | BODY MASS INDEX: 22.85 KG/M2

## 2023-10-17 DIAGNOSIS — I49.5 SICK SINUS SYNDROME: Primary | ICD-10-CM

## 2023-10-17 DIAGNOSIS — I45.10 RIGHT BUNDLE BRANCH BLOCK: ICD-10-CM

## 2023-10-17 DIAGNOSIS — Z95.0 PRESENCE OF CARDIAC PACEMAKER: ICD-10-CM

## 2023-10-17 DIAGNOSIS — I10 PRIMARY HYPERTENSION: Primary | ICD-10-CM

## 2023-10-17 DIAGNOSIS — Z95.5 S/P CORONARY ARTERY STENT PLACEMENT: ICD-10-CM

## 2023-10-17 DIAGNOSIS — Z95.1 HX OF CABG: ICD-10-CM

## 2023-10-17 DIAGNOSIS — I25.10 CORONARY ARTERY DISEASE INVOLVING NATIVE CORONARY ARTERY OF NATIVE HEART WITHOUT ANGINA PECTORIS: ICD-10-CM

## 2023-10-17 DIAGNOSIS — I35.0 NONRHEUMATIC AORTIC VALVE STENOSIS: ICD-10-CM

## 2023-10-17 DIAGNOSIS — I49.5 SICK SINUS SYNDROME: ICD-10-CM

## 2023-10-17 PROCEDURE — 99214 OFFICE O/P EST MOD 30 MIN: CPT | Performed by: NURSE PRACTITIONER

## 2023-10-17 PROCEDURE — 93000 ELECTROCARDIOGRAM COMPLETE: CPT | Performed by: NURSE PRACTITIONER

## 2023-10-17 PROCEDURE — 3074F SYST BP LT 130 MM HG: CPT | Performed by: NURSE PRACTITIONER

## 2023-10-17 PROCEDURE — 3078F DIAST BP <80 MM HG: CPT | Performed by: NURSE PRACTITIONER

## 2023-10-17 RX ORDER — LORATADINE 10 MG/1
TABLET ORAL
COMMUNITY
Start: 2022-11-08

## 2023-10-17 RX ORDER — SULFAMETHOXAZOLE AND TRIMETHOPRIM 800; 160 MG/1; MG/1
TABLET ORAL
COMMUNITY
Start: 2023-09-14

## 2023-10-17 RX ORDER — TAMSULOSIN HYDROCHLORIDE 0.4 MG/1
0.4 CAPSULE ORAL
COMMUNITY
Start: 2022-11-08

## 2023-10-17 NOTE — PROGRESS NOTES
Date of Office Visit: 10/17/2023  Encounter Provider: COLLIN Villela  Place of Service: [facility] CARDIOLOGY  Patient Name: Javier Grant  :1940    No chief complaint on file.  : follow up    HPI: Javier Grant is a 83 y.o. male who is a patient of  Dr. Mcneal.  He is new to me today and presents for 6-month office follow-up.  He has a history of coronary artery disease, aortic valve stenosis, hypertension, sick sinus syndrome with presence of pacemaker, PVCs and right bundle branch block.  In , he underwent CABG x3 (SVG to LAD, SVG to OM1 and SVG to OM 2.  His anginal equivalent is always been chest pain, mainly in the center of his chest.  In , he underwent left heart cath which showed occlusion of all native vessels proximally, although there was 70% stenosis in mid SVG to OM 2.  He underwent placement of 4.0 x 15 mm Resolute JORGE to this lesion, as well as, placement of 3.0 x 12 mm Xience Alpine JORGE to anastomotic site of one of the grafts to the OM branches.  The LAD and saphenous vein graft to LAD collateralized all 3 major branches distally (circumflex, RCA and ramus).  Patient has history of fairly severe nosebleeds on Plavix.    He wore a 24-hour Holter monitor in  secondary to episodes of near syncope.  He was found to have significant intermittent bradycardia along with significant pauses.  He underwent placement of [employer] dual-chamber permanent pacemaker on 3/4/2022 by Dr. Francois.    Patient had chest discomfort in 2022.  He underwent stress test which showed no ischemia.    Today patient presents with significant unintentional weight loss.  In 2022, patient was at 180 pounds, today he is 163.  He has seen his PCP, had numerous scans.   Patient notes that his dentures have been causing him problems therefore he is unable to eat much.  It appears the weight loss is likely due to lack of caloric intake.  Patient does have  some fatigue, however, he remains quite active.  He lives in a tri-level house and is able to go up and down the stairs without any problems.  His blood pressure is controlled, slightly lower than his normal.  It has been running in the low 100s systolically for the last few months.  Patient notes that a couple of times he has had some fullness in his neck area.  He attributes this to times when he is on a stressful phone call or he has increase in anxiety.  Resolves on it's own very shortly thereafter.  A couple of times, he has been laying in bed and will notice some discomfort in his upper chest/neck area.  He will get up and go sit down which helps relieve the symptoms.  He denies any shortness of breath or lightheadedness.  No nausea or diaphoresis.  Last device download 09/04/23 shows no events, normal functioning device DDDR and 7.5 yrs of battery life left.   Today's EKG showed that patient is in sinus rhythm with some PVCs and right bundle branch block.    Previous testing and notes have been reviewed by  me.   Past Medical History:   Diagnosis Date    Abnormal ECG 1980    Arthritis     Bleeds easily     WAS TOLD THAT AFTER CABG    CAD (coronary artery disease)     Clotting disorder 1990    Noted bu surgeon when I had the heart bypasses    Colon polyp     Disorder of aorta     Dizziness     SKELTON (dyspnea on exertion)     Enlarged prostate     Gastritis     Gout     Heart murmur ?    Hiatal hernia     History of MI (myocardial infarction)     1990    HL (hearing loss)     Hyperlipidemia     Hypertension     Macular degeneration     rt eye    Mitral valve prolapse 1990    Myocardial infarction 1990    Nonrheumatic aortic (valve) insufficiency     Nonrheumatic aortic (valve) stenosis     RBBB (right bundle branch block)     Sleep apnea     USES CPAP    Umbilical hernia     Vitamin D deficiency        Past Surgical History:   Procedure Laterality Date    CARDIAC CATHETERIZATION Left 10/09/2015    Dr. Kelly  Vashti    CARDIAC ELECTROPHYSIOLOGY PROCEDURE N/A 2022    Procedure: Pacemaker DC new  [place];  Surgeon: Serafin Francois MD;  Location: [facility] CATH INVASIVE LOCATION;  Service: Cardiology;  Laterality: N/A;    CARDIAC SURGERY      triple bypass    CATARACT EXTRACTION Bilateral     CORONARY ANGIOPLASTY WITH STENT PLACEMENT      CORONARY ARTERY BYPASS GRAFT      3 VESSELS    ENDOSCOPY N/A 2017    Procedure: ESOPHAGOGASTRODUODENOSCOPY WITH COLD BIOIPSIES AND BALLOON DILITATION SIZE 15, 16.5, 18;  Surgeon: Jerry SNOWDEN MD;  Location: [facility] ENDOSCOPY;  Service:     INSERT / REPLACE / REMOVE PACEMAKER  2022    MCCOY'S NEUROMA EXCISION Right     TONSILLECTOMY      UMBILICAL HERNIA REPAIR N/A 10/09/2017    Procedure: UMBILICAL HERNIA REPAIR;  Surgeon: Blake Kelly Jr., MD;  Location: [facility] MAIN OR;  Service:        Social History     Socioeconomic History    Marital status:    Tobacco Use    Smoking status: Former     Packs/day: 0.00     Years: 15.00     Additional pack years: 0.00     Total pack years: 0.00     Types: Cigarettes     Start date: 1957     Quit date: 1980     Years since quittin.8    Smokeless tobacco: Never   Substance and Sexual Activity    Alcohol use: No    Drug use: Never    Sexual activity: Not Currently     Partners: Female     Birth control/protection: Post-menopausal, None       Family History   Problem Relation Age of Onset    Depression Mother     Colon polyps Mother     Heart attack Father     Aneurysm Father     Stomach cancer Brother     Malig Hyperthermia Neg Hx        Review of Systems   Constitutional: Negative.   HENT: Negative.     Eyes: Negative.    Cardiovascular: Negative.    Respiratory: Negative.     Endocrine: Negative.    Hematologic/Lymphatic: Negative.    Skin: Negative.    Musculoskeletal: Negative.    Gastrointestinal: Negative.    Genitourinary: Negative.    Neurological: Negative.    Psychiatric/Behavioral:  Negative.     Allergic/Immunologic: Negative.        Allergies   Allergen Reactions    Ciprofloxacin Diarrhea         Current Outpatient Medications:     acetaminophen (TYLENOL) 325 MG tablet, Take 650 mg by mouth Every Night., Disp: , Rfl:     allopurinol (ZYLOPRIM) 300 MG tablet, Take 1 tablet by mouth Daily., Disp: 90 tablet, Rfl: 3    aspirin 81 MG tablet, Take 81 mg by mouth Daily., Disp: , Rfl:     atorvastatin (LIPITOR) 80 MG tablet, TAKE 1 TABLET EVERY NIGHT, Disp: 90 tablet, Rfl: 3    carvedilol (COREG) 3.125 MG tablet, TAKE 1 TABLET TWICE DAILY  WITH MEALS, Disp: 180 tablet, Rfl: 3    Cholecalciferol 2000 units capsule, Take 2,000 Units by mouth Daily., Disp: , Rfl:     famotidine (PEPCID) 20 MG tablet, Take 20 mg by mouth Daily., Disp: , Rfl:     Multiple Vitamins-Minerals (PRESERVISION AREDS 2 PO), Take  by mouth 2 (Two) Times a Day., Disp: , Rfl:     NIFEdipine XL (PROCARDIA XL) 30 MG 24 hr tablet, TAKE ONE TABLET BY MOUTH DAILY, Disp: 90 tablet, Rfl: 3    nitroglycerin (Nitrostat) 0.4 MG SL tablet, Take 1 tablet by mouth Every 5 (Five) Minutes As Needed (CHEST PAIN: MAX DOSE 3 TABLETS)., Disp: 25 tablet, Rfl: 6    omega-3 acid ethyl esters (LOVAZA) 1 g capsule, TAKE 4 CAPSULES DAILY (TO  HOLD MEDICATION PRIOR TO   OPERATING ROOM), Disp: 360 capsule, Rfl: 3    ramipril (ALTACE) 10 MG capsule, Take 1 capsule by mouth Every Evening., Disp: 90 capsule, Rfl: 3      Objective:     There were no vitals filed for this visit.  There is no height or weight on file to calculate BMI.    2D Echocardiogram 03/03/2023:    Left ventricular systolic function is normal. Calculated left ventricular EF = 60.1% Normal left ventricular cavity size noted. Left ventricular wall thickness is consistent with concentric hypertrophy. Left ventricular diastolic function is consistent with (grade I) impaired relaxation.    Mildly reduced right ventricular systolic function noted.    The interatrial septum appears redundant. Saline  test for shunting not performed. History of positive PFO    There is severe calcification of the aortic valve. No aortic valve regurgitation is present. Mild aortic valve stenosis is present. Aortic valve area is 1.38 cm2. Aortic valve mean pressure gradient is 17.5 mmHg. Aortic valve dimensionless index is 0.30 .    Severe mitral annular calcification is present. There is severe, bileaflet mitral valve thickening present. Trace mitral valve regurgitation is present. No significant mitral valve stenosis is present.    There is a probable left pleural (less likely pericardial) effusion. This is not well visualized.    Lexiscan Stress test 07/13/2022:  Myocardial perfusion imaging indicates a normal myocardial perfusion study with no evidence of ischemia.  Left ventricular ejection fraction is normal. (Calculated EF = 63%).  Diaphragmatic attenuation artifact is present.  There is no prior study available for comparison.    24-hour Holter monitor 2/25/2022:  An abnormal monitor study.  There were episodes of significant sinus bradycardia in the afternoon hours between 2:43 PM and 4:11 PM. The lowest recorded heart rate was 32 bpm.  There were also episodes of significant sinus bradycardia as low as 31 bpm between 7:48 PM and 8:49 PM.  There was a sinus pause of 4.2 seconds at 9:07 PM     2D Echocardiogram 6/17/2021:  Left ventricular wall thickness is consistent with mild concentric hypertrophy.  Estimated left ventricular EF = 61% Left ventricular systolic function is normal.  Left ventricular diastolic function was normal.  There is moderate calcification of the aortic valve.  Mild tricuspid valve regurgitation is present. Estimated right ventricular systolic pressure from tricuspid regurgitation is normal (<35 mmHg).  Mitral annular calcification is present. Mild mitral valve regurgitation is present. No significant mitral valve stenosis is present.    PHYSICAL EXAM:    Constitutional:       Appearance: Not in  distress. Frail.   Neck:      Vascular: No JVR. JVD normal.   Pulmonary:      Effort: Pulmonary effort is normal.      Breath sounds: Normal breath sounds. No wheezing. No rhonchi. No rales.   Chest:      Chest wall: Not tender to palpatation.   Cardiovascular:      PMI at left midclavicular line. Normal rate. Regular rhythm. Normal S1. Normal S2.       Murmurs: There is a systolic murmur.      No gallop.  No click. No rub.   Pulses:     Intact distal pulses.   Edema:     Peripheral edema absent.   Abdominal:      General: Bowel sounds are normal.      Palpations: Abdomen is soft.      Tenderness: There is no abdominal tenderness.   Musculoskeletal: Normal range of motion.         General: No tenderness. Skin:     General: Skin is warm and dry.   Neurological:      General: No focal deficit present.      Mental Status: Alert and oriented to person, place and time.           ECG 12 Lead    Date/Time: 10/17/2023 10:55 AM  Performed by: Hodan Avila APRN    Authorized by: Hodan Avila APRN  Comparison: compared with previous ECG from 2/8/2023  Rhythm: sinus rhythm  Ectopy: unifocal PVCs  Rate: normal  BPM: 65  Conduction: right bundle branch block            Assessment:       Diagnosis Plan   1. Primary hypertension        2. Nonrheumatic aortic valve stenosis        3. Coronary artery disease involving native coronary artery of native heart without angina pectoris        4. Hx of CABG        5. S/P coronary artery stent placement        6. Sick sinus syndrome        7. Presence of cardiac pacemaker          No orders of the defined types were placed in this encounter.         Plan:       1.  Coronary artery disease: Status post CABG x 3 (SVG to LAD, SVG to OM1 and SVG to OM 2) in 1991.  Status post PCI of SVG to OM 2 (2015).  Normal myocardial perfusion study with no evidence of ischemia in July 2022.  Normal LV function.  On asa, statin, beta blocker.  No angina  2.  Nonrheumatic aortic valve stenosis:  Mild.  Positive systolic murmur  3.  Hypertension: Controlled  4.  Hyperlipidemia: Lipid panel in target range.  On statin therapy.  5.  Sick sinus syndrome: Pacemaker placement in 2022.  Last remote download shows no events.  Patient will have device checked after this office visit.  6.  Unintentional weight loss: Patient has had issues with his dentures and has not been eating well.  He is most definitely had a lack of caloric intake.    Mr. Grant will follow-up with Dr. Mcneal in 6 months.  He will call sooner for any questions or concerns.             Your medication list            Accurate as of October 17, 2023  8:29 AM. If you have any questions, ask your nurse or doctor.                CONTINUE taking these medications        Instructions Last Dose Given Next Dose Due   acetaminophen 325 MG tablet  Commonly known as: TYLENOL      Take 650 mg by mouth Every Night.       allopurinol 300 MG tablet  Commonly known as: ZYLOPRIM      Take 1 tablet by mouth Daily.       aspirin 81 MG tablet      Take 81 mg by mouth Daily.       atorvastatin 80 MG tablet  Commonly known as: LIPITOR      TAKE 1 TABLET EVERY NIGHT       carvedilol 3.125 MG tablet  Commonly known as: COREG      TAKE 1 TABLET TWICE DAILY  WITH MEALS       Cholecalciferol 50 MCG (2000 UT) capsule      Take 2,000 Units by mouth Daily.       famotidine 20 MG tablet  Commonly known as: PEPCID      Take 20 mg by mouth Daily.       NIFEdipine XL 30 MG 24 hr tablet  Commonly known as: PROCARDIA XL      TAKE ONE TABLET BY MOUTH DAILY       nitroglycerin 0.4 MG SL tablet  Commonly known as: Nitrostat      Take 1 tablet by mouth Every 5 (Five) Minutes As Needed (CHEST PAIN: MAX DOSE 3 TABLETS).       omega-3 acid ethyl esters 1 g capsule  Commonly known as: LOVAZA      TAKE 4 CAPSULES DAILY (TO  HOLD MEDICATION PRIOR TO   OPERATING ROOM)       PRESERVISION AREDS 2 PO      Take  by mouth 2 (Two) Times a Day.       ramipril 10 MG capsule  Commonly known as:  ALTACE      Take 1 capsule by mouth Every Evening.                  As always, it has been a pleasure to participate in your patient's care.      Sincerely,       COLLIN Baumann

## 2023-11-27 RX ORDER — ATORVASTATIN CALCIUM 80 MG/1
TABLET, FILM COATED ORAL
Qty: 90 TABLET | Refills: 3 | Status: SHIPPED | OUTPATIENT
Start: 2023-11-27

## 2023-11-28 RX ORDER — RAMIPRIL 10 MG/1
10 CAPSULE ORAL EVERY EVENING
Qty: 90 CAPSULE | Refills: 3 | Status: SHIPPED | OUTPATIENT
Start: 2023-11-28

## 2023-12-26 RX ORDER — NIFEDIPINE 30 MG/1
TABLET, EXTENDED RELEASE ORAL
Qty: 90 TABLET | Refills: 3 | Status: SHIPPED | OUTPATIENT
Start: 2023-12-26

## 2024-01-26 ENCOUNTER — APPOINTMENT (OUTPATIENT)
Dept: GENERAL RADIOLOGY | Facility: HOSPITAL | Age: 84
End: 2024-01-26
Payer: MEDICARE

## 2024-01-26 ENCOUNTER — TELEPHONE (OUTPATIENT)
Age: 84
End: 2024-01-26
Payer: MEDICARE

## 2024-01-26 ENCOUNTER — HOSPITAL ENCOUNTER (OUTPATIENT)
Facility: HOSPITAL | Age: 84
Setting detail: OBSERVATION
Discharge: HOME | End: 2024-01-28
Attending: STUDENT IN AN ORGANIZED HEALTH CARE EDUCATION/TRAINING PROGRAM | Admitting: INTERNAL MEDICINE
Payer: MEDICARE

## 2024-01-26 DIAGNOSIS — R07.9 CHEST PAIN, UNSPECIFIED TYPE: Primary | ICD-10-CM

## 2024-01-26 LAB
ALBUMIN SERPL-MCNC: 4.1 G/DL (ref 3.5–5.2)
ALBUMIN/GLOB SERPL: 1.6 G/DL
ALP SERPL-CCNC: 101 U/L (ref 39–117)
ALT SERPL W P-5'-P-CCNC: 17 U/L (ref 1–41)
ANION GAP SERPL CALCULATED.3IONS-SCNC: 11.8 MMOL/L (ref 5–15)
AST SERPL-CCNC: 23 U/L (ref 1–40)
BASOPHILS # BLD AUTO: 0.03 10*3/MM3 (ref 0–0.2)
BASOPHILS NFR BLD AUTO: 0.5 % (ref 0–1.5)
BILIRUB SERPL-MCNC: 0.5 MG/DL (ref 0–1.2)
BUN SERPL-MCNC: 14 MG/DL (ref 8–23)
BUN/CREAT SERPL: 14.1 (ref 7–25)
CALCIUM SPEC-SCNC: 9.2 MG/DL (ref 8.6–10.5)
CHLORIDE SERPL-SCNC: 99 MMOL/L (ref 98–107)
CO2 SERPL-SCNC: 23.2 MMOL/L (ref 22–29)
CREAT SERPL-MCNC: 0.99 MG/DL (ref 0.76–1.27)
DEPRECATED RDW RBC AUTO: 54.3 FL (ref 37–54)
EGFRCR SERPLBLD CKD-EPI 2021: 75.6 ML/MIN/1.73
EOSINOPHIL # BLD AUTO: 0.15 10*3/MM3 (ref 0–0.4)
EOSINOPHIL NFR BLD AUTO: 2.6 % (ref 0.3–6.2)
ERYTHROCYTE [DISTWIDTH] IN BLOOD BY AUTOMATED COUNT: 14.8 % (ref 12.3–15.4)
GEN 5 2HR TROPONIN T REFLEX: 44 NG/L
GLOBULIN UR ELPH-MCNC: 2.5 GM/DL
GLUCOSE SERPL-MCNC: 104 MG/DL (ref 65–99)
HCT VFR BLD AUTO: 36 % (ref 37.5–51)
HGB BLD-MCNC: 11.9 G/DL (ref 13–17.7)
HOLD SPECIMEN: NORMAL
IMM GRANULOCYTES # BLD AUTO: 0.02 10*3/MM3 (ref 0–0.05)
IMM GRANULOCYTES NFR BLD AUTO: 0.3 % (ref 0–0.5)
LYMPHOCYTES # BLD AUTO: 0.86 10*3/MM3 (ref 0.7–3.1)
LYMPHOCYTES NFR BLD AUTO: 15 % (ref 19.6–45.3)
MCH RBC QN AUTO: 32.6 PG (ref 26.6–33)
MCHC RBC AUTO-ENTMCNC: 33.1 G/DL (ref 31.5–35.7)
MCV RBC AUTO: 98.6 FL (ref 79–97)
MONOCYTES # BLD AUTO: 0.42 10*3/MM3 (ref 0.1–0.9)
MONOCYTES NFR BLD AUTO: 7.3 % (ref 5–12)
NEUTROPHILS NFR BLD AUTO: 4.27 10*3/MM3 (ref 1.7–7)
NEUTROPHILS NFR BLD AUTO: 74.3 % (ref 42.7–76)
NRBC BLD AUTO-RTO: 0 /100 WBC (ref 0–0.2)
PLATELET # BLD AUTO: 178 10*3/MM3 (ref 140–450)
PMV BLD AUTO: 10.1 FL (ref 6–12)
POTASSIUM SERPL-SCNC: 4.7 MMOL/L (ref 3.5–5.2)
PROT SERPL-MCNC: 6.6 G/DL (ref 6–8.5)
QT INTERVAL: 484 MS
QTC INTERVAL: 496 MS
RBC # BLD AUTO: 3.65 10*6/MM3 (ref 4.14–5.8)
SODIUM SERPL-SCNC: 134 MMOL/L (ref 136–145)
TROPONIN T DELTA: -11 NG/L
TROPONIN T SERPL HS-MCNC: 55 NG/L
WBC NRBC COR # BLD AUTO: 5.75 10*3/MM3 (ref 3.4–10.8)
WHOLE BLOOD HOLD COAG: NORMAL
WHOLE BLOOD HOLD SPECIMEN: NORMAL

## 2024-01-26 PROCEDURE — 71045 X-RAY EXAM CHEST 1 VIEW: CPT

## 2024-01-26 PROCEDURE — 93005 ELECTROCARDIOGRAM TRACING: CPT | Performed by: STUDENT IN AN ORGANIZED HEALTH CARE EDUCATION/TRAINING PROGRAM

## 2024-01-26 PROCEDURE — 85025 COMPLETE CBC W/AUTO DIFF WBC: CPT | Performed by: STUDENT IN AN ORGANIZED HEALTH CARE EDUCATION/TRAINING PROGRAM

## 2024-01-26 PROCEDURE — G0378 HOSPITAL OBSERVATION PER HR: HCPCS

## 2024-01-26 PROCEDURE — 93010 ELECTROCARDIOGRAM REPORT: CPT | Performed by: INTERNAL MEDICINE

## 2024-01-26 PROCEDURE — 84484 ASSAY OF TROPONIN QUANT: CPT | Performed by: STUDENT IN AN ORGANIZED HEALTH CARE EDUCATION/TRAINING PROGRAM

## 2024-01-26 PROCEDURE — 80053 COMPREHEN METABOLIC PANEL: CPT | Performed by: STUDENT IN AN ORGANIZED HEALTH CARE EDUCATION/TRAINING PROGRAM

## 2024-01-26 PROCEDURE — 93005 ELECTROCARDIOGRAM TRACING: CPT

## 2024-01-26 PROCEDURE — 99284 EMERGENCY DEPT VISIT MOD MDM: CPT

## 2024-01-26 PROCEDURE — 36415 COLL VENOUS BLD VENIPUNCTURE: CPT

## 2024-01-26 RX ORDER — POLYETHYLENE GLYCOL 3350 17 G/17G
17 POWDER, FOR SOLUTION ORAL DAILY PRN
Status: DISCONTINUED | OUTPATIENT
Start: 2024-01-26 | End: 2024-01-28 | Stop reason: HOSPADM

## 2024-01-26 RX ORDER — BISACODYL 10 MG
10 SUPPOSITORY, RECTAL RECTAL DAILY PRN
Status: DISCONTINUED | OUTPATIENT
Start: 2024-01-26 | End: 2024-01-28 | Stop reason: HOSPADM

## 2024-01-26 RX ORDER — FAMOTIDINE 20 MG/1
20 TABLET, FILM COATED ORAL DAILY
Status: DISCONTINUED | OUTPATIENT
Start: 2024-01-26 | End: 2024-01-28 | Stop reason: HOSPADM

## 2024-01-26 RX ORDER — ACETAMINOPHEN 650 MG/1
650 SUPPOSITORY RECTAL EVERY 4 HOURS PRN
Status: DISCONTINUED | OUTPATIENT
Start: 2024-01-26 | End: 2024-01-28 | Stop reason: HOSPADM

## 2024-01-26 RX ORDER — SODIUM CHLORIDE 0.9 % (FLUSH) 0.9 %
10 SYRINGE (ML) INJECTION AS NEEDED
Status: DISCONTINUED | OUTPATIENT
Start: 2024-01-26 | End: 2024-01-28 | Stop reason: HOSPADM

## 2024-01-26 RX ORDER — SODIUM CHLORIDE 0.9 % (FLUSH) 0.9 %
10 SYRINGE (ML) INJECTION EVERY 12 HOURS SCHEDULED
Status: DISCONTINUED | OUTPATIENT
Start: 2024-01-26 | End: 2024-01-28 | Stop reason: HOSPADM

## 2024-01-26 RX ORDER — LISINOPRIL 40 MG/1
40 TABLET ORAL
Status: DISCONTINUED | OUTPATIENT
Start: 2024-01-26 | End: 2024-01-28 | Stop reason: HOSPADM

## 2024-01-26 RX ORDER — MULTIVIT-MIN/IRON/FOLIC ACID/K 18-600-40
2000 CAPSULE ORAL DAILY
Status: DISCONTINUED | OUTPATIENT
Start: 2024-01-26 | End: 2024-01-26

## 2024-01-26 RX ORDER — BISACODYL 5 MG/1
5 TABLET, DELAYED RELEASE ORAL DAILY PRN
Status: DISCONTINUED | OUTPATIENT
Start: 2024-01-26 | End: 2024-01-28 | Stop reason: HOSPADM

## 2024-01-26 RX ORDER — NITROGLYCERIN 0.4 MG/1
0.4 TABLET SUBLINGUAL
Status: DISCONTINUED | OUTPATIENT
Start: 2024-01-26 | End: 2024-01-28 | Stop reason: HOSPADM

## 2024-01-26 RX ORDER — MELATONIN
2000 DAILY
Status: DISCONTINUED | OUTPATIENT
Start: 2024-01-26 | End: 2024-01-28 | Stop reason: HOSPADM

## 2024-01-26 RX ORDER — AMOXICILLIN 250 MG
2 CAPSULE ORAL 2 TIMES DAILY
Status: DISCONTINUED | OUTPATIENT
Start: 2024-01-26 | End: 2024-01-28 | Stop reason: HOSPADM

## 2024-01-26 RX ORDER — ATORVASTATIN CALCIUM 80 MG/1
80 TABLET, FILM COATED ORAL NIGHTLY
Status: DISCONTINUED | OUTPATIENT
Start: 2024-01-26 | End: 2024-01-28 | Stop reason: HOSPADM

## 2024-01-26 RX ORDER — ALLOPURINOL 300 MG/1
300 TABLET ORAL DAILY
Status: DISCONTINUED | OUTPATIENT
Start: 2024-01-26 | End: 2024-01-28 | Stop reason: HOSPADM

## 2024-01-26 RX ORDER — ASPIRIN 81 MG/1
81 TABLET, CHEWABLE ORAL DAILY
Status: DISCONTINUED | OUTPATIENT
Start: 2024-01-26 | End: 2024-01-28 | Stop reason: HOSPADM

## 2024-01-26 RX ORDER — ASPIRIN 325 MG
325 TABLET ORAL ONCE
Status: COMPLETED | OUTPATIENT
Start: 2024-01-26 | End: 2024-01-26

## 2024-01-26 RX ORDER — CLOPIDOGREL BISULFATE 75 MG/1
75 TABLET ORAL DAILY
COMMUNITY

## 2024-01-26 RX ORDER — NIFEDIPINE 30 MG/1
30 TABLET, EXTENDED RELEASE ORAL DAILY
Status: DISCONTINUED | OUTPATIENT
Start: 2024-01-26 | End: 2024-01-28 | Stop reason: HOSPADM

## 2024-01-26 RX ORDER — FLUOXETINE HYDROCHLORIDE 20 MG/1
20 CAPSULE ORAL DAILY
COMMUNITY

## 2024-01-26 RX ORDER — ACETAMINOPHEN 160 MG/5ML
650 SOLUTION ORAL EVERY 4 HOURS PRN
Status: DISCONTINUED | OUTPATIENT
Start: 2024-01-26 | End: 2024-01-28 | Stop reason: HOSPADM

## 2024-01-26 RX ORDER — SODIUM CHLORIDE 9 MG/ML
40 INJECTION, SOLUTION INTRAVENOUS AS NEEDED
Status: DISCONTINUED | OUTPATIENT
Start: 2024-01-26 | End: 2024-01-28 | Stop reason: HOSPADM

## 2024-01-26 RX ORDER — CARVEDILOL 3.12 MG/1
3.12 TABLET ORAL 2 TIMES DAILY WITH MEALS
Status: DISCONTINUED | OUTPATIENT
Start: 2024-01-26 | End: 2024-01-28 | Stop reason: HOSPADM

## 2024-01-26 RX ORDER — ONDANSETRON 2 MG/ML
4 INJECTION INTRAMUSCULAR; INTRAVENOUS EVERY 6 HOURS PRN
Status: DISCONTINUED | OUTPATIENT
Start: 2024-01-26 | End: 2024-01-28 | Stop reason: HOSPADM

## 2024-01-26 RX ORDER — ACETAMINOPHEN 325 MG/1
650 TABLET ORAL EVERY 4 HOURS PRN
Status: DISCONTINUED | OUTPATIENT
Start: 2024-01-26 | End: 2024-01-28 | Stop reason: HOSPADM

## 2024-01-26 RX ORDER — TAMSULOSIN HYDROCHLORIDE 0.4 MG/1
0.4 CAPSULE ORAL DAILY
Status: DISCONTINUED | OUTPATIENT
Start: 2024-01-26 | End: 2024-01-28 | Stop reason: HOSPADM

## 2024-01-26 RX ADMIN — TAMSULOSIN HYDROCHLORIDE 0.4 MG: 0.4 CAPSULE ORAL at 17:40

## 2024-01-26 RX ADMIN — ASPIRIN 81 MG: 81 TABLET, CHEWABLE ORAL at 17:40

## 2024-01-26 RX ADMIN — ATORVASTATIN CALCIUM 80 MG: 80 TABLET, FILM COATED ORAL at 21:17

## 2024-01-26 RX ADMIN — Medication 2000 UNITS: at 17:40

## 2024-01-26 RX ADMIN — ASPIRIN 325 MG: 325 TABLET ORAL at 13:13

## 2024-01-26 RX ADMIN — LISINOPRIL 40 MG: 40 TABLET ORAL at 17:40

## 2024-01-26 RX ADMIN — CARVEDILOL 3.12 MG: 3.12 TABLET, FILM COATED ORAL at 17:40

## 2024-01-26 RX ADMIN — ALLOPURINOL 300 MG: 300 TABLET ORAL at 17:40

## 2024-01-26 RX ADMIN — Medication 10 ML: at 21:18

## 2024-01-26 RX ADMIN — FAMOTIDINE 20 MG: 20 TABLET, FILM COATED ORAL at 17:40

## 2024-01-26 RX ADMIN — NIFEDIPINE 30 MG: 30 TABLET, FILM COATED, EXTENDED RELEASE ORAL at 17:40

## 2024-01-26 NOTE — H&P
Internal medicine history and physical  INTERNAL MEDICINE   [facility]       Patient Identification:  Name: Javier Grant  Age: 83 y.o.  Sex: male  :  1940  MRN: [id number]                   Primary Care Physician: Sherron Gray MD                               Date of admission:2024    Chief Complaint: Had discomfort in his throat and tightness in the chest last night and today he was seen by home health who noticed low blood pressure suggesting that he needs to go to the emergency room.    History of Present Illness:   Patient is 83-year-old male who has complicated past medical history including history of coronary artery disease, history of coronary artery bypass grafting, history of hypertension, gout, enlarged prostate, sleep apnea who was hospitalized at [facility] from 2023 through 2022 for evaluation of chest pain and was found to have calcified graft which was thought to be culprit but could not have a stent placement or intervention and medical management was recommended.  Apparently her ejection fraction was also dropped from 60% to 45 to 50%.  Post discharge patient did have a follow-up with [place] cardiology service and medical management was recommended.  In this background patient was doing reasonably well since his discharge but did have some discomfort in his throat that was transient and went away with no associated shortness of breath or sweats.  This morning home health nurse came and checked on him and found that his blood pressure was low.  Because of low blood pressure episodic throat discomfort and tightness in the chest and finding of nontreatable calcified coronary artery graft and declining left ventricular ejection fraction patient decided to come to the emergency room and was sent to the ER.  Patient actually was trying to get in touch with Dr. Mcneal who is his cardiologist and wants his recent cardiac workup including catheterization  and cardiac recommendation to be reviewed by him.  Patient at present denies any chest pain orthopnea PND or swelling of his legs.      Past Medical History:  Past Medical History:   Diagnosis Date    Abnormal ECG 1980    Arthritis     Bleeds easily     WAS TOLD THAT AFTER CABG    CAD (coronary artery disease)     Clotting disorder 1990    Noted bu surgeon when I had the heart bypasses    Colon polyp     Disorder of aorta     Dizziness     SKELTON (dyspnea on exertion)     Enlarged prostate     Gastritis     Gout     Heart murmur ?    Hiatal hernia     History of MI (myocardial infarction)     1990    HL (hearing loss)     Hyperlipidemia     Hypertension     Macular degeneration     rt eye    Mitral valve prolapse 1990    Myocardial infarction 1990    Nonrheumatic aortic (valve) insufficiency     Nonrheumatic aortic (valve) stenosis     RBBB (right bundle branch block)     Sleep apnea     USES CPAP    Umbilical hernia     Vitamin D deficiency      Past Surgical History:  Past Surgical History:   Procedure Laterality Date    CARDIAC CATHETERIZATION Left 10/09/2015    Dr. Robin Kingston    CARDIAC ELECTROPHYSIOLOGY PROCEDURE N/A 03/04/2022    Procedure: Pacemaker DC new  [place];  Surgeon: Serafin Francois MD;  Location: [facility] CATH INVASIVE LOCATION;  Service: Cardiology;  Laterality: N/A;    CARDIAC SURGERY  1990    triple bypass    CATARACT EXTRACTION Bilateral     CORONARY ANGIOPLASTY WITH STENT PLACEMENT  2015    CORONARY ARTERY BYPASS GRAFT  1990    3 VESSELS    ENDOSCOPY N/A 05/26/2017    Procedure: ESOPHAGOGASTRODUODENOSCOPY WITH COLD BIOIPSIES AND BALLOON DILITATION SIZE 15, 16.5, 18;  Surgeon: Jerry SNOWDEN MD;  Location: [facility] ENDOSCOPY;  Service:     INSERT / REPLACE / REMOVE PACEMAKER  03/05/2022    MCCOY'S NEUROMA EXCISION Right     TONSILLECTOMY      UMBILICAL HERNIA REPAIR N/A 10/09/2017    Procedure: UMBILICAL HERNIA REPAIR;  Surgeon: Blake Kelly Jr., MD;  Location: [facility] OR;  Service:        Home Meds:  Medications Prior to Admission   Medication Sig Dispense Refill Last Dose    acetaminophen (TYLENOL) 325 MG tablet Take 2 tablets by mouth Every Night.       allopurinol (ZYLOPRIM) 300 MG tablet Take 1 tablet by mouth Daily. 90 tablet 3     aspirin 81 MG tablet Take 1 tablet by mouth Daily.       atorvastatin (LIPITOR) 80 MG tablet TAKE 1 TABLET EVERY NIGHT 90 tablet 3     carvedilol (COREG) 3.125 MG tablet TAKE 1 TABLET TWICE DAILY  WITH MEALS 180 tablet 3     Cholecalciferol 2000 units capsule Take 2,000 Units by mouth Daily.       famotidine (PEPCID) 20 MG tablet Take 20 mg by mouth Daily.       loratadine (Claritin) 10 MG tablet mg Tab, Oral, Daily, 0 Refill(s)       Multiple Vitamins-Minerals (PRESERVISION AREDS 2 PO) Take  by mouth 2 (Two) Times a Day.       NIFEdipine XL (PROCARDIA XL) 30 MG 24 hr tablet TAKE ONE TABLET BY MOUTH DAILY 90 tablet 3     nitroglycerin (Nitrostat) 0.4 MG SL tablet Take 1 tablet by mouth Every 5 (Five) Minutes As Needed (CHEST PAIN: MAX DOSE 3 TABLETS). 25 tablet 6     omega-3 acid ethyl esters (LOVAZA) 1 g capsule TAKE 4 CAPSULES DAILY (TO  HOLD MEDICATION PRIOR TO   OPERATING ROOM) 360 capsule 3     ramipril (ALTACE) 10 MG capsule TAKE 1 CAPSULE EVERY       EVENING 90 capsule 3     sulfamethoxazole-trimethoprim (BACTRIM DS,SEPTRA DS) 800-160 MG per tablet        tamsulosin (FLOMAX) 0.4 MG capsule 24 hr capsule 1 capsule.        Current Meds:     Current Facility-Administered Medications:     sodium chloride 0.9 % flush 10 mL, 10 mL, Intravenous, PRN, Jc Mancuso MD  Allergies:  Allergies   Allergen Reactions    Ciprofloxacin Diarrhea     Social History:   Social History     Tobacco Use    Smoking status: Former     Packs/day: 0.00     Years: 15.00     Additional pack years: 0.00     Total pack years: 0.00     Types: Cigarettes     Start date: 1957     Quit date: 1980     Years since quittin.0    Smokeless tobacco: Never   Substance Use Topics     Alcohol use: No      Family History:  Family History   Problem Relation Age of Onset    Depression Mother     Colon polyps Mother     Heart attack Father     Aneurysm Father     Stomach cancer Brother     Malig Hyperthermia Neg Hx           Review of Systems  See history of present illness and past medical history.    As described in history presenting illness.  Remainder of ROS is negative.      Vitals:   /80 (BP Location: Left arm, Patient Position: Lying)   Pulse 67   Temp 98.7 °F (37.1 °C) (Tympanic)   Resp 18   SpO2 99%   I/O: No intake or output data in the 24 hours ending 01/26/24 2868  Exam:  Patient is examined using the personal protective equipment as per guidelines from infection control for this particular patient as enacted.  Hand washing was performed before and after patient interaction.  General Appearance:    Alert, cooperative, no distress, appears stated age   Head:    Normocephalic, without obvious abnormality, atraumatic   Eyes:    PERRL, conjunctiva/corneas clear, EOM's intact, both eyes   Ears:    Normal external ear canals, both ears   Nose:   Nares normal, septum midline, mucosa normal, no drainage    or sinus tenderness   Throat:   Lips, tongue, gums normal; oral mucosa pink and moist   Neck: Supple no adenopathy or no JVD noted.   Back:     Symmetric, no curvature, ROM normal, no CVA tenderness   Lungs:     Clear to auscultation bilaterally, respirations unlabored   Chest Wall:    No tenderness or deformity no chest wall tenderness noted    Heart:  S1-S2 regular   Abdomen:   Soft nontender    Extremities: Bilateral trace edema noted   Pulses:   Pulses palpable in all extremities; symmetric all extremities   Skin:   Skin color normal, Skin is warm and dry,  no rashes or palpable lesions   Neurologic: Alert and oriented and grossly nonfocal       Data Review:      I reviewed the patient's new clinical results.  Results from last 7 days   Lab Units 01/26/24  1302   WBC 10*3/mm3  5.75   HEMOGLOBIN g/dL 11.9*   PLATELETS 10*3/mm3 178     Results from last 7 days   Lab Units 01/26/24  1302   SODIUM mmol/L 134*   POTASSIUM mmol/L 4.7   CHLORIDE mmol/L 99   CO2 mmol/L 23.2   BUN mg/dL 14   CREATININE mg/dL 0.99   CALCIUM mg/dL 9.2   GLUCOSE mg/dL 104*     ECG 12 Lead Chest Pain   Final Result   HEART RATE= 63  bpm   RR Interval= 952  ms   NV Interval= 179  ms   P Horizontal Axis= -76  deg   P Front Axis=   deg   QRSD Interval= 192  ms   QT Interval= 484  ms   QTcB= 496  ms   QRS Axis= -69  deg   T Wave Axis= 110  deg   - ABNORMAL ECG -   Atrial-paced complexes - new   Nonspecific IVCD with LAD   LVH with secondary repolarization abnormality   Electronically Signed By: Danuta Contreras ([facility]) 26-Jan-2024 16:54:39   Date and Time of Study: 2024-01-26 12:34:21      SCANNED - TELEMETRY     Final Result        Microbiology Results (last 10 days)       ** No results found for the last 240 hours. **          XR Chest 1 View    Result Date: 1/26/2024  1. No acute process.   This report was finalized on 1/26/2024 1:23 PM by Dr. Patrick Dorman M.D on Workstation: [id number]       Assessment:  Active Hospital Problems    Diagnosis  POA    **Chest pain [R07.9]  Yes    Hx of CABG [Z95.1]  Not Applicable    Presence of cardiac pacemaker [Z95.0]  Yes    S/P coronary artery stent placement [Z95.5]  Not Applicable    Sick sinus syndrome [I49.5]  Yes     Added automatically from request for surgery [id number]      Cor pulmonale [I27.81]  Yes    CRISTY on autoCPAP [G47.33]  Yes    Coronary artery disease involving native coronary artery of native heart without angina pectoris [I25.10]  Yes    Hypertension [I10]  Yes    Hyperlipidemia [E78.5]  Yes       Medical decision making/care plan: See admitting orders  Recurring chest pressure and tightness in the setting of known coronary artery disease, coronary artery bypass grafting and recent left heart catheterization showing occluded venous graft with calcification not  amenable to stenting and percutaneous intervention with plans for optimization of medical therapy-plan is to admit the patient check serial troponin and cardiology consultation.  Patient wants to get an opinion from his cardiologist about the recent catheterization and recommendations about his cardiac care.  Hypertension etiology unclear at present patient blood pressure is 106/80-plan is to hold his antihypertensive for systolic blood pressure is less than 100.  Check orthostatics and consider hydration.  History of obstructive sleep apnea on CPAP-continue with pulse ox monitoring and low threshold to check ABG if he becomes somnolent to rule out hypercapnic failure.  History of sick sinus syndrome status post permanent pacemaker placement-provide telemetry monitoring and watch electrolyte abnormalities and correct per protocol.    Lizzeth Castillo MD   1/26/2024  15:38 EST    Parts of this note may be an electronic transcription/translation of spoken language to printed text using the Dragon dictation system.

## 2024-01-26 NOTE — TELEPHONE ENCOUNTER
Pt called Wednesday about getting back in to see you. He had been at [facility] on 12/21/23 and had a cath on 12/22/23.    Records are updated under Care Everywhere.    He said they did a cath and attempted to stent a vessel but were unable to stent it because it was totally occluded. He said his EF has also dropped from 60% to 45-50%    He followed up with [facility] on 1/23/24.    I called to get him back in to see us next week, but he let me know when home health came to visit him today his BP was 80 systolic and that he was headed to the ER her at [facility].    MALOU Lucio

## 2024-01-26 NOTE — ED NOTES
Nursing report ED to floor  Javier Grant  83 y.o.  male    HPI :   Chief Complaint   Patient presents with    Chest Pain       Admitting doctor:   Lizzeth Castillo MD    Admitting diagnosis:   The encounter diagnosis was Chest pain, unspecified type.    Code status:   Current Code Status       Date Active Code Status Order ID Comments User Context       Prior            Allergies:   Ciprofloxacin    Isolation:   No active isolations    Intake and Output  No intake or output data in the 24 hours ending 01/26/24 1424    Weight:   There were no vitals filed for this visit.    Most recent vitals:   Vitals:    01/26/24 1228 01/26/24 1240   BP:  106/80   BP Location:  Left arm   Patient Position:  Lying   Pulse: 67    Resp: 18    Temp:  98.7 °F (37.1 °C)   TempSrc:  Tympanic   SpO2: 99%        Active LDAs/IV Access:   Lines, Drains & Airways       Active LDAs       Name Placement date Placement time Site Days    Peripheral IV 01/26/24 1303 Anterior;Right Forearm 01/26/24  1303  Forearm  less than 1                    Labs (abnormal labs have a star):   Labs Reviewed   COMPREHENSIVE METABOLIC PANEL - Abnormal; Notable for the following components:       Result Value    Glucose 104 (*)     Sodium 134 (*)     All other components within normal limits    Narrative:     GFR Normal >60  Chronic Kidney Disease <60  Kidney Failure <15    The GFR formula is only valid for adults with stable renal function between ages 18 and 70.   TROPONIN - Abnormal; Notable for the following components:    HS Troponin T 55 (*)     All other components within normal limits    Narrative:     High Sensitive Troponin T Reference Range:  <14.0 ng/L- Negative Female for AMI  <22.0 ng/L- Negative Male for AMI  >=14 - Abnormal Female indicating possible myocardial injury.  >=22 - Abnormal Male indicating possible myocardial injury.   Clinicians would have to utilize clinical acumen, EKG, Troponin, and serial changes to determine if it is an Acute Myocardial  Infarction or myocardial injury due to an underlying chronic condition.        CBC WITH AUTO DIFFERENTIAL - Abnormal; Notable for the following components:    RBC 3.65 (*)     Hemoglobin 11.9 (*)     Hematocrit 36.0 (*)     MCV 98.6 (*)     RDW-SD 54.3 (*)     Lymphocyte % 15.0 (*)     All other components within normal limits   RAINBOW DRAW    Narrative:     The following orders were created for panel order [place] Draw.  Procedure                               Abnormality         Status                     ---------                               -----------         ------                     Green Top (Gel)[[id number]]                                  Final result               Lavender Top[[id number]]                                     Final result               Gold Top - SST[[id number]]                                                              Light Blue Top[[id number]]                                   Final result                 Please view results for these tests on the individual orders.   HIGH SENSITIVITIY TROPONIN T 2HR   CBC AND DIFFERENTIAL    Narrative:     The following orders were created for panel order CBC & Differential.  Procedure                               Abnormality         Status                     ---------                               -----------         ------                     CBC Auto Differential[[id number]]        Abnormal            Final result                 Please view results for these tests on the individual orders.   GREEN TOP   LAVENDER TOP   LIGHT BLUE TOP   GOLD TOP - SST       EKG:   ECG 12 Lead Chest Pain   Preliminary Result   HEART RATE= 63  bpm   RR Interval= 952  ms   AL Interval= 179  ms   P Horizontal Axis= -76  deg   P Front Axis=   deg   QRSD Interval= 192  ms   QT Interval= 484  ms   QTcB= 496  ms   QRS Axis= -69  deg   T Wave Axis= 110  deg   - ABNORMAL ECG -   Atrial-paced complexes   Nonspecific IVCD with LAD   LVH with secondary repolarization  abnormality   Electronically Signed By:    Date and Time of Study: 2024 12:34:21          Meds given in ED:   Medications   sodium chloride 0.9 % flush 10 mL (has no administration in time range)   aspirin tablet 325 mg (325 mg Oral Given 24 1313)       Imaging results:  XR Chest 1 View    Result Date: 2024  1. No acute process.   This report was finalized on 2024 1:23 PM by Dr. Patrick Dorman M.D on Workstation: [id number]       Ambulatory status:   - ad zoila    Social issues:   Social History     Socioeconomic History    Marital status:    Tobacco Use    Smoking status: Former     Packs/day: 0.00     Years: 15.00     Additional pack years: 0.00     Total pack years: 0.00     Types: Cigarettes     Start date: 1957     Quit date: 1980     Years since quittin.0    Smokeless tobacco: Never   Vaping Use    Vaping Use: Never used   Substance and Sexual Activity    Alcohol use: No    Drug use: Never    Sexual activity: Not Currently     Partners: Female     Birth control/protection: Post-menopausal, None       NIH Stroke Scale:       Jerry Alicea RN  24 14:24 EST

## 2024-01-26 NOTE — ED PROVIDER NOTES
EMERGENCY DEPARTMENT ENCOUNTER  Room Number:  13/13  PCP: Sherron Gray MD  Independent Historians: Patient and Family      HPI:  Chief Complaint: had concerns including Chest Pain.     Context: Javier Grant is a 83 y.o. male with a medical history of CAD s/p coronary bypass, HTN, HLD, sick sinus who presents to the ED c/o acute chest pain.  Patient states he was diagnosed with an MI in December and was seen at [facility] where he had cardiac catheterization done and was noted to have significant occlusion of his bypass however they were unable to perform intervention at that time due to heavy calcification.  Patient was managed medically and discharged follow-up on outpatient basis.  Patient follows with Dr. Mcneal of cardiology here.  Patient reports over the last couple weeks he has had 3-4 episodes of chest pressure that are located midsternally and self resolved.  Patient states most recent 1 occurred in the middle the night and woke him from sleep leading to him having to get up and sit in his chair until it resolved.  Patient is currently asymptomatic.      Review of prior external notes (non-ED) -and- Review of prior external test results outside of this encounter: Cardiac catheterization report from 12/22/2023 reviewed and notable for 95% stenosis in his SVG but no stent could be placed will optimize medically and discharged to follow-up on outpatient basis    PAST MEDICAL HISTORY  Active Ambulatory Problems     Diagnosis Date Noted    Abnormal electrocardiogram 05/16/2016    Disorder of aorta 05/16/2016    Coronary artery disease involving native coronary artery of native heart without angina pectoris 05/16/2016    Dyspnea on exertion 05/16/2016    Hypertension 05/16/2016    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    Hyperlipidemia 05/16/2016    CRISTY on autoCPAP 10/29/2016    Nonrheumatic aortic valve stenosis 11/16/2016    Nonrheumatic aortic valve insufficiency 11/16/2016    Cor pulmonale  11/16/2016    Umbilical hernia without obstruction and without gangrene 09/26/2017    Dizziness 12/15/2018    Vertigo 12/15/2018    History of heart attack 12/18/2018    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/18/2018    Bradycardia, sinus 02/28/2022    Near syncope 02/28/2022    Sick sinus syndrome 02/28/2022    Hx of CABG 10/17/2023    S/P coronary artery stent placement 10/17/2023    Presence of cardiac pacemaker 10/17/2023     Resolved Ambulatory Problems     Diagnosis Date Noted    Hypersomnia due to medical condition - CRISTY 10/29/2016    Obesity (BMI 30-39.9) 11/20/2017    Hypertensive encephalopathy 12/15/2018     Past Medical History:   Diagnosis Date    Abnormal ECG 1980    Arthritis     Bleeds easily     CAD (coronary artery disease)     Clotting disorder 1990    Colon polyp     SKELTON (dyspnea on exertion)     Enlarged prostate     Gastritis     Gout     Heart murmur ?    Hiatal hernia     History of MI (myocardial infarction)     HL (hearing loss)     Macular degeneration     Mitral valve prolapse 1990    Myocardial infarction 1990    Nonrheumatic aortic (valve) insufficiency     Nonrheumatic aortic (valve) stenosis     RBBB (right bundle branch block)     Sleep apnea     Umbilical hernia     Vitamin D deficiency          PAST SURGICAL HISTORY  Past Surgical History:   Procedure Laterality Date    CARDIAC CATHETERIZATION Left 10/09/2015    Dr. Robin Kingston    CARDIAC ELECTROPHYSIOLOGY PROCEDURE N/A 03/04/2022    Procedure: Pacemaker DC new  [place];  Surgeon: Serafin Francois MD;  Location: [facility] INVASIVE LOCATION;  Service: Cardiology;  Laterality: N/A;    CARDIAC SURGERY  1990    triple bypass    CATARACT EXTRACTION Bilateral     CORONARY ANGIOPLASTY WITH STENT PLACEMENT  2015    CORONARY ARTERY BYPASS GRAFT  1990    3 VESSELS    ENDOSCOPY N/A 05/26/2017    Procedure: ESOPHAGOGASTRODUODENOSCOPY WITH COLD BIOIPSIES AND BALLOON DILITATION SIZE 15, 16.5, 18;  Surgeon: Jerry  Lennie SNOWDEN MD;  Location: [facility] ENDOSCOPY;  Service:     INSERT / REPLACE / REMOVE PACEMAKER  2022    MCCOY'S NEUROMA EXCISION Right     TONSILLECTOMY      UMBILICAL HERNIA REPAIR N/A 10/09/2017    Procedure: UMBILICAL HERNIA REPAIR;  Surgeon: Blake Kelly Jr., MD;  Location: [facility] MAIN OR;  Service:          FAMILY HISTORY  Family History   Problem Relation Age of Onset    Depression Mother     Colon polyps Mother     Heart attack Father     Aneurysm Father     Stomach cancer Brother     Malig Hyperthermia Neg Hx          SOCIAL HISTORY  Social History     Socioeconomic History    Marital status:    Tobacco Use    Smoking status: Former     Packs/day: 0.00     Years: 15.00     Additional pack years: 0.00     Total pack years: 0.00     Types: Cigarettes     Start date: 1957     Quit date: 1980     Years since quittin.0    Smokeless tobacco: Never   Vaping Use    Vaping Use: Never used   Substance and Sexual Activity    Alcohol use: No    Drug use: Never    Sexual activity: Not Currently     Partners: Female     Birth control/protection: Post-menopausal, None         ALLERGIES  Ciprofloxacin      REVIEW OF SYSTEMS  Review of Systems  Included in HPI  All systems reviewed and negative except for those discussed in HPI.      PHYSICAL EXAM    I have reviewed the triage vital signs and nursing notes.    ED Triage Vitals   Temp Heart Rate Resp BP SpO2   24 1240 24 1228 24 1228 24 1240 24 1228   98.7 °F (37.1 °C) 67 18 106/80 99 %      Temp src Heart Rate Source Patient Position BP Location FiO2 (%)   24 1240 -- 24 1240 24 1240 --   Tympanic  Lying Left arm        Physical Exam  GENERAL: alert, no acute distress  SKIN: Warm, dry  HENT: Normocephalic, atraumatic  EYES: no scleral icterus  CV: regular rhythm, regular rate  RESPIRATORY: normal effort, lungs clear  ABDOMEN: soft, nontender, nondistended  MUSCULOSKELETAL: no deformity  NEURO:  alert, moves all extremities, follows commands    LAB RESULTS  Recent Results (from the past 24 hour(s))   ECG 12 Lead Chest Pain    Collection Time: 01/26/24 12:34 PM   Result Value Ref Range    QT Interval 484 ms    QTC Interval 496 ms   Comprehensive Metabolic Panel    Collection Time: 01/26/24  1:02 PM    Specimen: Blood   Result Value Ref Range    Glucose 104 (H) 65 - 99 mg/dL    BUN 14 8 - 23 mg/dL    Creatinine 0.99 0.76 - 1.27 mg/dL    Sodium 134 (L) 136 - 145 mmol/L    Potassium 4.7 3.5 - 5.2 mmol/L    Chloride 99 98 - 107 mmol/L    CO2 23.2 22.0 - 29.0 mmol/L    Calcium 9.2 8.6 - 10.5 mg/dL    Total Protein 6.6 6.0 - 8.5 g/dL    Albumin 4.1 3.5 - 5.2 g/dL    ALT (SGPT) 17 1 - 41 U/L    AST (SGOT) 23 1 - 40 U/L    Alkaline Phosphatase 101 39 - 117 U/L    Total Bilirubin 0.5 0.0 - 1.2 mg/dL    Globulin 2.5 gm/dL    A/G Ratio 1.6 g/dL    BUN/Creatinine Ratio 14.1 7.0 - 25.0    Anion Gap 11.8 5.0 - 15.0 mmol/L    eGFR 75.6 >60.0 mL/min/1.73   High Sensitivity Troponin T    Collection Time: 01/26/24  1:02 PM    Specimen: Blood   Result Value Ref Range    HS Troponin T 55 (C) <22 ng/L   Green Top (Gel)    Collection Time: 01/26/24  1:02 PM   Result Value Ref Range    Extra Tube Hold for add-ons.    Lavender Top    Collection Time: 01/26/24  1:02 PM   Result Value Ref Range    Extra Tube hold for add-on    Light Blue Top    Collection Time: 01/26/24  1:02 PM   Result Value Ref Range    Extra Tube Hold for add-ons.    CBC Auto Differential    Collection Time: 01/26/24  1:02 PM    Specimen: Blood   Result Value Ref Range    WBC 5.75 3.40 - 10.80 10*3/mm3    RBC 3.65 (L) 4.14 - 5.80 10*6/mm3    Hemoglobin 11.9 (L) 13.0 - 17.7 g/dL    Hematocrit 36.0 (L) 37.5 - 51.0 %    MCV 98.6 (H) 79.0 - 97.0 fL    MCH 32.6 26.6 - 33.0 pg    MCHC 33.1 31.5 - 35.7 g/dL    RDW 14.8 12.3 - 15.4 %    RDW-SD 54.3 (H) 37.0 - 54.0 fl    MPV 10.1 6.0 - 12.0 fL    Platelets 178 140 - 450 10*3/mm3    Neutrophil % 74.3 42.7 - 76.0 %     Lymphocyte % 15.0 (L) 19.6 - 45.3 %    Monocyte % 7.3 5.0 - 12.0 %    Eosinophil % 2.6 0.3 - 6.2 %    Basophil % 0.5 0.0 - 1.5 %    Immature Grans % 0.3 0.0 - 0.5 %    Neutrophils, Absolute 4.27 1.70 - 7.00 10*3/mm3    Lymphocytes, Absolute 0.86 0.70 - 3.10 10*3/mm3    Monocytes, Absolute 0.42 0.10 - 0.90 10*3/mm3    Eosinophils, Absolute 0.15 0.00 - 0.40 10*3/mm3    Basophils, Absolute 0.03 0.00 - 0.20 10*3/mm3    Immature Grans, Absolute 0.02 0.00 - 0.05 10*3/mm3    nRBC 0.0 0.0 - 0.2 /100 WBC         RADIOLOGY  XR Chest 1 View    Result Date: 1/26/2024  XR CHEST 1 VW-1/26/2024  HISTORY: Chest pain.  Heart size is within normal limits. Lungs appear free of acute infiltrates. Small calcified granuloma seen in the left midlung. Cardiac pacemaker and sternotomy wires are seen.      1. No acute process.   This report was finalized on 1/26/2024 1:23 PM by Dr. Patrick Dorman M.D on Workstation: [id number]         MEDICATIONS GIVEN IN ER  Medications   sodium chloride 0.9 % flush 10 mL (has no administration in time range)   aspirin tablet 325 mg (325 mg Oral Given 1/26/24 1313)         ORDERS PLACED DURING THIS VISIT:  Orders Placed This Encounter   Procedures    XR Chest 1 View    [place] Draw    Comprehensive Metabolic Panel    High Sensitivity Troponin T    CBC Auto Differential    High Sensitivity Troponin T 2Hr    NPO Diet NPO Type: Strict NPO    Undress & Gown    Continuous Pulse Oximetry    LHA (on-call MD unless specified) Details    Oxygen Therapy- Nasal Cannula; Titrate 1-6 LPM Per SpO2; 90 - 95%    ECG 12 Lead Chest Pain    ECG 12 Lead ED Triage Standing Order; Chest Pain    Insert Peripheral IV    Initiate Observation Status    CBC & Differential    Green Top (Gel)    Lavender Top    Gold Top - SST    Light Blue Top         OUTPATIENT MEDICATION MANAGEMENT:  Current Facility-Administered Medications Ordered in Epic   Medication Dose Route Frequency Provider Last Rate Last Admin    sodium chloride 0.9 %  flush 10 mL  10 mL Intravenous PRN Jc Mancuso MD         Current Outpatient Medications Ordered in Epic   Medication Sig Dispense Refill    acetaminophen (TYLENOL) 325 MG tablet Take 2 tablets by mouth Every Night.      allopurinol (ZYLOPRIM) 300 MG tablet Take 1 tablet by mouth Daily. 90 tablet 3    aspirin 81 MG tablet Take 1 tablet by mouth Daily.      atorvastatin (LIPITOR) 80 MG tablet TAKE 1 TABLET EVERY NIGHT 90 tablet 3    carvedilol (COREG) 3.125 MG tablet TAKE 1 TABLET TWICE DAILY  WITH MEALS 180 tablet 3    Cholecalciferol 2000 units capsule Take 2,000 Units by mouth Daily.      famotidine (PEPCID) 20 MG tablet Take 20 mg by mouth Daily.      loratadine (Claritin) 10 MG tablet mg Tab, Oral, Daily, 0 Refill(s)      Multiple Vitamins-Minerals (PRESERVISION AREDS 2 PO) Take  by mouth 2 (Two) Times a Day.      NIFEdipine XL (PROCARDIA XL) 30 MG 24 hr tablet TAKE ONE TABLET BY MOUTH DAILY 90 tablet 3    nitroglycerin (Nitrostat) 0.4 MG SL tablet Take 1 tablet by mouth Every 5 (Five) Minutes As Needed (CHEST PAIN: MAX DOSE 3 TABLETS). 25 tablet 6    omega-3 acid ethyl esters (LOVAZA) 1 g capsule TAKE 4 CAPSULES DAILY (TO  HOLD MEDICATION PRIOR TO   OPERATING ROOM) 360 capsule 3    ramipril (ALTACE) 10 MG capsule TAKE 1 CAPSULE EVERY       EVENING 90 capsule 3    sulfamethoxazole-trimethoprim (BACTRIM DS,SEPTRA DS) 800-160 MG per tablet       tamsulosin (FLOMAX) 0.4 MG capsule 24 hr capsule 1 capsule.           PROGRESS, DATA ANALYSIS, CONSULTS, AND MEDICAL DECISION MAKING  All labs have been independently interpreted by me.  All radiology studies have been reviewed by me. All EKG's have been independently viewed and interpreted by me.  Discussion below represents my analysis of pertinent findings related to patient's condition, differential diagnosis, treatment plan and final disposition.    Differential diagnosis includes but is not limited to ACS, pneumonia, PE.    Clinical Scores: HEART Score: 7                   ED Course as of 01/26/24 1415   Fri Jan 26, 2024   1309 EKG interpreted by me demonstrates paced complex, rate of 63 [MW]   1414 Chest x-ray interpreted by me and demonstrates no evidence of consolidation [MW]   1415 Patient with a heart score of 7.  Laboratory evaluation is notable for mild anemia, elevation of troponin at 55.  EKG is a paced rhythm.  Patient given 325 mg aspirin.  Patient will be admitted for further evaluation and management [MW]   1415 Discussed with Dr. Castillo who agrees to admit [MW]      ED Course User Index  [MW] Jc Mancuso MD             AS OF 14:15 EST VITALS:    BP - 106/80  HR - 67  TEMP - 98.7 °F (37.1 °C) (Tympanic)  O2 SATS - 99%    COMPLEXITY OF CARE  The patient requires admission.      DIAGNOSIS  Final diagnoses:   Chest pain, unspecified type         DISPOSITION  ED Disposition       ED Disposition   Decision to Admit    Condition   --    Comment   Level of Care: Telemetry [5]   Diagnosis: Chest pain [017476]   Admitting Physician: AVELINA CASTILLO [[id number]]   Attending Physician: AVELINA CASTILLO [[id number]]                  Please note that portions of this document were completed with a voice recognition program.    Note Disclaimer: At [facility], we believe that sharing information builds trust and better relationships. You are receiving this note because you recently visited [facility]. It is possible you will see health information before a provider has talked with you about it. This kind of information can be easy to misunderstand. To help you fully understand what it means for your health, we urge you to discuss this note with your provider.         Jc Mancuso MD  01/26/24 1415

## 2024-01-26 NOTE — PLAN OF CARE
Problem: Pain Acute  Goal: Acceptable Pain Control and Functional Ability  Intervention: Prevent or Manage Pain  Recent Flowsheet Documentation  Taken 1/26/2024 1605 by Josr Moreira, RN  Medication Review/Management: medications reviewed  Intervention: Optimize Psychosocial Wellbeing  Recent Flowsheet Documentation  Taken 1/26/2024 1605 by Josr Moreira, RN  Diversional Activities: television   Goal Outcome Evaluation:               Pt Alert, oriented x4, room air, A-V paced, denied chest pain, no SOA. Pt has green cath paced at [facility] on October 2023, last BM 1/25, stand by with assistance. Plan of care ongoing.

## 2024-01-27 ENCOUNTER — APPOINTMENT (OUTPATIENT)
Dept: CARDIOLOGY | Facility: HOSPITAL | Age: 84
End: 2024-01-27
Payer: MEDICARE

## 2024-01-27 LAB
ALBUMIN SERPL-MCNC: 3.3 G/DL (ref 3.5–5.2)
ALBUMIN/GLOB SERPL: 1.7 G/DL
ALP SERPL-CCNC: 79 U/L (ref 39–117)
ALT SERPL W P-5'-P-CCNC: 16 U/L (ref 1–41)
ANION GAP SERPL CALCULATED.3IONS-SCNC: 8 MMOL/L (ref 5–15)
AST SERPL-CCNC: 19 U/L (ref 1–40)
BASOPHILS # BLD AUTO: 0.04 10*3/MM3 (ref 0–0.2)
BASOPHILS NFR BLD AUTO: 0.7 % (ref 0–1.5)
BH CV LOWER VASCULAR LEFT COMMON FEMORAL AUGMENT: NORMAL
BH CV LOWER VASCULAR LEFT COMMON FEMORAL COMPETENT: NORMAL
BH CV LOWER VASCULAR LEFT COMMON FEMORAL COMPRESS: NORMAL
BH CV LOWER VASCULAR LEFT COMMON FEMORAL PHASIC: NORMAL
BH CV LOWER VASCULAR LEFT COMMON FEMORAL SPONT: NORMAL
BH CV LOWER VASCULAR RIGHT COMMON FEMORAL AUGMENT: NORMAL
BH CV LOWER VASCULAR RIGHT COMMON FEMORAL COMPETENT: NORMAL
BH CV LOWER VASCULAR RIGHT COMMON FEMORAL COMPRESS: NORMAL
BH CV LOWER VASCULAR RIGHT COMMON FEMORAL PHASIC: NORMAL
BH CV LOWER VASCULAR RIGHT COMMON FEMORAL SPONT: NORMAL
BH CV LOWER VASCULAR RIGHT DISTAL FEMORAL COMPRESS: NORMAL
BH CV LOWER VASCULAR RIGHT GASTRONEMIUS COMPRESS: NORMAL
BH CV LOWER VASCULAR RIGHT GREATER SAPH AK COMPRESS: NORMAL
BH CV LOWER VASCULAR RIGHT GREATER SAPH BK COMPRESS: NORMAL
BH CV LOWER VASCULAR RIGHT LESSER SAPH COMPRESS: NORMAL
BH CV LOWER VASCULAR RIGHT MID FEMORAL AUGMENT: NORMAL
BH CV LOWER VASCULAR RIGHT MID FEMORAL COMPETENT: NORMAL
BH CV LOWER VASCULAR RIGHT MID FEMORAL COMPRESS: NORMAL
BH CV LOWER VASCULAR RIGHT MID FEMORAL PHASIC: NORMAL
BH CV LOWER VASCULAR RIGHT MID FEMORAL SPONT: NORMAL
BH CV LOWER VASCULAR RIGHT PERONEAL COMPRESS: NORMAL
BH CV LOWER VASCULAR RIGHT POPLITEAL AUGMENT: NORMAL
BH CV LOWER VASCULAR RIGHT POPLITEAL COMPETENT: NORMAL
BH CV LOWER VASCULAR RIGHT POPLITEAL COMPRESS: NORMAL
BH CV LOWER VASCULAR RIGHT POPLITEAL PHASIC: NORMAL
BH CV LOWER VASCULAR RIGHT POPLITEAL SPONT: NORMAL
BH CV LOWER VASCULAR RIGHT POSTERIOR TIBIAL COMPRESS: NORMAL
BH CV LOWER VASCULAR RIGHT PROFUNDA FEMORAL COMPRESS: NORMAL
BH CV LOWER VASCULAR RIGHT PROXIMAL FEMORAL COMPRESS: NORMAL
BH CV LOWER VASCULAR RIGHT SAPHENOFEMORAL JUNCTION COMPRESS: NORMAL
BILIRUB SERPL-MCNC: 0.4 MG/DL (ref 0–1.2)
BUN SERPL-MCNC: 15 MG/DL (ref 8–23)
BUN/CREAT SERPL: 13.8 (ref 7–25)
CALCIUM SPEC-SCNC: 8.8 MG/DL (ref 8.6–10.5)
CHLORIDE SERPL-SCNC: 102 MMOL/L (ref 98–107)
CO2 SERPL-SCNC: 24 MMOL/L (ref 22–29)
CREAT SERPL-MCNC: 1.09 MG/DL (ref 0.76–1.27)
D DIMER PPP FEU-MCNC: 1.55 MCGFEU/ML (ref 0–0.83)
DEPRECATED RDW RBC AUTO: 53.5 FL (ref 37–54)
EGFRCR SERPLBLD CKD-EPI 2021: 67.3 ML/MIN/1.73
EOSINOPHIL # BLD AUTO: 0.22 10*3/MM3 (ref 0–0.4)
EOSINOPHIL NFR BLD AUTO: 3.9 % (ref 0.3–6.2)
ERYTHROCYTE [DISTWIDTH] IN BLOOD BY AUTOMATED COUNT: 14.8 % (ref 12.3–15.4)
GLOBULIN UR ELPH-MCNC: 1.9 GM/DL
GLUCOSE SERPL-MCNC: 74 MG/DL (ref 65–99)
HCT VFR BLD AUTO: 30.9 % (ref 37.5–51)
HGB BLD-MCNC: 10.3 G/DL (ref 13–17.7)
IMM GRANULOCYTES # BLD AUTO: 0.02 10*3/MM3 (ref 0–0.05)
IMM GRANULOCYTES NFR BLD AUTO: 0.4 % (ref 0–0.5)
LYMPHOCYTES # BLD AUTO: 1.39 10*3/MM3 (ref 0.7–3.1)
LYMPHOCYTES NFR BLD AUTO: 24.4 % (ref 19.6–45.3)
MAGNESIUM SERPL-MCNC: 1.8 MG/DL (ref 1.6–2.4)
MCH RBC QN AUTO: 32.9 PG (ref 26.6–33)
MCHC RBC AUTO-ENTMCNC: 33.3 G/DL (ref 31.5–35.7)
MCV RBC AUTO: 98.7 FL (ref 79–97)
MONOCYTES # BLD AUTO: 0.5 10*3/MM3 (ref 0.1–0.9)
MONOCYTES NFR BLD AUTO: 8.8 % (ref 5–12)
NEUTROPHILS NFR BLD AUTO: 3.52 10*3/MM3 (ref 1.7–7)
NEUTROPHILS NFR BLD AUTO: 61.8 % (ref 42.7–76)
NRBC BLD AUTO-RTO: 0 /100 WBC (ref 0–0.2)
NT-PROBNP SERPL-MCNC: 8442 PG/ML (ref 0–1800)
PLATELET # BLD AUTO: 171 10*3/MM3 (ref 140–450)
PMV BLD AUTO: 10.7 FL (ref 6–12)
POTASSIUM SERPL-SCNC: 4.9 MMOL/L (ref 3.5–5.2)
PROT SERPL-MCNC: 5.2 G/DL (ref 6–8.5)
RBC # BLD AUTO: 3.13 10*6/MM3 (ref 4.14–5.8)
SODIUM SERPL-SCNC: 134 MMOL/L (ref 136–145)
TROPONIN T SERPL HS-MCNC: 60 NG/L
WBC NRBC COR # BLD AUTO: 5.69 10*3/MM3 (ref 3.4–10.8)

## 2024-01-27 PROCEDURE — 85025 COMPLETE CBC W/AUTO DIFF WBC: CPT | Performed by: INTERNAL MEDICINE

## 2024-01-27 PROCEDURE — 96374 THER/PROPH/DIAG INJ IV PUSH: CPT

## 2024-01-27 PROCEDURE — 83735 ASSAY OF MAGNESIUM: CPT | Performed by: INTERNAL MEDICINE

## 2024-01-27 PROCEDURE — 97162 PT EVAL MOD COMPLEX 30 MIN: CPT

## 2024-01-27 PROCEDURE — 25010000002 ENOXAPARIN PER 10 MG: Performed by: INTERNAL MEDICINE

## 2024-01-27 PROCEDURE — 99214 OFFICE O/P EST MOD 30 MIN: CPT | Performed by: INTERNAL MEDICINE

## 2024-01-27 PROCEDURE — 96372 THER/PROPH/DIAG INJ SC/IM: CPT

## 2024-01-27 PROCEDURE — G0378 HOSPITAL OBSERVATION PER HR: HCPCS

## 2024-01-27 PROCEDURE — 84484 ASSAY OF TROPONIN QUANT: CPT | Performed by: INTERNAL MEDICINE

## 2024-01-27 PROCEDURE — 93971 EXTREMITY STUDY: CPT

## 2024-01-27 PROCEDURE — 80053 COMPREHEN METABOLIC PANEL: CPT | Performed by: INTERNAL MEDICINE

## 2024-01-27 PROCEDURE — 85379 FIBRIN DEGRADATION QUANT: CPT | Performed by: INTERNAL MEDICINE

## 2024-01-27 PROCEDURE — 25010000002 FUROSEMIDE PER 20 MG: Performed by: INTERNAL MEDICINE

## 2024-01-27 PROCEDURE — 83880 ASSAY OF NATRIURETIC PEPTIDE: CPT | Performed by: INTERNAL MEDICINE

## 2024-01-27 PROCEDURE — 97116 GAIT TRAINING THERAPY: CPT

## 2024-01-27 RX ORDER — FLUOXETINE HYDROCHLORIDE 20 MG/1
20 CAPSULE ORAL DAILY
Status: DISCONTINUED | OUTPATIENT
Start: 2024-01-27 | End: 2024-01-28 | Stop reason: HOSPADM

## 2024-01-27 RX ORDER — ISOSORBIDE MONONITRATE 30 MG/1
30 TABLET, EXTENDED RELEASE ORAL
Status: DISCONTINUED | OUTPATIENT
Start: 2024-01-27 | End: 2024-01-28 | Stop reason: HOSPADM

## 2024-01-27 RX ORDER — FUROSEMIDE 10 MG/ML
40 INJECTION INTRAMUSCULAR; INTRAVENOUS ONCE
Status: COMPLETED | OUTPATIENT
Start: 2024-01-27 | End: 2024-01-27

## 2024-01-27 RX ORDER — ENOXAPARIN SODIUM 100 MG/ML
40 INJECTION SUBCUTANEOUS EVERY 24 HOURS
Status: DISCONTINUED | OUTPATIENT
Start: 2024-01-27 | End: 2024-01-28 | Stop reason: HOSPADM

## 2024-01-27 RX ORDER — CLOPIDOGREL BISULFATE 75 MG/1
75 TABLET ORAL DAILY
Status: DISCONTINUED | OUTPATIENT
Start: 2024-01-27 | End: 2024-01-28 | Stop reason: HOSPADM

## 2024-01-27 RX ADMIN — ALLOPURINOL 300 MG: 300 TABLET ORAL at 08:18

## 2024-01-27 RX ADMIN — Medication 10 ML: at 20:47

## 2024-01-27 RX ADMIN — ASPIRIN 81 MG: 81 TABLET, CHEWABLE ORAL at 08:18

## 2024-01-27 RX ADMIN — ISOSORBIDE MONONITRATE 30 MG: 30 TABLET, EXTENDED RELEASE ORAL at 16:02

## 2024-01-27 RX ADMIN — FUROSEMIDE 40 MG: 10 INJECTION, SOLUTION INTRAMUSCULAR; INTRAVENOUS at 16:02

## 2024-01-27 RX ADMIN — ENOXAPARIN SODIUM 40 MG: 100 INJECTION SUBCUTANEOUS at 12:59

## 2024-01-27 RX ADMIN — DOCUSATE SODIUM 50MG AND SENNOSIDES 8.6MG 2 TABLET: 8.6; 5 TABLET, FILM COATED ORAL at 08:21

## 2024-01-27 RX ADMIN — FLUOXETINE HYDROCHLORIDE 20 MG: 20 CAPSULE ORAL at 16:02

## 2024-01-27 RX ADMIN — CLOPIDOGREL BISULFATE 75 MG: 75 TABLET, FILM COATED ORAL at 12:59

## 2024-01-27 RX ADMIN — TAMSULOSIN HYDROCHLORIDE 0.4 MG: 0.4 CAPSULE ORAL at 08:18

## 2024-01-27 RX ADMIN — ATORVASTATIN CALCIUM 80 MG: 80 TABLET, FILM COATED ORAL at 20:46

## 2024-01-27 RX ADMIN — NIFEDIPINE 30 MG: 30 TABLET, FILM COATED, EXTENDED RELEASE ORAL at 08:18

## 2024-01-27 RX ADMIN — Medication 10 ML: at 08:21

## 2024-01-27 RX ADMIN — CARVEDILOL 3.12 MG: 3.12 TABLET, FILM COATED ORAL at 08:18

## 2024-01-27 RX ADMIN — Medication 2000 UNITS: at 08:18

## 2024-01-27 RX ADMIN — LISINOPRIL 40 MG: 40 TABLET ORAL at 08:18

## 2024-01-27 RX ADMIN — FAMOTIDINE 20 MG: 20 TABLET, FILM COATED ORAL at 08:18

## 2024-01-27 RX ADMIN — CARVEDILOL 3.12 MG: 3.12 TABLET, FILM COATED ORAL at 18:03

## 2024-01-27 NOTE — THERAPY EVALUATION
Patient Name: Javier Grant  : 1940    MRN: [id number]                              Today's Date: 2024       Admit Date: 2024    Visit Dx:     ICD-10-CM ICD-9-CM   1. 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    Hiatal hernia     History of MI (myocardial infarction)         HL (hearing loss)     Hyperlipidemia     Hypertension     Macular degeneration     rt eye    Mitral valve prolapse     Myocardial infarction     Nonrheumatic aortic (valve) insufficiency     Nonrheumatic aortic (valve) stenosis     RBBB (right bundle branch block)     Sleep apnea     USES CPAP    Umbilical hernia     Vitamin D deficiency      Past Surgical History:   Procedure Laterality Date    CARDIAC CATHETERIZATION Left 10/09/2015    Dr. Robin Kingston    CARDIAC ELECTROPHYSIOLOGY PROCEDURE N/A 2022    Procedure:  Pacemaker DC ila CADENA;  Surgeon: Serafin Francois MD;  Location: [facility] CATH INVASIVE LOCATION;  Service: Cardiology;  Laterality: N/A;    CARDIAC SURGERY  1990    triple bypass    CATARACT EXTRACTION Bilateral     CORONARY ANGIOPLASTY WITH STENT PLACEMENT  2015    CORONARY ARTERY BYPASS GRAFT  1990    3 VESSELS    ENDOSCOPY N/A 05/26/2017    Procedure: ESOPHAGOGASTRODUODENOSCOPY WITH COLD BIOIPSIES AND BALLOON DILITATION SIZE 15, 16.5, 18;  Surgeon: Jerry SNOWDEN MD;  Location: [facility] ENDOSCOPY;  Service:     INSERT / REPLACE / REMOVE PACEMAKER  03/05/2022    MCCOY'S NEUROMA EXCISION Right     TONSILLECTOMY      UMBILICAL HERNIA REPAIR N/A 10/09/2017    Procedure: UMBILICAL HERNIA REPAIR;  Surgeon: Blake Kelly Jr., MD;  Location: [facility] MAIN OR;  Service:       General Information       Row Name 01/27/24 1121          Physical Therapy Time and Intention    Document Type evaluation  -     Mode of Treatment physical therapy;individual therapy  -       Row Name 01/27/24 1121          General Information    Patient Profile Reviewed yes  -     Existing Precautions/Restrictions fall  -     Barriers to Rehab none identified  -       Row Name 01/27/24 1121          Living Environment    People in Home child(dontrell), adult;spouse  -       Row Name 01/27/24 1121          Home Main Entrance    Number of Stairs, Main Entrance other (see comments)  lives in tri-level home  -       Row Name 01/27/24 1121          Stairs Within Home, Primary    Number of Stairs, Within Home, Primary other (see comments)  lives in tri-level home  -       Row Name 01/27/24 1121          Cognition    Orientation Status (Cognition) oriented x 3  -       Row Name 01/27/24 1121          Safety Issues, Functional Mobility    Safety Issues Affecting Function (Mobility) awareness of need for assistance  -     Impairments Affecting Function (Mobility) balance;strength;shortness of breath;endurance/activity tolerance  -                User Key  (r) = Recorded By, (t) = Taken By, (c) = Cosigned By      Initials Name Provider Type     Layla Nuñez PT Physical Therapist                   Mobility       Row Name 01/27/24 1122          Bed Mobility    Comment, (Bed Mobility) UIC  -       Row Name 01/27/24 1122          Sit-Stand Transfer    Sit-Stand Weed (Transfers) contact guard;verbal cues;1 person assist  -     Assistive Device (Sit-Stand Transfers) other (see comments)  no AD  -       Row Name 01/27/24 1122          Gait/Stairs (Locomotion)    Weed Level (Gait) contact guard;1 person assist  -     Assistive Device (Gait) other (see comments)  no AD  -     Distance in Feet (Gait) 120'  -     Deviations/Abnormal Patterns (Gait) gait speed decreased  -     Bilateral Gait Deviations forward flexed posture;heel strike decreased  -     Comment, (Gait/Stairs) narrow REGAN  -               User Key  (r) = Recorded By, (t) = Taken By, (c) = Cosigned By      Initials Name Provider Type     Layla Nuñez PT Physical Therapist                   Obj/Interventions       [facility] Name 01/27/24 1122          Range of Motion Comprehensive    General Range of Motion no range of motion deficits identified  -[facility] Name 01/27/24 1122          Strength Comprehensive (MMT)    General Manual Muscle Testing (MMT) Assessment lower extremity strength deficits identified  -     Comment, General Manual Muscle Testing (MMT) Assessment generalized weakness  -[facility] Name 01/27/24 1122          Balance    Balance Assessment standing static balance  -     Static Standing Balance standby assist;verbal cues  -     Position/Device Used, Standing Balance unsupported  -     Balance Interventions standing  -MH       Row Name 01/27/24 1122          Sensory Assessment (Somatosensory)    Sensory Assessment (Somatosensory) sensation intact  -               User Key  (r) = Recorded By, (t) = Taken By, (c) = Cosigned  By      Initials Name Provider Type     Layla Nuñez, PT Physical Therapist                   Goals/Plan       Row Name 01/27/24 1126          Bed Mobility Goal 1 (PT)    Activity/Assistive Device (Bed Mobility Goal 1, PT) sit to supine;supine to sit  -     Phelps Level/Cues Needed (Bed Mobility Goal 1, PT) independent  -MH     Time Frame (Bed Mobility Goal 1, PT) 1 week  -[facility] Name 01/27/24 1126          Transfer Goal 1 (PT)    Activity/Assistive Device (Transfer Goal 1, PT) sit-to-stand/stand-to-sit;bed-to-chair/chair-to-bed  -     Phelps Level/Cues Needed (Transfer Goal 1, PT) standby assist  -     Time Frame (Transfer Goal 1, PT) 1 week  -[facility] Name 01/27/24 1126          Gait Training Goal 1 (PT)    Activity/Assistive Device (Gait Training Goal 1, PT) gait (walking locomotion);walker, rolling  -     Phelps Level (Gait Training Goal 1, PT) standby assist  -     Distance (Gait Training Goal 1, PT) 75  -     Time Frame (Gait Training Goal 1, PT) 1 week  -[facility] Name 01/27/24 1126          Therapy Assessment/Plan (PT)    Planned Therapy Interventions (PT) balance training;bed mobility training;gait training;home exercise program;patient/family education;stretching;strengthening;stair training;ROM (range of motion);transfer training;postural re-education;neuromuscular re-education  -               User Key  (r) = Recorded By, (t) = Taken By, (c) = Cosigned By      Initials Name Provider Type     Layla Nuñez, PT Physical Therapist                   Clinical Impression       Row Name 01/27/24 1123          Pain    Pretreatment Pain Rating 0/10 - no pain  -     Posttreatment Pain Rating 0/10 - no pain  -[facility] Name 01/27/24 1123          Plan of Care Review    Plan of Care Reviewed With patient  -     Outcome Evaluation Patient is an 83-year-old male admitted with reports of worsening chest pain.  He was admitted in December with MI where he had  cardiac catheterization done and was noted to have significant occlusion of his bypass however they were unable to perform intervention at that time due to heavy calcification.Past medical history includes CAD s/p coronary bypass, HTN, HLD, sick sinus.  He lives with spouse and daughter in trilevel home, does not use AD at baseline and denies falls.  He has permanent indwelling catheter.  He is functioning below baseline with impaired endurance, strength and balance.  He required CGA for STS and ambulation for 120 feet with no AD.  Demonstrated mild instability and narrow base of support but no evidence of LOB.  He denies concerns with returning home, however, does feel generally weaker than baseline, may benefit from  PT at [place] to further progress strength and endurance.  Will continue to monitor  -       Row Name 01/27/24 1123          Therapy Assessment/Plan (PT)    Rehab Potential (PT) good, to achieve stated therapy goals  -     Criteria for Skilled Interventions Met (PT) yes  -     Therapy Frequency (PT) 5 times/wk  -     Predicted Duration of Therapy Intervention (PT) 1 week  -       Row Name 01/27/24 1123          Positioning and Restraints    Pre-Treatment Position sitting in chair/recliner  -     Post Treatment Position chair  -MH     In Chair reclined;sitting;call light within reach;encouraged to call for assist;exit alarm on;with other staff  MD present at end of PT session  -               User Key  (r) = Recorded By, (t) = Taken By, (c) = Cosigned By      Initials Name Provider Type     Layla Nuñez, PT Physical Therapist                   Outcome Measures       Row Name 01/27/24 1126          How much help from another person do you currently need...    Turning from your back to your side while in flat bed without using bedrails? 4  -MH     Moving from lying on back to sitting on the side of a flat bed without bedrails? 4  -MH     Moving to and from a bed to a chair (including a  wheelchair)? 4  -MH     Standing up from a chair using your arms (e.g., wheelchair, bedside chair)? 4  -MH     Climbing 3-5 steps with a railing? 3  -MH     To walk in hospital room? 3  -     AM-PAC 6 Clicks Score (PT) 22  -     Highest Level of Mobility Goal 7 --> Walk 25 feet or more  -       Row Name 01/27/24 1126          Functional Assessment    Outcome Measure Options AM-PAC 6 Clicks Basic Mobility (PT)  -               User Key  (r) = Recorded By, (t) = Taken By, (c) = Cosigned By      Initials Name Provider Type     Layla Nuñez, PT Physical Therapist                                 Physical Therapy Education       Title: PT OT SLP Therapies (In Progress)       Topic: Physical Therapy (In Progress)       Point: Mobility training (Done)       Learning Progress Summary             Patient Acceptance, E,TB,D, VU,DU,NR by  at 1/27/2024 1126                         Point: Home exercise program (Not Started)       Learner Progress:  Not documented in this visit.              Point: Body mechanics (Done)       Learning Progress Summary             Patient Acceptance, E,TB,D, VU,DU,NR by  at 1/27/2024 1126                         Point: Precautions (Done)       Learning Progress Summary             Patient Acceptance, E,TB,D, VU,DU,NR by  at 1/27/2024 1126                                         User Key       Initials Effective Dates Name Provider Type [facility] 05/26/20 -  Layla Nuñez, JOANNE Physical Therapist PT                  PT Recommendation and Plan  Planned Therapy Interventions (PT): balance training, bed mobility training, gait training, home exercise program, patient/family education, stretching, strengthening, stair training, ROM (range of motion), transfer training, postural re-education, neuromuscular re-education  Plan of Care Reviewed With: patient  Outcome Evaluation: Patient is an 83-year-old male admitted with reports of worsening chest pain.  He was admitted in  December with MI where he had cardiac catheterization done and was noted to have significant occlusion of his bypass however they were unable to perform intervention at that time due to heavy calcification.Past medical history includes CAD s/p coronary bypass, HTN, HLD, sick sinus.  He lives with spouse and daughter in trilevel home, does not use AD at baseline and denies falls.  He has permanent indwelling catheter.  He is functioning below baseline with impaired endurance, strength and balance.  He required CGA for STS and ambulation for 120 feet with no AD.  Demonstrated mild instability and narrow base of support but no evidence of LOB.  He denies concerns with returning home, however, does feel generally weaker than baseline, may benefit from  PT at [place] to further progress strength and endurance.  Will continue to monitor     Time Calculation:         PT Charges       Row Name 01/27/24 1126             Time Calculation    Start Time 1011  -      Stop Time 1021  -      Time Calculation (min) 10 min  -      PT Received On 01/27/24  -      PT - Next Appointment 01/29/24  -      PT Goal Re-Cert Due Date 02/03/24  -         Time Calculation- PT    Total Timed Code Minutes- PT 8 minute(s)  -         Timed Charges    48543 - Gait Training Minutes  8  -MH         Total Minutes    Timed Charges Total Minutes 8  -MH       Total Minutes 8  -MH                User Key  (r) = Recorded By, (t) = Taken By, (c) = Cosigned By      Initials Name Provider Type     Layla Nuñez, PT Physical Therapist                  Therapy Charges for Today       Code Description Service Date Service Provider Modifiers Qty    79788550853 HC GAIT TRAINING EA 15 MIN 1/27/2024 Layla Nuñez, PT GP 1    17572143479 HC PT EVAL MOD COMPLEXITY 1 1/27/2024 Layla Nuñez, PT GP 1            PT G-Codes  Outcome Measure Options: AM-PAC 6 Clicks Basic Mobility (PT)  AM-PAC 6 Clicks Score (PT): 22  PT Discharge Summary  Anticipated  Discharge Disposition (PT): home with home health, home with assist    Layla Nuñez, PT  1/27/2024

## 2024-01-27 NOTE — CONSULTS
Patient Name: Javier Grant  :1940  83 y.o.    Date of Admission: 2024  Date of Consultation:  24  Encounter Provider: Raven Calhoun MD  Place of Service: [facility] CARDIOLOGY  Referring Provider: Jc Mancuso MD  Patient Care Team:  Sherron Gray MD as PCP - General (Pediatrics)  Robin Kingston Jr., MD as Consulting Physician (Cardiology)  Leonard Byrne MD as Consulting Physician (Ophthalmology)      Chief complaint:    History of Present Illness:    He has a history of coronary artery disease, aortic valve stenosis, hypertension, sick sinus syndrome with presence of pacemaker, PVCs and right bundle branch block.  In , he underwent CABG x3 (SVG to LAD, SVG to OM1 and SVG to OM 2.  His anginal equivalent is always been chest pain, mainly in the center of his chest.  In , he underwent left heart cath which showed occlusion of all native vessels proximally, although there was 70% stenosis in mid SVG to OM 2.  He underwent placement of 4.0 x 15 mm Resolute JORGE to this lesion, as well as, placement of 3.0 x 12 mm Xience Alpine JORGE to anastomotic site of one of the grafts to the OM branches.  The LAD and saphenous vein graft to LAD collateralized all 3 major branches distally (circumflex, RCA and ramus)     He was admitted to [facility] in 2023 with what sounds like orthopnea, as well as UTI related to a chronic indwelling Stanley catheter.  He had gram-negative bacteremia due to E. coli.  He had a non-STEMI with troponins which were elevated..  His EF was found to be newly reduced in the 45% range, mild to moderate AS.  He had a repeat cardiac catheterization which showed occluded left main, occluded RCA, patent SVG to LAD collaterals to the RCA diagonal and marginal, SVG to OM1 had a 95% stenosis.  Dr. Colvin attempted intervention however this was unsuccessful with various  style techniques, and the distal vessel eventually closed.  He was  therefore treated medically.    It does not appear that his medications were changed at discharge.  He presented again to the hospital last night with orthopnea.  He describes a heaviness in the chest which occurs when laying flat and improves with sitting upright.  He has some exertional dyspnea and new lower extremity edema bilaterally, right worse than left.  He denies any palpitations dizziness or syncope.    His BNP is elevated.  At [place] it was 300.  Today it is greater than 8000.  His troponin isMinimally elevated from 55-60.  He is hyponatremic.  Creatinine is normal.  Past Medical History:   Diagnosis Date    Abnormal ECG 1980    Arthritis     Bleeds easily     WAS TOLD THAT AFTER CABG    CAD (coronary artery disease)     Clotting disorder 1990    Noted bu surgeon when I had the heart bypasses    Colon polyp     Disorder of aorta     Dizziness     SKELTON (dyspnea on exertion)     Enlarged prostate     Gastritis     Gout     Heart murmur ?    Hiatal hernia     History of MI (myocardial infarction)     1990    HL (hearing loss)     Hyperlipidemia     Hypertension     Macular degeneration     rt eye    Mitral valve prolapse 1990    Myocardial infarction 1990    Nonrheumatic aortic (valve) insufficiency     Nonrheumatic aortic (valve) stenosis     RBBB (right bundle branch block)     Sleep apnea     USES CPAP    Umbilical hernia     Vitamin D deficiency        Past Surgical History:   Procedure Laterality Date    CARDIAC CATHETERIZATION Left 10/09/2015    Dr. Robin Kingston    CARDIAC ELECTROPHYSIOLOGY PROCEDURE N/A 03/04/2022    Procedure: Pacemaker DC new  [place];  Surgeon: Serafin Francois MD;  Location: [facility] INVASIVE LOCATION;  Service: Cardiology;  Laterality: N/A;    CARDIAC SURGERY  1990    triple bypass    CATARACT EXTRACTION Bilateral     CORONARY ANGIOPLASTY WITH STENT PLACEMENT  2015    CORONARY ARTERY BYPASS GRAFT  1990    3 VESSELS    ENDOSCOPY N/A 05/26/2017    Procedure:  ESOPHAGOGASTRODUODENOSCOPY WITH COLD BIOIPSIES AND BALLOON DILITATION SIZE 15, 16.5, 18;  Surgeon: Jerry SNOWDEN MD;  Location: [facility] ENDOSCOPY;  Service:     INSERT / REPLACE / REMOVE PACEMAKER  03/05/2022    MCCOY'S NEUROMA EXCISION Right     TONSILLECTOMY      UMBILICAL HERNIA REPAIR N/A 10/09/2017    Procedure: UMBILICAL HERNIA REPAIR;  Surgeon: Blake Kelly Jr., MD;  Location: [facility] MAIN OR;  Service:          Prior to Admission medications    Medication Sig Start Date End Date Taking? Authorizing Provider   allopurinol (ZYLOPRIM) 300 MG tablet Take 1 tablet by mouth Daily. 7/31/20  Yes Stanton Sotomayor Jr., MD   aspirin 81 MG tablet Take 1 tablet by mouth Daily. 7/14/14  Yes Doretha Renee MD   atorvastatin (LIPITOR) 80 MG tablet TAKE 1 TABLET EVERY NIGHT 11/27/23  Yes Hodan Avila APRN   carvedilol (COREG) 3.125 MG tablet TAKE 1 TABLET TWICE DAILY  WITH MEALS 4/10/23  Yes Amy Angel APRN   Cholecalciferol 2000 units capsule Take 1 capsule by mouth Daily.   Yes ProviderDoretha MD   clopidogrel (PLAVIX) 75 MG tablet Take 1 tablet by mouth Daily.   Yes ProviderDoretha MD   famotidine (PEPCID) 20 MG tablet Take 1 tablet by mouth Daily.   Yes ProviderDoretha MD   FLUoxetine (PROzac) 20 MG capsule Take 1 capsule by mouth Daily.   Yes Provider, MD Doretha   loratadine (Claritin) 10 MG tablet mg Tab, Oral, Daily, 0 Refill(s) 11/8/22  Yes ProviderDoretha MD   Multiple Vitamins-Minerals (PRESERVISION AREDS 2 PO) Take  by mouth 2 (Two) Times a Day.   Yes ProviderDoretha MD   NIFEdipine XL (PROCARDIA XL) 30 MG 24 hr tablet TAKE ONE TABLET BY MOUTH DAILY 12/26/23  Yes Evelio Mcneal MD   omega-3 acid ethyl esters (LOVAZA) 1 g capsule TAKE 4 CAPSULES DAILY (TO  HOLD MEDICATION PRIOR TO   OPERATING ROOM) 10/2/23  Yes Evelio Mcneal MD   ramipril (ALTACE) 10 MG capsule TAKE 1 CAPSULE EVERY       EVENING 11/28/23  Yes Evelio Mcneal  MD   tamsulosin (FLOMAX) 0.4 MG capsule 24 hr capsule 1 capsule. 22  Yes Provider, Historical, MD   acetaminophen (TYLENOL) 325 MG tablet Take 2 tablets by mouth Every Night.    Provider, Historical, MD   nitroglycerin (Nitrostat) 0.4 MG SL tablet Take 1 tablet by mouth Every 5 (Five) Minutes As Needed (CHEST PAIN: MAX DOSE 3 TABLETS). 23   Evelio Mcneal MD       Allergies   Allergen Reactions    Ciprofloxacin Diarrhea       Social History     Socioeconomic History    Marital status:    Tobacco Use    Smoking status: Former     Packs/day: 0.00     Years: 15.00     Additional pack years: 0.00     Total pack years: 0.00     Types: Cigarettes     Start date: 1957     Quit date: 1980     Years since quittin.1    Smokeless tobacco: Never   Vaping Use    Vaping Use: Never used   Substance and Sexual Activity    Alcohol use: No    Drug use: Never    Sexual activity: Not Currently     Partners: Female     Birth control/protection: Post-menopausal, None       Family History   Problem Relation Age of Onset    Depression Mother     Colon polyps Mother     Heart attack Father     Aneurysm Father     Stomach cancer Brother     Malig Hyperthermia Neg Hx        REVIEW OF SYSTEMS:   All systems reviewed.  Pertinent positives identified in HPI.  All other systems are negative.      Objective:     Vitals:    24 2345 24 0729 24 1320 24 1321   BP: 134/67 114/66  109/72   BP Location: Left arm Right arm  Right arm   Patient Position: Lying Lying  Lying   Pulse: 71 63 60 60   Resp: 18 18  18   Temp: 98.8 °F (37.1 °C) 97.9 °F (36.6 °C)  97.9 °F (36.6 °C)   TempSrc: Oral Oral  Oral   SpO2: 96% 95% 98% 98%   Weight:       Height:         Body mass index is 22.61 kg/m².    General Appearance:    Alert, cooperative, in no acute distress   Head:    Normocephalic, without obvious abnormality, atraumatic   Eyes:            Lids and lashes normal, conjunctivae and sclerae normal, no  icterus, no pallor, corneas clear, PERRLA   Ears:    Ears appear intact with no abnormalities noted   Throat:   No oral lesions, no thrush, oral mucosa moist   Neck:   No adenopathy, supple, trachea midline, no thyromegaly, no carotid bruit, no JVD   Back:     No kyphosis present, no scoliosis present, no skin lesions, erythema or scars, no tenderness to percussion or palpation, range of motion normal   Lungs:   Faint rales, respirations regular, even and unlabored    Heart:    Regular rhythm and normal rate, normal S1 and S2, 3 out of 6 AS murmur murmur, no gallop, no rub, no click   Chest Wall:    No abnormalities observed   Abdomen:     Normal bowel sounds, no masses, no organomegaly, soft, nontender, nondistended, no guarding, no rebound  tenderness   Extremities:   Moves all extremities well, +1 left lower extremity edema, +2 right lower extremity edema no cyanosis, no redness   Pulses:   Pulses palpable and equal bilaterally. Normal radial, carotid, femoral, dorsalis pedis and posterior tibial pulses bilaterally. Normal abdominal aorta   Skin:  Psychiatric:   No bleeding, bruising or rash    Alert and oriented x 3, normal mood and affect   Lab Review:     Results from last 7 days   Lab Units 01/27/24  0323   SODIUM mmol/L 134*   POTASSIUM mmol/L 4.9   CHLORIDE mmol/L 102   CO2 mmol/L 24.0   BUN mg/dL 15   CREATININE mg/dL 1.09   CALCIUM mg/dL 8.8   BILIRUBIN mg/dL 0.4   ALK PHOS U/L 79   ALT (SGPT) U/L 16   AST (SGOT) U/L 19   GLUCOSE mg/dL 74     Results from last 7 days   Lab Units 01/27/24  0323 01/26/24  1501 01/26/24  1302   HSTROP T ng/L 60* 44* 55*     Results from last 7 days   Lab Units 01/27/24  0323   WBC 10*3/mm3 5.69   HEMOGLOBIN g/dL 10.3*   HEMATOCRIT % 30.9*   PLATELETS 10*3/mm3 171         Results from last 7 days   Lab Units 01/27/24  0323   MAGNESIUM mg/dL 1.8                   I personally viewed and interpreted the patient's EKG/Telemetry data.        Assessment and Plan:       1.  Acute  CHF: Elevated BNP, orthopnea lower extremity edema.  Was recently discharged from [place] with a newly reduced EF of 45%.  Not taking a daily diuretic at home.  Will give IV Lasix x 1 today.  Will and reassess output.  2.  Chronic CAD with recent non-STEMI 1223.  Native vessels are occluded.  Patent SVG to LAD.  95% SVG to OM1 with failed attempted PCI at [place] 12/23, presumably now this graft is also occluded as they lost distal flow during that intervention attempt.  Uptitrate antianginals as tolerated.  I think it is okay to continue Procardia as an antianginal despite heart failure.  3.  Mild to moderate aortic stenosis  4.  Chronic indwelling Stanley catheter recent UTI with E. coli bacteremia    Raven Calhoun MD  01/27/24  16:07 EST

## 2024-01-27 NOTE — PLAN OF CARE
Goal Outcome Evaluation:  Plan of Care Reviewed With: patient           Outcome Evaluation: Patient is an 83-year-old male admitted with reports of worsening chest pain.  He was admitted in December with MI where he had cardiac catheterization done and was noted to have significant occlusion of his bypass however they were unable to perform intervention at that time due to heavy calcification.Past medical history includes CAD s/p coronary bypass, HTN, HLD, sick sinus.  He lives with spouse and daughter in trilevel home, does not use AD at baseline and denies falls.  He has permanent indwelling catheter.  He is functioning below baseline with impaired endurance, strength and balance.  He required CGA for STS and ambulation for 120 feet with no AD.  Demonstrated mild instability and narrow base of support but no evidence of LOB.  He denies concerns with returning home, however, does feel generally weaker than baseline, may benefit from  PT at [facility] to further progress strength and endurance.  Will continue to monitor      Anticipated Discharge Disposition (PT): home with home health, home with assist

## 2024-01-27 NOTE — PROGRESS NOTES
Name: Javier Grant ADMIT: 2024   : 1940  PCP: Sherron Gray MD    MRN: [id number] LOS: 0 days   AGE/SEX: 83 y.o. male  ROOM: [facility]     Subjective   Subjective   No more chest pain or tightness since admission.  No palpitation.  No shortness of breath.  No cough.  No wheeze.  No hemoptysis.  Positive right lower extremity edema.    Review of Systems  GI.  No abdominal pain or nausea or vomiting.  .  No dysuria or hematuria.  CNS.  No loss of consciousness or dizziness.  No focal neurological symptoms.     Objective   Objective   Vital Signs  Temp:  [97.5 °F (36.4 °C)-98.8 °F (37.1 °C)] 97.9 °F (36.6 °C)  Heart Rate:  [60-71] 63  Resp:  [18] 18  BP: (106-134)/(60-95) 114/66  SpO2:  [95 %-100 %] 95 %  on   ;   Device (Oxygen Therapy): room air    Intake/Output Summary (Last 24 hours) at 2024 1026  Last data filed at 2024 1605  Gross per 24 hour   Intake --   Output 700 ml   Net -700 ml     Body mass index is 22.61 kg/m².      24  1540 24  1629   Weight: 73.5 kg (162 lb 0.6 oz) 73.5 kg (162 lb 0.6 oz)     Physical Exam  General.  Elderly gentleman.  He is alert and oriented x 4.  No apparent pain/distress/diaphoresis.  Normal mood and affect.  Eyes.  Pupils equal round and reactive.  Intact extraocular musculature.  No pallor or jaundice.  Oral cavity.  Moist mucous membrane.  Neck.  Supple.  No JVD.  No lymphadenopathy or thyromegaly.  Cardiovascular.  Regular rate and rhythm and grade 3 systolic murmur.  Chest.  Clear to auscultation bilaterally with no added sounds.  Abdomen.  Soft lax.  No tenderness.  No organomegaly.  No guarding or rebound.  Extremities.  No clubbing/cyanosis.  +1 to +2 right lower extremity edema with no calf tenderness.  CNS.  No acute focal neurological deficits.    Results Review:      Results from last 7 days   Lab Units 24  0323 01/26/24  1302   SODIUM mmol/L 134* 134*   POTASSIUM mmol/L 4.9 4.7   CHLORIDE mmol/L 102 99   CO2 mmol/L 24.0  "23.2   BUN mg/dL 15 14   CREATININE mg/dL 1.09 0.99   GLUCOSE mg/dL 74 104*   CALCIUM mg/dL 8.8 9.2   AST (SGOT) U/L 19 23   ALT (SGPT) U/L 16 17     Estimated Creatinine Clearance: 53.4 mL/min (by C-G formula based on SCr of 1.09 mg/dL).          Results from last 7 days   Lab Units 01/27/24  0323 01/26/24  1501 01/26/24  1302   HSTROP T ng/L 60* 44* 55*             Results from last 7 days   Lab Units 01/27/24  0323   MAGNESIUM mg/dL 1.8           Invalid input(s): \"LDLCALC\"  Results from last 7 days   Lab Units 01/27/24  0323 01/26/24  1302   WBC 10*3/mm3 5.69 5.75   HEMOGLOBIN g/dL 10.3* 11.9*   HEMATOCRIT % 30.9* 36.0*   PLATELETS 10*3/mm3 171 178   MCV fL 98.7* 98.6*   MCH pg 32.9 32.6   MCHC g/dL 33.3 33.1   RDW % 14.8 14.8   RDW-SD fl 53.5 54.3*   MPV fL 10.7 10.1   NEUTROPHIL % % 61.8 74.3   LYMPHOCYTE % % 24.4 15.0*   MONOCYTES % % 8.8 7.3   EOSINOPHIL % % 3.9 2.6   BASOPHIL % % 0.7 0.5   IMM GRAN % % 0.4 0.3   NEUTROS ABS 10*3/mm3 3.52 4.27   LYMPHS ABS 10*3/mm3 1.39 0.86   MONOS ABS 10*3/mm3 0.50 0.42   EOS ABS 10*3/mm3 0.22 0.15   BASOS ABS 10*3/mm3 0.04 0.03   IMMATURE GRANS (ABS) 10*3/mm3 0.02 0.02   NRBC /100 WBC 0.0 0.0                                           Imaging:  Imaging Results (Last 24 Hours)       Procedure Component Value Units Date/Time    XR Chest 1 View [[id number]] Collected: 01/26/24 1322     Updated: 01/26/24 1326    Narrative:      XR CHEST 1 VW-1/26/2024     HISTORY: Chest pain.     Heart size is within normal limits. Lungs appear free of acute  infiltrates. Small calcified granuloma seen in the left midlung. Cardiac  pacemaker and sternotomy wires are seen.       Impression:      1. No acute process.        This report was finalized on 1/26/2024 1:23 PM by Dr. Patrick Dorman M.D on Workstation: [id number]                  I reviewed the patient's new clinical results / labs / tests / procedures      Assessment/Plan     Active Hospital Problems    Diagnosis  POA    **Chest pain " [R07.9]  Yes    Hx of CABG [Z95.1]  Not Applicable    Presence of cardiac pacemaker [Z95.0]  Yes    S/P coronary artery stent placement [Z95.5]  Not Applicable    Sick sinus syndrome [I49.5]  Yes    Cor pulmonale [I27.81]  Yes    CRISTY on autoCPAP [G47.33]  Yes    Coronary artery disease involving native coronary artery of native heart without angina pectoris [I25.10]  Yes    Hypertension [I10]  Yes    Hyperlipidemia [E78.5]  Yes      Resolved Hospital Problems   No resolved problems to display.           Angina in a patient with a history of coronary artery disease status post CABG/hypertension/AR/AS/chronic systolic congestive heart failure/sick sinus syndrome status post pacer.  Status post recent echo and cardiac catheterization with decision of medical treatment.  Troponin are elevated.  Chest pain resolved now.  Hemodynamically stable.  Will check D-dimer.  Chest x-ray is negative.  Continue aspirin/Plavix/Lipitor/Coreg/lisinopril/Procardia.  Add Imdur.  Consult cardiology.  No clinical evidence of congestive heart failure.  Consider stopping Procardia  Peptic ulcer disease/hiatal hernia.  Asymptomatic with benign GI examination continue Pepcid.  Dyslipidemia.  Continue Lipitor.  Benign prostatic hypertrophy.  Asymptomatic on Flomax.  Obstructive sleep apnea.  Resume CPAP once available.  Chronic disease anemia.  Hemoglobin stable between 9 and 12.  Will monitor.  Right lower extremity swelling.  Rule out DVT.  VTE prophylaxis.  Lovenox.      Discussed my findings and plan of treatment with the patient.  Disposition.  To be determined based on clinical course.  Hopefully home tomorrow if patient is stable and no intervention by cardiology as needed      Sultana Harding MD  [facility]  01/27/24  10:26 EST

## 2024-01-28 ENCOUNTER — APPOINTMENT (OUTPATIENT)
Dept: CT IMAGING | Facility: HOSPITAL | Age: 84
End: 2024-01-28
Payer: MEDICARE

## 2024-01-28 VITALS
SYSTOLIC BLOOD PRESSURE: 108 MMHG | OXYGEN SATURATION: 99 % | HEART RATE: 60 BPM | WEIGHT: 162.04 LBS | TEMPERATURE: 97.3 F | BODY MASS INDEX: 22.69 KG/M2 | HEIGHT: 71 IN | RESPIRATION RATE: 18 BRPM | DIASTOLIC BLOOD PRESSURE: 65 MMHG

## 2024-01-28 PROBLEM — R07.9 CHEST PAIN: Status: RESOLVED | Noted: 2024-01-26 | Resolved: 2024-01-28

## 2024-01-28 LAB
ANION GAP SERPL CALCULATED.3IONS-SCNC: 10 MMOL/L (ref 5–15)
BASOPHILS # BLD AUTO: 0.05 10*3/MM3 (ref 0–0.2)
BASOPHILS NFR BLD AUTO: 0.8 % (ref 0–1.5)
BUN SERPL-MCNC: 16 MG/DL (ref 8–23)
BUN/CREAT SERPL: 15.1 (ref 7–25)
CALCIUM SPEC-SCNC: 8.7 MG/DL (ref 8.6–10.5)
CHLORIDE SERPL-SCNC: 100 MMOL/L (ref 98–107)
CO2 SERPL-SCNC: 24 MMOL/L (ref 22–29)
CREAT SERPL-MCNC: 1.06 MG/DL (ref 0.76–1.27)
DEPRECATED RDW RBC AUTO: 50.8 FL (ref 37–54)
EGFRCR SERPLBLD CKD-EPI 2021: 69.6 ML/MIN/1.73
EOSINOPHIL # BLD AUTO: 0.23 10*3/MM3 (ref 0–0.4)
EOSINOPHIL NFR BLD AUTO: 3.6 % (ref 0.3–6.2)
ERYTHROCYTE [DISTWIDTH] IN BLOOD BY AUTOMATED COUNT: 14.5 % (ref 12.3–15.4)
GEN 5 2HR TROPONIN T REFLEX: 52 NG/L
GLUCOSE SERPL-MCNC: 81 MG/DL (ref 65–99)
HCT VFR BLD AUTO: 28.7 % (ref 37.5–51)
HGB BLD-MCNC: 9.9 G/DL (ref 13–17.7)
IMM GRANULOCYTES # BLD AUTO: 0.02 10*3/MM3 (ref 0–0.05)
IMM GRANULOCYTES NFR BLD AUTO: 0.3 % (ref 0–0.5)
LYMPHOCYTES # BLD AUTO: 1.12 10*3/MM3 (ref 0.7–3.1)
LYMPHOCYTES NFR BLD AUTO: 17.6 % (ref 19.6–45.3)
MCH RBC QN AUTO: 32.8 PG (ref 26.6–33)
MCHC RBC AUTO-ENTMCNC: 34.5 G/DL (ref 31.5–35.7)
MCV RBC AUTO: 95 FL (ref 79–97)
MONOCYTES # BLD AUTO: 0.48 10*3/MM3 (ref 0.1–0.9)
MONOCYTES NFR BLD AUTO: 7.5 % (ref 5–12)
NEUTROPHILS NFR BLD AUTO: 4.48 10*3/MM3 (ref 1.7–7)
NEUTROPHILS NFR BLD AUTO: 70.2 % (ref 42.7–76)
NRBC BLD AUTO-RTO: 0 /100 WBC (ref 0–0.2)
PLATELET # BLD AUTO: 156 10*3/MM3 (ref 140–450)
PMV BLD AUTO: 10.1 FL (ref 6–12)
POTASSIUM SERPL-SCNC: 4 MMOL/L (ref 3.5–5.2)
RBC # BLD AUTO: 3.02 10*6/MM3 (ref 4.14–5.8)
SODIUM SERPL-SCNC: 134 MMOL/L (ref 136–145)
TROPONIN T DELTA: -8 NG/L
TROPONIN T SERPL HS-MCNC: 60 NG/L
WBC NRBC COR # BLD AUTO: 6.38 10*3/MM3 (ref 3.4–10.8)

## 2024-01-28 PROCEDURE — 96372 THER/PROPH/DIAG INJ SC/IM: CPT

## 2024-01-28 PROCEDURE — G0378 HOSPITAL OBSERVATION PER HR: HCPCS

## 2024-01-28 PROCEDURE — 84484 ASSAY OF TROPONIN QUANT: CPT | Performed by: INTERNAL MEDICINE

## 2024-01-28 PROCEDURE — 85025 COMPLETE CBC W/AUTO DIFF WBC: CPT | Performed by: INTERNAL MEDICINE

## 2024-01-28 PROCEDURE — 71275 CT ANGIOGRAPHY CHEST: CPT

## 2024-01-28 PROCEDURE — 25010000002 ENOXAPARIN PER 10 MG: Performed by: INTERNAL MEDICINE

## 2024-01-28 PROCEDURE — 99214 OFFICE O/P EST MOD 30 MIN: CPT | Performed by: INTERNAL MEDICINE

## 2024-01-28 PROCEDURE — 80048 BASIC METABOLIC PNL TOTAL CA: CPT | Performed by: INTERNAL MEDICINE

## 2024-01-28 PROCEDURE — 25510000001 IOPAMIDOL PER 1 ML: Performed by: INTERNAL MEDICINE

## 2024-01-28 RX ORDER — FUROSEMIDE 40 MG/1
40 TABLET ORAL DAILY
Qty: 30 TABLET | Refills: 3 | Status: SHIPPED | OUTPATIENT
Start: 2024-01-28

## 2024-01-28 RX ORDER — FUROSEMIDE 40 MG/1
40 TABLET ORAL DAILY
Status: DISCONTINUED | OUTPATIENT
Start: 2024-01-28 | End: 2024-01-28 | Stop reason: HOSPADM

## 2024-01-28 RX ORDER — ISOSORBIDE MONONITRATE 30 MG/1
30 TABLET, EXTENDED RELEASE ORAL
Qty: 30 TABLET | Refills: 3 | Status: SHIPPED | OUTPATIENT
Start: 2024-01-29

## 2024-01-28 RX ADMIN — ALLOPURINOL 300 MG: 300 TABLET ORAL at 09:01

## 2024-01-28 RX ADMIN — Medication 10 ML: at 09:01

## 2024-01-28 RX ADMIN — LISINOPRIL 40 MG: 40 TABLET ORAL at 09:01

## 2024-01-28 RX ADMIN — ENOXAPARIN SODIUM 40 MG: 100 INJECTION SUBCUTANEOUS at 11:11

## 2024-01-28 RX ADMIN — FAMOTIDINE 20 MG: 20 TABLET, FILM COATED ORAL at 09:01

## 2024-01-28 RX ADMIN — ISOSORBIDE MONONITRATE 30 MG: 30 TABLET, EXTENDED RELEASE ORAL at 09:01

## 2024-01-28 RX ADMIN — TAMSULOSIN HYDROCHLORIDE 0.4 MG: 0.4 CAPSULE ORAL at 09:01

## 2024-01-28 RX ADMIN — FUROSEMIDE 40 MG: 40 TABLET ORAL at 11:11

## 2024-01-28 RX ADMIN — IOPAMIDOL 95 ML: 755 INJECTION, SOLUTION INTRAVENOUS at 13:04

## 2024-01-28 RX ADMIN — CARVEDILOL 3.12 MG: 3.12 TABLET, FILM COATED ORAL at 09:01

## 2024-01-28 RX ADMIN — NIFEDIPINE 30 MG: 30 TABLET, FILM COATED, EXTENDED RELEASE ORAL at 09:01

## 2024-01-28 RX ADMIN — ASPIRIN 81 MG: 81 TABLET, CHEWABLE ORAL at 09:01

## 2024-01-28 RX ADMIN — CLOPIDOGREL BISULFATE 75 MG: 75 TABLET, FILM COATED ORAL at 09:01

## 2024-01-28 RX ADMIN — Medication 2000 UNITS: at 09:01

## 2024-01-28 NOTE — NURSING NOTE
I spoke to Dr. Unique Carbajal this morning, and patient is okay to discharge from cardiology standpoint. Dr. Harding notified.

## 2024-01-28 NOTE — PROGRESS NOTES
"    Patient Name: Javier Grant  :1940  83 y.o.      Patient Care Team:  Sherron Gray MD as PCP - General (Pediatrics)  Robin Kingston Jr., MD as Consulting Physician (Cardiology)  Leonard Byrne MD as Consulting Physician (Ophthalmology)    Chief Complaint: chf    Interval History: no angina, diuresed >2L overnight with one dose of IV lasix. Orthopnea resolved.        Objective   Vital Signs  Temp:  [97.3 °F (36.3 °C)-98.1 °F (36.7 °C)] 97.3 °F (36.3 °C)  Heart Rate:  [60-63] 60  Resp:  [18] 18  BP: ()/(49-65) 108/65    Intake/Output Summary (Last 24 hours) at 2024 1449  Last data filed at 2024 0545  Gross per 24 hour   Intake --   Output 2350 ml   Net -2350 ml     Flowsheet Rows      Flowsheet Row First Filed Value   Admission Height 180.3 cm (70.98\") Documented at 2024 1633   Admission Weight 73.5 kg (162 lb 0.6 oz) Documented at 2024 1540            Physical Exam:   General Appearance:    Alert, cooperative, in no acute distress   Lungs:     Clear to auscultation.  Normal respiratory effort and rate.      Heart:    Regular rhythm and normal rate, normal S1 and S2, no murmurs, gallops or rubs.     Chest Wall:    No abnormalities observed   Abdomen:     Soft, nontender, positive bowel sounds.     Extremities:   no cyanosis, clubbing or edema.  No marked joint deformities.  Adequate musculoskeletal strength.       Results Review:    Results from last 7 days   Lab Units 24  0720   SODIUM mmol/L 134*   POTASSIUM mmol/L 4.0   CHLORIDE mmol/L 100   CO2 mmol/L 24.0   BUN mg/dL 16   CREATININE mg/dL 1.06   GLUCOSE mg/dL 81   CALCIUM mg/dL 8.7     Results from last 7 days   Lab Units 24  0951 24  0720 24  0323   HSTROP T ng/L 52* 60* 60*     Results from last 7 days   Lab Units 24  0516   WBC 10*3/mm3 6.38   HEMOGLOBIN g/dL 9.9*   HEMATOCRIT % 28.7*   PLATELETS 10*3/mm3 156         Results from last 7 days   Lab Units 24  0323   MAGNESIUM mg/dL " 1.8                   Medication Review:   allopurinol, 300 mg, Oral, Daily  aspirin, 81 mg, Oral, Daily  atorvastatin, 80 mg, Oral, Nightly  carvedilol, 3.125 mg, Oral, BID With Meals  cholecalciferol, 2,000 Units, Oral, Daily  clopidogrel, 75 mg, Oral, Daily  enoxaparin, 40 mg, Subcutaneous, Q24H  famotidine, 20 mg, Oral, Daily  FLUoxetine, 20 mg, Oral, Daily  furosemide, 40 mg, Oral, Daily  isosorbide mononitrate, 30 mg, Oral, Q24H  lisinopril, 40 mg, Oral, Q24H  NIFEdipine XL, 30 mg, Oral, Daily  senna-docusate sodium, 2 tablet, Oral, BID  sodium chloride, 10 mL, Intravenous, Q12H  tamsulosin, 0.4 mg, Oral, Daily              Assessment & Plan     1.  Acute CHF: Elevated BNP, orthopnea lower extremity edema.  Was recently discharged from [place] with a newly reduced EF of 45%.    2.  Chronic CAD with recent non-STEMI 1223.  Native vessels are occluded.  Patent SVG to LAD.  95% SVG to OM1 with failed attempted PCI at [place] 12/23, presumably now this graft is also occluded as they lost distal flow during that intervention attempt.  Uptitrate antianginals as tolerated.  I think it is okay to continue Procardia as an antianginal despite heart failure.  3.  Mild to moderate aortic stenosis  4.  Chronic indwelling Stanley catheter recent UTI with E. coli bacteremia     Start lasix 40 po daily. Needs f/u with Dr abreu in the next week or two. Resume other home meds.ok for dc today    Raven Calhoun MD  [facility]  01/28/24  14:49 EST

## 2024-01-28 NOTE — NURSING NOTE
Pt discharged today accompanied for daughter, AVS and med list a copy provided, instructions for PCP and cardiology follow ups given. Peripheral IV removed

## 2024-01-28 NOTE — DISCHARGE SUMMARY
Patient Name: Javier Grant  : 1940  MRN: [id number]    Date of Admission: 2024  Date of Discharge:  2024  Primary Care Physician: Sherron Gray MD      Discharge Diagnoses     Active Hospital Problems    Diagnosis  POA    Hx of CABG [Z95.1]  Not Applicable    Presence of cardiac pacemaker [Z95.0]  Yes    S/P coronary artery stent placement [Z95.5]  Not Applicable    Sick sinus syndrome [I49.5]  Yes    CRISTY on autoCPAP [G47.33]  Yes    Coronary artery disease involving native coronary artery of native heart without angina pectoris [I25.10]  Yes    Hypertension [I10]  Yes    Hyperlipidemia [E78.5]  Yes      Resolved Hospital Problems    Diagnosis Date Resolved POA    **Chest pain [R07.9] 2024 Yes    Cor pulmonale [I27.81] 2024 Yes        Hospital Course     Brief admission history and physical.  Is refer to the H&P for full details.  A pleasant 83 years old gentleman with a past history of coronary artery disease/hypertension/sick sinus syndrome status post pacer/AAS/AR/recent diagnosis of systolic congestive heart failure and cath with recommendation of medical treatment/peptic ulcer disease/BPH with indwelling Stanley catheter/anemia chronic disease/dyslipidemia who presents to the emergency department with chest pain suggestive of angina.  No other symptomatology.  Physical examination on admission remarkable for grade 3 cardiac systolic murmur/+1 to +2 right lower extremity edema.  Hospital course.  Initial ER evaluation included a CBC that was normal except a hemoglobin of 11.9.  Troponin is elevated at 55.  CMP normal except random blood sugar of 104 and sodium of 134.  Repeat troponin is 60.  Magnesium was normal.  Chest x-ray without acute disease.  Patient was diagnosed with angina and mild acute systolic congestive heart failure exacerbation in a patient with a history of coronary artery disease (for medical treatment)/hypertension/aortic valve disease/chronic systolic  congestive heart failure/sick sinus syndrome status post pacer.  On admission Imdur was added to his regimen of antianginal treatment.  Chest pain has spontaneously resolved after that.  Chest x-ray was negative.  We maintained him on his aspirin Plavix Lipitor Coreg lisinopril and Procardia in addition to adding Imdur and Lasix by cardiology..  Cardiology consult was obtained and agreed with addition of Imdur.  He developed mild hypotension that was asymptomatic during the hospital stay that has spontaneously resolved.  D-dimer was positive.  Right lower extremity venous ultrasound was negative for DVT and has edema in the right lower extremity has resolved.  CTA of the chest was done and revealed no PE.  Cardiology okayed the discharge with follow-up.  For his peptic ulcer disease we maintained him on the Pepcid and he had a benign GI examination.  Dyslipidemia was treated with continuation of the Lipitor.  For his benign prostatic hypertrophy and indwelling Stanley catheter the indwelling Stanley catheter was functioning we continued him on Flomax and he has a urology follow-up.  He has a history of obstructive sleep apnea and he is to resume his CPAP at home.  He was noted to have anemia and hemoglobin remained stable between a baseline of 9 and 12 and anemia was diagnosed previously as anemia of chronic disease.  At the time of discharge patient was asymptomatic and hemodynamically stable and cardiology okayed the discharge.  He is to follow-up with primary MD/cardiology/urology.      Consultants     Consult Orders (all) (From admission, onward)       Start     Ordered    01/26/24 1541  Inpatient Cardiology Consult  Once        Specialty:  Cardiology  Provider:  Андрей Carmona MD    01/26/24 1541    01/26/24 1340  [facility] (on-call MD unless specified) Details  Once        Specialty:  Hospitalist  Provider:  (Not yet assigned)    01/26/24 1339                  Procedures     Imaging Results (All)       Procedure  "Component Value Units Date/Time    XR Chest 1 View [[id number]] Collected: 01/26/24 1322     Updated: 01/26/24 1326    Narrative:      XR CHEST 1 VW-1/26/2024     HISTORY: Chest pain.     Heart size is within normal limits. Lungs appear free of acute  infiltrates. Small calcified granuloma seen in the left midlung. Cardiac  pacemaker and sternotomy wires are seen.       Impression:      1. No acute process.        This report was finalized on 1/26/2024 1:23 PM by Dr. Patrick Dorman M.D on Workstation: [id number]               Pertinent Labs     Results from last 7 days   Lab Units 01/28/24  0516 01/27/24  0323 01/26/24  1302   WBC 10*3/mm3 6.38 5.69 5.75   HEMOGLOBIN g/dL 9.9* 10.3* 11.9*   PLATELETS 10*3/mm3 156 171 178     Results from last 7 days   Lab Units 01/28/24  0720 01/27/24  0323 01/26/24  1302   SODIUM mmol/L 134* 134* 134*   POTASSIUM mmol/L 4.0 4.9 4.7   CHLORIDE mmol/L 100 102 99   CO2 mmol/L 24.0 24.0 23.2   BUN mg/dL 16 15 14   CREATININE mg/dL 1.06 1.09 0.99   GLUCOSE mg/dL 81 74 104*   Estimated Creatinine Clearance: 54.9 mL/min (by C-G formula based on SCr of 1.06 mg/dL).  Results from last 7 days   Lab Units 01/27/24  0323 01/26/24  1302   ALBUMIN g/dL 3.3* 4.1   BILIRUBIN mg/dL 0.4 0.5   ALK PHOS U/L 79 101   AST (SGOT) U/L 19 23   ALT (SGPT) U/L 16 17     Results from last 7 days   Lab Units 01/28/24  0720 01/27/24  0323 01/26/24  1302   CALCIUM mg/dL 8.7 8.8 9.2   ALBUMIN g/dL  --  3.3* 4.1   MAGNESIUM mg/dL  --  1.8  --        Results from last 7 days   Lab Units 01/28/24  0720 01/27/24  1113 01/27/24  0323 01/26/24  1501 01/26/24  1302   HSTROP T ng/L 60*  --  60* 44* 55*   PROBNP pg/mL  --  8,442.0*  --   --   --    D DIMER QUANT MCGFEU/mL  --  1.55*  --   --   --            Invalid input(s): \"LDLCALC\"      Imaging Results (Last 24 Hours)       ** No results found for the last 24 hours. **            Test Results Pending at Discharge     Pending Labs       Order Current Status    High " Sensitivity Troponin T 2Hr In process    [place] Draw In process              Discharge Exam   Physical Exam  Vitals.  Blood pressure 104/58 temperature 98.1 a pulse of 63 respirate rate of 18 and an O2 sats of 96% on room air  General.  Elderly gentleman.  He is alert and oriented x 4.  No apparent pain/distress/diaphoresis.  Normal mood and affect.  Eyes.  Pupils equal round and reactive.  Intact extraocular musculature.  No pallor or jaundice.  Oral cavity.  Moist mucous membrane.  Neck.  Supple.  No JVD.  No lymphadenopathy or thyromegaly.  Cardiovascular.  Regular rate and rhythm and grade 3 systolic murmur.  Chest.  Clear to auscultation bilaterally with no added sounds.  Abdomen.  Soft lax.  No tenderness.  No organomegaly.  No guarding or rebound.  Extremities.  No clubbing/cyanosis.  Resolved right lower extremity edema.  CNS.  No acute focal neurological deficits.  Discharge Details        Discharge Medications        New Medications        Instructions Start Date   furosemide 40 MG tablet  Commonly known as: LASIX   40 mg, Oral, Daily      isosorbide mononitrate 30 MG 24 hr tablet  Commonly known as: IMDUR   30 mg, Oral, Every 24 Hours Scheduled   Start Date: January 29, 2024            Continue These Medications        Instructions Start Date   acetaminophen 325 MG tablet  Commonly known as: TYLENOL   650 mg, Oral, Nightly      allopurinol 300 MG tablet  Commonly known as: ZYLOPRIM   300 mg, Oral, Daily      aspirin 81 MG tablet   81 mg, Oral, Daily      atorvastatin 80 MG tablet  Commonly known as: LIPITOR   TAKE 1 TABLET EVERY NIGHT      carvedilol 3.125 MG tablet  Commonly known as: COREG   TAKE 1 TABLET TWICE DAILY  WITH MEALS      Cholecalciferol 50 MCG (2000 UT) capsule   2,000 Units, Oral, Daily      Claritin 10 MG tablet  Generic drug: loratadine   mg Tab, Oral, Daily, 0 Refill(s)      clopidogrel 75 MG tablet  Commonly known as: PLAVIX   75 mg, Oral, Daily      famotidine 20 MG tablet  Commonly  known as: PEPCID   20 mg, Oral, Daily      FLUoxetine 20 MG capsule  Commonly known as: PROzac   20 mg, Oral, Daily      NIFEdipine XL 30 MG 24 hr tablet  Commonly known as: PROCARDIA XL   TAKE ONE TABLET BY MOUTH DAILY      nitroglycerin 0.4 MG SL tablet  Commonly known as: Nitrostat   0.4 mg, Oral, Every 5 Minutes PRN      omega-3 acid ethyl esters 1 g capsule  Commonly known as: LOVAZA   TAKE 4 CAPSULES DAILY (TO  HOLD MEDICATION PRIOR TO   OPERATING ROOM)      PRESERVISION AREDS 2 PO   Oral, 2 Times Daily      ramipril 10 MG capsule  Commonly known as: ALTACE   10 mg, Oral, Every Evening      tamsulosin 0.4 MG capsule 24 hr capsule  Commonly known as: FLOMAX   0.4 mg               Allergies   Allergen Reactions    Ciprofloxacin Diarrhea         Discharge Disposition:  Condition: Stable    Diet:   Diet Order   Procedures    Diet: Cardiac Diets; Healthy Heart (2-3 Na+); Texture: Regular Texture (IDDSI 7); Fluid Consistency: Thin (IDDSI 0)       Activity:   Activity Instructions       Activity as Tolerated                  CODE STATUS:    Code Status and Medical Interventions:   Ordered at: 01/26/24 1541     Code Status (Patient has no pulse and is not breathing):    CPR (Attempt to Resuscitate)     Medical Interventions (Patient has pulse or is breathing):    Full Support       Future Appointments   Date Time Provider Department Center   5/15/2024  9:40 AM Evelio Mcneal MD [facility]     Additional Instructions for the Follow-ups that You Need to Schedule       Call MD With Problems / Concerns   As directed      Instructions: Call MD or return to ER if chest pain/palpitation/dizziness/loss of consciousness/cough/shortness of breath/lower extremity edema/nausea or vomiting    Order Comments: Instructions: Call MD or return to ER if chest pain/palpitation/dizziness/loss of consciousness/cough/shortness of breath/lower extremity edema/nausea or vomiting         Discharge Follow-up with PCP   As  directed       Currently Documented PCP:    Sherron Gray MD    PCP Phone Number:    [phone]     Follow Up Details: Primary MD.  Angina/coronary artery disease/HTN/AAS/AR/chronic systolic congestive heart failure/SSS S/peptic ulcer disease/dyslipidemia/BPH with indwelling Stanley catheter/anemia chronic disease/lower extremity edema        Discharge Follow-up with Specified Provider: Cardiology; 2 Weeks   As directed      To: Cardiology   Follow Up: 2 Weeks   Follow Up Details: Angina/coronary artery disease/hypertension/AAS/AR/chronic systolic congestive heart failure/SSS               Follow-up Information       Sherron Gray MD .    Specialty: Pediatrics  Why: Primary MD.  Angina/coronary artery disease/HTN/AAS/AR/chronic systolic congestive heart failure/SSS S/peptic ulcer disease/dyslipidemia/BPH with indwelling Stanley catheter/anemia chronic disease/lower extremity edema  Contact information:  [address] [facility] [address] [phone]                               Time Spent on Discharge:  Greater than 30 minutes      Sutlana Harding MD  [facility]  01/28/24  10:18 EST

## 2024-01-29 NOTE — CASE MANAGEMENT/SOCIAL WORK
Case Management Discharge Note      Final Note: Home, no needs.         Selected Continued Care - Discharged on 1/28/2024 Admission date: 1/26/2024 - Discharge disposition: Home or Self Care      Destination    No services have been selected for the patient.                Durable Medical Equipment    No services have been selected for the patient.                Dialysis/Infusion    No services have been selected for the patient.                Home Medical Care    No services have been selected for the patient.                Therapy    No services have been selected for the patient.                Community Resources    No services have been selected for the patient.                Community & DME    No services have been selected for the patient.                         Final Discharge Disposition Code: 01 - home or self-care   20

## 2024-02-01 ENCOUNTER — LAB (OUTPATIENT)
Dept: LAB | Facility: HOSPITAL | Age: 84
End: 2024-02-01
Payer: MEDICARE

## 2024-02-01 ENCOUNTER — OFFICE VISIT (OUTPATIENT)
Age: 84
End: 2024-02-01
Payer: MEDICARE

## 2024-02-01 VITALS
HEART RATE: 63 BPM | WEIGHT: 162 LBS | DIASTOLIC BLOOD PRESSURE: 52 MMHG | BODY MASS INDEX: 22.68 KG/M2 | SYSTOLIC BLOOD PRESSURE: 110 MMHG | HEIGHT: 71 IN

## 2024-02-01 DIAGNOSIS — I49.5 SSS (SICK SINUS SYNDROME): ICD-10-CM

## 2024-02-01 DIAGNOSIS — I35.0 NONRHEUMATIC AORTIC VALVE STENOSIS: ICD-10-CM

## 2024-02-01 DIAGNOSIS — Z95.5 S/P CORONARY ARTERY STENT PLACEMENT: ICD-10-CM

## 2024-02-01 DIAGNOSIS — I35.0 NONRHEUMATIC AORTIC VALVE STENOSIS: Primary | ICD-10-CM

## 2024-02-01 DIAGNOSIS — Z95.1 HX OF CABG: ICD-10-CM

## 2024-02-01 LAB
ANION GAP SERPL CALCULATED.3IONS-SCNC: 9.2 MMOL/L (ref 5–15)
BUN SERPL-MCNC: 20 MG/DL (ref 8–23)
BUN/CREAT SERPL: 16.8 (ref 7–25)
CALCIUM SPEC-SCNC: 8.8 MG/DL (ref 8.6–10.5)
CHLORIDE SERPL-SCNC: 100 MMOL/L (ref 98–107)
CO2 SERPL-SCNC: 24.8 MMOL/L (ref 22–29)
CREAT SERPL-MCNC: 1.19 MG/DL (ref 0.76–1.27)
EGFRCR SERPLBLD CKD-EPI 2021: 60.6 ML/MIN/1.73
GLUCOSE SERPL-MCNC: 101 MG/DL (ref 65–99)
POTASSIUM SERPL-SCNC: 4 MMOL/L (ref 3.5–5.2)
SODIUM SERPL-SCNC: 134 MMOL/L (ref 136–145)

## 2024-02-01 PROCEDURE — 36415 COLL VENOUS BLD VENIPUNCTURE: CPT

## 2024-02-01 PROCEDURE — 80048 BASIC METABOLIC PNL TOTAL CA: CPT

## 2024-02-19 ENCOUNTER — HOSPITAL ENCOUNTER (EMERGENCY)
Facility: HOSPITAL | Age: 84
Discharge: HOME | End: 2024-02-19
Attending: STUDENT IN AN ORGANIZED HEALTH CARE EDUCATION/TRAINING PROGRAM | Admitting: STUDENT IN AN ORGANIZED HEALTH CARE EDUCATION/TRAINING PROGRAM
Payer: MEDICARE

## 2024-02-19 VITALS
HEIGHT: 70 IN | TEMPERATURE: 98 F | RESPIRATION RATE: 18 BRPM | WEIGHT: 155 LBS | OXYGEN SATURATION: 98 % | BODY MASS INDEX: 22.19 KG/M2 | SYSTOLIC BLOOD PRESSURE: 115 MMHG | DIASTOLIC BLOOD PRESSURE: 66 MMHG | HEART RATE: 75 BPM

## 2024-02-19 DIAGNOSIS — T83.9XXA PROBLEM WITH FOLEY CATHETER, INITIAL ENCOUNTER: Primary | ICD-10-CM

## 2024-02-19 PROCEDURE — 99282 EMERGENCY DEPT VISIT SF MDM: CPT

## 2024-02-20 NOTE — DISCHARGE INSTRUCTIONS
Please follow-up with your primary care physician within 1 week for repeat evaluation    Please follow-up with your urologist if you have any further complications    Please return to the ER with new or worsening symptoms

## 2024-02-20 NOTE — ED PROVIDER NOTES
EMERGENCY DEPARTMENT ENCOUNTER  Room Number:  38/38  PCP: Sherron Gray MD  Independent Historians: Patient      HPI:  Chief Complaint: had concerns including Catheter problem .     Context: Javier Grant is a 83 y.o. male with a medical history of BPH, urinary retention, CAD who presents to the ED c/o acute Stanley catheter complication.  Patient states he has Stanley catheter changed earlier today at the urology office.  Patient states things go very smoothly and he had some irritation following the catheter exchange.  Patient states he had ongoing pain and discomfort anytime he stood or sat.  Patient states he did not drain any urine since mid afternoon and the catheter eventually fell out.  Patient is complaining of mild suprapubic discomfort      Review of prior external notes (non-ED) -and- Review of prior external test results outside of this encounter:   Discharge summary from 1/28/2024 reviewed and notable for admission secondary to chest pain with suggestion of angina in the setting of significant cardiac history.  Radiological evaluation was overall unremarkable.  Cardiology consulted and added Imdur for the management.  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    Hyperlipidemia 05/16/2016    CRISTY on autoCPAP 10/29/2016    Nonrheumatic aortic valve stenosis 11/16/2016    Nonrheumatic aortic valve insufficiency 11/16/2016    Umbilical hernia without obstruction and without gangrene 09/26/2017    Dizziness 12/15/2018    Vertigo 12/15/2018    History of heart attack 12/18/2018    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  12/18/2018    Bradycardia, sinus 02/28/2022    Near syncope 02/28/2022    Sick sinus syndrome 02/28/2022    Hx of CABG 10/17/2023    S/P coronary artery stent placement 10/17/2023    Presence of cardiac pacemaker 10/17/2023     Resolved Ambulatory Problems     Diagnosis Date Noted    Hypersomnia due to medical condition - CRISTY 10/29/2016    Cor pulmonale 11/16/2016    Obesity (BMI 30-39.9) 11/20/2017    Hypertensive encephalopathy 12/15/2018    Chest pain 01/26/2024     Past Medical History:   Diagnosis Date    Abnormal ECG 1980    Arthritis     Bleeds easily     CAD (coronary artery disease)     Clotting disorder 1990    Colon polyp     SKELTON (dyspnea on exertion)     Enlarged prostate     Gastritis     Gout     Heart murmur ?    Hiatal hernia     History of MI (myocardial infarction)     HL (hearing loss)     Macular degeneration     Mitral valve prolapse 1990    Myocardial infarction 1990    Nonrheumatic aortic (valve) insufficiency     Nonrheumatic aortic (valve) stenosis     RBBB (right bundle branch block)     Sleep apnea     Umbilical hernia     Vitamin D deficiency          PAST SURGICAL HISTORY  Past Surgical History:   Procedure Laterality Date    CARDIAC CATHETERIZATION Left 10/09/2015    Dr. Robin Kingston    CARDIAC ELECTROPHYSIOLOGY PROCEDURE N/A 03/04/2022    Procedure: Pacemaker DC new  [place];  Surgeon: Serafin Francois MD;  Location: [facility] CATH INVASIVE LOCATION;  Service: Cardiology;  Laterality: N/A;    CARDIAC SURGERY  1990    triple bypass    CATARACT EXTRACTION Bilateral     CORONARY ANGIOPLASTY WITH STENT PLACEMENT  2015    CORONARY ARTERY BYPASS GRAFT  1990    3 VESSELS    ENDOSCOPY N/A 05/26/2017    Procedure: ESOPHAGOGASTRODUODENOSCOPY WITH COLD BIOIPSIES AND BALLOON DILITATION SIZE 15, 16.5, 18;  Surgeon: Jerry SNOWDEN MD;  Location: [facility] ENDOSCOPY;  Service:     INSERT / REPLACE / REMOVE PACEMAKER  03/05/2022    MCCOY'S NEUROMA EXCISION Right     TONSILLECTOMY      UMBILICAL  HERNIA REPAIR N/A 10/09/2017    Procedure: UMBILICAL HERNIA REPAIR;  Surgeon: Blake Kelly Jr., MD;  Location: [facility] OR;  Service:          FAMILY HISTORY  Family History   Problem Relation Age of Onset    Depression Mother     Colon polyps Mother     Heart attack Father     Aneurysm Father     Stomach cancer Brother     Malig Hyperthermia Neg Hx          SOCIAL HISTORY  Social History     Socioeconomic History    Marital status:    Tobacco Use    Smoking status: Former     Packs/day: 0.00     Years: 15.00     Additional pack years: 0.00     Total pack years: 0.00     Types: Cigarettes     Start date: 1957     Quit date: 1980     Years since quittin.1    Smokeless tobacco: Never   Vaping Use    Vaping Use: Never used   Substance and Sexual Activity    Alcohol use: No    Drug use: Never    Sexual activity: Not Currently     Partners: Female     Birth control/protection: Post-menopausal, None         ALLERGIES  Ciprofloxacin      REVIEW OF SYSTEMS  Review of Systems  Included in HPI  All systems reviewed and negative except for those discussed in HPI.      PHYSICAL EXAM    I have reviewed the triage vital signs and nursing notes.    ED Triage Vitals   Temp Heart Rate Resp BP SpO2   24   98 °F (36.7 °C) 75 18 115/66 98 %      Temp src Heart Rate Source Patient Position BP Location FiO2 (%)   24 --   Oral Monitor Sitting Left arm        Physical Exam  GENERAL: alert, no acute distress  SKIN: Warm, dry  HENT: Normocephalic, atraumatic  EYES: no scleral icterus  CV: regular rhythm, regular rate  RESPIRATORY: normal effort, lungs clear  ABDOMEN: soft, mild suprapubic fullness, nondistended  MUSCULOSKELETAL: no deformity  NEURO: alert, moves all extremities, follows commands      LAB RESULTS  No results found for this or any previous visit (from the past 24  hour(s)).      RADIOLOGY  No Radiology Exams Resulted Within Past 24 Hours      MEDICATIONS GIVEN IN ER  Medications - No data to display      ORDERS PLACED DURING THIS VISIT:  No orders of the defined types were placed in this encounter.        OUTPATIENT MEDICATION MANAGEMENT:  No current Epic-ordered facility-administered medications on file.     Current Outpatient Medications Ordered in Epic   Medication Sig Dispense Refill    acetaminophen (TYLENOL) 325 MG tablet Take 2 tablets by mouth Every Night.      allopurinol (ZYLOPRIM) 300 MG tablet Take 1 tablet by mouth Daily. 90 tablet 3    aspirin 81 MG tablet Take 1 tablet by mouth Daily.      atorvastatin (LIPITOR) 80 MG tablet TAKE 1 TABLET EVERY NIGHT 90 tablet 3    carvedilol (COREG) 3.125 MG tablet TAKE 1 TABLET TWICE DAILY  WITH MEALS 180 tablet 3    Cholecalciferol 2000 units capsule Take 1 capsule by mouth Daily.      clopidogrel (PLAVIX) 75 MG tablet Take 1 tablet by mouth Daily.      famotidine (PEPCID) 20 MG tablet Take 1 tablet by mouth Daily.      FLUoxetine (PROzac) 20 MG capsule Take 1 capsule by mouth Daily.      furosemide (LASIX) 40 MG tablet Take 1 tablet by mouth Daily. 30 tablet 3    isosorbide mononitrate (IMDUR) 30 MG 24 hr tablet Take 1 tablet by mouth Daily. 30 tablet 3    loratadine (Claritin) 10 MG tablet mg Tab, Oral, Daily, 0 Refill(s)      Multiple Vitamins-Minerals (PRESERVISION AREDS 2 PO) Take  by mouth 2 (Two) Times a Day.      NIFEdipine XL (PROCARDIA XL) 30 MG 24 hr tablet TAKE ONE TABLET BY MOUTH DAILY 90 tablet 3    nitroglycerin (Nitrostat) 0.4 MG SL tablet Take 1 tablet by mouth Every 5 (Five) Minutes As Needed (CHEST PAIN: MAX DOSE 3 TABLETS). 25 tablet 6    omega-3 acid ethyl esters (LOVAZA) 1 g capsule TAKE 4 CAPSULES DAILY (TO  HOLD MEDICATION PRIOR TO   OPERATING ROOM) 360 capsule 3    ramipril (ALTACE) 10 MG capsule TAKE 1 CAPSULE EVERY       EVENING 90 capsule 3    tamsulosin (FLOMAX) 0.4 MG capsule 24 hr capsule 1  capsule.             PROGRESS, DATA ANALYSIS, CONSULTS, AND MEDICAL DECISION MAKING  All labs have been independently interpreted by me.  All radiology studies have been reviewed by me. All EKG's have been independently viewed and interpreted by me.  Discussion below represents my analysis of pertinent findings related to patient's condition, differential diagnosis, treatment plan and final disposition.    Differential diagnosis includes but is not limited to urinary obstruction, urethral injury, catheter complication.    Clinical Scores:                   ED Course as of 02/19/24 2255   Mon Feb 19, 2024   2254 Stanley catheter able to be replaced by nursing staff without difficulty.  Patient states it feels much more comfortable and has no further complaints.  Patient counseled to follow-up with primary and urology. [MW]      ED Course User Index  [MW] Jc Mancuso MD             AS OF 22:55 EST VITALS:    BP - 115/66  HR - 75  TEMP - 98 °F (36.7 °C) (Oral)  O2 SATS - 98%    COMPLEXITY OF CARE  Admission was considered but after careful review of the patient's presentation, physical examination, diagnostic results, and response to treatment the patient may be safely discharged with outpatient follow-up.      DIAGNOSIS  Final diagnoses:   Problem with Stanley catheter, initial encounter         DISPOSITION  ED Disposition       ED Disposition   Discharge    Condition   Stable    Comment   --                Please note that portions of this document were completed with a voice recognition program.    Note Disclaimer: At [facility], we believe that sharing information builds trust and better relationships. You are receiving this note because you recently visited [facility]. It is possible you will see health information before a provider has talked with you about it. This kind of information can be easy to misunderstand. To help you fully understand what it means for your health, we urge you to discuss this  note with your provider.         Jc Mancuso MD  02/19/24 [id number]

## 2024-02-22 ENCOUNTER — TELEPHONE (OUTPATIENT)
Dept: CARDIOLOGY | Facility: CLINIC | Age: 84
End: 2024-02-22
Payer: MEDICARE

## 2024-02-22 NOTE — TELEPHONE ENCOUNTER
CS pt/ LOV 2/1/24 Plan:      Deepak nurse with [facility]  is calling to report some low BP on this pt.   visits him weekly and prior to this week his SBP's have been 112-140.  Today his BP is 102/62 on manual check.  Yesterday on patient home cuff his BP was 100/64.  Today pts home cuff says 97/64.  His HR is 72.  He is asymptomatic.  Deepak the  nurse wants to know if any of his medication should be decreased?      In addition, he wanted to mention that he had some chest pressure while watching a basketball game on TV yesterday.  It was central, did not radiate.  He had no other symptoms.  He denies SOA, dizziness, nausea, diaphoresis.  He has no swelling.  It went away on its own after 5-10 minutes.  The last time he had an episode of chest pressure was about 1 week before that.  He is on Imdur.     Patient medication list in [facility] was reviewed and it correct.    Recommendations?    Bethany Pan Cardiology Triage  02/22/24 13:03 EST

## 2024-02-23 NOTE — TELEPHONE ENCOUNTER
I was unable to locate a phone number for Deepak with [facility]. So called the pt back and advised him on Dr. Mcneal's recommendation.

## 2024-03-25 ENCOUNTER — APPOINTMENT (OUTPATIENT)
Dept: GENERAL RADIOLOGY | Facility: HOSPITAL | Age: 84
DRG: 321 | End: 2024-03-25
Payer: MEDICARE

## 2024-03-25 ENCOUNTER — HOSPITAL ENCOUNTER (INPATIENT)
Facility: HOSPITAL | Age: 84
LOS: 3 days | Discharge: HOME | DRG: 321 | End: 2024-03-29
Attending: EMERGENCY MEDICINE | Admitting: INTERNAL MEDICINE
Payer: MEDICARE

## 2024-03-25 DIAGNOSIS — D64.9 CHRONIC ANEMIA: ICD-10-CM

## 2024-03-25 DIAGNOSIS — I21.4 NON-ST ELEVATION MI (NSTEMI): Primary | ICD-10-CM

## 2024-03-25 DIAGNOSIS — T83.511A URINARY TRACT INFECTION ASSOCIATED WITH INDWELLING URETHRAL CATHETER, INITIAL ENCOUNTER: ICD-10-CM

## 2024-03-25 DIAGNOSIS — R65.20 SEPSIS WITH ACUTE RENAL FAILURE, DUE TO UNSPECIFIED ORGANISM, UNSPECIFIED ACUTE RENAL FAILURE TYPE, UNSPECIFIED WHETHER SEPTIC SHOCK PRESENT: ICD-10-CM

## 2024-03-25 DIAGNOSIS — N39.0 URINARY TRACT INFECTION ASSOCIATED WITH INDWELLING URETHRAL CATHETER, INITIAL ENCOUNTER: ICD-10-CM

## 2024-03-25 DIAGNOSIS — N17.9 SEPSIS WITH ACUTE RENAL FAILURE, DUE TO UNSPECIFIED ORGANISM, UNSPECIFIED ACUTE RENAL FAILURE TYPE, UNSPECIFIED WHETHER SEPTIC SHOCK PRESENT: ICD-10-CM

## 2024-03-25 DIAGNOSIS — A41.9 SEPSIS WITH ACUTE RENAL FAILURE, DUE TO UNSPECIFIED ORGANISM, UNSPECIFIED ACUTE RENAL FAILURE TYPE, UNSPECIFIED WHETHER SEPTIC SHOCK PRESENT: ICD-10-CM

## 2024-03-25 PROBLEM — R07.9 CHEST PAIN: Status: ACTIVE | Noted: 2024-03-25

## 2024-03-25 LAB
ALBUMIN SERPL-MCNC: 3.8 G/DL (ref 3.5–5.2)
ALBUMIN/GLOB SERPL: 1.7 G/DL
ALP SERPL-CCNC: 114 U/L (ref 39–117)
ALT SERPL W P-5'-P-CCNC: 35 U/L (ref 1–41)
ANION GAP SERPL CALCULATED.3IONS-SCNC: 10 MMOL/L (ref 5–15)
ANION GAP SERPL CALCULATED.3IONS-SCNC: 12 MMOL/L (ref 5–15)
APTT PPP: 28.4 SECONDS (ref 22.7–35.4)
AST SERPL-CCNC: 55 U/L (ref 1–40)
B PARAPERT DNA SPEC QL NAA+PROBE: NOT DETECTED
B PERT DNA SPEC QL NAA+PROBE: NOT DETECTED
BACTERIA UR QL AUTO: ABNORMAL /HPF
BASOPHILS # BLD AUTO: 0.02 10*3/MM3 (ref 0–0.2)
BASOPHILS NFR BLD AUTO: 0.1 % (ref 0–1.5)
BILIRUB SERPL-MCNC: 0.6 MG/DL (ref 0–1.2)
BILIRUB UR QL STRIP: NEGATIVE
BUN SERPL-MCNC: 32 MG/DL (ref 8–23)
BUN SERPL-MCNC: 33 MG/DL (ref 8–23)
BUN/CREAT SERPL: 21.6 (ref 7–25)
BUN/CREAT SERPL: 24.4 (ref 7–25)
C PNEUM DNA NPH QL NAA+NON-PROBE: NOT DETECTED
CALCIUM SPEC-SCNC: 8.2 MG/DL (ref 8.6–10.5)
CALCIUM SPEC-SCNC: 8.9 MG/DL (ref 8.6–10.5)
CHLORIDE SERPL-SCNC: 100 MMOL/L (ref 98–107)
CHLORIDE SERPL-SCNC: 101 MMOL/L (ref 98–107)
CLARITY UR: CLEAR
CO2 SERPL-SCNC: 21 MMOL/L (ref 22–29)
CO2 SERPL-SCNC: 25 MMOL/L (ref 22–29)
COLOR UR: YELLOW
CREAT SERPL-MCNC: 1.35 MG/DL (ref 0.76–1.27)
CREAT SERPL-MCNC: 1.48 MG/DL (ref 0.76–1.27)
D-LACTATE SERPL-SCNC: 1.4 MMOL/L (ref 0.5–2)
DEPRECATED RDW RBC AUTO: 47 FL (ref 37–54)
EGFRCR SERPLBLD CKD-EPI 2021: 46.7 ML/MIN/1.73
EGFRCR SERPLBLD CKD-EPI 2021: 52.1 ML/MIN/1.73
EOSINOPHIL # BLD AUTO: 0.03 10*3/MM3 (ref 0–0.4)
EOSINOPHIL NFR BLD AUTO: 0.2 % (ref 0.3–6.2)
ERYTHROCYTE [DISTWIDTH] IN BLOOD BY AUTOMATED COUNT: 13.5 % (ref 12.3–15.4)
FLUAV SUBTYP SPEC NAA+PROBE: NOT DETECTED
FLUBV RNA ISLT QL NAA+PROBE: NOT DETECTED
GLOBULIN UR ELPH-MCNC: 2.3 GM/DL
GLUCOSE SERPL-MCNC: 101 MG/DL (ref 65–99)
GLUCOSE SERPL-MCNC: 113 MG/DL (ref 65–99)
GLUCOSE UR STRIP-MCNC: NEGATIVE MG/DL
HADV DNA SPEC NAA+PROBE: NOT DETECTED
HCOV 229E RNA SPEC QL NAA+PROBE: NOT DETECTED
HCOV HKU1 RNA SPEC QL NAA+PROBE: NOT DETECTED
HCOV NL63 RNA SPEC QL NAA+PROBE: NOT DETECTED
HCOV OC43 RNA SPEC QL NAA+PROBE: NOT DETECTED
HCT VFR BLD AUTO: 31.8 % (ref 37.5–51)
HGB BLD-MCNC: 10.6 G/DL (ref 13–17.7)
HGB UR QL STRIP.AUTO: ABNORMAL
HMPV RNA NPH QL NAA+NON-PROBE: NOT DETECTED
HPIV1 RNA ISLT QL NAA+PROBE: NOT DETECTED
HPIV2 RNA SPEC QL NAA+PROBE: NOT DETECTED
HPIV3 RNA NPH QL NAA+PROBE: NOT DETECTED
HPIV4 P GENE NPH QL NAA+PROBE: NOT DETECTED
HYALINE CASTS UR QL AUTO: ABNORMAL /LPF
IMM GRANULOCYTES # BLD AUTO: 0.09 10*3/MM3 (ref 0–0.05)
IMM GRANULOCYTES NFR BLD AUTO: 0.5 % (ref 0–0.5)
INR PPP: 1.14 (ref 0.9–1.1)
KETONES UR QL STRIP: NEGATIVE
LEUKOCYTE ESTERASE UR QL STRIP.AUTO: ABNORMAL
LYMPHOCYTES # BLD AUTO: 0.57 10*3/MM3 (ref 0.7–3.1)
LYMPHOCYTES NFR BLD AUTO: 3.3 % (ref 19.6–45.3)
M PNEUMO IGG SER IA-ACNC: NOT DETECTED
MCH RBC QN AUTO: 31.6 PG (ref 26.6–33)
MCHC RBC AUTO-ENTMCNC: 33.3 G/DL (ref 31.5–35.7)
MCV RBC AUTO: 94.9 FL (ref 79–97)
MONOCYTES # BLD AUTO: 0.89 10*3/MM3 (ref 0.1–0.9)
MONOCYTES NFR BLD AUTO: 5.2 % (ref 5–12)
NEUTROPHILS NFR BLD AUTO: 15.65 10*3/MM3 (ref 1.7–7)
NEUTROPHILS NFR BLD AUTO: 90.7 % (ref 42.7–76)
NITRITE UR QL STRIP: NEGATIVE
NRBC BLD AUTO-RTO: 0 /100 WBC (ref 0–0.2)
NT-PROBNP SERPL-MCNC: 3133 PG/ML (ref 0–1800)
PH UR STRIP.AUTO: 5.5 [PH] (ref 5–8)
PLATELET # BLD AUTO: 176 10*3/MM3 (ref 140–450)
PMV BLD AUTO: 10.5 FL (ref 6–12)
POTASSIUM SERPL-SCNC: 3.7 MMOL/L (ref 3.5–5.2)
POTASSIUM SERPL-SCNC: 3.8 MMOL/L (ref 3.5–5.2)
PROT SERPL-MCNC: 6.1 G/DL (ref 6–8.5)
PROT UR QL STRIP: NEGATIVE
PROTHROMBIN TIME: 14.9 SECONDS (ref 11.7–14.2)
RBC # BLD AUTO: 3.35 10*6/MM3 (ref 4.14–5.8)
RBC # UR STRIP: ABNORMAL /HPF
REF LAB TEST METHOD: ABNORMAL
RHINOVIRUS RNA SPEC NAA+PROBE: NOT DETECTED
RSV RNA NPH QL NAA+NON-PROBE: NOT DETECTED
SARS-COV-2 RNA NPH QL NAA+NON-PROBE: NOT DETECTED
SODIUM SERPL-SCNC: 134 MMOL/L (ref 136–145)
SODIUM SERPL-SCNC: 135 MMOL/L (ref 136–145)
SP GR UR STRIP: 1.01 (ref 1–1.03)
SQUAMOUS #/AREA URNS HPF: ABNORMAL /HPF
TROPONIN T SERPL HS-MCNC: 100 NG/L
TROPONIN T SERPL HS-MCNC: 42 NG/L
UROBILINOGEN UR QL STRIP: ABNORMAL
WBC # UR STRIP: ABNORMAL /HPF
WBC NRBC COR # BLD AUTO: 17.25 10*3/MM3 (ref 3.4–10.8)

## 2024-03-25 PROCEDURE — 0202U NFCT DS 22 TRGT SARS-COV-2: CPT | Performed by: EMERGENCY MEDICINE

## 2024-03-25 PROCEDURE — 25010000002 FENTANYL CITRATE (PF) 50 MCG/ML SOLUTION: Performed by: EMERGENCY MEDICINE

## 2024-03-25 PROCEDURE — 84484 ASSAY OF TROPONIN QUANT: CPT | Performed by: EMERGENCY MEDICINE

## 2024-03-25 PROCEDURE — 87040 BLOOD CULTURE FOR BACTERIA: CPT | Performed by: EMERGENCY MEDICINE

## 2024-03-25 PROCEDURE — 25810000003 LACTATED RINGERS SOLUTION: Performed by: EMERGENCY MEDICINE

## 2024-03-25 PROCEDURE — 81001 URINALYSIS AUTO W/SCOPE: CPT | Performed by: EMERGENCY MEDICINE

## 2024-03-25 PROCEDURE — 85730 THROMBOPLASTIN TIME PARTIAL: CPT | Performed by: EMERGENCY MEDICINE

## 2024-03-25 PROCEDURE — P9612 CATHETERIZE FOR URINE SPEC: HCPCS

## 2024-03-25 PROCEDURE — 25010000002 ONDANSETRON PER 1 MG: Performed by: EMERGENCY MEDICINE

## 2024-03-25 PROCEDURE — 25010000002 CEFTRIAXONE PER 250 MG: Performed by: EMERGENCY MEDICINE

## 2024-03-25 PROCEDURE — 85610 PROTHROMBIN TIME: CPT | Performed by: EMERGENCY MEDICINE

## 2024-03-25 PROCEDURE — 93005 ELECTROCARDIOGRAM TRACING: CPT

## 2024-03-25 PROCEDURE — 93005 ELECTROCARDIOGRAM TRACING: CPT | Performed by: INTERNAL MEDICINE

## 2024-03-25 PROCEDURE — 85025 COMPLETE CBC W/AUTO DIFF WBC: CPT | Performed by: EMERGENCY MEDICINE

## 2024-03-25 PROCEDURE — 83605 ASSAY OF LACTIC ACID: CPT | Performed by: EMERGENCY MEDICINE

## 2024-03-25 PROCEDURE — 83880 ASSAY OF NATRIURETIC PEPTIDE: CPT | Performed by: EMERGENCY MEDICINE

## 2024-03-25 PROCEDURE — 87086 URINE CULTURE/COLONY COUNT: CPT | Performed by: EMERGENCY MEDICINE

## 2024-03-25 PROCEDURE — 93010 ELECTROCARDIOGRAM REPORT: CPT | Performed by: INTERNAL MEDICINE

## 2024-03-25 PROCEDURE — 25010000002 HEPARIN (PORCINE) 25000-0.45 UT/250ML-% SOLUTION: Performed by: EMERGENCY MEDICINE

## 2024-03-25 PROCEDURE — 25810000003 SODIUM CHLORIDE 0.9 % SOLUTION: Performed by: EMERGENCY MEDICINE

## 2024-03-25 PROCEDURE — 84484 ASSAY OF TROPONIN QUANT: CPT | Performed by: INTERNAL MEDICINE

## 2024-03-25 PROCEDURE — G0378 HOSPITAL OBSERVATION PER HR: HCPCS

## 2024-03-25 PROCEDURE — 36415 COLL VENOUS BLD VENIPUNCTURE: CPT

## 2024-03-25 PROCEDURE — 84145 PROCALCITONIN (PCT): CPT | Performed by: INTERNAL MEDICINE

## 2024-03-25 PROCEDURE — 80053 COMPREHEN METABOLIC PANEL: CPT | Performed by: EMERGENCY MEDICINE

## 2024-03-25 PROCEDURE — 71045 X-RAY EXAM CHEST 1 VIEW: CPT

## 2024-03-25 PROCEDURE — 99291 CRITICAL CARE FIRST HOUR: CPT

## 2024-03-25 RX ORDER — POLYETHYLENE GLYCOL 3350 17 G/17G
17 POWDER, FOR SOLUTION ORAL DAILY PRN
Status: DISCONTINUED | OUTPATIENT
Start: 2024-03-25 | End: 2024-03-29 | Stop reason: HOSPADM

## 2024-03-25 RX ORDER — ACETAMINOPHEN 160 MG/5ML
650 SOLUTION ORAL EVERY 4 HOURS PRN
Status: DISCONTINUED | OUTPATIENT
Start: 2024-03-25 | End: 2024-03-29 | Stop reason: HOSPADM

## 2024-03-25 RX ORDER — NITROGLYCERIN 0.4 MG/1
0.4 TABLET SUBLINGUAL
Status: DISCONTINUED | OUTPATIENT
Start: 2024-03-25 | End: 2024-03-29 | Stop reason: HOSPADM

## 2024-03-25 RX ORDER — AMOXICILLIN 250 MG
2 CAPSULE ORAL 2 TIMES DAILY PRN
Status: DISCONTINUED | OUTPATIENT
Start: 2024-03-25 | End: 2024-03-29 | Stop reason: HOSPADM

## 2024-03-25 RX ORDER — SODIUM CHLORIDE 9 MG/ML
40 INJECTION, SOLUTION INTRAVENOUS AS NEEDED
Status: DISCONTINUED | OUTPATIENT
Start: 2024-03-25 | End: 2024-03-29 | Stop reason: HOSPADM

## 2024-03-25 RX ORDER — FENTANYL CITRATE 50 UG/ML
25 INJECTION, SOLUTION INTRAMUSCULAR; INTRAVENOUS ONCE
Status: COMPLETED | OUTPATIENT
Start: 2024-03-25 | End: 2024-03-25

## 2024-03-25 RX ORDER — ATORVASTATIN CALCIUM 80 MG/1
80 TABLET, FILM COATED ORAL NIGHTLY
Status: DISCONTINUED | OUTPATIENT
Start: 2024-03-26 | End: 2024-03-26 | Stop reason: SDUPTHER

## 2024-03-25 RX ORDER — HEPARIN SODIUM 10000 [USP'U]/100ML
12 INJECTION, SOLUTION INTRAVENOUS
Status: DISCONTINUED | OUTPATIENT
Start: 2024-03-25 | End: 2024-03-26

## 2024-03-25 RX ORDER — SODIUM CHLORIDE 9 MG/ML
125 INJECTION, SOLUTION INTRAVENOUS CONTINUOUS
Status: DISCONTINUED | OUTPATIENT
Start: 2024-03-25 | End: 2024-03-26 | Stop reason: SDUPTHER

## 2024-03-25 RX ORDER — BISACODYL 5 MG/1
5 TABLET, DELAYED RELEASE ORAL DAILY PRN
Status: DISCONTINUED | OUTPATIENT
Start: 2024-03-25 | End: 2024-03-29 | Stop reason: HOSPADM

## 2024-03-25 RX ORDER — SODIUM CHLORIDE 0.9 % (FLUSH) 0.9 %
10 SYRINGE (ML) INJECTION EVERY 12 HOURS SCHEDULED
Status: DISCONTINUED | OUTPATIENT
Start: 2024-03-25 | End: 2024-03-29 | Stop reason: HOSPADM

## 2024-03-25 RX ORDER — ACETAMINOPHEN 650 MG/1
650 SUPPOSITORY RECTAL EVERY 4 HOURS PRN
Status: DISCONTINUED | OUTPATIENT
Start: 2024-03-25 | End: 2024-03-29 | Stop reason: HOSPADM

## 2024-03-25 RX ORDER — ONDANSETRON 2 MG/ML
4 INJECTION INTRAMUSCULAR; INTRAVENOUS ONCE
Status: COMPLETED | OUTPATIENT
Start: 2024-03-25 | End: 2024-03-25

## 2024-03-25 RX ORDER — SODIUM CHLORIDE 0.9 % (FLUSH) 0.9 %
10 SYRINGE (ML) INJECTION AS NEEDED
Status: DISCONTINUED | OUTPATIENT
Start: 2024-03-25 | End: 2024-03-29 | Stop reason: HOSPADM

## 2024-03-25 RX ORDER — ASPIRIN 325 MG
325 TABLET ORAL ONCE
Status: DISCONTINUED | OUTPATIENT
Start: 2024-03-26 | End: 2024-03-29 | Stop reason: HOSPADM

## 2024-03-25 RX ORDER — ACETAMINOPHEN 500 MG
1000 TABLET ORAL ONCE
Status: COMPLETED | OUTPATIENT
Start: 2024-03-25 | End: 2024-03-25

## 2024-03-25 RX ORDER — BISACODYL 10 MG
10 SUPPOSITORY, RECTAL RECTAL DAILY PRN
Status: DISCONTINUED | OUTPATIENT
Start: 2024-03-25 | End: 2024-03-29 | Stop reason: HOSPADM

## 2024-03-25 RX ORDER — ACETAMINOPHEN 325 MG/1
650 TABLET ORAL EVERY 4 HOURS PRN
Status: DISCONTINUED | OUTPATIENT
Start: 2024-03-25 | End: 2024-03-29 | Stop reason: HOSPADM

## 2024-03-25 RX ADMIN — ACETAMINOPHEN 1000 MG: 500 TABLET ORAL at 20:38

## 2024-03-25 RX ADMIN — SODIUM CHLORIDE 1000 ML: 9 INJECTION, SOLUTION INTRAVENOUS at 22:02

## 2024-03-25 RX ADMIN — HEPARIN SODIUM 12 UNITS/KG/HR: 10000 INJECTION, SOLUTION INTRAVENOUS at 23:43

## 2024-03-25 RX ADMIN — FENTANYL CITRATE 25 MCG: 50 INJECTION, SOLUTION INTRAMUSCULAR; INTRAVENOUS at 23:24

## 2024-03-25 RX ADMIN — ONDANSETRON 4 MG: 2 INJECTION INTRAMUSCULAR; INTRAVENOUS at 23:24

## 2024-03-25 RX ADMIN — SODIUM CHLORIDE, POTASSIUM CHLORIDE, SODIUM LACTATE AND CALCIUM CHLORIDE 500 ML: 600; 310; 30; 20 INJECTION, SOLUTION INTRAVENOUS at 20:48

## 2024-03-25 RX ADMIN — SODIUM CHLORIDE 125 ML/HR: 9 INJECTION, SOLUTION INTRAVENOUS at 23:33

## 2024-03-25 RX ADMIN — CEFTRIAXONE 2000 MG: 2 INJECTION, POWDER, FOR SOLUTION INTRAMUSCULAR; INTRAVENOUS at 20:49

## 2024-03-25 NOTE — Clinical Note
Level of Care: Telemetry [5]   Diagnosis: Non-ST elevation MI (NSTEMI) [707059]   Admitting Physician: HERMILA DOVER [[id number]]   Attending Physician: HERMILA DOVER [[id number]]   Certification: I Certify That Inpatient Hospital Services Are Medically Necessary For Greater Than 2 Midnights

## 2024-03-25 NOTE — Clinical Note
Hemostasis started on the right femoral artery. Angio-Seal was used in achieving hemostasis. Closure device deployed in the vessel. Hemostasis achieved successfully. Closure device additional comment: 4x4 tegaderm applied

## 2024-03-25 NOTE — ED NOTES
Pt c/o fever and upper chest pain that started this am. Denies cough or change in urine. Pt has indwelling green. No n/v/d. Pt did not treat fever at home. Pt was given nitro and asa enroute with some relief of chest pain

## 2024-03-25 NOTE — ED PROVIDER NOTES
EMERGENCY DEPARTMENT ENCOUNTER  Room Number:  10/10  PCP: Sherron Gray MD  Independent Historians: Patient and EMS      HPI:  Chief Complaint: had concerns including Shortness of Breath and Chest Pain.     A complete HPI/ROS/PMH/PSH/SH/FH are unobtainable due to: None    Chronic or social conditions impacting patient care (Social Determinants of Health): None      Context: Javier Grant is a 83 y.o. male with a medical history of CAD, hypertension, hyperlipidemia and sleep apnea who presents to the ED c/o acute chest pain with shortness of breath and shaking/tremors.  Patient says that around 4:00 this afternoon, he began experiencing some pain in the upper chest and throat area that did not radiate to the arms or back.  The pain made him feel short of breath.  He was sitting down at home when the pain began.  No exertional activities described.  He tried one of his nitroglycerin tablets at home but that did not help the pain subside.  So he called 911.  Paramedics arrived and transported him here.  And route they gave him a second nitroglycerin tablet that did help provide some relief for the chest pain.  Upon arrival, though he is found to have a fever here.  That is when he told us that he also had some shaking and chills episode earlier in the day.  He has not had any vomiting or diarrhea.  Denies any abdominal pain.  Denies any recent sick contacts.  He has a chronic indwelling Stanley catheter which was just changed about 1 week ago at first urology office.      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    Hyperlipidemia 05/16/2016    CRISTY on autoCPAP 10/29/2016    Nonrheumatic aortic valve stenosis 11/16/2016    Nonrheumatic aortic valve insufficiency 11/16/2016    Umbilical hernia without obstruction and without gangrene 09/26/2017    Dizziness 12/15/2018    Vertigo 12/15/2018    History of heart attack 12/18/2018    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/18/2018    Bradycardia, sinus 02/28/2022    Near syncope 02/28/2022    Sick sinus syndrome 02/28/2022    Hx of CABG 10/17/2023    S/P coronary artery stent placement 10/17/2023    Presence of cardiac pacemaker 10/17/2023     Resolved Ambulatory Problems     Diagnosis Date Noted    Hypersomnia due to medical condition - CRISTY 10/29/2016    Cor pulmonale 11/16/2016    Obesity (BMI 30-39.9) 11/20/2017    Hypertensive encephalopathy 12/15/2018    Chest pain 01/26/2024     Past Medical History:   Diagnosis Date    Abnormal ECG 1980    Arthritis     Bleeds easily     CAD (coronary artery disease)     Clotting disorder 1990    Colon polyp     SKELTON (dyspnea on exertion)     Enlarged prostate     Gastritis     Gout     Heart murmur ?    Hiatal hernia     History of MI (myocardial infarction)     HL (hearing loss)     Macular degeneration     Mitral valve prolapse 1990    Myocardial infarction 1990    Nonrheumatic aortic (valve) insufficiency     Nonrheumatic aortic (valve) stenosis     RBBB (right bundle branch block)     Sleep apnea     Umbilical hernia     Vitamin D deficiency          PAST SURGICAL HISTORY  Past Surgical History:   Procedure Laterality Date    CARDIAC CATHETERIZATION Left 10/09/2015    Dr. Robin Kingston    CARDIAC ELECTROPHYSIOLOGY PROCEDURE N/A 03/04/2022    Procedure: Pacemaker DC new  [place];  Surgeon: Serafin Francois MD;  Location: [facility] CATH INVASIVE LOCATION;  Service: Cardiology;  Laterality: N/A;    CARDIAC SURGERY  1990     triple bypass    CATARACT EXTRACTION Bilateral     CORONARY ANGIOPLASTY WITH STENT PLACEMENT      CORONARY ARTERY BYPASS GRAFT      3 VESSELS    ENDOSCOPY N/A 2017    Procedure: ESOPHAGOGASTRODUODENOSCOPY WITH COLD BIOIPSIES AND BALLOON DILITATION SIZE 15, 16.5, 18;  Surgeon: Jerry SNOWDEN MD;  Location: [facility] ENDOSCOPY;  Service:     INSERT / REPLACE / REMOVE PACEMAKER  2022    MCCOY'S NEUROMA EXCISION Right     TONSILLECTOMY      UMBILICAL HERNIA REPAIR N/A 10/09/2017    Procedure: UMBILICAL HERNIA REPAIR;  Surgeon: Blake Kelly Jr., MD;  Location: [facility] MAIN OR;  Service:          FAMILY HISTORY  Family History   Problem Relation Age of Onset    Depression Mother     Colon polyps Mother     Heart attack Father     Aneurysm Father     Stomach cancer Brother     Malig Hyperthermia Neg Hx          SOCIAL HISTORY  Social History     Socioeconomic History    Marital status:    Tobacco Use    Smoking status: Former     Current packs/day: 0.00     Types: Cigarettes     Start date: 1957     Quit date: 1980     Years since quittin.2    Smokeless tobacco: Never   Vaping Use    Vaping status: Never Used   Substance and Sexual Activity    Alcohol use: No    Drug use: Never    Sexual activity: Not Currently     Partners: Female     Birth control/protection: Post-menopausal, None         ALLERGIES  Ciprofloxacin      REVIEW OF SYSTEMS  Review of Systems  Included in HPI  All systems reviewed and negative except for those discussed in HPI.      PHYSICAL EXAM    I have reviewed the triage vital signs and nursing notes.    ED Triage Vitals [24]   Temp Heart Rate Resp BP SpO2   (!) 101 °F (38.3 °C) 90 18 131/71 96 %      Temp src Heart Rate Source Patient Position BP Location FiO2 (%)   Tympanic Monitor Sitting Left arm --       Physical Exam  GENERAL: alert, no acute distress, nontoxic-appearing  SKIN: Warm, dry no diaphoresis  HENT: Normocephalic, atraumatic, moist  mucous membranes  EYES: no scleral icterus normal conjunctiva  CV: regular rhythm, regular rate  RESPIRATORY: normal effort, breath sounds are slightly diminished at the bases but otherwise clear to auscultation.  No coughing.  ABDOMEN: soft, nontender, nondistended, no masses palpable  MUSCULOSKELETAL: no deformity, no asymmetry to the lower extremities  NEURO: alert, moves all extremities, follows commands        LAB RESULTS  Recent Results (from the past 24 hour(s))   ECG 12 Lead Chest Pain    Collection Time: 03/25/24  7:50 PM   Result Value Ref Range    QT Interval 388 ms    QTC Interval 486 ms   Comprehensive Metabolic Panel    Collection Time: 03/25/24  7:58 PM    Specimen: Blood   Result Value Ref Range    Glucose 101 (H) 65 - 99 mg/dL    BUN 32 (H) 8 - 23 mg/dL    Creatinine 1.48 (H) 0.76 - 1.27 mg/dL    Sodium 135 (L) 136 - 145 mmol/L    Potassium 3.8 3.5 - 5.2 mmol/L    Chloride 100 98 - 107 mmol/L    CO2 25.0 22.0 - 29.0 mmol/L    Calcium 8.9 8.6 - 10.5 mg/dL    Total Protein 6.1 6.0 - 8.5 g/dL    Albumin 3.8 3.5 - 5.2 g/dL    ALT (SGPT) 35 1 - 41 U/L    AST (SGOT) 55 (H) 1 - 40 U/L    Alkaline Phosphatase 114 39 - 117 U/L    Total Bilirubin 0.6 0.0 - 1.2 mg/dL    Globulin 2.3 gm/dL    A/G Ratio 1.7 g/dL    BUN/Creatinine Ratio 21.6 7.0 - 25.0    Anion Gap 10.0 5.0 - 15.0 mmol/L    eGFR 46.7 (L) >60.0 mL/min/1.73   Protime-INR    Collection Time: 03/25/24  7:58 PM    Specimen: Blood   Result Value Ref Range    Protime 14.9 (H) 11.7 - 14.2 Seconds    INR 1.14 (H) 0.90 - 1.10   aPTT    Collection Time: 03/25/24  7:58 PM    Specimen: Blood   Result Value Ref Range    PTT 28.4 22.7 - 35.4 seconds   Single High Sensitivity Troponin T    Collection Time: 03/25/24  7:58 PM    Specimen: Blood   Result Value Ref Range    HS Troponin T 42 (H) <22 ng/L   BNP    Collection Time: 03/25/24  7:58 PM    Specimen: Blood   Result Value Ref Range    proBNP 3,133.0 (H) 0.0 - 1,800.0 pg/mL   Lactic Acid, Plasma    Collection  Time: 03/25/24  7:58 PM    Specimen: Blood   Result Value Ref Range    Lactate 1.4 0.5 - 2.0 mmol/L   CBC Auto Differential    Collection Time: 03/25/24  7:58 PM    Specimen: Blood   Result Value Ref Range    WBC 17.25 (H) 3.40 - 10.80 10*3/mm3    RBC 3.35 (L) 4.14 - 5.80 10*6/mm3    Hemoglobin 10.6 (L) 13.0 - 17.7 g/dL    Hematocrit 31.8 (L) 37.5 - 51.0 %    MCV 94.9 79.0 - 97.0 fL    MCH 31.6 26.6 - 33.0 pg    MCHC 33.3 31.5 - 35.7 g/dL    RDW 13.5 12.3 - 15.4 %    RDW-SD 47.0 37.0 - 54.0 fl    MPV 10.5 6.0 - 12.0 fL    Platelets 176 140 - 450 10*3/mm3    Neutrophil % 90.7 (H) 42.7 - 76.0 %    Lymphocyte % 3.3 (L) 19.6 - 45.3 %    Monocyte % 5.2 5.0 - 12.0 %    Eosinophil % 0.2 (L) 0.3 - 6.2 %    Basophil % 0.1 0.0 - 1.5 %    Immature Grans % 0.5 0.0 - 0.5 %    Neutrophils, Absolute 15.65 (H) 1.70 - 7.00 10*3/mm3    Lymphocytes, Absolute 0.57 (L) 0.70 - 3.10 10*3/mm3    Monocytes, Absolute 0.89 0.10 - 0.90 10*3/mm3    Eosinophils, Absolute 0.03 0.00 - 0.40 10*3/mm3    Basophils, Absolute 0.02 0.00 - 0.20 10*3/mm3    Immature Grans, Absolute 0.09 (H) 0.00 - 0.05 10*3/mm3    nRBC 0.0 0.0 - 0.2 /100 WBC   Respiratory Panel PCR w/COVID-19(SARS-CoV-2) KELLI/ZA/GEROSN/PAD/COR/SABINO In-House, NP Swab in [facility], 2 HR TAT - Swab, Nasopharynx    Collection Time: 03/25/24  8:01 PM    Specimen: Nasopharynx; Swab   Result Value Ref Range    ADENOVIRUS, PCR Not Detected Not Detected    Coronavirus 229E Not Detected Not Detected    Coronavirus HKU1 Not Detected Not Detected    Coronavirus NL63 Not Detected Not Detected    Coronavirus OC43 Not Detected Not Detected    COVID19 Not Detected Not Detected - Ref. Range    Human Metapneumovirus Not Detected Not Detected    Human Rhinovirus/Enterovirus Not Detected Not Detected    Influenza A PCR Not Detected Not Detected    Influenza B PCR Not Detected Not Detected    Parainfluenza Virus 1 Not Detected Not Detected    Parainfluenza Virus 2 Not Detected Not Detected    Parainfluenza Virus 3  Not Detected Not Detected    Parainfluenza Virus 4 Not Detected Not Detected    RSV, PCR Not Detected Not Detected    Bordetella pertussis pcr Not Detected Not Detected    Bordetella parapertussis PCR Not Detected Not Detected    Chlamydophila pneumoniae PCR Not Detected Not Detected    Mycoplasma pneumo by PCR Not Detected Not Detected   Urinalysis With Culture If Indicated - Urine, Catheter    Collection Time: 03/25/24  9:49 PM    Specimen: Urine, Catheter   Result Value Ref Range    Color, UA Yellow Yellow, Straw    Appearance, UA Clear Clear    pH, UA 5.5 5.0 - 8.0    Specific Gravity, UA 1.013 1.005 - 1.030    Glucose, UA Negative Negative    Ketones, UA Negative Negative    Bilirubin, UA Negative Negative    Blood, UA Trace (A) Negative    Protein, UA Negative Negative    Leuk Esterase, UA Moderate (2+) (A) Negative    Nitrite, UA Negative Negative    Urobilinogen, UA 1.0 E.U./dL 0.2 - 1.0 E.U./dL   Urinalysis, Microscopic Only - Urine, Catheter    Collection Time: 03/25/24  9:49 PM    Specimen: Urine, Catheter   Result Value Ref Range    RBC, UA 3-5 (A) None Seen, 0-2 /HPF    WBC, UA 21-50 (A) None Seen, 0-2 /HPF    Bacteria, UA 4+ (A) None Seen /HPF    Squamous Epithelial Cells, UA 0-2 None Seen, 0-2 /HPF    Hyaline Casts, UA 3-6 None Seen /LPF    Methodology Automated Microscopy    ECG 12 Lead Chest Pain    Collection Time: 03/25/24 11:06 PM   Result Value Ref Range    QT Interval 417 ms    QTC Interval 447 ms   Basic Metabolic Panel    Collection Time: 03/25/24 11:14 PM    Specimen: Blood   Result Value Ref Range    Glucose 113 (H) 65 - 99 mg/dL    BUN 33 (H) 8 - 23 mg/dL    Creatinine 1.35 (H) 0.76 - 1.27 mg/dL    Sodium 134 (L) 136 - 145 mmol/L    Potassium 3.7 3.5 - 5.2 mmol/L    Chloride 101 98 - 107 mmol/L    CO2 21.0 (L) 22.0 - 29.0 mmol/L    Calcium 8.2 (L) 8.6 - 10.5 mg/dL    BUN/Creatinine Ratio 24.4 7.0 - 25.0    Anion Gap 12.0 5.0 - 15.0 mmol/L    eGFR 52.1 (L) >60.0 mL/min/1.73   Single High  Sensitivity Troponin T    Collection Time: 03/25/24 11:14 PM    Specimen: Blood   Result Value Ref Range    HS Troponin T 100 (C) <22 ng/L         RADIOLOGY  XR Chest 1 View    Result Date: 3/25/2024  PORTABLE CHEST X-RAY  HISTORY: Chest pain, shortness of breath and fever.  Portable chest x-ray is provided. Correlation: January 26, 2024.  FINDINGS: Sternal wires with cardiac pacing device. The cardiomediastinal silhouette is normal. The lungs are clear. The costophrenic sulci are dry and the bones appear normal. There is no pneumothorax.      Negative.  This report was finalized on 3/25/2024 8:28 PM by Dr. Corbin Kendrick M.D on Workstation: [id number]         MEDICATIONS GIVEN IN ER  Medications   sodium chloride 0.9 % flush 10 mL (has no administration in time range)   sodium chloride 0.9 % flush 10 mL ( Intravenous Canceled Entry 3/25/24 2235)   sodium chloride 0.9 % flush 10 mL (has no administration in time range)   sodium chloride 0.9 % infusion 40 mL (has no administration in time range)   nitroglycerin (NITROSTAT) SL tablet 0.4 mg (has no administration in time range)   sennosides-docusate (PERICOLACE) 8.6-50 MG per tablet 2 tablet (has no administration in time range)     And   polyethylene glycol (MIRALAX) packet 17 g (has no administration in time range)     And   bisacodyl (DULCOLAX) EC tablet 5 mg (has no administration in time range)     And   bisacodyl (DULCOLAX) suppository 10 mg (has no administration in time range)   acetaminophen (TYLENOL) tablet 650 mg (has no administration in time range)     Or   acetaminophen (TYLENOL) 160 MG/5ML oral solution 650 mg (has no administration in time range)     Or   acetaminophen (TYLENOL) suppository 650 mg (has no administration in time range)   sodium chloride 0.9 % infusion (125 mL/hr Intravenous New Bag 3/25/24 1267)   heparin 83625 units/250 mL (100 units/mL) in 0.45 % NaCl infusion (12 Units/kg/hr × 70.3 kg Intravenous New Bag 3/25/24 9333)   sodium  chloride 0.9 % bolus 500 mL (has no administration in time range)   aspirin tablet 325 mg (has no administration in time range)   atorvastatin (LIPITOR) tablet 80 mg (has no administration in time range)   acetaminophen (TYLENOL) tablet 1,000 mg (1,000 mg Oral Given 3/25/24 2038)   lactated ringers bolus 500 mL (0 mL Intravenous Stopped 3/25/24 2143)   cefTRIAXone (ROCEPHIN) 2,000 mg in sodium chloride 0.9 % 100 mL MBP (0 mg Intravenous Stopped 3/25/24 2138)   sodium chloride 0.9 % bolus 1,000 mL (0 mL Intravenous Stopped 3/25/24 2318)   fentaNYL citrate (PF) (SUBLIMAZE) injection 25 mcg (25 mcg Intravenous Given 3/25/24 2324)   ondansetron (ZOFRAN) injection 4 mg (4 mg Intravenous Given 3/25/24 2324)         ORDERS PLACED DURING THIS VISIT:  Orders Placed This Encounter   Procedures    Respiratory Panel PCR w/COVID-19(SARS-CoV-2) KELLI/ZA/GERSON/PAD/COR/SABINO In-House, NP Swab in UTM/VTM, 2 HR TAT - Swab, Nasopharynx    Blood Culture - Blood,    Blood Culture - Blood,    Urine Culture - Urine,    XR Chest 1 View    Comprehensive Metabolic Panel    Protime-INR    aPTT    Single High Sensitivity Troponin T    BNP    Urinalysis With Culture If Indicated - Urine, Catheter    Lactic Acid, Plasma    CBC Auto Differential    Basic Metabolic Panel    CBC (No Diff)    Urinalysis, Microscopic Only - Urine, Clean Catch    Single High Sensitivity Troponin T    High Sensitivity Troponin T    aPTT    aPTT    CBC Auto Differential    CBC Auto Differential    High Sensitivity Troponin T 2Hr    NPO Diet NPO Type: Strict NPO    Monitor Blood Pressure    Vital Signs    Intake & Output    Weigh Patient    Oral Care    Place Sequential Compression Device    Maintain Sequential Compression Device    Maintain IV Access    Telemetry - Place Orders & Notify Provider of Results When Patient Experiences Acute Chest Pain, Dysrhythmia or Respiratory Distress    May Be Off Telemetry for Tests    Continuous Pulse Oximetry    Up With Assistance     Daily Weights    Neuro Checks    Neurovascular Checks    Continue Chronic Indwelling Urinary Catheter    Assess Need for Indwelling Urinary Catheter - Follow Removal Protocol    Urinary Catheter Care    Adjust Heparin Rate Based on aPTT Using Nomogram    Verify All Anticoagulant Orders Are Discontinued Upon Initiation of Heparin Protocol (eg Enoxaparin, Fondaparinux, Apixaban, Dabigatran, Edoxaban, or Rivaroxaban)    RN To Release aPTT Order 6 Hours After Heparin Bolus & 6 Hours After Any Heparin Rate Change    Code Status and Medical Interventions:    [facility] (on-call MD unless specified) Details    Inpatient Cardiology Consult    Inpatient Infection Control Nurse Consult    Pulmonology (on-call MD unless specified)    Incentive Spirometry    Oxygen Therapy- Nasal Cannula; Titrate 1-6 LPM Per SpO2; 90 - 95%    ECG 12 Lead Chest Pain    ECG 12 Lead Chest Pain    Insert Peripheral IV    Insert Peripheral IV    Initiate Observation Status    Initiate Observation Status    Inpatient Admission    CBC & Differential    CBC & Differential         OUTPATIENT MEDICATION MANAGEMENT:  Current Facility-Administered Medications Ordered in Epic   Medication Dose Route Frequency Provider Last Rate Last Admin    acetaminophen (TYLENOL) tablet 650 mg  650 mg Oral Q4H PRN Carine Lamas APRN        Or    acetaminophen (TYLENOL) 160 MG/5ML oral solution 650 mg  650 mg Oral Q4H PRN Carine Lamas APRN        Or    acetaminophen (TYLENOL) suppository 650 mg  650 mg Rectal Q4H PRN Carine Lamas APRN        aspirin tablet 325 mg  325 mg Oral Once Jc Colon MD        atorvastatin (LIPITOR) tablet 80 mg  80 mg Oral Nightly Jc Colon MD        sennosides-docusate (PERICOLACE) 8.6-50 MG per tablet 2 tablet  2 tablet Oral BID PRN Carine Lamas APRN        And    polyethylene glycol (MIRALAX) packet 17 g  17 g Oral Daily PRN Carine Lamas APRN        And    bisacodyl (DULCOLAX) EC  tablet 5 mg  5 mg Oral Daily PRN Carine Lamas APRN        And    bisacodyl (DULCOLAX) suppository 10 mg  10 mg Rectal Daily PRN Carine Lamas APRN        heparin 81918 units/250 mL (100 units/mL) in 0.45 % NaCl infusion  12 Units/kg/hr Intravenous Titrated Griffin Jorge MD 8.43 mL/hr at 03/25/24 2343 12 Units/kg/hr at 03/25/24 2343    nitroglycerin (NITROSTAT) SL tablet 0.4 mg  0.4 mg Sublingual Q5 Min PRN Carine Lamas APRN        sodium chloride 0.9 % bolus 500 mL  500 mL Intravenous Once Sloane Kingston APRN        sodium chloride 0.9 % flush 10 mL  10 mL Intravenous PRN Griffin Jorge MD        sodium chloride 0.9 % flush 10 mL  10 mL Intravenous Q12H Carine Lamas APRN        sodium chloride 0.9 % flush 10 mL  10 mL Intravenous PRN Carine Lamas APRN        sodium chloride 0.9 % infusion 40 mL  40 mL Intravenous PRN Carine Lamas APRN        sodium chloride 0.9 % infusion  125 mL/hr Intravenous Continuous Griffin Jorge  mL/hr at 03/25/24 2333 125 mL/hr at 03/25/24 2333     Current Outpatient Medications Ordered in Epic   Medication Sig Dispense Refill    acetaminophen (TYLENOL) 325 MG tablet Take 2 tablets by mouth Every Night.      allopurinol (ZYLOPRIM) 300 MG tablet Take 1 tablet by mouth Daily. 90 tablet 3    aspirin 81 MG tablet Take 1 tablet by mouth Daily.      atorvastatin (LIPITOR) 80 MG tablet TAKE 1 TABLET EVERY NIGHT 90 tablet 3    carvedilol (COREG) 3.125 MG tablet TAKE 1 TABLET TWICE DAILY  WITH MEALS 180 tablet 3    Cholecalciferol 2000 units capsule Take 1 capsule by mouth Daily.      clopidogrel (PLAVIX) 75 MG tablet Take 1 tablet by mouth Daily.      famotidine (PEPCID) 20 MG tablet Take 1 tablet by mouth Daily.      FLUoxetine (PROzac) 20 MG capsule Take 1 capsule by mouth Daily.      furosemide (LASIX) 40 MG tablet Take 1 tablet by mouth Daily. 30 tablet 3    isosorbide mononitrate (IMDUR) 30 MG 24 hr tablet Take  1 tablet by mouth Daily. 30 tablet 3    loratadine (Claritin) 10 MG tablet mg Tab, Oral, Daily, 0 Refill(s)      Multiple Vitamins-Minerals (PRESERVISION AREDS 2 PO) Take  by mouth 2 (Two) Times a Day.      NIFEdipine XL (PROCARDIA XL) 30 MG 24 hr tablet TAKE ONE TABLET BY MOUTH DAILY 90 tablet 3    nitroglycerin (Nitrostat) 0.4 MG SL tablet Take 1 tablet by mouth Every 5 (Five) Minutes As Needed (CHEST PAIN: MAX DOSE 3 TABLETS). 25 tablet 6    omega-3 acid ethyl esters (LOVAZA) 1 g capsule TAKE 4 CAPSULES DAILY (TO  HOLD MEDICATION PRIOR TO   OPERATING ROOM) 360 capsule 3    ramipril (ALTACE) 10 MG capsule TAKE 1 CAPSULE EVERY       EVENING 90 capsule 3    tamsulosin (FLOMAX) 0.4 MG capsule 24 hr capsule 1 capsule.           PROCEDURES  Procedures      Critical care provider statement:    Critical care time (minutes): 75.   Critical care time was exclusive of:  Separately billable procedures and treating other patients   Critical care was necessary to treat or prevent imminent or life-threatening deterioration of the following conditions:  Cardiac Failure, Circulatory Failure, and Sepsis   Critical care was time spent personally by me on the following activities:  Development of treatment plan with patient or surrogate, discussions with consultants, evaluation of patient's response to treatment, examination of patient, obtaining history from patient or surrogate, ordering and performing treatments and interventions, ordering and review of laboratory studies, ordering and review of radiographic studies, pulse oximetry, re-evaluation of patient's condition and review of old charts. Critical Care indicators: Angina, Unstable, Aggressive Management, Sepsis / septicemia, and Unstable Vital signs Others: aminophylline, diazepam, glucagon, heparin, lovenox, morphine, sodium bicarbonate, et al.      PROGRESS, DATA ANALYSIS, CONSULTS, AND MEDICAL DECISION MAKING  All labs have been independently interpreted by me.  All  radiology studies have been reviewed by me. All EKG's have been independently viewed and interpreted by me.  Discussion below represents my analysis of pertinent findings related to patient's condition, differential diagnosis, treatment plan and final disposition.    Differential diagnosis includes but is not limited to sepsis, viral illness, pneumonia, UTI, acute MI, pneumothorax, GERD.    Clinical Scores: HEART Score: 8                   ED Course as of 03/26/24 0005   Mon Mar 25, 2024   2027 Temp(!): 101 °F (38.3 °C) [KIRSTEN]   2027 Fever noted upon arrival.  Initiating infectious workup. [KIRSTEN]   2027 EKG         EKG time/Interp time: 1950/1954  Rhythm/Rate: Sinus rhythm, 94 bpm  P waves and IL: Present, 234 ms  QRS, axis: 136 ms, right bundle branch block  ST and T waves: Interpretation is somewhat limited because of the bundle branch block pattern.  However there do not appear to be any acute ischemic changes notable.    Independently interpreted by me contemporaneously with treatment   [KIRSTEN]   2028 WBC(!): 17.25 [KIRSTEN]   2028 Hemoglobin(!): 10.6 [KIRSTEN]   2028 Hematocrit(!): 31.8 [KIRSTEN]   2033 I independently interpreted the Chest X-ray and my findings are: No Pneumothorax, No Effusion, No Infiltrate   [KIRSTEN]   2043 HS Troponin T(!): 42  Troponin value is indeterminately elevated but is consistent with previous values on record from the past.  Given findings of LANA with elevated creatinine, I think that this troponin value is not particularly worrisome at this time. [KIRSTEN]   2044 Creatinine(!): 1.48 [KIRSTEN]   2044 BUN(!): 32 [KIRSTEN]   2113 Respiratory viral panel is unremarkable. [KIRSTEN]   2159 I discussed with Bibiana Lamas about this patient and she agrees to accept him to the hospitalist service on behalf of Dr. Colon for further medical management. [KIRSTEN]   2318 Patient is complaining of chest pain once again.  His blood pressure is much better at this time.  I discussed with Dr. Mcneal who is on for interventional cardiology about  the patient and happens to know the patient well.  He is going to look at the patient's EKGs and just a moment and call me back. [KIRSTEN]   2322 EKG         EKG time/Interp time: 1106/1110  Rhythm/Rate: Sinus rhythm, 69 bpm  P waves and [place]: Present, 326 ms  QRS, axis: 147 ms, right bundle branch block  ST and T waves: ST depression changes are noted in the anterior leads which is new in comparison to previous exam from earlier today.  Independently interpreted by me contemporaneously with treatment   [KIRSTEN]   2322 Bacteria, UA(!): 4+ [KIRSTEN]   2322 WBC, UA(!): 21-50 [KIRSTEN]   2322 Urine sample certainly suggestive of the probable etiology for sepsis presentation here. [KIRSTEN]   2334 Dr. Mcneal just called back after having looked at the patient's EKGs and labs from today's visit.  He is currently in the Cath Lab taking care of a patient with ST elevation MI.  He says as soon as he is finished with that procedure he is going to come over here and evaluate this patient as well.  He asked me to start a heparin drip which I am happy to do right now. [KIRSTEN]   2334 Given the complexity of the patient's care and his volatile blood pressure readings here in the ED, I am actually going to change his disposition and request that he be admitted to the ICU for further care tonight.  Paging the pulmonologist now. [KIRSTEN]   2335 I just updated the patient and his daughter about the findings and the plan of care with change in disposition to the ICU.  They are agreeable with everything as outlined.  Patient is resting comfortably at this time.  Work of breathing is normal and saturations are normal on room air. [KIRSTEN]   2342 Second troponin values pending at this time. [KIRSTEN]   Tue Mar 26, 2024   0001 I discussed with Amanda who is the [facility] on-call for pulmonary critical care at this time.  She agrees to accept the patient on behalf of Dr. Morris to the ICU at this time [KIRSTEN]   0001 HS Troponin T(!!): 100  Second troponin is notably elevated.  Heparin  infusion is started. [KIRSTEN]      ED Course User Index  [KIRSTEN] Griffin Jorge MD             AS OF 00:05 EDT VITALS:    BP - 95/53  HR - 65  TEMP - 100.2 °F (37.9 °C) (Tympanic)  O2 SATS - 92%    COMPLEXITY OF CARE  The patient requires admission.      DIAGNOSIS  Final diagnoses:   Non-ST elevation MI (NSTEMI)   Sepsis with acute renal failure, due to unspecified organism, unspecified acute renal failure type, unspecified whether septic shock present   Urinary tract infection associated with indwelling urethral catheter, initial encounter   Chronic anemia         DISPOSITION  ED Disposition       ED Disposition   Decision to Admit    Condition   --    Comment   Level of Care: Telemetry [5]   Diagnosis: Non-ST elevation MI (NSTEMI) [349812]   Admitting Physician: HERMILA DOVER [[id number]]   Attending Physician: HERMILA DOVER [[id number]]   Certification: I Certify That Inpatient Hospital Services Are Medically Necessary For Greater Than 2 Midnights                  Please note that portions of this document were completed with a voice recognition program.    Note Disclaimer: At [facility], we believe that sharing information builds trust and better relationships. You are receiving this note because you recently visited [facility]. It is possible you will see health information before a provider has talked with you about it. This kind of information can be easy to misunderstand. To help you fully understand what it means for your health, we urge you to discuss this note with your provider.         Griffin Jorge MD  03/26/24 0005

## 2024-03-25 NOTE — Clinical Note
First balloon inflation max pressure = 12 salvatore. First balloon inflation duration = 7 seconds. Second inflation of balloon - Max pressure = 14 salvatore. 2nd Inflation of balloon - Duration = 8 seconds. 2nd inflation was done at 14:41 EDT.

## 2024-03-25 NOTE — Clinical Note
First balloon inflation max pressure = 20 salvatore. First balloon inflation duration = 10 seconds. Second inflation of balloon - Max pressure = 22 salvatore. 2nd Inflation of balloon - Duration = 10 seconds. 2nd inflation was done at 14:53 EDT.

## 2024-03-25 NOTE — Clinical Note
First balloon inflation max pressure = 18 slavatore. First balloon inflation duration = 9 seconds. Second inflation of balloon - Max pressure = 18 salvatore. 2nd Inflation of balloon - Duration = 5 seconds. 2nd inflation was done at 14:46 EDT.

## 2024-03-26 PROBLEM — I21.4 NON-ST ELEVATION MI (NSTEMI): Status: ACTIVE | Noted: 2024-03-26

## 2024-03-26 LAB
APTT PPP: 127.1 SECONDS (ref 22.7–35.4)
APTT PPP: 53.1 SECONDS (ref 22.7–35.4)
BACTERIA SPEC AEROBE CULT: NORMAL
BASOPHILS # BLD AUTO: 0.04 10*3/MM3 (ref 0–0.2)
BASOPHILS NFR BLD AUTO: 0.2 % (ref 0–1.5)
DEPRECATED RDW RBC AUTO: 51 FL (ref 37–54)
EOSINOPHIL # BLD AUTO: 0.01 10*3/MM3 (ref 0–0.4)
EOSINOPHIL NFR BLD AUTO: 0 % (ref 0.3–6.2)
ERYTHROCYTE [DISTWIDTH] IN BLOOD BY AUTOMATED COUNT: 13.9 % (ref 12.3–15.4)
GEN 5 2HR TROPONIN T REFLEX: 102 NG/L
GLUCOSE BLDC GLUCOMTR-MCNC: 124 MG/DL (ref 70–130)
GLUCOSE BLDC GLUCOMTR-MCNC: 125 MG/DL (ref 70–130)
GLUCOSE BLDC GLUCOMTR-MCNC: 94 MG/DL (ref 70–130)
HCT VFR BLD AUTO: 30.2 % (ref 37.5–51)
HGB BLD-MCNC: 10 G/DL (ref 13–17.7)
IMM GRANULOCYTES # BLD AUTO: 0.2 10*3/MM3 (ref 0–0.05)
IMM GRANULOCYTES NFR BLD AUTO: 0.9 % (ref 0–0.5)
LYMPHOCYTES # BLD AUTO: 0.7 10*3/MM3 (ref 0.7–3.1)
LYMPHOCYTES NFR BLD AUTO: 3.3 % (ref 19.6–45.3)
MCH RBC QN AUTO: 33 PG (ref 26.6–33)
MCHC RBC AUTO-ENTMCNC: 33.1 G/DL (ref 31.5–35.7)
MCV RBC AUTO: 99.7 FL (ref 79–97)
MONOCYTES # BLD AUTO: 1.08 10*3/MM3 (ref 0.1–0.9)
MONOCYTES NFR BLD AUTO: 5.1 % (ref 5–12)
NEUTROPHILS NFR BLD AUTO: 19.15 10*3/MM3 (ref 1.7–7)
NEUTROPHILS NFR BLD AUTO: 90.5 % (ref 42.7–76)
NRBC BLD AUTO-RTO: 0 /100 WBC (ref 0–0.2)
PLATELET # BLD AUTO: 148 10*3/MM3 (ref 140–450)
PMV BLD AUTO: 11 FL (ref 6–12)
PROCALCITONIN SERPL-MCNC: 0.09 NG/ML (ref 0–0.25)
QT INTERVAL: 388 MS
QT INTERVAL: 417 MS
QT INTERVAL: 481 MS
QTC INTERVAL: 447 MS
QTC INTERVAL: 486 MS
QTC INTERVAL: 508 MS
RBC # BLD AUTO: 3.03 10*6/MM3 (ref 4.14–5.8)
TROPONIN T DELTA: 2 NG/L
TROPONIN T SERPL HS-MCNC: 134 NG/L
WBC NRBC COR # BLD AUTO: 21.18 10*3/MM3 (ref 3.4–10.8)

## 2024-03-26 PROCEDURE — 027034Z DILATION OF CORONARY ARTERY, ONE ARTERY WITH DRUG-ELUTING INTRALUMINAL DEVICE, PERCUTANEOUS APPROACH: ICD-10-PCS | Performed by: INTERNAL MEDICINE

## 2024-03-26 PROCEDURE — C1760 CLOSURE DEV, VASC: HCPCS | Performed by: INTERNAL MEDICINE

## 2024-03-26 PROCEDURE — 25010000002 NITROGLYCERIN 200 MCG/ML SOLUTION: Performed by: INTERNAL MEDICINE

## 2024-03-26 PROCEDURE — C9604 PERC D-E COR REVASC T CABG S: HCPCS | Performed by: INTERNAL MEDICINE

## 2024-03-26 PROCEDURE — 25010000002 MORPHINE PER 10 MG: Performed by: INTERNAL MEDICINE

## 2024-03-26 PROCEDURE — C1769 GUIDE WIRE: HCPCS | Performed by: INTERNAL MEDICINE

## 2024-03-26 PROCEDURE — 85730 THROMBOPLASTIN TIME PARTIAL: CPT | Performed by: INTERNAL MEDICINE

## 2024-03-26 PROCEDURE — 93455 CORONARY ART/GRFT ANGIO S&I: CPT | Performed by: INTERNAL MEDICINE

## 2024-03-26 PROCEDURE — C1725 CATH, TRANSLUMIN NON-LASER: HCPCS | Performed by: INTERNAL MEDICINE

## 2024-03-26 PROCEDURE — 25010000002 FENTANYL CITRATE (PF) 50 MCG/ML SOLUTION: Performed by: INTERNAL MEDICINE

## 2024-03-26 PROCEDURE — 99222 1ST HOSP IP/OBS MODERATE 55: CPT | Performed by: INTERNAL MEDICINE

## 2024-03-26 PROCEDURE — 25810000003 SODIUM CHLORIDE 0.9 % SOLUTION: Performed by: INTERNAL MEDICINE

## 2024-03-26 PROCEDURE — C1894 INTRO/SHEATH, NON-LASER: HCPCS | Performed by: INTERNAL MEDICINE

## 2024-03-26 PROCEDURE — 25010000002 HEPARIN (PORCINE) PER 1000 UNITS: Performed by: INTERNAL MEDICINE

## 2024-03-26 PROCEDURE — 25010000002 MIDAZOLAM PER 1 MG: Performed by: INTERNAL MEDICINE

## 2024-03-26 PROCEDURE — 93005 ELECTROCARDIOGRAM TRACING: CPT | Performed by: INTERNAL MEDICINE

## 2024-03-26 PROCEDURE — 84484 ASSAY OF TROPONIN QUANT: CPT | Performed by: INTERNAL MEDICINE

## 2024-03-26 PROCEDURE — 93010 ELECTROCARDIOGRAM REPORT: CPT | Performed by: INTERNAL MEDICINE

## 2024-03-26 PROCEDURE — 82948 REAGENT STRIP/BLOOD GLUCOSE: CPT

## 2024-03-26 PROCEDURE — C1874 STENT, COATED/COV W/DEL SYS: HCPCS | Performed by: INTERNAL MEDICINE

## 2024-03-26 PROCEDURE — 85025 COMPLETE CBC W/AUTO DIFF WBC: CPT | Performed by: EMERGENCY MEDICINE

## 2024-03-26 PROCEDURE — 4A023N7 MEASUREMENT OF CARDIAC SAMPLING AND PRESSURE, LEFT HEART, PERCUTANEOUS APPROACH: ICD-10-PCS | Performed by: INTERNAL MEDICINE

## 2024-03-26 PROCEDURE — 84484 ASSAY OF TROPONIN QUANT: CPT | Performed by: NURSE PRACTITIONER

## 2024-03-26 PROCEDURE — C1887 CATHETER, GUIDING: HCPCS | Performed by: INTERNAL MEDICINE

## 2024-03-26 PROCEDURE — 92937 PRQ TRLUML REVSC CAB GRF 1: CPT | Performed by: INTERNAL MEDICINE

## 2024-03-26 PROCEDURE — 25010000002 CEFTRIAXONE PER 250 MG: Performed by: INTERNAL MEDICINE

## 2024-03-26 PROCEDURE — B2111ZZ FLUOROSCOPY OF MULTIPLE CORONARY ARTERIES USING LOW OSMOLAR CONTRAST: ICD-10-PCS | Performed by: INTERNAL MEDICINE

## 2024-03-26 PROCEDURE — B2131ZZ FLUOROSCOPY OF MULTIPLE CORONARY ARTERY BYPASS GRAFTS USING LOW OSMOLAR CONTRAST: ICD-10-PCS | Performed by: INTERNAL MEDICINE

## 2024-03-26 PROCEDURE — 85347 COAGULATION TIME ACTIVATED: CPT

## 2024-03-26 PROCEDURE — 25510000001 IOPAMIDOL PER 1 ML: Performed by: INTERNAL MEDICINE

## 2024-03-26 DEVICE — XIENCE SKYPOINT™ EVEROLIMUS ELUTING CORONARY STENT SYSTEM 4.00 MM X 15 MM / RAPID-EXCHANGE
Type: IMPLANTABLE DEVICE | Site: CORONARY | Status: FUNCTIONAL
Brand: XIENCE SKYPOINT™

## 2024-03-26 RX ORDER — LIDOCAINE HYDROCHLORIDE 20 MG/ML
INJECTION, SOLUTION INFILTRATION; PERINEURAL
Status: DISCONTINUED | OUTPATIENT
Start: 2024-03-26 | End: 2024-03-26 | Stop reason: HOSPADM

## 2024-03-26 RX ORDER — HYDROCODONE BITARTRATE AND ACETAMINOPHEN 5; 325 MG/1; MG/1
1 TABLET ORAL EVERY 4 HOURS PRN
Status: DISCONTINUED | OUTPATIENT
Start: 2024-03-26 | End: 2024-03-29 | Stop reason: HOSPADM

## 2024-03-26 RX ORDER — ACETAMINOPHEN 325 MG/1
650 TABLET ORAL EVERY 4 HOURS PRN
Status: DISCONTINUED | OUTPATIENT
Start: 2024-03-26 | End: 2024-03-29 | Stop reason: HOSPADM

## 2024-03-26 RX ORDER — MORPHINE SULFATE 2 MG/ML
2 INJECTION, SOLUTION INTRAMUSCULAR; INTRAVENOUS
Status: DISCONTINUED | OUTPATIENT
Start: 2024-03-26 | End: 2024-03-29 | Stop reason: HOSPADM

## 2024-03-26 RX ORDER — NOREPINEPHRINE BITARTRATE 0.03 MG/ML
.02-.3 INJECTION, SOLUTION INTRAVENOUS
Status: DISCONTINUED | OUTPATIENT
Start: 2024-03-26 | End: 2024-03-29

## 2024-03-26 RX ORDER — HEPARIN SODIUM 1000 [USP'U]/ML
INJECTION, SOLUTION INTRAVENOUS; SUBCUTANEOUS
Status: DISCONTINUED | OUTPATIENT
Start: 2024-03-26 | End: 2024-03-26 | Stop reason: HOSPADM

## 2024-03-26 RX ORDER — MIDAZOLAM HYDROCHLORIDE 1 MG/ML
INJECTION INTRAMUSCULAR; INTRAVENOUS
Status: DISCONTINUED | OUTPATIENT
Start: 2024-03-26 | End: 2024-03-26 | Stop reason: HOSPADM

## 2024-03-26 RX ORDER — SODIUM CHLORIDE 9 MG/ML
50 INJECTION, SOLUTION INTRAVENOUS CONTINUOUS
Status: ACTIVE | OUTPATIENT
Start: 2024-03-26 | End: 2024-03-26

## 2024-03-26 RX ORDER — CLOPIDOGREL BISULFATE 75 MG/1
75 TABLET ORAL DAILY
Status: DISCONTINUED | OUTPATIENT
Start: 2024-03-26 | End: 2024-03-29 | Stop reason: HOSPADM

## 2024-03-26 RX ORDER — ONDANSETRON 4 MG/1
4 TABLET, ORALLY DISINTEGRATING ORAL EVERY 6 HOURS PRN
Status: DISCONTINUED | OUTPATIENT
Start: 2024-03-26 | End: 2024-03-29 | Stop reason: HOSPADM

## 2024-03-26 RX ORDER — ASPIRIN 81 MG/1
81 TABLET ORAL DAILY
Status: DISCONTINUED | OUTPATIENT
Start: 2024-03-26 | End: 2024-03-29 | Stop reason: HOSPADM

## 2024-03-26 RX ORDER — ATORVASTATIN CALCIUM 80 MG/1
80 TABLET, FILM COATED ORAL NIGHTLY
Status: DISCONTINUED | OUTPATIENT
Start: 2024-03-26 | End: 2024-03-29 | Stop reason: HOSPADM

## 2024-03-26 RX ORDER — NITROGLYCERIN 20 MG/100ML
5-20 INJECTION INTRAVENOUS
Status: DISCONTINUED | OUTPATIENT
Start: 2024-03-26 | End: 2024-03-26

## 2024-03-26 RX ORDER — FENTANYL CITRATE 50 UG/ML
INJECTION, SOLUTION INTRAMUSCULAR; INTRAVENOUS
Status: DISCONTINUED | OUTPATIENT
Start: 2024-03-26 | End: 2024-03-26 | Stop reason: HOSPADM

## 2024-03-26 RX ORDER — ONDANSETRON 2 MG/ML
4 INJECTION INTRAMUSCULAR; INTRAVENOUS EVERY 6 HOURS PRN
Status: DISCONTINUED | OUTPATIENT
Start: 2024-03-26 | End: 2024-03-29 | Stop reason: HOSPADM

## 2024-03-26 RX ORDER — FAMOTIDINE 20 MG/1
20 TABLET, FILM COATED ORAL
Status: DISCONTINUED | OUTPATIENT
Start: 2024-03-26 | End: 2024-03-29 | Stop reason: HOSPADM

## 2024-03-26 RX ADMIN — NITROGLYCERIN 5 MCG/MIN: 20 INJECTION INTRAVENOUS at 02:15

## 2024-03-26 RX ADMIN — FAMOTIDINE 20 MG: 20 TABLET, FILM COATED ORAL at 17:51

## 2024-03-26 RX ADMIN — Medication 10 ML: at 09:12

## 2024-03-26 RX ADMIN — CLOPIDOGREL BISULFATE 75 MG: 75 TABLET, FILM COATED ORAL at 11:13

## 2024-03-26 RX ADMIN — Medication 0.02 MCG/KG/MIN: at 02:30

## 2024-03-26 RX ADMIN — ATORVASTATIN CALCIUM 80 MG: 80 TABLET, FILM COATED ORAL at 20:00

## 2024-03-26 RX ADMIN — MORPHINE SULFATE 2 MG: 2 INJECTION, SOLUTION INTRAMUSCULAR; INTRAVENOUS at 09:11

## 2024-03-26 RX ADMIN — CEFTRIAXONE 2000 MG: 2 INJECTION, POWDER, FOR SOLUTION INTRAMUSCULAR; INTRAVENOUS at 20:01

## 2024-03-26 RX ADMIN — Medication 10 ML: at 20:01

## 2024-03-26 RX ADMIN — Medication 10 ML: at 09:11

## 2024-03-26 RX ADMIN — ASPIRIN 81 MG: 81 TABLET, COATED ORAL at 11:12

## 2024-03-26 RX ADMIN — SODIUM CHLORIDE 125 ML/HR: 9 INJECTION, SOLUTION INTRAVENOUS at 10:29

## 2024-03-26 NOTE — H&P
Patient Name:  Javier Grant  YOB: 1940  MRN:  [id number]  Admit Date:  3/25/2024  Patient Care Team:  Sherron Gray MD as PCP - General (Pediatrics)  Robin Kingston Jr., MD as Consulting Physician (Cardiology)  Leonard Byrne MD as Consulting Physician (Ophthalmology)      Subjective   History Present Illness     Chief Complaint   Patient presents with    Shortness of Breath    Chest Pain   History of Present Illness  Mr. Grant is a 83-year-old male with history of obstructive sleep apnea, CAD, hypertension, hyperlipidemia, sick sinus syndrome, presence of pacemaker, history of CABG who presents to the emergency room with chest pain and shortness of breath.  Patient states that he had sudden onset of chest pain around 4 PM this afternoon, he tried to take a nitroglycerin at home with no relief in the chest pain so he called EMS, he was given another nitroglycerin with EMS and he states that his pain improved upon arrival here to the emergency room.  His initial EKG showed sinus rhythm with a prolonged NC interval and a rate of 94.  He was having some hypotension in the emergency room and was given a liter of fluid bolus.  On my exam of the patient he was complaining of chest pain again, second EKG showed some possible T abnormalities in possible ischemia, instructed nurse to call cardiology.  Patient's blood pressure is very soft 90s over 50s, unable to give nitro at this time given his low systolic blood pressure.  Other lab work in the emergency room his lactate was 1.4, white blood cell count is elevated at 17.25, INR 1.14, hemoglobin 10.6, hematocrit 31.8% to be close to his baseline.  BNP is 3133.  Troponin initially was 42, increased to 100, patient was started on heparin drip.  Patient is diaphoretic, awake and talking, but lethargic.  He is on 2 L of oxygen with sats 95% during my exam.  Again I did instruct nurse to page cardiology with EKG changes and increase in troponin.    Review  of Systems   Constitutional:  Positive for fatigue. Negative for appetite change and fever.   HENT:  Negative for nosebleeds and trouble swallowing.    Eyes:  Negative for photophobia, redness and visual disturbance.   Respiratory:  Positive for shortness of breath. Negative for cough, chest tightness and wheezing.    Cardiovascular:  Positive for chest pain. Negative for palpitations and leg swelling.   Gastrointestinal:  Negative for abdominal distention, abdominal pain, nausea and vomiting.   Endocrine: Negative.    Genitourinary: Negative.    Musculoskeletal:  Negative for gait problem and joint swelling.   Skin: Negative.    Neurological:  Negative for dizziness, seizures, speech difficulty, light-headedness and headaches.   Hematological: Negative.    Psychiatric/Behavioral:  Negative for behavioral problems and confusion.         Personal History     Past Medical History:   Diagnosis Date    Abnormal ECG 1980    Arthritis     Bleeds easily     WAS TOLD THAT AFTER CABG    CAD (coronary artery disease)     Clotting disorder 1990    Noted bu surgeon when I had the heart bypasses    Colon polyp     Disorder of aorta     Dizziness     SKELTON (dyspnea on exertion)     Enlarged prostate     Gastritis     Gout     Heart murmur ?    Hiatal hernia     History of MI (myocardial infarction)     1990    HL (hearing loss)     Hyperlipidemia     Hypertension     Macular degeneration     rt eye    Mitral valve prolapse 1990    Myocardial infarction 1990    Nonrheumatic aortic (valve) insufficiency     Nonrheumatic aortic (valve) stenosis     RBBB (right bundle branch block)     Sleep apnea     USES CPAP    Umbilical hernia     Vitamin D deficiency      Past Surgical History:   Procedure Laterality Date    CARDIAC CATHETERIZATION Left 10/09/2015    Dr. Robin Kingston    CARDIAC ELECTROPHYSIOLOGY PROCEDURE N/A 03/04/2022    Procedure: Pacemaker DC ila CADENA;  Surgeon: Serafin Francois MD;  Location: [facility] INVASIVE  LOCATION;  Service: Cardiology;  Laterality: N/A;    CARDIAC SURGERY      triple bypass    CATARACT EXTRACTION Bilateral     CORONARY ANGIOPLASTY WITH STENT PLACEMENT      CORONARY ARTERY BYPASS GRAFT      3 VESSELS    ENDOSCOPY N/A 2017    Procedure: ESOPHAGOGASTRODUODENOSCOPY WITH COLD BIOIPSIES AND BALLOON DILITATION SIZE 15, 16.5, 18;  Surgeon: Jerry SNOWDEN MD;  Location: [facility] ENDOSCOPY;  Service:     INSERT / REPLACE / REMOVE PACEMAKER  2022    MCCOY'S NEUROMA EXCISION Right     TONSILLECTOMY      UMBILICAL HERNIA REPAIR N/A 10/09/2017    Procedure: UMBILICAL HERNIA REPAIR;  Surgeon: Blake Kelly Jr., MD;  Location: [facility] MAIN OR;  Service:      Family History   Problem Relation Age of Onset    Depression Mother     Colon polyps Mother     Heart attack Father     Aneurysm Father     Stomach cancer Brother     Malig Hyperthermia Neg Hx      Social History     Tobacco Use    Smoking status: Former     Current packs/day: 0.00     Types: Cigarettes     Start date: 1957     Quit date: 1980     Years since quittin.2    Smokeless tobacco: Never   Vaping Use    Vaping status: Never Used   Substance Use Topics    Alcohol use: No    Drug use: Never     No current facility-administered medications on file prior to encounter.     Current Outpatient Medications on File Prior to Encounter   Medication Sig Dispense Refill    acetaminophen (TYLENOL) 325 MG tablet Take 2 tablets by mouth Every Night.      allopurinol (ZYLOPRIM) 300 MG tablet Take 1 tablet by mouth Daily. 90 tablet 3    aspirin 81 MG tablet Take 1 tablet by mouth Daily.      atorvastatin (LIPITOR) 80 MG tablet TAKE 1 TABLET EVERY NIGHT 90 tablet 3    carvedilol (COREG) 3.125 MG tablet TAKE 1 TABLET TWICE DAILY  WITH MEALS 180 tablet 3    Cholecalciferol 2000 units capsule Take 1 capsule by mouth Daily.      clopidogrel (PLAVIX) 75 MG tablet Take 1 tablet by mouth Daily.      famotidine (PEPCID) 20 MG tablet Take  1 tablet by mouth Daily.      FLUoxetine (PROzac) 20 MG capsule Take 1 capsule by mouth Daily.      furosemide (LASIX) 40 MG tablet Take 1 tablet by mouth Daily. 30 tablet 3    isosorbide mononitrate (IMDUR) 30 MG 24 hr tablet Take 1 tablet by mouth Daily. 30 tablet 3    loratadine (Claritin) 10 MG tablet mg Tab, Oral, Daily, 0 Refill(s)      Multiple Vitamins-Minerals (PRESERVISION AREDS 2 PO) Take  by mouth 2 (Two) Times a Day.      NIFEdipine XL (PROCARDIA XL) 30 MG 24 hr tablet TAKE ONE TABLET BY MOUTH DAILY 90 tablet 3    nitroglycerin (Nitrostat) 0.4 MG SL tablet Take 1 tablet by mouth Every 5 (Five) Minutes As Needed (CHEST PAIN: MAX DOSE 3 TABLETS). 25 tablet 6    omega-3 acid ethyl esters (LOVAZA) 1 g capsule TAKE 4 CAPSULES DAILY (TO  HOLD MEDICATION PRIOR TO   OPERATING ROOM) 360 capsule 3    ramipril (ALTACE) 10 MG capsule TAKE 1 CAPSULE EVERY       EVENING 90 capsule 3    tamsulosin (FLOMAX) 0.4 MG capsule 24 hr capsule 1 capsule.       Allergies   Allergen Reactions    Ciprofloxacin Diarrhea       Objective    Objective     Vital Signs  Temp:  [100.2 °F (37.9 °C)-101 °F (38.3 °C)] 100.2 °F (37.9 °C)  Heart Rate:  [65-91] 65  Resp:  [16-18] 18  BP: ()/(47-71) 95/53  SpO2:  [91 %-96 %] 92 %  on  Flow (L/min):  [2] 2;   Device (Oxygen Therapy): room air  Body mass index is 22.24 kg/m².    Physical Exam  Vitals and nursing note reviewed.   Constitutional:       General: He is not in acute distress.     Appearance: He is well-developed.   HENT:      Head: Normocephalic.   Neck:      Vascular: No JVD.   Cardiovascular:      Rate and Rhythm: Normal rate and regular rhythm.      Comments: Normal sinus rhythm on the monitor with heart rate 94 during my exam.  Patient is complaining of acute midsternal chest pain during my exam, he is diaphoretic and lethargic.  Blood pressure during my exam was 92/58.  EKG done shows some T wave changes possible ischemia.  Pulmonary:      Effort: Pulmonary effort is  normal.      Breath sounds: Normal breath sounds.      Comments: Diminished lung sounds, otherwise clear, sats 97% on 2 L of oxygen  Abdominal:      General: Bowel sounds are normal. There is no distension.      Palpations: Abdomen is soft.      Tenderness: There is no abdominal tenderness.   Musculoskeletal:         General: Normal range of motion.      Cervical back: Normal range of motion.   Skin:     General: Skin is warm and dry.      Capillary Refill: Capillary refill takes less than 2 seconds.   Neurological:      Mental Status: He is alert and oriented to person, place, and time.      Comments: Patient is alert and talking, he is a little lethargic, but follows commands and answers questions appropriately.  No focal neurodeficits noted   Psychiatric:         Mood and Affect: Mood normal.         Behavior: Behavior normal.         Cognition and Memory: Cognition normal.         Results Review:  I reviewed the patient's new clinical results.  I reviewed the patient's new imaging results and agree with the interpretation.  I reviewed the patient's other test results and agree with the interpretation  I personally viewed and interpreted the patient's EKG/Telemetry data  Discussed with ED provider.    Lab Results (last 24 hours)       Procedure Component Value Units Date/Time    CBC & Differential [[id number]]  (Abnormal) Collected: 03/25/24 1958    Specimen: Blood Updated: 03/25/24 2011    Narrative:      The following orders were created for panel order CBC & Differential.  Procedure                               Abnormality         Status                     ---------                               -----------         ------                     CBC Auto Differential[[id number]]        Abnormal            Final result                 Please view results for these tests on the individual orders.    Comprehensive Metabolic Panel [[id number]]  (Abnormal) Collected: 03/25/24 1958    Specimen: Blood Updated: 03/25/24  2031     Glucose 101 mg/dL      BUN 32 mg/dL      Creatinine 1.48 mg/dL      Sodium 135 mmol/L      Potassium 3.8 mmol/L      Chloride 100 mmol/L      CO2 25.0 mmol/L      Calcium 8.9 mg/dL      Total Protein 6.1 g/dL      Albumin 3.8 g/dL      ALT (SGPT) 35 U/L      AST (SGOT) 55 U/L      Alkaline Phosphatase 114 U/L      Total Bilirubin 0.6 mg/dL      Globulin 2.3 gm/dL      A/G Ratio 1.7 g/dL      BUN/Creatinine Ratio 21.6     Anion Gap 10.0 mmol/L      eGFR 46.7 mL/min/1.73     Narrative:      GFR Normal >60  Chronic Kidney Disease <60  Kidney Failure <15    The GFR formula is only valid for adults with stable renal function between ages 18 and 70.    Protime-INR [[id number]]  (Abnormal) Collected: 03/25/24 1958    Specimen: Blood Updated: 03/25/24 2029     Protime 14.9 Seconds      INR 1.14    aPTT [[id number]]  (Normal) Collected: 03/25/24 1958    Specimen: Blood Updated: 03/25/24 2029     PTT 28.4 seconds     Single High Sensitivity Troponin T [[id number]]  (Abnormal) Collected: 03/25/24 1958    Specimen: Blood Updated: 03/25/24 2031     HS Troponin T 42 ng/L     Narrative:      High Sensitive Troponin T Reference Range:  <14.0 ng/L- Negative Female for AMI  <22.0 ng/L- Negative Male for AMI  >=14 - Abnormal Female indicating possible myocardial injury.  >=22 - Abnormal Male indicating possible myocardial injury.   Clinicians would have to utilize clinical acumen, EKG, Troponin, and serial changes to determine if it is an Acute Myocardial Infarction or myocardial injury due to an underlying chronic condition.         BNP [[id number]]  (Abnormal) Collected: 03/25/24 1958    Specimen: Blood Updated: 03/25/24 2029     proBNP 3,133.0 pg/mL     Narrative:      This assay is used as an aid in the diagnosis of individuals suspected of having heart failure. It can be used as an aid in the diagnosis of acute decompensated heart failure (ADHF) in patients presenting with signs and symptoms of ADHF to the emergency  department (ED). In addition, NT-proBNP of <300 pg/mL indicates ADHF is not likely.    Age Range Result Interpretation  NT-proBNP Concentration (pg/mL:      <50             Positive            >450                   Gray                 300-450                    Negative             <300    50-75           Positive            >900                  Gray                300-900                  Negative            <300      >75             Positive            >1800                  Gray                300-1800                  Negative            <300    Lactic Acid, Plasma [[id number]]  (Normal) Collected: 03/25/24 1958    Specimen: Blood Updated: 03/25/24 2027     Lactate 1.4 mmol/L     CBC Auto Differential [[id number]]  (Abnormal) Collected: 03/25/24 1958    Specimen: Blood Updated: 03/25/24 2011     WBC 17.25 10*3/mm3      RBC 3.35 10*6/mm3      Hemoglobin 10.6 g/dL      Hematocrit 31.8 %      MCV 94.9 fL      MCH 31.6 pg      MCHC 33.3 g/dL      RDW 13.5 %      RDW-SD 47.0 fl      MPV 10.5 fL      Platelets 176 10*3/mm3      Neutrophil % 90.7 %      Lymphocyte % 3.3 %      Monocyte % 5.2 %      Eosinophil % 0.2 %      Basophil % 0.1 %      Immature Grans % 0.5 %      Neutrophils, Absolute 15.65 10*3/mm3      Lymphocytes, Absolute 0.57 10*3/mm3      Monocytes, Absolute 0.89 10*3/mm3      Eosinophils, Absolute 0.03 10*3/mm3      Basophils, Absolute 0.02 10*3/mm3      Immature Grans, Absolute 0.09 10*3/mm3      nRBC 0.0 /100 WBC     Respiratory Panel PCR w/COVID-19(SARS-CoV-2) KELLI/ZA/GERSON/PAD/COR/SABINO In-House, NP Swab in UTM/[facility], 2 HR TAT - Swab, Nasopharynx [[id number]]  (Normal) Collected: 03/25/24 2001    Specimen: Swab from Nasopharynx Updated: 03/25/24 2104     ADENOVIRUS, PCR Not Detected     Coronavirus 229E Not Detected     Coronavirus HKU1 Not Detected     Coronavirus NL63 Not Detected     Coronavirus OC43 Not Detected     COVID19 Not Detected     Human Metapneumovirus Not Detected     Human  Rhinovirus/Enterovirus Not Detected     Influenza A PCR Not Detected     Influenza B PCR Not Detected     Parainfluenza Virus 1 Not Detected     Parainfluenza Virus 2 Not Detected     Parainfluenza Virus 3 Not Detected     Parainfluenza Virus 4 Not Detected     RSV, PCR Not Detected     Bordetella pertussis pcr Not Detected     Bordetella parapertussis PCR Not Detected     Chlamydophila pneumoniae PCR Not Detected     Mycoplasma pneumo by PCR Not Detected    Narrative:      In the setting of a positive respiratory panel with a viral infection PLUS a negative procalcitonin without other underlying concern for bacterial infection, consider observing off antibiotics or discontinuation of antibiotics and continue supportive care. If the respiratory panel is positive for atypical bacterial infection (Bordetella pertussis, Chlamydophila pneumoniae, or Mycoplasma pneumoniae), consider antibiotic de-escalation to target atypical bacterial infection.    Blood Culture - Blood, Arm, Left [[id number]] Collected: 03/25/24 2036    Specimen: Blood from Arm, Left Updated: 03/25/24 2041    Blood Culture - Blood, Arm, Right [[id number]] Collected: 03/25/24 2036    Specimen: Blood from Arm, Right Updated: 03/25/24 2041    Urinalysis With Culture If Indicated - Urine, Catheter [[id number]]  (Abnormal) Collected: 03/25/24 2149    Specimen: Urine, Catheter Updated: 03/25/24 2217     Color, UA Yellow     Appearance, UA Clear     pH, UA 5.5     Specific Gravity, UA 1.013     Glucose, UA Negative     Ketones, UA Negative     Bilirubin, UA Negative     Blood, UA Trace     Protein, UA Negative     Leuk Esterase, UA Moderate (2+)     Nitrite, UA Negative     Urobilinogen, UA 1.0 E.U./dL    Narrative:      In absence of clinical symptoms, the presence of pyuria, bacteria, and/or nitrites on the urinalysis result does not correlate with infection.    Urinalysis, Microscopic Only - Urine, Catheter [[id number]]  (Abnormal) Collected: 03/25/24 2149     Specimen: Urine, Catheter Updated: 03/25/24 2217     RBC, UA 3-5 /HPF      WBC, UA 21-50 /HPF      Bacteria, UA 4+ /HPF      Squamous Epithelial Cells, UA 0-2 /HPF      Hyaline Casts, UA 3-6 /LPF      Methodology Automated Microscopy    Urine Culture - Urine, Urine, Catheter [[id number]] Collected: 03/25/24 2149    Specimen: Urine, Catheter Updated: 03/25/24 2217    Basic Metabolic Panel [[id number]]  (Abnormal) Collected: 03/25/24 2314    Specimen: Blood Updated: 03/25/24 2355     Glucose 113 mg/dL      BUN 33 mg/dL      Creatinine 1.35 mg/dL      Sodium 134 mmol/L      Potassium 3.7 mmol/L      Chloride 101 mmol/L      CO2 21.0 mmol/L      Calcium 8.2 mg/dL      BUN/Creatinine Ratio 24.4     Anion Gap 12.0 mmol/L      eGFR 52.1 mL/min/1.73     Narrative:      GFR Normal >60  Chronic Kidney Disease <60  Kidney Failure <15    The GFR formula is only valid for adults with stable renal function between ages 18 and 70.    Single High Sensitivity Troponin T [[id number]]  (Abnormal) Collected: 03/25/24 2314    Specimen: Blood Updated: 03/25/24 2357     HS Troponin T 100 ng/L     Narrative:      High Sensitive Troponin T Reference Range:  <14.0 ng/L- Negative Female for AMI  <22.0 ng/L- Negative Male for AMI  >=14 - Abnormal Female indicating possible myocardial injury.  >=22 - Abnormal Male indicating possible myocardial injury.   Clinicians would have to utilize clinical acumen, EKG, Troponin, and serial changes to determine if it is an Acute Myocardial Infarction or myocardial injury due to an underlying chronic condition.                 Imaging Results (Last 24 Hours)       Procedure Component Value Units Date/Time    XR Chest 1 View [[id number]] Collected: 03/25/24 2028     Updated: 03/25/24 2031    Narrative:      PORTABLE CHEST X-RAY     HISTORY: Chest pain, shortness of breath and fever.     Portable chest x-ray is provided. Correlation: January 26, 2024.     FINDINGS: Sternal wires with cardiac pacing device.  The  cardiomediastinal silhouette is normal. The lungs are clear. The  costophrenic sulci are dry and the bones appear normal. There is no  pneumothorax.       Impression:      Negative.     This report was finalized on 3/25/2024 8:28 PM by Dr. Corbin Kendrick M.D on Workstation: [id number]               Results for orders placed during the hospital encounter of 03/02/23    Adult Transthoracic Echo Complete w/ Color, Spectral and Contrast if Necessary Per Protocol    Interpretation Summary    Left ventricular systolic function is normal. Calculated left ventricular EF = 60.1% Normal left ventricular cavity size noted. Left ventricular wall thickness is consistent with concentric hypertrophy. Left ventricular diastolic function is consistent with (grade I) impaired relaxation.    Mildly reduced right ventricular systolic function noted.    The interatrial septum appears redundant. Saline test for shunting not performed. History of positive PFO    There is severe calcification of the aortic valve. No aortic valve regurgitation is present. Mild aortic valve stenosis is present. Aortic valve area is 1.38 cm2. Aortic valve mean pressure gradient is 17.5 mmHg. Aortic valve dimensionless index is 0.30 .    Severe mitral annular calcification is present. There is severe, bileaflet mitral valve thickening present. Trace mitral valve regurgitation is present. No significant mitral valve stenosis is present.    There is a probable left pleural (less likely pericardial) effusion. This is not well visualized.      ECG 12 Lead Chest Pain   Preliminary Result   HEART RATE= 69  bpm   RR Interval= 870  ms   SC Interval= 326  ms   P Horizontal Axis= 19  deg   P Front Axis= 42  deg   QRSD Interval= 147  ms   QT Interval= 417  ms   QTcB= 447  ms   QRS Axis= -85  deg   T Wave Axis= 263  deg   - ABNORMAL ECG -   Sinus rhythm   Prolonged SC interval   RBBB and LAFB   Abnormal T, consider ischemia, lateral leads   Baseline wander in  lead(s) I,III,aVL   Electronically Signed By:    Date and Time of Study: 2024-03-25 23:06:56      ECG 12 Lead Chest Pain   Preliminary Result   HEART RATE= 94  bpm   RR Interval= 638  ms   MO Interval= 234  ms   P Horizontal Axis= -57  deg   P Front Axis= 164  deg   QRSD Interval= 136  ms   QT Interval= 388  ms   QTcB= 486  ms   QRS Axis= -63  deg   T Wave Axis= -57  deg   - ABNORMAL ECG -   Sinus or ectopic atrial rhythm   Prolonged MO interval   RBBB and LAFB   Electronically Signed By:    Date and Time of Study: 2024-03-25 19:50:50           Assessment/Plan     Active Hospital Problems    Diagnosis  POA    **Non-ST elevation MI (NSTEMI) [I21.4]  Yes    Chest pain [R07.9]  Yes    Sepsis [A41.9]  Yes    Presence of cardiac pacemaker [Z95.0]  Yes    Hx of CABG [Z95.1]  Not Applicable    Sick sinus syndrome [I49.5]  Yes     Added automatically from request for surgery [id number]      CRISTY on autoCPAP [G47.33]  Yes    Coronary artery disease involving native coronary artery of native heart without angina pectoris [I25.10]  Yes    Hypertension [I10]  Yes    Hyperlipidemia [E78.5]  Yes     Mr. Grant is a 83-year-old male with history of obstructive sleep apnea, CAD, hypertension, hyperlipidemia, sick sinus syndrome, presence of pacemaker, history of CABG who presents to the emergency room with chest pain and shortness of breath.    NSTEMI/chest pain  -Patient started on heparin drip per protocol  -Consult cardiology  -Trend troponins  -Telemetry for monitoring, blood pressure not maintaining, patient will likely end up in ICU    Sick sinus syndrome/pacemaker/CAD  -Cardiology consulted  -Continue home medications when med rec completed with parameters    Elevated white blood cell count   -Normal saline at 125 cc an hour  -Repeat CBC in a.m.  -No obvious source of infection  -Chronic indwelling Stanley, urine culture is pending    I discussed the patient's findings and my recommendations with patient.    VTE Prophylaxis -   Heparin drip initiated .  Code Status - Full code.       COLLIN Mcallister  [facility]  03/25/24  23:15 EDT

## 2024-03-26 NOTE — CASE MANAGEMENT/SOCIAL WORK
Discharge Planning Assessment  [facility]     Patient Name: Javier Grant  MRN: [id number]  Today's Date: 3/26/2024    Admit Date: 3/25/2024    Plan: Plan is home with HH at [facility]  Pt declines referrals   Discharge Needs Assessment       Row Name 03/26/24 1329       Resource/Environmental Concerns    Resource/Environmental Concerns none    Transportation Concerns none       Transportation Needs    In the past 12 months, has lack of transportation kept you from medical appointments or from getting medications? no    In the past 12 months, has lack of transportation kept you from meetings, work, or from getting things needed for daily living? No       Food Insecurity    Within the past 12 months, you worried that your food would run out before you got the money to buy more. Never true    Within the past 12 months, the food you bought just didn't last and you didn't have money to get more. Never true       Transition Planning    Patient/Family Anticipates Transition to home with family    Patient/Family Anticipated Services at Transition none    Transportation Anticipated family or friend will provide       Discharge Needs Assessment    Equipment Currently Used at Home other (see comments)    Anticipated Changes Related to Illness none    Equipment Needed After Discharge none      Row Name 03/26/24 1324       Living Environment    People in Home child(dontrell), adult;grandchild(dontrell);spouse    Current Living Arrangements home    Potentially Unsafe Housing Conditions none    In the past 12 months has the electric, gas, oil, or water company threatened to shut off services in your home? No    Primary Care Provided by self    Provides Primary Care For no one    Family Caregiver if Needed child(dontrell), adult;spouse    Quality of Family Relationships supportive                   Discharge Plan       Row Name 03/26/24 1330       Plan    Plan Comments CCP spoke to patient at bedside  CCP role explained  DC planning discussed.   Pt states he lives with his wife, a daughter Li, and a great grandson, in a single story house.  Pt does not use an assistive device to ambulate.  [facility]  Tara weill follow for [employer].  He has no rehab history.  [facility]      Row Name 03/26/24 6674       Plan    Plan Plan is home with  at [facility]  Pt declines referrals                  Continued Care and Services - Admitted Since 3/25/2024       Home Medical Care       Service Provider Request Status Selected Services Address Phone Fax Patient Preferred    [facility] - Request Sent N/A [id number] [place] [address] [place] [id number] [phone] --                     Demographic Summary    No documentation.                  Functional Status    No documentation.                  Psychosocial    No documentation.                  Abuse/Neglect    No documentation.                  Legal    No documentation.                  Substance Abuse    No documentation.                  Patient Forms    No documentation.                     Li Bruno, RN

## 2024-03-26 NOTE — CONSULTS
Referring Provider:       Patient Care Team:  Sherron Gray MD as PCP - General (Pediatrics)  Robin Kingston Jr., MD as Consulting Physician (Cardiology)  Leonard Byrne MD as Consulting Physician (Ophthalmology)      Reason for Consultation:   NSTEMI  History of CABG and PCI    History of present illness:    83-year-old male with medical history of coronary artery disease with prior CABG, SVG to LAD, SVG to OM1, SVG to OM 2, occluded native vessels, PCI to the SVG to OM 2 and 2015, right bundle branch block aortic valve stenosis that is mild, permanent pacemaker secondary to sick sinus syndrome, PVCs, essential hypertension, indwelling Satnley catheter, recurrent bacteremia, who presented with the report of chest pain, shortness of breath and what sounds to be rigors.    His last cardiac catheterization was performed in December 2023 and at that time he was noted to have an occluded left main and RCA.  The SVG to LAD was patent and gave collateral flow to the diagonal branch and RCA from the distal vessel.  SVG to OM 2 was occluded at the proximal anastomosis.  SVG to OM1 graft was documented to have a 95% anastomotic stenosis that was tortuous and calcified and he underwent unsuccessful attempted PCI to this vessel in the [facility] system with distal vessel closure during that time.      He was evaluated in the emergency room here by Dr. Jorge.  He was noted during his stay to be hypotensive requiring aggressive fluid resuscitation.  He was also febrile with a temperature of 101.  Lab work showed him to have an elevated white count at 17,000.  Initial troponin was 42 with a repeat that up trended to 100.  He also had 4+ bacteria in his urine.    Patient did complain of chest pain in the ER and a repeat EKG was performed showing new ST depression and T wave abnormalities.        Review of Systems  All other systems reviewed and negative.     Past Medical History:   Past Medical History:   Diagnosis Date    Abnormal  ECG 1980    Arthritis     Bleeds easily     WAS TOLD THAT AFTER CABG    CAD (coronary artery disease)     Clotting disorder 1990    Noted bu surgeon when I had the heart bypasses    Colon polyp     Disorder of aorta     Dizziness     SKELTON (dyspnea on exertion)     Enlarged prostate     Gastritis     Gout     Heart murmur ?    Hiatal hernia     History of MI (myocardial infarction)     1990    HL (hearing loss)     Hyperlipidemia     Hypertension     Macular degeneration     rt eye    Mitral valve prolapse 1990    Myocardial infarction 1990    Nonrheumatic aortic (valve) insufficiency     Nonrheumatic aortic (valve) stenosis     RBBB (right bundle branch block)     Sleep apnea     USES CPAP    Umbilical hernia     Vitamin D deficiency        Past Surgical History:   Past Surgical History:   Procedure Laterality Date    CARDIAC CATHETERIZATION Left 10/09/2015    Dr. Robin Kingston    CARDIAC ELECTROPHYSIOLOGY PROCEDURE N/A 03/04/2022    Procedure: Pacemaker DC new  [place];  Surgeon: Serafin Francois MD;  Location: [facility] CATH INVASIVE LOCATION;  Service: Cardiology;  Laterality: N/A;    CARDIAC SURGERY  1990    triple bypass    CATARACT EXTRACTION Bilateral     CORONARY ANGIOPLASTY WITH STENT PLACEMENT  2015    CORONARY ARTERY BYPASS GRAFT  1990    3 VESSELS    ENDOSCOPY N/A 05/26/2017    Procedure: ESOPHAGOGASTRODUODENOSCOPY WITH COLD BIOIPSIES AND BALLOON DILITATION SIZE 15, 16.5, 18;  Surgeon: Jerry SNOWDEN MD;  Location: [facility] ENDOSCOPY;  Service:     INSERT / REPLACE / REMOVE PACEMAKER  03/05/2022    MCCOY'S NEUROMA EXCISION Right     TONSILLECTOMY      UMBILICAL HERNIA REPAIR N/A 10/09/2017    Procedure: UMBILICAL HERNIA REPAIR;  Surgeon: Blake Kelly Jr., MD;  Location: [facility] MAIN OR;  Service:        Family History:   Family History   Problem Relation Age of Onset    Depression Mother     Colon polyps Mother     Heart attack Father     Aneurysm Father     Stomach cancer Brother     King  Hyperthermia Neg Hx        Social History:   Social History     Tobacco Use    Smoking status: Former     Current packs/day: 0.00     Types: Cigarettes     Start date: 1957     Quit date: 1980     Years since quittin.2    Smokeless tobacco: Never   Vaping Use    Vaping status: Never Used   Substance Use Topics    Alcohol use: No    Drug use: Never       Home Medications: (Not in a hospital admission)      Current Medications:   Current Facility-Administered Medications:     acetaminophen (TYLENOL) tablet 650 mg, 650 mg, Oral, Q4H PRN **OR** acetaminophen (TYLENOL) 160 MG/5ML oral solution 650 mg, 650 mg, Oral, Q4H PRN **OR** acetaminophen (TYLENOL) suppository 650 mg, 650 mg, Rectal, Q4H PRN, Carine Lamas APRN    aspirin tablet 325 mg, 325 mg, Oral, Once, Jc Colon MD    atorvastatin (LIPITOR) tablet 80 mg, 80 mg, Oral, Nightly, Jc Colon MD    sennosides-docusate (PERICOLACE) 8.6-50 MG per tablet 2 tablet, 2 tablet, Oral, BID PRN **AND** polyethylene glycol (MIRALAX) packet 17 g, 17 g, Oral, Daily PRN **AND** bisacodyl (DULCOLAX) EC tablet 5 mg, 5 mg, Oral, Daily PRN **AND** bisacodyl (DULCOLAX) suppository 10 mg, 10 mg, Rectal, Daily PRN, Carine Lamas APRN    heparin 15612 units/250 mL (100 units/mL) in 0.45 % NaCl infusion, 12 Units/kg/hr, Intravenous, Titrated, Griffin Jorge MD, Last Rate: 8.43 mL/hr at 243, 12 Units/kg/hr at 24 2343    nitroglycerin (NITROSTAT) SL tablet 0.4 mg, 0.4 mg, Sublingual, Q5 Min PRN, Carine Lamas APRN    sodium chloride 0.9 % bolus 500 mL, 500 mL, Intravenous, Once, Sloane Kingston APRN    [COMPLETED] Insert Peripheral IV, , , Once **AND** sodium chloride 0.9 % flush 10 mL, 10 mL, Intravenous, PRN, Griffin Jorge MD    sodium chloride 0.9 % flush 10 mL, 10 mL, Intravenous, Q12H, Carine Lamas, COLLIN    sodium chloride 0.9 % flush 10 mL, 10 mL, Intravenous, PRN, Carine Lamas,  APRN    sodium chloride 0.9 % infusion 40 mL, 40 mL, Intravenous, PRN, Carine Lamas, APRN    sodium chloride 0.9 % infusion, 125 mL/hr, Intravenous, Continuous, Griffin Jorge MD, Stopped at 03/26/24 0029    Current Outpatient Medications:     acetaminophen (TYLENOL) 325 MG tablet, Take 2 tablets by mouth Every Night., Disp: , Rfl:     allopurinol (ZYLOPRIM) 300 MG tablet, Take 1 tablet by mouth Daily., Disp: 90 tablet, Rfl: 3    aspirin 81 MG tablet, Take 1 tablet by mouth Daily., Disp: , Rfl:     atorvastatin (LIPITOR) 80 MG tablet, TAKE 1 TABLET EVERY NIGHT, Disp: 90 tablet, Rfl: 3    carvedilol (COREG) 3.125 MG tablet, TAKE 1 TABLET TWICE DAILY  WITH MEALS, Disp: 180 tablet, Rfl: 3    Cholecalciferol 2000 units capsule, Take 1 capsule by mouth Daily., Disp: , Rfl:     clopidogrel (PLAVIX) 75 MG tablet, Take 1 tablet by mouth Daily., Disp: , Rfl:     famotidine (PEPCID) 20 MG tablet, Take 1 tablet by mouth Daily., Disp: , Rfl:     FLUoxetine (PROzac) 20 MG capsule, Take 1 capsule by mouth Daily., Disp: , Rfl:     furosemide (LASIX) 40 MG tablet, Take 1 tablet by mouth Daily., Disp: 30 tablet, Rfl: 3    isosorbide mononitrate (IMDUR) 30 MG 24 hr tablet, Take 1 tablet by mouth Daily., Disp: 30 tablet, Rfl: 3    loratadine (Claritin) 10 MG tablet, mg Tab, Oral, Daily, 0 Refill(s), Disp: , Rfl:     Multiple Vitamins-Minerals (PRESERVISION AREDS 2 PO), Take  by mouth 2 (Two) Times a Day., Disp: , Rfl:     NIFEdipine XL (PROCARDIA XL) 30 MG 24 hr tablet, TAKE ONE TABLET BY MOUTH DAILY, Disp: 90 tablet, Rfl: 3    nitroglycerin (Nitrostat) 0.4 MG SL tablet, Take 1 tablet by mouth Every 5 (Five) Minutes As Needed (CHEST PAIN: MAX DOSE 3 TABLETS)., Disp: 25 tablet, Rfl: 6    omega-3 acid ethyl esters (LOVAZA) 1 g capsule, TAKE 4 CAPSULES DAILY (TO  HOLD MEDICATION PRIOR TO   OPERATING ROOM), Disp: 360 capsule, Rfl: 3    ramipril (ALTACE) 10 MG capsule, TAKE 1 CAPSULE EVERY       EVENING, Disp: 90 capsule, Rfl:  "3    tamsulosin (FLOMAX) 0.4 MG capsule 24 hr capsule, 1 capsule., Disp: , Rfl:      Allergies: Ciprofloxacin      Vital Signs   Temp:  [100.2 °F (37.9 °C)-101 °F (38.3 °C)] 100.2 °F (37.9 °C)  Heart Rate:  [65-91] 65  Resp:  [16-18] 18  BP: ()/(47-71) 95/53  Flowsheet Rows      Flowsheet Row First Filed Value   Admission Height 177.8 cm (70\") Documented at 03/25/2024 1928   Admission Weight 70.3 kg (154 lb 15.7 oz) Documented at 03/25/2024 1928            General Appearance:    Alert, cooperative, in no acute distress   Head:    Normocephalic, without obvious abnormality, atraumatic       Neck:   No adenopathy, supple, no thyromegaly, no carotid bruit, no    JVD   Lungs:     Clear to auscultation bilaterally, no wheezes, rales, or     rhonchi    Heart:    Normal rate, regular rhythm, no murmur, no rub, no gallop   Chest Wall:  Sternotomy   Abdomen:     Normal bowel sounds, soft, nontender, nondistended,            no rebound tenderness   Extremities:   No cyanosis, clubbing, or edema   Pulses:   Pulses palpable and equal bilaterally   Skin:   No bleeding or rash   Lymph nodes:   No cervical adenopathy   Neurologic:   Cranial nerves 2 - 12 grossly intact, sensation intact               Results Review: I personally viewed and interpreted the patient's EKG/Telemetry data    Results from last 7 days   Lab Units 03/25/24 1958   WBC 10*3/mm3 17.25*   HEMOGLOBIN g/dL 10.6*   HEMATOCRIT % 31.8*   PLATELETS 10*3/mm3 176     Results from last 7 days   Lab Units 03/25/24 2314   SODIUM mmol/L 134*   POTASSIUM mmol/L 3.7   CHLORIDE mmol/L 101   CO2 mmol/L 21.0*   BUN mg/dL 33*   CREATININE mg/dL 1.35*   GLUCOSE mg/dL 113*   CALCIUM mg/dL 8.2*     Lab Results   Lab Value Date/Time    TROPONINT 100 (C) 03/25/2024 2314    TROPONINT 42 (H) 03/25/2024 1958    TROPONINT 52 (H) 01/28/2024 0951    TROPONINT 60 (C) 01/28/2024 0720    TROPONINT 60 (C) 01/27/2024 0323    TROPONINT 44 (H) 01/26/2024 1501    TROPONINT 55 (C) " 01/26/2024 1302    TROPONINT <0.010 08/17/2019 1933    TROPONINT <0.010 08/17/2019 1720    TROPONINT <0.010 12/14/2018 2244    TROPONINT <0.01 10/01/2015 1020    TROPONINT <0.01 10/01/2015 0217    TROPONINT <0.01 09/30/2015 2010    TROPONINT <0.01 09/23/2015 1652           Assessment & Plan   1.  Sepsis  2.  Non-ST elevation MI  3.  Coronary artery disease with prior CABG.  Occluded native vessels.  Occluded grafts other than the SVG to LAD.  4.  Indwelling Stanley catheter  5.  Sick sinus syndrome with permanent pacemaker  6.  Urinary tract infection    -Difficult situation.  Patient recently underwent coronary and graft angiography 3 months ago with evidence of occluded SVG to OM 2 and distal vessel closure after attempted PCI of SVG to OM1.  He does have a patent SVG to LAD that gives flow basically to everything.  -These films have been requested to confirm the anatomy  -As he effectively has 1 bypass graft open and all of his native vessels are occluded I think is very likely that in the setting of his low blood pressure and sepsis that he is having angina in the setting of a type II non-ST elevation MI  -His low blood pressure does preclude high doses of nitroglycerin but his last systolic was 115 mmHg and I do think he would likely tolerate 5-10 mics of IV nitro which I have ordered.  -Aspirin, Plavix, and atorvastatin have been continued.  Hold carvedilol in the setting of lower blood pressure  -Repeat troponin in the a.m.  -We will continue to follow closely.  Defer infection management to the primary team.    I discussed the patient's findings and my recommendations with the patient

## 2024-03-26 NOTE — ED NOTES
"Nursing report ED to floor  Javier Grant  83 y.o.  male    HPI :  HPI (Adult)  Stated Reason for Visit: Pt to ED via EMS for reports of SOA x 3 hours. Pt states, \"I have pressure in my lungs that squeezing my chest.\"  History Obtained From: patient, EMS    Chief Complaint  Chief Complaint   Patient presents with    Shortness of Breath    Chest Pain       Admitting doctor:   Jc Colon MD    Admitting diagnosis:   The primary encounter diagnosis was Sepsis without acute organ dysfunction, due to unspecified organism. Diagnoses of Chest pain, unspecified type, LANA (acute kidney injury), and Chronic anemia were also pertinent to this visit.    Code status:   Current Code Status       Date Active Code Status Order ID Comments User Context       3/25/2024 2135 CPR (Attempt to Resuscitate) [id number]  Carine Lamas APRN ED        Question Answer    Code Status (Patient has no pulse and is not breathing) CPR (Attempt to Resuscitate)    Medical Interventions (Patient has pulse or is breathing) Full Support                    Allergies:   Ciprofloxacin    Isolation:   Enhanced Droplet/Contact     Intake and Output    Intake/Output Summary (Last 24 hours) at 3/25/2024 2140  Last data filed at 3/25/2024 2138  Gross per 24 hour   Intake 100 ml   Output --   Net 100 ml       Weight:       03/25/24 1928   Weight: 70.3 kg (154 lb 15.7 oz)       Most recent vitals:   Vitals:    03/25/24 1928 03/25/24 1945 03/25/24 2001 03/25/24 2101   BP: 131/71 104/58 102/51 94/57   BP Location: Left arm      Patient Position: Sitting      Pulse: 90 91 87 81   Resp: 18   16   Temp: (!) 101 °F (38.3 °C)      TempSrc: Tympanic      SpO2: 96% 92% 91% 91%   Weight: 70.3 kg (154 lb 15.7 oz)      Height: 177.8 cm (70\")          Active LDAs/IV Access:   Lines, Drains & Airways       Active LDAs       Name Placement date Placement time Site Days    Peripheral IV 03/25/24 1931 Posterior;Right Forearm 03/25/24 1931  Forearm  less than 1    " Urethral Catheter 10/16/23  --  -- 161                    Labs (abnormal labs have a star):   Labs Reviewed   COMPREHENSIVE METABOLIC PANEL - Abnormal; Notable for the following components:       Result Value    Glucose 101 (*)     BUN 32 (*)     Creatinine 1.48 (*)     Sodium 135 (*)     AST (SGOT) 55 (*)     eGFR 46.7 (*)     All other components within normal limits    Narrative:     GFR Normal >60  Chronic Kidney Disease <60  Kidney Failure <15    The GFR formula is only valid for adults with stable renal function between ages 18 and 70.   PROTIME-INR - Abnormal; Notable for the following components:    Protime 14.9 (*)     INR 1.14 (*)     All other components within normal limits   SINGLE HS TROPONIN T - Abnormal; Notable for the following components:    HS Troponin T 42 (*)     All other components within normal limits    Narrative:     High Sensitive Troponin T Reference Range:  <14.0 ng/L- Negative Female for AMI  <22.0 ng/L- Negative Male for AMI  >=14 - Abnormal Female indicating possible myocardial injury.  >=22 - Abnormal Male indicating possible myocardial injury.   Clinicians would have to utilize clinical acumen, EKG, Troponin, and serial changes to determine if it is an Acute Myocardial Infarction or myocardial injury due to an underlying chronic condition.        BNP (IN-HOUSE) - Abnormal; Notable for the following components:    proBNP 3,133.0 (*)     All other components within normal limits    Narrative:     This assay is used as an aid in the diagnosis of individuals suspected of having heart failure. It can be used as an aid in the diagnosis of acute decompensated heart failure (ADHF) in patients presenting with signs and symptoms of ADHF to the emergency department (ED). In addition, NT-proBNP of <300 pg/mL indicates ADHF is not likely.    Age Range Result Interpretation  NT-proBNP Concentration (pg/mL:      <50             Positive            >450                   Verma                  300-450                    Negative             <300    50-75           Positive            >900                  Gray                300-900                  Negative            <300      >75             Positive            >1800                  Gray                300-1800                  Negative            <300   CBC WITH AUTO DIFFERENTIAL - Abnormal; Notable for the following components:    WBC 17.25 (*)     RBC 3.35 (*)     Hemoglobin 10.6 (*)     Hematocrit 31.8 (*)     Neutrophil % 90.7 (*)     Lymphocyte % 3.3 (*)     Eosinophil % 0.2 (*)     Neutrophils, Absolute 15.65 (*)     Lymphocytes, Absolute 0.57 (*)     Immature Grans, Absolute 0.09 (*)     All other components within normal limits   RESPIRATORY PANEL PCR W/ COVID-19 (SARS-COV-2), NP SWAB IN UTM/VTP, 2 HR TAT - Normal    Narrative:     In the setting of a positive respiratory panel with a viral infection PLUS a negative procalcitonin without other underlying concern for bacterial infection, consider observing off antibiotics or discontinuation of antibiotics and continue supportive care. If the respiratory panel is positive for atypical bacterial infection (Bordetella pertussis, Chlamydophila pneumoniae, or Mycoplasma pneumoniae), consider antibiotic de-escalation to target atypical bacterial infection.   APTT - Normal   LACTIC ACID, PLASMA - Normal   BLOOD CULTURE   BLOOD CULTURE   URINALYSIS W/ CULTURE IF INDICATED   CBC AND DIFFERENTIAL    Narrative:     The following orders were created for panel order CBC & Differential.  Procedure                               Abnormality         Status                     ---------                               -----------         ------                     CBC Auto Differential[[id number]]        Abnormal            Final result                 Please view results for these tests on the individual orders.       EKG:   ECG 12 Lead Chest Pain   Preliminary Result   HEART RATE= 94  bpm   RR Interval= 638  ms    RI Interval= 234  ms   P Horizontal Axis= -57  deg   P Front Axis= 164  deg   QRSD Interval= 136  ms   QT Interval= 388  ms   QTcB= 486  ms   QRS Axis= -63  deg   T Wave Axis= -57  deg   - ABNORMAL ECG -   Sinus or ectopic atrial rhythm   Prolonged RI interval   RBBB and LAFB   Electronically Signed By:    Date and Time of Study: 2024-03-25 19:50:50          Meds given in ED:   Medications   sodium chloride 0.9 % flush 10 mL (has no administration in time range)   sodium chloride 0.9 % flush 10 mL (has no administration in time range)   sodium chloride 0.9 % flush 10 mL (has no administration in time range)   sodium chloride 0.9 % infusion 40 mL (has no administration in time range)   nitroglycerin (NITROSTAT) SL tablet 0.4 mg (has no administration in time range)   sennosides-docusate (PERICOLACE) 8.6-50 MG per tablet 2 tablet (has no administration in time range)     And   polyethylene glycol (MIRALAX) packet 17 g (has no administration in time range)     And   bisacodyl (DULCOLAX) EC tablet 5 mg (has no administration in time range)     And   bisacodyl (DULCOLAX) suppository 10 mg (has no administration in time range)   acetaminophen (TYLENOL) tablet 650 mg (has no administration in time range)     Or   acetaminophen (TYLENOL) 160 MG/5ML oral solution 650 mg (has no administration in time range)     Or   acetaminophen (TYLENOL) suppository 650 mg (has no administration in time range)   acetaminophen (TYLENOL) tablet 1,000 mg (1,000 mg Oral Given 3/25/24 2038)   lactated ringers bolus 500 mL (500 mL Intravenous New Bag 3/25/24 2048)   cefTRIAXone (ROCEPHIN) 2,000 mg in sodium chloride 0.9 % 100 mL MBP (0 mg Intravenous Stopped 3/25/24 2138)       Imaging results:  XR Chest 1 View    Result Date: 3/25/2024  Negative.  This report was finalized on 3/25/2024 8:28 PM by Dr. Corbin Kendrick M.D on Workstation: [email or web address]       Ambulatory status:   - with assist    Social issues:   Social History     Socioeconomic  History    Marital status:    Tobacco Use    Smoking status: Former     Current packs/day: 0.00     Types: Cigarettes     Start date: 1957     Quit date: 1980     Years since quittin.2    Smokeless tobacco: Never   Vaping Use    Vaping status: Never Used   Substance and Sexual Activity    Alcohol use: No    Drug use: Never    Sexual activity: Not Currently     Partners: Female     Birth control/protection: Post-menopausal, None       Peripheral Neurovascular  Peripheral Neurovascular (Adult)  Peripheral Neurovascular WDL: WDL    Neuro Cognitive  Neuro Cognitive (Adult)  Cognitive/Neuro/Behavioral WDL: WDL    Learning  Learning Assessment (Adult)  Learning Readiness and Ability: no barriers identified    Respiratory  Respiratory WDL  Respiratory WDL: WDL    Abdominal Pain       Pain Assessments  Pain (Adult)  (0-10) Pain Rating: Rest: 4    NIH Stroke Scale       Kayode Sampson RN  24 21:40 EDT

## 2024-03-26 NOTE — PROGRESS NOTES
"Patient Care Team:  Sherron Gray MD as PCP - General (Pediatrics)  Robin Kingston Jr., MD as Consulting Physician (Cardiology)  Leonard Byrne MD as Consulting Physician (Ophthalmology)    Chief Complaint: Non-ST elevation MI    Interval History:   Patient continues to complain of chest discomfort 5 out of 10 in intensity.    Objective   Vital Signs  Temp:  [98 °F (36.7 °C)-101 °F (38.3 °C)] 98 °F (36.7 °C)  Heart Rate:  [59-91] 62  Resp:  [16-18] 18  BP: ()/(46-98) 110/61    Intake/Output Summary (Last 24 hours) at 3/26/2024 1316  Last data filed at 3/26/2024 1147  Gross per 24 hour   Intake 2349.97 ml   Output 750 ml   Net 1599.97 ml     Flowsheet Rows      Flowsheet Row First Filed Value   Admission Height 177.8 cm (70\") Documented at 03/25/2024 1928   Admission Weight 70.3 kg (154 lb 15.7 oz) Documented at 03/25/2024 1928            Temp:  [98 °F (36.7 °C)-101 °F (38.3 °C)] 98 °F (36.7 °C)  Heart Rate:  [59-91] 62  Resp:  [16-18] 18  BP: ()/(46-98) 110/61    Intake/Output Summary (Last 24 hours) at 3/26/2024 1316  Last data filed at 3/26/2024 1147  Gross per 24 hour   Intake 2349.97 ml   Output 750 ml   Net 1599.97 ml     Flowsheet Rows      Flowsheet Row First Filed Value   Admission Height 177.8 cm (70\") Documented at 03/25/2024 1928   Admission Weight 70.3 kg (154 lb 15.7 oz) Documented at 03/25/2024 1928            General Appearance:    Alert, cooperative, in no acute distress   Head:    Normocephalic, without obvious abnormality, atraumatic       Neck/Lymph   No adenopathy, supple, no thyromegaly, no carotid bruit, no    JVD   Lungs:     Clear to auscultation bilaterally, no wheezes, rales, or     rhonchi    Cardiac:    Normal rate, regular rhythm, no murmur, no rub, no gallop   Chest Wall:  Sternotomy   GI:     Normal bowel sounds, soft, nontender, nondistended,            no rebound tenderness   Extremities:   No cyanosis, clubbing, or edema   Circulatory/Peripheral Vascular :   Pulses " palpable and equal bilaterally   Integumentary:   No bleeding or rash. Normal temperature         aspirin, 81 mg, Oral, Daily  aspirin, 325 mg, Oral, Once  atorvastatin, 80 mg, Oral, Nightly  cefTRIAXone, 2,000 mg, Intravenous, Q24H  clopidogrel, 75 mg, Oral, Daily  famotidine, 20 mg, Oral, BID AC  sepsis fluid NS, 500 mL, Intravenous, Once  sodium chloride, 500 mL, Intravenous, Once  sodium chloride, 10 mL, Intravenous, Q12H        heparin, 12 Units/kg/hr, Last Rate: Stopped (03/26/24 1259)  nitroglycerin, 5-20 mcg/min, Last Rate: 10 mcg/min (03/26/24 0906)  norepinephrine, 0.02-0.3 mcg/kg/min, Last Rate: 0.04 mcg/kg/min (03/26/24 0907)  sodium chloride, 125 mL/hr, Last Rate: 125 mL/hr (03/26/24 1029)        Results Review:    Results from last 7 days   Lab Units 03/25/24  2314   SODIUM mmol/L 134*   POTASSIUM mmol/L 3.7   CHLORIDE mmol/L 101   CO2 mmol/L 21.0*   BUN mg/dL 33*   CREATININE mg/dL 1.35*   GLUCOSE mg/dL 113*   CALCIUM mg/dL 8.2*     Results from last 7 days   Lab Units 03/26/24  0244 03/26/24  0120 03/25/24  2314   HSTROP T ng/L 134* 102* 100*     Results from last 7 days   Lab Units 03/26/24  0244   WBC 10*3/mm3 21.18*   HEMOGLOBIN g/dL 10.0*   HEMATOCRIT % 30.2*   PLATELETS 10*3/mm3 148     Results from last 7 days   Lab Units 03/26/24  1209 03/26/24  0244 03/25/24 1958   INR   --   --  1.14*   APTT seconds 127.1* 53.1* 28.4                 @LABNT(bnp)@  I reviewed the patient's new clinical results.  I personally viewed and interpreted the patient's EKG/Telemetry data            Assessment & Plan   1.  Sepsis  2.  Non-ST elevation MI  3.  Coronary artery disease with prior CABG.  Occluded native vessels.  Occluded grafts other than the SVG to LAD.  4.  Indwelling Stanley catheter  5.  Sick sinus syndrome with permanent pacemaker  6.  Urinary tract infection     -Difficult situation.  Patient recently underwent coronary and graft angiography 3 months ago with evidence of occluded SVG to OM 2 and  distal vessel closure after attempted PCI of SVG to OM1.  He does have a patent SVG to LAD that gives flow basically to everything.  That being said I reviewed the films and do not see the angiography of the SVG to OM1 graft.  It was reported to be occluded.    -Aspirin, Plavix, and atorvastatin have been continued.  Hold carvedilol in the setting of lower blood pressure  -Repeat troponins have been slowly uptrending.  -Patient continues to complain of 5 out of 10 chest discomfort.  In the setting of his abnormal EKG and increasing troponin.  I think organ to have to take a look at his bypass grafts and make sure there is nothing that we can offer him.  Groin access secondary to difficulties with intervention last case.

## 2024-03-26 NOTE — PLAN OF CARE
Problem: Adult Inpatient Plan of Care  Goal: Plan of Care Review  Outcome: Ongoing, Progressing  Goal: Patient-Specific Goal (Individualized)  Outcome: Ongoing, Progressing  Goal: Absence of Hospital-Acquired Illness or Injury  Outcome: Ongoing, Progressing  Goal: Optimal Comfort and Wellbeing  Outcome: Ongoing, Progressing  Goal: Readiness for Transition of Care  Outcome: Ongoing, Progressing  Intervention: Mutually Develop Transition Plan  Recent Flowsheet Documentation  Taken 3/26/2024 0251 by Lalo Pang, RN  Equipment Currently Used at Home: (denttures chronic green) --  Taken 3/26/2024 0248 by Lalo Pang, RN  Equipment Currently Used at Home: (dentures, chronic green) --  Transportation Anticipated: car, drives self  Transportation Concerns: none  Patient/Family Anticipated Services at Transition: none  Patient/Family Anticipates Transition to: home with family     Problem: Asthma Comorbidity  Goal: Maintenance of Asthma Control  Outcome: Ongoing, Progressing     Problem: Behavioral Health Comorbidity  Goal: Maintenance of Behavioral Health Symptom Control  Outcome: Ongoing, Progressing     Problem: COPD (Chronic Obstructive Pulmonary Disease) Comorbidity  Goal: Maintenance of COPD Symptom Control  Outcome: Ongoing, Progressing     Problem: Diabetes Comorbidity  Goal: Blood Glucose Level Within Targeted Range  Outcome: Ongoing, Progressing     Problem: Heart Failure Comorbidity  Goal: Maintenance of Heart Failure Symptom Control  Outcome: Ongoing, Progressing     Problem: Hypertension Comorbidity  Goal: Blood Pressure in Desired Range  Outcome: Ongoing, Progressing     Problem: Obstructive Sleep Apnea Risk or Actual Comorbidity Management  Goal: Unobstructed Breathing During Sleep  Outcome: Ongoing, Progressing     Problem: Osteoarthritis Comorbidity  Goal: Maintenance of Osteoarthritis Symptom Control  Outcome: Ongoing, Progressing     Problem: Pain Chronic (Persistent) (Comorbidity  Management)  Goal: Acceptable Pain Control and Functional Ability  Outcome: Ongoing, Progressing     Problem: Seizure Disorder Comorbidity  Goal: Maintenance of Seizure Control  Outcome: Ongoing, Progressing   Goal Outcome Evaluation:

## 2024-03-26 NOTE — DISCHARGE PLACEMENT REQUEST
"Javier Grant (83 y.o. Male)       Date of Birth   1940    Social Security Number       Address   [address] [place] [address] [address] [facility] [address]    Home Phone   [phone]    MRN   [id number]       [employer]   [facility] [place]    Marital Status                               Admission Date   3/25/24    Admission Type   Emergency    Admitting Provider   Blake Vaughan MD    Attending Provider   Janell Youngblood MD    Department, Room/Bed   [facility] INTENSIVE CARE, I375/1       Discharge Date       Discharge Disposition       Discharge Destination                                 Attending Provider: Janell Youngblood MD    Allergies: Ciprofloxacin    Isolation: None   Infection: None   Code Status: CPR    Ht: 177.8 cm (70\")   Wt: 77 kg (169 lb 12.1 oz)    Admission Cmt: None   Principal Problem: Non-ST elevation MI (NSTEMI) [I21.4]                   Active Insurance as of 3/25/2024       Primary Coverage       Payor Plan Insurance Group Employer/Plan Group    ANTHEM MEDICARE REPLACEMENT ANTHEM MEDICARE ADVANTAGE [id number]       Payor Plan Address Payor Plan Phone Number Payor Plan Fax Number Effective Dates    [address] [phone]  2016 - None Entered    [facility] [id number]         Subscriber Name Subscriber Birth Date Member ID       JAVIER GRANT 1940 [id number]                     Emergency Contacts        (Rel.) Home Phone Work Phone Mobile Phone    FRANKLIN SALES (Daughter) [phone] -- [phone]    raheelnaif silverman (Daughter) [phone] -- --                "

## 2024-03-26 NOTE — H&P
Group: [facility]         History and Physical    Patient Identification:  Javier Grant  83 y.o.  male  1940  [id number]            Requesting physician: Dr Jorge    Reason for Consultation: ICU admission    CC: NSTEMI, sepsis, hypotension    History of Present Illness:  Javier Grant is an 83 year old male with past medical history CABG x 3, MI, CAD, cardiac stenting, pacemaker, HTN, HLD, CRISTY with cpap, TURP, chronic indwelling green catheter, ILD who presented to the ED with complaints of chest pain starting around 4PM today unrelieved by nitro at home.  He was given an additional nitro per EMS.  Chest pain was initially resolved by the time of ED arrival, EKG initially with RBBB, LAFB, sinus rhythm.  Patient initially febrile Tmax 101, hypotensive in ED.   He denies any other complaints.  Lactate negative, procalcitonin negative.  WBC 17.25, creatinine 1.48.  The patient began to have another episode of chest pain/pressure in the ED.  Patient follows with Dr. Mcneal, he was contacted per ED provider, he was placed on heparin drip per cardiology rec.  He was given sepsis bolus of 2.5L NS.  Decision was made to admit to ICU.      Follows with [facility], he reports he has chronic indwelling Green catheter since December after TURP for chronic retention.  It was was exchanged in the office on 3/25/24.    Patient follows with [facility], Dr. Martinez for ILD.  He was a 1 pack/day x 25 years smoker, quit in 1980.  He does have CRISTY and wears a CPAP at home.    Pt had 3V CABG in 1990s.  Per patient he had a heart cath December 2023 which revealed occlusion of grafts leaving 1 bypass graft open.  He is on aspirin and Plavix at home.  He reports some fatigue for the last few days, but otherwise in his normal state of health until the chest pain started today.  He did note some lower blood pressures at home with SBP 90s.    Review of Systems  CONSTITUTIONAL: Positive for fever.  EYE:  No new  vision changes  EAR:  No change in hearing  CARDIAC: Positive for chest pain.  PULMONARY:  No productive cough or shortness of breath.  GI:  No diarrhea, hematemesis or hematochezia.  RENAL:  No dysuria or urinary frequency  MUSCULOSKELETAL:  No musculoskeletal complaints  ENDOCRINE:  No heat or cold intolerance  INTEGUMENTARY: No skin rashes  NEUROLOGICAL:  No dizziness or confusion.  No seizure activity  PSYCHIATRIC:  No new anxiety or depression  12 system review of systems performed and all else negative     Past Medical History:  Past Medical History:   Diagnosis Date    Abnormal ECG 1980    Arthritis     Bleeds easily     WAS TOLD THAT AFTER CABG    CAD (coronary artery disease)     Clotting disorder 1990    Noted bu surgeon when I had the heart bypasses    Colon polyp     Disorder of aorta     Dizziness     SKELTON (dyspnea on exertion)     Enlarged prostate     Gastritis     Gout     Heart murmur ?    Hiatal hernia     History of MI (myocardial infarction)     1990    HL (hearing loss)     Hyperlipidemia     Hypertension     Macular degeneration     rt eye    Mitral valve prolapse 1990    Myocardial infarction 1990    Nonrheumatic aortic (valve) insufficiency     Nonrheumatic aortic (valve) stenosis     RBBB (right bundle branch block)     Sleep apnea     USES CPAP    Umbilical hernia     Vitamin D deficiency        Past Surgical History:  Past Surgical History:   Procedure Laterality Date    CARDIAC CATHETERIZATION Left 10/09/2015    Dr. Robin Kingston    CARDIAC ELECTROPHYSIOLOGY PROCEDURE N/A 03/04/2022    Procedure: Pacemaker DC new  [place];  Surgeon: Serafin Francois MD;  Location: [facility] CATH INVASIVE LOCATION;  Service: Cardiology;  Laterality: N/A;    CARDIAC SURGERY  1990    triple bypass    CATARACT EXTRACTION Bilateral     CORONARY ANGIOPLASTY WITH STENT PLACEMENT  2015    CORONARY ARTERY BYPASS GRAFT  1990    3 VESSELS    ENDOSCOPY N/A 05/26/2017    Procedure: ESOPHAGOGASTRODUODENOSCOPY WITH COLD  "BIOIPSIES AND BALLOON DILITATION SIZE 15, 16.5, 18;  Surgeon: Jerry SNOWDEN MD;  Location: [facility] ENDOSCOPY;  Service:     INSERT / REPLACE / REMOVE PACEMAKER  2022    MCCOY'S NEUROMA EXCISION Right     TONSILLECTOMY      UMBILICAL HERNIA REPAIR N/A 10/09/2017    Procedure: UMBILICAL HERNIA REPAIR;  Surgeon: Blake Kelly Jr., MD;  Location: [facility] MAIN OR;  Service:         Home Meds:  (Not in a hospital admission)      Allergies:  Allergies   Allergen Reactions    Ciprofloxacin Diarrhea       Social History:   Social History     Socioeconomic History    Marital status:    Tobacco Use    Smoking status: Former     Current packs/day: 0.00     Types: Cigarettes     Start date: 1957     Quit date: 1980     Years since quittin.2    Smokeless tobacco: Never   Vaping Use    Vaping status: Never Used   Substance and Sexual Activity    Alcohol use: No    Drug use: Never    Sexual activity: Not Currently     Partners: Female     Birth control/protection: Post-menopausal, None       Family History:  Family History   Problem Relation Age of Onset    Depression Mother     Colon polyps Mother     Heart attack Father     Aneurysm Father     Stomach cancer Brother     Malig Hyperthermia Neg Hx        Physical Exam:  /59   Pulse 73   Temp 100.2 °F (37.9 °C) (Tympanic)   Resp 16   Ht 177.8 cm (70\")   Wt 70.3 kg (154 lb 15.7 oz)   SpO2 93%   BMI 22.24 kg/m²  Body mass index is 22.24 kg/m². 93% 70.3 kg (154 lb 15.7 oz)    Physical Exam  Constitutional: Elderly male patient lying in bed, appears nontoxic, in no acute respiratory distress.  Head: NCAT  Eyes: No pallor, Anicteric conjunctiva, EOMI. PERRLA.  ENMT:  No oral thrush. Dry MM.   NECK: Trachea midline, no thyromegaly, no JVD.  Heart: RRR, systolic murmur noted.  Trace edema bilateral lower extremities.  Lungs: FARHEEN +, clear to auscultation throughout, no wheezing or crackles heard, he is on room air.  Abdomen: Soft. No tenderness, " guarding or rigidity. No palpable masses.  Extremities: Extremities warm and well perfused. No cyanosis/ clubbing.  All pulses palpable.  Neuro: Alert and oriented x 4, answers questions appropriately, no focal neuro deficits  Psych: Mood and affect appropriate    PPE recommended per [facility] infectious disease Isolation protocol for the current clinical scenario(as mentioned below) was followed.    LABS:  COVID19   Date Value Ref Range Status   03/25/2024 Not Detected Not Detected - Ref. Range Final       Lab Results   Component Value Date    CALCIUM 8.2 (L) 03/25/2024     Results from last 7 days   Lab Units 03/25/24 2314 03/25/24 1958   SODIUM mmol/L 134* 135*   POTASSIUM mmol/L 3.7 3.8   CHLORIDE mmol/L 101 100   CO2 mmol/L 21.0* 25.0   BUN mg/dL 33* 32*   CREATININE mg/dL 1.35* 1.48*   GLUCOSE mg/dL 113* 101*   CALCIUM mg/dL 8.2* 8.9   WBC 10*3/mm3  --  17.25*   HEMOGLOBIN g/dL  --  10.6*   PLATELETS 10*3/mm3  --  176   ALT (SGPT) U/L  --  35   AST (SGOT) U/L  --  55*   PROBNP pg/mL  --  3,133.0*     Lab Results   Component Value Date    CKTOTAL 77 07/17/2020    TROPONINI 7.994 (C) 12/22/2023    TROPONINT 100 (C) 03/25/2024     Results from last 7 days   Lab Units 03/25/24 2314 03/25/24 1958   HSTROP T ng/L 100* 42*         Results from last 7 days   Lab Units 03/25/24 1958   LACTATE mmol/L 1.4         Results from last 7 days   Lab Units 03/25/24 2001   ADENOVIRUS DETECTION BY PCR  Not Detected   CORONAVIRUS 229E  Not Detected   CORONAVIRUS HKU1  Not Detected   CORONAVIRUS NL63  Not Detected   CORONAVIRUS OC43  Not Detected   HUMAN METAPNEUMOVIRUS  Not Detected   HUMAN RHINOVIRUS/ENTEROVIRUS  Not Detected   INFLUENZA B PCR  Not Detected   PARAINFLUENZA 1  Not Detected   PARAINFLUENZA VIRUS 2  Not Detected   PARAINFLUENZA VIRUS 3  Not Detected   PARAINFLUENZA VIRUS 4  Not Detected   BORDETELLA PERTUSSIS PCR  Not Detected   BORDETELLA PARAPERTUSSIS PCR  Not Detected   CHLAMYDOPHILA PNEUMONIAE PCR   Not Detected   MYCOPLAMA PNEUMO PCR  Not Detected   RSV, PCR  Not Detected     Results from last 7 days   Lab Units 03/25/24  1958   INR  1.14*         Lab Results   Component Value Date    TSH 1.270 07/17/2020     Estimated Creatinine Clearance: 41.2 mL/min (A) (by C-G formula based on SCr of 1.35 mg/dL (H)).  Results from last 7 days   Lab Units 03/25/24  2149   NITRITE UA  Negative   WBC UA /HPF 21-50*   BACTERIA UA /HPF 4+*   SQUAM EPITHEL UA /HPF 0-2        Imaging: I personally visualized the images of scans/x-rays performed within last 3 days.      Assessment:  Sepsis  UTI  Hypotension  NSTEMI  Ischemic cardiomyopathy  Aortic valve stenosis  LANA  Chronic urinary retention   BPH  CRISTY  ILD  GERD  Hx of SSS, s/p pacemaker  Hx of HTN    Recommendations:  1.  Sepsis/hypotension/UTI  -UA appears positive with 4+ bacteria.  Blood cultures have been drawn.  He does have elevated white count.  Will treat with IV Rocephin as patient has been febrile.  Will await urine culture, could be colonized as patient with chronic indwelling Stanley catheter for retention.  -Patient with hypotension with SBP initially in the 70s, improved with IV fluid resuscitation, now SBP 90s.  Continue maintenance IV fluids.  -Dr. Mcneal would like to start IV nitroglycerin drip, patient is still borderline hypotensive, will add low-dose Levophed if needed.  -Hold home BP medications    2.  NSTEMI/CAD/Ischemic cardiomyopathy  -Continue heparin drip, starting nitroglycerin gtt per cardiology  -Troponin rise, now 100  -ASA, Plavix, atorvastatin continued per cardiology  -Home coreg, nifedipine, imdur, lasix, ramipril all on hold  -Echo ordered    3.  LANA  -Suspect related to dehydration, hypotension.  Suspect kidney functions will trend down after IV fluid.  Will monitor closely.  He has sufficient urine output currently.    4.  BPH/chronic urinary retention/chronic indwelling Stanley catheter  -Stanley catheter was exchanged at [facility] on  Monday, will hold off on replacing for now.  -Flomax restarted    5.  CRISTY  -Will have patient's daughter bring CPAP in for tomorrow night    6.  ILD  -Autoimmune workup and pulmonary function testing was recommended per [facility] in August 2023, it appears neither have been performed.  Recommended follow-up as outpatient.      VTE prophy: Heparin gtt, SCDs  GI prophy: Pepcid    Patient was placed in face mask upon entering room and kept mask on throughout our encounter. I wore full protective equipment throughout this patient encounter including a face mask, gown and gloves. Hand hygiene was performed before donning protective equipment and after removal when leaving the room.    Amanda Chu, APRN  3/25/2024  23:58 EDT      Much of this encounter note is an electronic transcription/translation of spoken language to printed text using Dragon Software.

## 2024-03-26 NOTE — CONSULTS
This is an 83-year-old male patient with history of CAD s/p CABG 1990 and PCI in 2015 in December 2023 at [facility] following admission for UTI and sepsis.  He has a chronic indwelling Stanley catheter since December 2023 for urinary retention secondary to BPH.  His catheter was changed last Monday.    He presented to the hospital today for chest pain.  This started around 3:30 PM.  It is moderate to severe intensity and similar to the chest pain he experienced in December 2023 when he had the heart attack.  It is associated with sweating and dyspnea.  SpO2 is normal on RA.  No cough.  He had some symptoms of weakness and fatigue over the last 2-3 days.    In ED, he had max HR of 91 and charles BP of 81/48.  Tmax 101.    Exam: RRR.  Systolic murmur over the aortic area radiating across the chest.  Clear lungs.  Soft abdomen.  Trace ankle edema.    Imaging (reviewed independently): CXR clear.  EKG: ST depression >2 mm in leads I, II, V1, V2, V3, V4 and V5.    Labs: WBC 17; neutrophils 90%; lactic acid normal; troponin=.     Assessment:  Severe sepsis  UTI  Hypotension, secondary to sepsis and BP meds.  Non-STEMI.  LANA  Ischemic cardiomyopathy, EF 45-50%  CAD s/p CABG 1990, PCI 2015 and December 2023.    Chronic anemia  HTN  BPH  Chronic indwelling Stanley catheter    Recommendations:  Admit to ICU  Administer 30 mill/KG IV fluid bolus for sepsis  Rocephin for UTI.  Check urine culture  Heparin drip.  Consult cardiology  Hold BP meds.  Echocardiogram    CC time 52 minutes

## 2024-03-26 NOTE — PROGRESS NOTES
"  PROGRESS NOTE  Patient Name: Javier Grant  Age/Sex: 83 y.o. male  : 1940  MRN: [id number]    Date of Admission: 3/25/2024  Date of Encounter Visit: 24   LOS: 0 days   Patient Care Team:  Sherron Gray MD as PCP - General (Pediatrics)  Robin Kingston Jr., MD as Consulting Physician (Cardiology)  Leonard Byrne MD as Consulting Physician (Ophthalmology)    Chief Complaint: Acute MI no ST elevation, UTI with sepsis    Hospital course: Patient is doing better, he is on the nitroglycerin and this is why he is requiring the small amount of IV pressors.  He is on the antibiotic, he is afebrile, he denies any chest pain, he is on room air and denies any shortness of breath at rest.        REVIEW OF SYSTEMS:   CONSTITUTIONAL: no fever or chills  CARDIOVASCULAR: No chest pain (the chest pain on presentation has resolved), chest pressure or chest discomfort. No palpitations or edema.   RESPIRATORY: No shortness of breath, cough or sputum.   GASTROINTESTINAL: No anorexia, nausea, vomiting or diarrhea. No abdominal pain or blood.   HEMATOLOGIC: No bleeding or bruising.     Ventilator/Non-Invasive Ventilation Settings (From admission, onward)      None              Vital Signs  Temp:  [98.2 °F (36.8 °C)-101 °F (38.3 °C)] 98.2 °F (36.8 °C)  Heart Rate:  [59-91] 60  Resp:  [16-18] 18  BP: ()/(46-98) 95/58  SpO2:  [88 %-96 %] 90 %  on  Flow (L/min):  [2] 2 Device (Oxygen Therapy): room air    Intake/Output Summary (Last 24 hours) at 3/26/2024 1133  Last data filed at 3/26/2024 0650  Gross per 24 hour   Intake 2349.97 ml   Output 450 ml   Net 1899.97 ml     Flowsheet Rows      Flowsheet Row First Filed Value   Admission Height 177.8 cm (70\") Documented at 2024   Admission Weight 70.3 kg (154 lb 15.7 oz) Documented at 2024          Body mass index is 24.36 kg/m².      2424  0600   Weight: 70.3 kg (154 lb 15.7 oz) 77 kg (169 lb 12.1 oz)       Physical Exam:  GEN:  No " "acute distress, alert, cooperative, well developed   EYES:   Sclerae clear. No icterus. PERRL. Normal EOM  ENT:   External ears/nose normal, no oral lesions, no thrush, mucous membranes moist  NECK:  Supple, midline trachea, no JVD  LUNGS: Normal chest on inspection, CTAB, no wheezes. No rhonchi. No crackles. Respirations regular, even and unlabored.   CV:  Regular rhythm and rate. Normal S1/S2. No murmurs, gallops, or rubs noted.  ABD:  Soft, nontender and nondistended. Normal bowel sounds. No guarding  EXT:  Moves all extremities well. No cyanosis. No redness. No edema.   Skin: Dry, intact, no bleeding    Results Review:    Results From Last 14 Days   Lab Units 03/25/24 1958   LACTATE mmol/L 1.4     Results from last 7 days   Lab Units 03/25/24 2314 03/25/24 1958   SODIUM mmol/L 134* 135*   POTASSIUM mmol/L 3.7 3.8   CHLORIDE mmol/L 101 100   CO2 mmol/L 21.0* 25.0   BUN mg/dL 33* 32*   CREATININE mg/dL 1.35* 1.48*   CALCIUM mg/dL 8.2* 8.9   AST (SGOT) U/L  --  55*   ALT (SGPT) U/L  --  35   ANION GAP mmol/L 12.0 10.0   ALBUMIN g/dL  --  3.8     Results from last 7 days   Lab Units 03/26/24 0244 03/26/24 0120 03/25/24 2314   HSTROP T ng/L 134* 102* 100*         Results from last 7 days   Lab Units 03/25/24 1958   PROBNP pg/mL 3,133.0*     Results from last 7 days   Lab Units 03/26/24 0244 03/25/24 1958   WBC 10*3/mm3 21.18* 17.25*   HEMOGLOBIN g/dL 10.0* 10.6*   HEMATOCRIT % 30.2* 31.8*   PLATELETS 10*3/mm3 148 176   MCV fL 99.7* 94.9   NEUTROPHIL % % 90.5* 90.7*   LYMPHOCYTE % % 3.3* 3.3*   MONOCYTES % % 5.1 5.2   EOSINOPHIL % % 0.0* 0.2*   BASOPHIL % % 0.2 0.1   IMM GRAN % % 0.9* 0.5     Results from last 7 days   Lab Units 03/26/24  0244 03/25/24 1958   INR   --  1.14*   APTT seconds 53.1* 28.4               Invalid input(s): \"LDLCALC\"          Glucose   Date/Time Value Ref Range Status   03/26/2024 0149 125 70 - 130 mg/dL Final     Results from last 7 days   Lab Units 03/25/24  2314 03/25/24 1958 "   PROCALCITONIN ng/mL 0.09  --    LACTATE mmol/L  --  1.4     Results from last 7 days   Lab Units 03/25/24 2149   URINECX  Growth present, too young to evaluate     Results from last 7 days   Lab Units 03/25/24 2149   NITRITE UA  Negative   WBC UA /HPF 21-50*   BACTERIA UA /HPF 4+*   SQUAM EPITHEL UA /HPF 0-2   URINECX  Growth present, too young to evaluate     Results from last 7 days   Lab Units 03/25/24 2001   COVID19  Not Detected   ADENOVIRUS DETECTION BY PCR  Not Detected   CORONAVIRUS 229E  Not Detected   CORONAVIRUS HKU1  Not Detected   CORONAVIRUS NL63  Not Detected   CORONAVIRUS OC43  Not Detected   HUMAN METAPNEUMOVIRUS  Not Detected   HUMAN RHINOVIRUS/ENTEROVIRUS  Not Detected   INFLUENZA B PCR  Not Detected   PARAINFLUENZA 1  Not Detected   PARAINFLUENZA VIRUS 2  Not Detected   PARAINFLUENZA VIRUS 3  Not Detected   PARAINFLUENZA VIRUS 4  Not Detected   BORDETELLA PERTUSSIS PCR  Not Detected   BORDETELLA PARAPERTUSSIS PCR  Not Detected   CHLAMYDOPHILA PNEUMONIAE PCR  Not Detected   MYCOPLAMA PNEUMO PCR  Not Detected   RSV, PCR  Not Detected               Imaging:   Imaging Results (All)       Procedure Component Value Units Date/Time    XR Chest 1 View [[id number]] Collected: 03/25/24 2028     Updated: 03/25/24 2031    Narrative:      PORTABLE CHEST X-RAY     HISTORY: Chest pain, shortness of breath and fever.     Portable chest x-ray is provided. Correlation: January 26, 2024.     FINDINGS: Sternal wires with cardiac pacing device. The  cardiomediastinal silhouette is normal. The lungs are clear. The  costophrenic sulci are dry and the bones appear normal. There is no  pneumothorax.       Impression:      Negative.     This report was finalized on 3/25/2024 8:28 PM by Dr. Corbin Kendrick M.D on Workstation: [id number]               I reviewed the patient's new clinical results.  I personally viewed and interpreted the patient's imaging results:        Medication Review:   aspirin, 81 mg, Oral,  Daily  aspirin, 325 mg, Oral, Once  atorvastatin, 80 mg, Oral, Nightly  cefTRIAXone, 2,000 mg, Intravenous, Q24H  clopidogrel, 75 mg, Oral, Daily  famotidine, 20 mg, Oral, BID AC  sepsis fluid NS, 500 mL, Intravenous, Once  sodium chloride, 500 mL, Intravenous, Once  sodium chloride, 10 mL, Intravenous, Q12H        heparin, 12 Units/kg/hr, Last Rate: 15 Units/kg/hr (03/26/24 0650)  nitroglycerin, 5-20 mcg/min, Last Rate: 10 mcg/min (03/26/24 0906)  norepinephrine, 0.02-0.3 mcg/kg/min, Last Rate: 0.04 mcg/kg/min (03/26/24 0907)  sodium chloride, 125 mL/hr, Last Rate: 125 mL/hr (03/26/24 1029)        ASSESSMENT:   Severe sepsis  UTI  Hypotension, secondary to sepsis and BP meds.  Non-STEMI.  LANA  Ischemic cardiomyopathy, EF 45-50%  CAD s/p CABG 1990, PCI 2015 and December 2023.  Chronic indwelling Stanley catheter, to be replaced today    PLAN:  Patient is on minimal rate of norepinephrine hopefully will be discontinued soon, blood pressure became more challenging with sepsis and was requiring nitroglycerin.  Patient is on antibiotic, responding to her current supportive measures  Cultures are negative to date but urinalysis is suggestive of UTI with a leukocytosis.  Kidney function is better  Patient has a chronic Stanley, the urinalysis all by itself is not reliable however with the leukocytosis, with the clinical picture of sepsis, and having significant pyuria and elevated white blood cell count, it is  safe to assume that this is UTI that was present on admission from chronic indwelling Stanley catheter.  We will have the staff replaced the existing Stanley catheter  Continue monitor in the ICU    Discussed with patient and with family, discussed with the ICU rounding team  Labs/Notes/films were independently reviewed and pertinent results are summarized above  The copied texts in this note were reviewed and they are accurate as of 03/26/24    Disposition: Continue in the ICU for 1 more day until cleared for transfer out  by cardiology    Janell Youngblood MD  03/26/24  11:33 EDT            Dictated utilizing Dragon dictation

## 2024-03-26 NOTE — CONSULTS
Consult for removal of Enhanced Droplet/Contact isolation. Patient reviewed, Covid test negative, no reported exposures, isolation removed, RN notified.

## 2024-03-26 NOTE — PLAN OF CARE
Goal Outcome Evaluation: Patient remains in CICU/ICU post NSTEMI. Pain was managed with Nitro drip and Morphine x 1. Weaned off the heparin and Nitro post LCH with PCI. Stent placed and post procedure care followed. Diet restarted. Room air with O2 sat at 93%. Chronic green in place upon admission, this was placed by urologist.

## 2024-03-26 NOTE — ED NOTES
Pt complains of upper chest pain that is worse than previous episode. Repeat EKG done, admitting md at bedside.

## 2024-03-27 LAB
ACT BLD: 303 SECONDS (ref 82–152)
ALBUMIN SERPL-MCNC: 2.7 G/DL (ref 3.5–5.2)
ANION GAP SERPL CALCULATED.3IONS-SCNC: 7 MMOL/L (ref 5–15)
BASOPHILS # BLD AUTO: 0.02 10*3/MM3 (ref 0–0.2)
BASOPHILS NFR BLD AUTO: 0.2 % (ref 0–1.5)
BUN SERPL-MCNC: 23 MG/DL (ref 8–23)
BUN/CREAT SERPL: 21.1 (ref 7–25)
CALCIUM SPEC-SCNC: 7.3 MG/DL (ref 8.6–10.5)
CHLORIDE SERPL-SCNC: 111 MMOL/L (ref 98–107)
CHOLEST SERPL-MCNC: 79 MG/DL (ref 0–200)
CO2 SERPL-SCNC: 22 MMOL/L (ref 22–29)
CREAT SERPL-MCNC: 1.09 MG/DL (ref 0.76–1.27)
DEPRECATED RDW RBC AUTO: 46.9 FL (ref 37–54)
EGFRCR SERPLBLD CKD-EPI 2021: 67.3 ML/MIN/1.73
EOSINOPHIL # BLD AUTO: 0.02 10*3/MM3 (ref 0–0.4)
EOSINOPHIL NFR BLD AUTO: 0.2 % (ref 0.3–6.2)
ERYTHROCYTE [DISTWIDTH] IN BLOOD BY AUTOMATED COUNT: 13.5 % (ref 12.3–15.4)
GEN 5 2HR TROPONIN T REFLEX: 2783 NG/L
GLUCOSE SERPL-MCNC: 80 MG/DL (ref 65–99)
HBA1C MFR BLD: 5 % (ref 4.8–5.6)
HCT VFR BLD AUTO: 26.5 % (ref 37.5–51)
HDLC SERPL-MCNC: 39 MG/DL (ref 40–60)
HGB BLD-MCNC: 9.1 G/DL (ref 13–17.7)
LDLC SERPL CALC-MCNC: 27 MG/DL (ref 0–100)
LDLC/HDLC SERPL: 0.78 {RATIO}
LYMPHOCYTES # BLD AUTO: 0.84 10*3/MM3 (ref 0.7–3.1)
LYMPHOCYTES NFR BLD AUTO: 8.7 % (ref 19.6–45.3)
MCH RBC QN AUTO: 33.2 PG (ref 26.6–33)
MCHC RBC AUTO-ENTMCNC: 34.3 G/DL (ref 31.5–35.7)
MCV RBC AUTO: 96.7 FL (ref 79–97)
MONOCYTES # BLD AUTO: 0.58 10*3/MM3 (ref 0.1–0.9)
MONOCYTES NFR BLD AUTO: 6 % (ref 5–12)
NEUTROPHILS NFR BLD AUTO: 8.19 10*3/MM3 (ref 1.7–7)
NEUTROPHILS NFR BLD AUTO: 84.5 % (ref 42.7–76)
PHOSPHATE SERPL-MCNC: 3.2 MG/DL (ref 2.5–4.5)
PLATELET # BLD AUTO: 114 10*3/MM3 (ref 140–450)
PMV BLD AUTO: 10.7 FL (ref 6–12)
POTASSIUM SERPL-SCNC: 3.7 MMOL/L (ref 3.5–5.2)
QT INTERVAL: 500 MS
QTC INTERVAL: 508 MS
RBC # BLD AUTO: 2.74 10*6/MM3 (ref 4.14–5.8)
SODIUM SERPL-SCNC: 140 MMOL/L (ref 136–145)
TRIGL SERPL-MCNC: 48 MG/DL (ref 0–150)
TROPONIN T DELTA: 297 NG/L
TROPONIN T SERPL HS-MCNC: 2486 NG/L
VLDLC SERPL-MCNC: 13 MG/DL (ref 5–40)
WBC NRBC COR # BLD AUTO: 9.69 10*3/MM3 (ref 3.4–10.8)

## 2024-03-27 PROCEDURE — 25010000002 CEFTRIAXONE PER 250 MG: Performed by: INTERNAL MEDICINE

## 2024-03-27 PROCEDURE — 99232 SBSQ HOSP IP/OBS MODERATE 35: CPT | Performed by: INTERNAL MEDICINE

## 2024-03-27 PROCEDURE — 83036 HEMOGLOBIN GLYCOSYLATED A1C: CPT | Performed by: INTERNAL MEDICINE

## 2024-03-27 PROCEDURE — 84484 ASSAY OF TROPONIN QUANT: CPT | Performed by: INTERNAL MEDICINE

## 2024-03-27 PROCEDURE — 93010 ELECTROCARDIOGRAM REPORT: CPT | Performed by: INTERNAL MEDICINE

## 2024-03-27 PROCEDURE — 80061 LIPID PANEL: CPT | Performed by: INTERNAL MEDICINE

## 2024-03-27 PROCEDURE — 85025 COMPLETE CBC W/AUTO DIFF WBC: CPT | Performed by: INTERNAL MEDICINE

## 2024-03-27 PROCEDURE — 93005 ELECTROCARDIOGRAM TRACING: CPT | Performed by: INTERNAL MEDICINE

## 2024-03-27 PROCEDURE — 80069 RENAL FUNCTION PANEL: CPT | Performed by: INTERNAL MEDICINE

## 2024-03-27 RX ADMIN — ASPIRIN 81 MG: 81 TABLET, COATED ORAL at 08:32

## 2024-03-27 RX ADMIN — Medication 10 ML: at 10:24

## 2024-03-27 RX ADMIN — Medication 10 ML: at 21:08

## 2024-03-27 RX ADMIN — CLOPIDOGREL BISULFATE 75 MG: 75 TABLET, FILM COATED ORAL at 08:32

## 2024-03-27 RX ADMIN — FAMOTIDINE 20 MG: 20 TABLET, FILM COATED ORAL at 17:42

## 2024-03-27 RX ADMIN — ATORVASTATIN CALCIUM 80 MG: 80 TABLET, FILM COATED ORAL at 21:17

## 2024-03-27 RX ADMIN — FAMOTIDINE 20 MG: 20 TABLET, FILM COATED ORAL at 08:32

## 2024-03-27 RX ADMIN — CEFTRIAXONE 2000 MG: 2 INJECTION, POWDER, FOR SOLUTION INTRAMUSCULAR; INTRAVENOUS at 21:07

## 2024-03-27 NOTE — PROGRESS NOTES
PROGRESS NOTE  Patient Name: Javier Grant  Age/Sex: 83 y.o. male  : 1940  MRN: [id number]    Date of Admission: 3/25/2024  Date of Encounter Visit: 24   LOS: 1 day   Patient Care Team:  Sherron Gray MD as PCP - General (Pediatrics)  Robin Kingston Jr., MD as Consulting Physician (Cardiology)  Leonard Byrne MD as Consulting Physician (Ophthalmology)    Chief Complaint: Acute MI no ST elevation, UTI with sepsis    Hospital course: Patient is doing better, he  had angioplasty done yesterday with good response since.  He is off the nitroglycerin off the pressor.  He is on the antibiotic, he is afebrile, he denies any chest pain, he is on room air and denies any shortness of breath at rest.  He did have a chronic Stanley catheter that was replaced today, he is having some heavy growth but there were mixed organism and likely contaminant based on the microbiology report however with a significant leukocytosis this is being treated as UTI  He did have an admission at [facility] in December, the records were reviewed and he did have an E. coli that was resistant to Cipro but sensitive to Levaquin, it was resistant to ampicillin and Unasyn but sensitive to Rocephin and aminoglycosides      REVIEW OF SYSTEMS:   CONSTITUTIONAL: no fever or chills  CARDIOVASCULAR: No chest pain, chest pressure or chest discomfort. No palpitations or edema.   RESPIRATORY: No shortness of breath, cough or sputum.   GASTROINTESTINAL: No anorexia, nausea, vomiting or diarrhea. No abdominal pain or blood.   HEMATOLOGIC: No bleeding or bruising.     Ventilator/Non-Invasive Ventilation Settings (From admission, onward)      None              Vital Signs  Temp:  [97.8 °F (36.6 °C)-99.5 °F (37.5 °C)] 97.9 °F (36.6 °C)  Heart Rate:  [60-71] 61  Resp:  [13-20] 16  BP: ()/(60-73) 96/60  SpO2:  [88 %-96 %] 96 %  on  Flow (L/min):  [2-3] 2 Device (Oxygen Therapy): nasal cannula with ETCO2    Intake/Output Summary (Last 24 hours) at  "3/27/2024 1204  Last data filed at 3/27/2024 1050  Gross per 24 hour   Intake 2498.59 ml   Output 900 ml   Net 1598.59 ml     Flowsheet Rows      Flowsheet Row First Filed Value   Admission Height 177.8 cm (70\") Documented at 03/25/2024 1928   Admission Weight 70.3 kg (154 lb 15.7 oz) Documented at 03/25/2024 1928          Body mass index is 24.36 kg/m².      03/25/24 1928 03/26/24  0600   Weight: 70.3 kg (154 lb 15.7 oz) 77 kg (169 lb 12.1 oz)       Physical Exam:  GEN:  No acute distress, alert, cooperative, well developed   EYES:   Sclerae clear. No icterus. PERRL. Normal EOM  ENT:   External ears/nose normal, no oral lesions, no thrush, mucous membranes moist  NECK:  Supple, midline trachea, no JVD  LUNGS: Normal chest on inspection, CTAB, no wheezes. No rhonchi. No crackles. Respirations regular, even and unlabored.   CV:  Regular rhythm and rate. Normal S1/S2. No murmurs, gallops, or rubs noted.  ABD:  Soft, nontender and nondistended. Normal bowel sounds. No guarding  EXT:  Moves all extremities well. No cyanosis. No redness. No edema.   Skin: Dry, intact, no bleeding    Results Review:    Results From Last 14 Days   Lab Units 03/27/24 0333 03/25/24 1958   LACTATE mmol/L  --  1.4   TRIGLYCERIDES mg/dL 48  --      Results from last 7 days   Lab Units 03/27/24  0333 03/25/24  2314 03/25/24 1958   SODIUM mmol/L 140 134* 135*   POTASSIUM mmol/L 3.7 3.7 3.8   CHLORIDE mmol/L 111* 101 100   CO2 mmol/L 22.0 21.0* 25.0   BUN mg/dL 23 33* 32*   CREATININE mg/dL 1.09 1.35* 1.48*   CALCIUM mg/dL 7.3* 8.2* 8.9   AST (SGOT) U/L  --   --  55*   ALT (SGPT) U/L  --   --  35   ANION GAP mmol/L 7.0 12.0 10.0   ALBUMIN g/dL 2.7*  --  3.8     Results from last 7 days   Lab Units 03/26/24  0244 03/26/24  0120 03/25/24 2314   HSTROP T ng/L 134* 102* 100*         Results from last 7 days   Lab Units 03/25/24 1958   PROBNP pg/mL 3,133.0*     Results from last 7 days   Lab Units 03/27/24  0333 03/26/24  0244 03/25/24 1958 "   WBC 10*3/mm3 9.69 21.18* 17.25*   HEMOGLOBIN g/dL 9.1* 10.0* 10.6*   HEMATOCRIT % 26.5* 30.2* 31.8*   PLATELETS 10*3/mm3 114* 148 176   MCV fL 96.7 99.7* 94.9   NEUTROPHIL % % 84.5* 90.5* 90.7*   LYMPHOCYTE % % 8.7* 3.3* 3.3*   MONOCYTES % % 6.0 5.1 5.2   EOSINOPHIL % % 0.2* 0.0* 0.2*   BASOPHIL % % 0.2 0.2 0.1   IMM GRAN % %  --  0.9* 0.5     Results from last 7 days   Lab Units 03/26/24  1209 03/26/24  0244 03/25/24 1958   INR   --   --  1.14*   APTT seconds 127.1* 53.1* 28.4         Results from last 7 days   Lab Units 03/27/24  0333   CHOLESTEROL mg/dL 79   TRIGLYCERIDES mg/dL 48   HDL CHOL mg/dL 39*         Results from last 7 days   Lab Units 03/27/24  0333   HEMOGLOBIN A1C % 5.00     Glucose   Date/Time Value Ref Range Status   03/26/2024 1733 94 70 - 130 mg/dL Final   03/26/2024 1146 124 70 - 130 mg/dL Final   03/26/2024 0149 125 70 - 130 mg/dL Final     Results from last 7 days   Lab Units 03/25/24  2314 03/25/24 1958   PROCALCITONIN ng/mL 0.09  --    LACTATE mmol/L  --  1.4     Results from last 7 days   Lab Units 03/25/24 2149 03/25/24 2036   BLOODCX   --  No growth at 24 hours  No growth at 24 hours   URINECX  >100,000 CFU/mL Mixed Marianne Isolated  --      Results from last 7 days   Lab Units 03/25/24 2149   NITRITE UA  Negative   WBC UA /HPF 21-50*   BACTERIA UA /HPF 4+*   SQUAM EPITHEL UA /HPF 0-2   URINECX  >100,000 CFU/mL Mixed Marianne Isolated     Results from last 7 days   Lab Units 03/25/24 2001   COVID19  Not Detected   ADENOVIRUS DETECTION BY PCR  Not Detected   CORONAVIRUS 229E  Not Detected   CORONAVIRUS HKU1  Not Detected   CORONAVIRUS NL63  Not Detected   CORONAVIRUS OC43  Not Detected   HUMAN METAPNEUMOVIRUS  Not Detected   HUMAN RHINOVIRUS/ENTEROVIRUS  Not Detected   INFLUENZA B PCR  Not Detected   PARAINFLUENZA 1  Not Detected   PARAINFLUENZA VIRUS 2  Not Detected   PARAINFLUENZA VIRUS 3  Not Detected   PARAINFLUENZA VIRUS 4  Not Detected   BORDETELLA PERTUSSIS PCR  Not Detected    BORDETELLA PARAPERTUSSIS PCR  Not Detected   CHLAMYDOPHILA PNEUMONIAE PCR  Not Detected   MYCOPLAMA PNEUMO PCR  Not Detected   RSV, PCR  Not Detected               Imaging:   Imaging Results (All)       Procedure Component Value Units Date/Time    XR Chest 1 View [[id number]] Collected: 03/25/24 2028     Updated: 03/25/24 2031    Narrative:      PORTABLE CHEST X-RAY     HISTORY: Chest pain, shortness of breath and fever.     Portable chest x-ray is provided. Correlation: January 26, 2024.     FINDINGS: Sternal wires with cardiac pacing device. The  cardiomediastinal silhouette is normal. The lungs are clear. The  costophrenic sulci are dry and the bones appear normal. There is no  pneumothorax.       Impression:      Negative.     This report was finalized on 3/25/2024 8:28 PM by Dr. Corbin Kendrick M.D on Workstation: [id number]               I reviewed the patient's new clinical results.  I personally viewed and interpreted the patient's imaging results:        Medication Review:   aspirin, 81 mg, Oral, Daily  aspirin, 325 mg, Oral, Once  atorvastatin, 80 mg, Oral, Nightly  cefTRIAXone, 2,000 mg, Intravenous, Q24H  clopidogrel, 75 mg, Oral, Daily  famotidine, 20 mg, Oral, BID AC  sepsis fluid NS, 500 mL, Intravenous, Once  sodium chloride, 500 mL, Intravenous, Once  sodium chloride, 10 mL, Intravenous, Q12H        norepinephrine, 0.02-0.3 mcg/kg/min, Last Rate: Stopped (03/26/24 2007)        ASSESSMENT:   Severe sepsis  UTI  Hypotension, secondary to sepsis and BP meds.  Non-STEMI   Status post angioplasty  LANA, resolved  Ischemic cardiomyopathy, EF 45-50%  CAD s/p CABG 1990, PCI 2015 and December 2023.  Chronic indwelling Stanley catheter, to be replaced today    PLAN:  Patient is on no pressors  He did have the angioplasty yesterday  His white count is down to normal he is on the IV Rocephin  He denies any shortness of breath  Stanley catheter was replaced today on oral Levaquin for the UTI    And is to discharge  home once okay with cardiology, awaiting their input from this morning.  Discussed with the patient with the family      Addendum:  Still waiting on cardiology recommendation for discharge or placement, will continue to follow  Discussed with patient and with family, discussed with the ICU rounding team  Labs/Notes/films were independently reviewed and pertinent results are summarized above  The copied texts in this note were reviewed and they are accurate as of 03/27/24    Disposition: Continue in the ICU for 1 more day until cleared for transfer out by cardiology    Janell Youngblood MD  03/27/24  12:04 EDT            Dictated utilizing Dragon dictation

## 2024-03-27 NOTE — PROGRESS NOTES
"Patient Care Team:  Sherron Gray MD as PCP - General (Pediatrics)  Robin Kingston Jr., MD as Consulting Physician (Cardiology)  Leonard Byrne MD as Consulting Physician (Ophthalmology)    Chief Complaint: Non-ST elevation MI    Interval History:   Status post PCI of the SVG to OM 2 graft.  No chest pain today.  EKG paced    Objective   Vital Signs  Temp:  [97.8 °F (36.6 °C)-99.5 °F (37.5 °C)] 97.8 °F (36.6 °C)  Heart Rate:  [60-71] 60  Resp:  [13-20] 16  BP: (101-110)/(55-73) 101/67    Intake/Output Summary (Last 24 hours) at 3/27/2024 1134  Last data filed at 3/27/2024 1050  Gross per 24 hour   Intake 2498.59 ml   Output 975 ml   Net 1523.59 ml     Flowsheet Rows      Flowsheet Row First Filed Value   Admission Height 177.8 cm (70\") Documented at 03/25/2024 1928   Admission Weight 70.3 kg (154 lb 15.7 oz) Documented at 03/25/2024 1928            Temp:  [97.8 °F (36.6 °C)-99.5 °F (37.5 °C)] 97.8 °F (36.6 °C)  Heart Rate:  [60-71] 60  Resp:  [13-20] 16  BP: (101-110)/(55-73) 101/67    Intake/Output Summary (Last 24 hours) at 3/27/2024 1134  Last data filed at 3/27/2024 1050  Gross per 24 hour   Intake 2498.59 ml   Output 975 ml   Net 1523.59 ml     Flowsheet Rows      Flowsheet Row First Filed Value   Admission Height 177.8 cm (70\") Documented at 03/25/2024 1928   Admission Weight 70.3 kg (154 lb 15.7 oz) Documented at 03/25/2024 1928            General Appearance:    Alert, cooperative, in no acute distress   Head:    Normocephalic, without obvious abnormality, atraumatic       Neck/Lymph   No adenopathy, supple, no thyromegaly, no carotid bruit, no    JVD   Lungs:     Clear to auscultation bilaterally, no wheezes, rales, or     rhonchi    Cardiac:    Normal rate, regular rhythm, no murmur, no rub, no gallop   Chest Wall:  Sternotomy   GI:     Normal bowel sounds, soft, nontender, nondistended,            no rebound tenderness   Extremities:   No cyanosis, clubbing, or edema   Circulatory/Peripheral Vascular : "   Pulses palpable and equal bilaterally             aspirin, 81 mg, Oral, Daily  aspirin, 325 mg, Oral, Once  atorvastatin, 80 mg, Oral, Nightly  cefTRIAXone, 2,000 mg, Intravenous, Q24H  clopidogrel, 75 mg, Oral, Daily  famotidine, 20 mg, Oral, BID AC  sepsis fluid NS, 500 mL, Intravenous, Once  sodium chloride, 500 mL, Intravenous, Once  sodium chloride, 10 mL, Intravenous, Q12H        norepinephrine, 0.02-0.3 mcg/kg/min, Last Rate: Stopped (03/26/24 2007)        Results Review:    Results from last 7 days   Lab Units 03/27/24  0333   SODIUM mmol/L 140   POTASSIUM mmol/L 3.7   CHLORIDE mmol/L 111*   CO2 mmol/L 22.0   BUN mg/dL 23   CREATININE mg/dL 1.09   GLUCOSE mg/dL 80   CALCIUM mg/dL 7.3*     Results from last 7 days   Lab Units 03/26/24  0244 03/26/24  0120 03/25/24  2314   HSTROP T ng/L 134* 102* 100*     Results from last 7 days   Lab Units 03/27/24  0333   WBC 10*3/mm3 9.69   HEMOGLOBIN g/dL 9.1*   HEMATOCRIT % 26.5*   PLATELETS 10*3/mm3 114*     Results from last 7 days   Lab Units 03/26/24  1209 03/26/24  0244 03/25/24  1958   INR   --   --  1.14*   APTT seconds 127.1* 53.1* 28.4     Results from last 7 days   Lab Units 03/27/24  0333   CHOLESTEROL mg/dL 79         Results from last 7 days   Lab Units 03/27/24  0333   CHOLESTEROL mg/dL 79   TRIGLYCERIDES mg/dL 48   HDL CHOL mg/dL 39*   LDL CHOL mg/dL 27     @LABRCNT(bnp)@  I reviewed the patient's new clinical results.  I personally viewed and interpreted the patient's EKG/Telemetry data        3/26/2023  1. Left main: Diffuse 90% stenosis  2. LAD:  at ostium.  Mid to distal vessel fills via patent SVG to LAD.  3. LCX:  at ostium.  Fills via SVG to OM2 graft.  4. RCA:  mid segment.  Fills via collaterals from the distal LAD and septals.  5.  SVG-OM 1: Occluded proximal  6.  SVG-OM2: 99% stenosis at the proximal anastomosis.  7.  SVG-LAD: Discrete 50% mid vessel stenosis.  8.  Successful PCI of the ostial SVG-OM2 with a 4.0 x 15 mm Xience miriam  point drug-eluting stent.         Assessment & Plan   1.  Sepsis  2.  Non-ST elevation MI.  Status post PCI of the ostial SVG to OM 2 graft yesterday  3.  Coronary artery disease with prior CABG.  Occluded native vessels.    4.  Indwelling Stanley catheter  5.  Sick sinus syndrome with permanent pacemaker  6.  Urinary tract infection     -Aspirin, Plavix, and atorvastatin have been continued.  Hold carvedilol in the setting of lower blood pressure  -Status post PCI of the SVG to OM 2 graft.  -Repeat troponin still uptrending.  No additional interventional targets.  Repeat in the morning.  -Transthoracic echocardiogram ordered.

## 2024-03-27 NOTE — CONSULTS
" met with pt at bedside; family present. Pt expressed aislinn and relief after receiving \"good news.\" Discussed stresses of hospital stay and gratitude for medical staff. Pt requested prayer;  provided prayer support. Chaplains remain available.  "

## 2024-03-27 NOTE — CONSULTS
Met with patient to discuss the benefits of cardiac rehab. Pt reports that he attended the program previously and has a treadmill at home. Reports that transportation would keep him from attending. Provided phase II information along with the contact information for cardiac rehab here at [facility]. If patient were to change his mind.

## 2024-03-27 NOTE — PLAN OF CARE
Problem: Adult Inpatient Plan of Care  Goal: Plan of Care Review  Outcome: Ongoing, Progressing  Goal: Patient-Specific Goal (Individualized)  Outcome: Ongoing, Progressing  Goal: Absence of Hospital-Acquired Illness or Injury  Outcome: Ongoing, Progressing  Intervention: Identify and Manage Fall Risk  Recent Flowsheet Documentation  Taken 3/26/2024 2000 by Lalo Pang RN  Safety Promotion/Fall Prevention:   activity supervised   safety round/check completed  Intervention: Prevent Skin Injury  Recent Flowsheet Documentation  Taken 3/26/2024 2000 by Lalo Pang RN  Body Position: position changed independently  Intervention: Prevent Infection  Recent Flowsheet Documentation  Taken 3/26/2024 2000 by Laol Pang RN  Infection Prevention: single patient room provided  Goal: Optimal Comfort and Wellbeing  Outcome: Ongoing, Progressing  Goal: Readiness for Transition of Care  Outcome: Ongoing, Progressing     Problem: Asthma Comorbidity  Goal: Maintenance of Asthma Control  Outcome: Ongoing, Progressing  Intervention: Maintain Asthma Symptom Control  Recent Flowsheet Documentation  Taken 3/26/2024 2000 by Lalo Pang RN  Medication Review/Management: medications reviewed     Problem: Behavioral Health Comorbidity  Goal: Maintenance of Behavioral Health Symptom Control  Outcome: Ongoing, Progressing  Intervention: Maintain Behavioral Health Symptom Control  Recent Flowsheet Documentation  Taken 3/26/2024 2000 by Lalo Pang RN  Medication Review/Management: medications reviewed     Problem: COPD (Chronic Obstructive Pulmonary Disease) Comorbidity  Goal: Maintenance of COPD Symptom Control  Outcome: Ongoing, Progressing  Intervention: Maintain COPD-Symptom Control  Recent Flowsheet Documentation  Taken 3/26/2024 2000 by Lalo Pang RN  Medication Review/Management: medications reviewed     Problem: Diabetes Comorbidity  Goal: Blood Glucose Level Within Targeted Range  Outcome: Ongoing,  Progressing     Problem: Heart Failure Comorbidity  Goal: Maintenance of Heart Failure Symptom Control  Outcome: Ongoing, Progressing  Intervention: Maintain Heart Failure-Management  Recent Flowsheet Documentation  Taken 3/26/2024 2000 by Lalo Pang RN  Medication Review/Management: medications reviewed     Problem: Hypertension Comorbidity  Goal: Blood Pressure in Desired Range  Outcome: Ongoing, Progressing  Intervention: Maintain Blood Pressure Management  Recent Flowsheet Documentation  Taken 3/26/2024 2000 by Lalo Pang RN  Medication Review/Management: medications reviewed     Problem: Obstructive Sleep Apnea Risk or Actual Comorbidity Management  Goal: Unobstructed Breathing During Sleep  Outcome: Ongoing, Progressing     Problem: Osteoarthritis Comorbidity  Goal: Maintenance of Osteoarthritis Symptom Control  Outcome: Ongoing, Progressing  Intervention: Maintain Osteoarthritis Symptom Control  Recent Flowsheet Documentation  Taken 3/26/2024 2000 by Lalo Pang RN  Medication Review/Management: medications reviewed     Problem: Pain Chronic (Persistent) (Comorbidity Management)  Goal: Acceptable Pain Control and Functional Ability  Outcome: Ongoing, Progressing  Intervention: Manage Persistent Pain  Recent Flowsheet Documentation  Taken 3/26/2024 2000 by Lalo Pang RN  Medication Review/Management: medications reviewed     Problem: Seizure Disorder Comorbidity  Goal: Maintenance of Seizure Control  Outcome: Ongoing, Progressing     Problem: Skin Injury Risk Increased  Goal: Skin Health and Integrity  Outcome: Ongoing, Progressing     Problem: Adjustment to Illness (Sepsis/Septic Shock)  Goal: Optimal Coping  Outcome: Ongoing, Progressing     Problem: Bleeding (Sepsis/Septic Shock)  Goal: Absence of Bleeding  Outcome: Ongoing, Progressing     Problem: Glycemic Control Impaired (Sepsis/Septic Shock)  Goal: Blood Glucose Level Within Desired Range  Outcome: Ongoing, Progressing      Problem: Infection Progression (Sepsis/Septic Shock)  Goal: Absence of Infection Signs and Symptoms  Outcome: Ongoing, Progressing  Intervention: Initiate Sepsis Management  Recent Flowsheet Documentation  Taken 3/26/2024 2000 by Lalo Pang RN  Infection Prevention: single patient room provided     Problem: Nutrition Impaired (Sepsis/Septic Shock)  Goal: Optimal Nutrition Intake  Outcome: Ongoing, Progressing   Goal Outcome Evaluation:

## 2024-03-28 ENCOUNTER — APPOINTMENT (OUTPATIENT)
Dept: CARDIOLOGY | Facility: HOSPITAL | Age: 84
DRG: 321 | End: 2024-03-28
Payer: MEDICARE

## 2024-03-28 LAB
ANION GAP SERPL CALCULATED.3IONS-SCNC: 9.4 MMOL/L (ref 5–15)
AORTIC DIMENSIONLESS INDEX: 0.3 (DI)
BASOPHILS # BLD AUTO: 0.03 10*3/MM3 (ref 0–0.2)
BASOPHILS NFR BLD AUTO: 0.3 % (ref 0–1.5)
BH CV ECHO MEAS - AO MAX PG: 27.7 MMHG
BH CV ECHO MEAS - AO MEAN PG: 15.5 MMHG
BH CV ECHO MEAS - AO ROOT DIAM: 3.5 CM
BH CV ECHO MEAS - AO V2 MAX: 263.3 CM/SEC
BH CV ECHO MEAS - AO V2 VTI: 52.9 CM
BH CV ECHO MEAS - AVA(I,D): 1.39 CM2
BH CV ECHO MEAS - EDV(CUBED): 158.8 ML
BH CV ECHO MEAS - EDV(MOD-SP2): 199 ML
BH CV ECHO MEAS - EDV(MOD-SP4): 206 ML
BH CV ECHO MEAS - EF(MOD-BP): 30.8 %
BH CV ECHO MEAS - EF(MOD-SP2): 35.2 %
BH CV ECHO MEAS - EF(MOD-SP4): 23.8 %
BH CV ECHO MEAS - ESV(CUBED): 72.9 ML
BH CV ECHO MEAS - ESV(MOD-SP2): 129 ML
BH CV ECHO MEAS - ESV(MOD-SP4): 157 ML
BH CV ECHO MEAS - FS: 22.8 %
BH CV ECHO MEAS - IVS/LVPW: 1.21 CM
BH CV ECHO MEAS - IVSD: 1.35 CM
BH CV ECHO MEAS - LAT PEAK E' VEL: 11 CM/SEC
BH CV ECHO MEAS - LV MASS(C)D: 276.2 GRAMS
BH CV ECHO MEAS - LV MAX PG: 2.12 MMHG
BH CV ECHO MEAS - LV MEAN PG: 1.21 MMHG
BH CV ECHO MEAS - LV V1 MAX: 72.8 CM/SEC
BH CV ECHO MEAS - LV V1 VTI: 14.7 CM
BH CV ECHO MEAS - LVIDD: 5.4 CM
BH CV ECHO MEAS - LVIDS: 4.2 CM
BH CV ECHO MEAS - LVOT AREA: 5 CM2
BH CV ECHO MEAS - LVOT DIAM: 2.5 CM
BH CV ECHO MEAS - LVPWD: 1.12 CM
BH CV ECHO MEAS - MED PEAK E' VEL: 8 CM/SEC
BH CV ECHO MEAS - MR MAX PG: 62.5 MMHG
BH CV ECHO MEAS - MR MAX VEL: 395.3 CM/SEC
BH CV ECHO MEAS - MV A MAX VEL: 85.9 CM/SEC
BH CV ECHO MEAS - MV DEC SLOPE: 319.4 CM/SEC2
BH CV ECHO MEAS - MV DEC TIME: 0.26 SEC
BH CV ECHO MEAS - MV E MAX VEL: 98.5 CM/SEC
BH CV ECHO MEAS - MV E/A: 1.15
BH CV ECHO MEAS - MV MAX PG: 4.8 MMHG
BH CV ECHO MEAS - MV MEAN PG: 2.8 MMHG
BH CV ECHO MEAS - MV P1/2T: 104.6 MSEC
BH CV ECHO MEAS - MV V2 VTI: 33.3 CM
BH CV ECHO MEAS - MVA(P1/2T): 2.1 CM2
BH CV ECHO MEAS - MVA(VTI): 2.21 CM2
BH CV ECHO MEAS - PA V2 MAX: 162.9 CM/SEC
BH CV ECHO MEAS - PULM A REVS DUR: 0.15 SEC
BH CV ECHO MEAS - PULM A REVS VEL: 27.7 CM/SEC
BH CV ECHO MEAS - PULM DIAS VEL: 55 CM/SEC
BH CV ECHO MEAS - PULM S/D: 0.9
BH CV ECHO MEAS - PULM SYS VEL: 49.4 CM/SEC
BH CV ECHO MEAS - RAP SYSTOLE: 3 MMHG
BH CV ECHO MEAS - RV MAX PG: 2.9 MMHG
BH CV ECHO MEAS - RV V1 MAX: 85.4 CM/SEC
BH CV ECHO MEAS - RV V1 VTI: 14.8 CM
BH CV ECHO MEAS - RVSP: 35 MMHG
BH CV ECHO MEAS - SV(LVOT): 73.5 ML
BH CV ECHO MEAS - SV(MOD-SP2): 70 ML
BH CV ECHO MEAS - SV(MOD-SP4): 49 ML
BH CV ECHO MEAS - TAPSE (>1.6): 2.34 CM
BH CV ECHO MEAS - TR MAX PG: 31.6 MMHG
BH CV ECHO MEAS - TR MAX VEL: 281 CM/SEC
BH CV ECHO MEASUREMENTS AVERAGE E/E' RATIO: 10.37
BH CV XLRA - RV BASE: 3.8 CM
BH CV XLRA - RV LENGTH: 9.4 CM
BH CV XLRA - RV MID: 3.7 CM
BH CV XLRA - TDI S': 10.1 CM/SEC
BUN SERPL-MCNC: 25 MG/DL (ref 8–23)
BUN/CREAT SERPL: 21.6 (ref 7–25)
CALCIUM SPEC-SCNC: 8.1 MG/DL (ref 8.6–10.5)
CHLORIDE SERPL-SCNC: 104 MMOL/L (ref 98–107)
CO2 SERPL-SCNC: 21.6 MMOL/L (ref 22–29)
CREAT SERPL-MCNC: 1.16 MG/DL (ref 0.76–1.27)
DEPRECATED RDW RBC AUTO: 47.6 FL (ref 37–54)
EGFRCR SERPLBLD CKD-EPI 2021: 62.5 ML/MIN/1.73
EOSINOPHIL # BLD AUTO: 0.07 10*3/MM3 (ref 0–0.4)
EOSINOPHIL NFR BLD AUTO: 0.8 % (ref 0.3–6.2)
ERYTHROCYTE [DISTWIDTH] IN BLOOD BY AUTOMATED COUNT: 13.1 % (ref 12.3–15.4)
GLUCOSE SERPL-MCNC: 149 MG/DL (ref 65–99)
HCT VFR BLD AUTO: 28.5 % (ref 37.5–51)
HGB BLD-MCNC: 9.6 G/DL (ref 13–17.7)
LEFT ATRIUM VOLUME INDEX: 50.8 ML/M2
LYMPHOCYTES # BLD AUTO: 0.84 10*3/MM3 (ref 0.7–3.1)
LYMPHOCYTES NFR BLD AUTO: 9.3 % (ref 19.6–45.3)
MCH RBC QN AUTO: 33.2 PG (ref 26.6–33)
MCHC RBC AUTO-ENTMCNC: 33.7 G/DL (ref 31.5–35.7)
MCV RBC AUTO: 98.6 FL (ref 79–97)
MONOCYTES # BLD AUTO: 0.57 10*3/MM3 (ref 0.1–0.9)
MONOCYTES NFR BLD AUTO: 6.3 % (ref 5–12)
NEUTROPHILS NFR BLD AUTO: 7.53 10*3/MM3 (ref 1.7–7)
NEUTROPHILS NFR BLD AUTO: 82.9 % (ref 42.7–76)
PLATELET # BLD AUTO: 135 10*3/MM3 (ref 140–450)
PMV BLD AUTO: 11.4 FL (ref 6–12)
POTASSIUM SERPL-SCNC: 3.7 MMOL/L (ref 3.5–5.2)
QT INTERVAL: 519 MS
QTC INTERVAL: 519 MS
RBC # BLD AUTO: 2.89 10*6/MM3 (ref 4.14–5.8)
SINUS: 3.9 CM
SODIUM SERPL-SCNC: 135 MMOL/L (ref 136–145)
TROPONIN T SERPL HS-MCNC: 3197 NG/L
WBC NRBC COR # BLD AUTO: 9.08 10*3/MM3 (ref 3.4–10.8)

## 2024-03-28 PROCEDURE — 80048 BASIC METABOLIC PNL TOTAL CA: CPT

## 2024-03-28 PROCEDURE — 85025 COMPLETE CBC W/AUTO DIFF WBC: CPT | Performed by: EMERGENCY MEDICINE

## 2024-03-28 PROCEDURE — 36415 COLL VENOUS BLD VENIPUNCTURE: CPT | Performed by: EMERGENCY MEDICINE

## 2024-03-28 PROCEDURE — 93306 TTE W/DOPPLER COMPLETE: CPT | Performed by: INTERNAL MEDICINE

## 2024-03-28 PROCEDURE — 25010000002 CEFTRIAXONE PER 250 MG: Performed by: INTERNAL MEDICINE

## 2024-03-28 PROCEDURE — 84484 ASSAY OF TROPONIN QUANT: CPT | Performed by: INTERNAL MEDICINE

## 2024-03-28 PROCEDURE — 25510000001 PERFLUTREN (DEFINITY) 8.476 MG IN SODIUM CHLORIDE (PF) 0.9 % 10 ML INJECTION: Performed by: INTERNAL MEDICINE

## 2024-03-28 PROCEDURE — 99232 SBSQ HOSP IP/OBS MODERATE 35: CPT | Performed by: INTERNAL MEDICINE

## 2024-03-28 PROCEDURE — 93306 TTE W/DOPPLER COMPLETE: CPT

## 2024-03-28 RX ADMIN — Medication 10 ML: at 20:42

## 2024-03-28 RX ADMIN — ASPIRIN 81 MG: 81 TABLET, COATED ORAL at 09:02

## 2024-03-28 RX ADMIN — PERFLUTREN 2 ML: 6.52 INJECTION, SUSPENSION INTRAVENOUS at 11:59

## 2024-03-28 RX ADMIN — FAMOTIDINE 20 MG: 20 TABLET, FILM COATED ORAL at 06:44

## 2024-03-28 RX ADMIN — CLOPIDOGREL BISULFATE 75 MG: 75 TABLET, FILM COATED ORAL at 09:02

## 2024-03-28 RX ADMIN — CEFTRIAXONE 2000 MG: 2 INJECTION, POWDER, FOR SOLUTION INTRAMUSCULAR; INTRAVENOUS at 20:42

## 2024-03-28 RX ADMIN — Medication 10 ML: at 09:02

## 2024-03-28 RX ADMIN — FAMOTIDINE 20 MG: 20 TABLET, FILM COATED ORAL at 17:54

## 2024-03-28 RX ADMIN — ATORVASTATIN CALCIUM 80 MG: 80 TABLET, FILM COATED ORAL at 20:42

## 2024-03-28 NOTE — PROGRESS NOTES
PROGRESS NOTE  Patient Name: Javier Grant  Age/Sex: 83 y.o. male  : 1940  MRN: [id number]    Date of Admission: 3/25/2024  Date of Encounter Visit: 24   LOS: 2 days   Patient Care Team:  Sherron Gray MD as PCP - General (Pediatrics)  Robin Kingston Jr., MD as Consulting Physician (Cardiology)  Leonard Byrne MD as Consulting Physician (Ophthalmology)    Chief Complaint: Acute MI no ST elevation, UTI with sepsis    Hospital course: Patient is doing better, he  had angioplasty done on 3/26/2024 with good response since.  He is off the nitroglycerin off the pressor.  He is on the antibiotic, he is afebrile, he denies any chest pain, he is on room air and denies any shortness of breath at rest.  He did have a chronic Stanley catheter that was replaced on 3/27/2024, he is having some heavy growth but there were mixed organism and likely contaminant based on the microbiology report however with a significant leukocytosis this is being treated as UTI with Rocephin and his white count has normalized and patient should be done within 3 days course on 3/28/2024  He did have an admission at [facility] in December, the records were reviewed and he did have an E. coli that was resistant to Cipro but sensitive to Levaquin, it was resistant to ampicillin and Unasyn but sensitive to Rocephin and aminoglycosides      REVIEW OF SYSTEMS:   CONSTITUTIONAL: no fever or chills  CARDIOVASCULAR: No chest pain, chest pressure or chest discomfort. No palpitations or edema.   RESPIRATORY: No shortness of breath, cough or sputum.   GASTROINTESTINAL: No anorexia, nausea, vomiting or diarrhea. No abdominal pain or blood.   HEMATOLOGIC: No bleeding or bruising.     Ventilator/Non-Invasive Ventilation Settings (From admission, onward)      None              Vital Signs  Temp:  [97.9 °F (36.6 °C)-99.2 °F (37.3 °C)] 98.2 °F (36.8 °C)  Heart Rate:  [60-71] 61  Resp:  [12-16] 16  BP: ()/(50-98) 96/50  SpO2:  [88 %-99 %] 94 %  on    " Device (Oxygen Therapy): room air    Intake/Output Summary (Last 24 hours) at 3/28/2024 0737  Last data filed at 3/28/2024 0503  Gross per 24 hour   Intake 1160 ml   Output 1100 ml   Net 60 ml     Flowsheet Rows      Flowsheet Row First Filed Value   Admission Height 177.8 cm (70\") Documented at 03/25/2024 1928   Admission Weight 70.3 kg (154 lb 15.7 oz) Documented at 03/25/2024 1928          Body mass index is 24.36 kg/m².      03/25/24 1928 03/26/24  0600   Weight: 70.3 kg (154 lb 15.7 oz) 77 kg (169 lb 12.1 oz)       Physical Exam:  GEN:  No acute distress, alert, cooperative, well developed   EYES:   Sclerae clear. No icterus. PERRL. Normal EOM  ENT:   External ears/nose normal, no oral lesions, no thrush, mucous membranes moist  NECK:  Supple, midline trachea, no JVD  LUNGS: Normal chest on inspection, CTAB, no wheezes. No rhonchi. No crackles. Respirations regular, even and unlabored.   CV:  Regular rhythm and rate. Normal S1/S2. No murmurs, gallops, or rubs noted.  ABD:  Soft, nontender and nondistended. Normal bowel sounds. No guarding  EXT:  Moves all extremities well. No cyanosis. No redness. No edema.   Skin: Dry, intact, no bleeding    Results Review:    Results From Last 14 Days   Lab Units 03/27/24  0333 03/25/24  1958   LACTATE mmol/L  --  1.4   TRIGLYCERIDES mg/dL 48  --      Results from last 7 days   Lab Units 03/27/24  0333 03/25/24 2314 03/25/24 1958   SODIUM mmol/L 140 134* 135*   POTASSIUM mmol/L 3.7 3.7 3.8   CHLORIDE mmol/L 111* 101 100   CO2 mmol/L 22.0 21.0* 25.0   BUN mg/dL 23 33* 32*   CREATININE mg/dL 1.09 1.35* 1.48*   CALCIUM mg/dL 7.3* 8.2* 8.9   AST (SGOT) U/L  --   --  55*   ALT (SGPT) U/L  --   --  35   ANION GAP mmol/L 7.0 12.0 10.0   ALBUMIN g/dL 2.7*  --  3.8     Results from last 7 days   Lab Units 03/27/24  1602 03/27/24  1206 03/26/24  0244   HSTROP T ng/L 2,783* 2,486* 134*         Results from last 7 days   Lab Units 03/25/24  1958   PROBNP pg/mL 3,133.0*     Results " from last 7 days   Lab Units 03/27/24  0333 03/26/24  0244 03/25/24 1958   WBC 10*3/mm3 9.69 21.18* 17.25*   HEMOGLOBIN g/dL 9.1* 10.0* 10.6*   HEMATOCRIT % 26.5* 30.2* 31.8*   PLATELETS 10*3/mm3 114* 148 176   MCV fL 96.7 99.7* 94.9   NEUTROPHIL % % 84.5* 90.5* 90.7*   LYMPHOCYTE % % 8.7* 3.3* 3.3*   MONOCYTES % % 6.0 5.1 5.2   EOSINOPHIL % % 0.2* 0.0* 0.2*   BASOPHIL % % 0.2 0.2 0.1   IMM GRAN % %  --  0.9* 0.5     Results from last 7 days   Lab Units 03/26/24  1209 03/26/24  0244 03/25/24 1958   INR   --   --  1.14*   APTT seconds 127.1* 53.1* 28.4         Results from last 7 days   Lab Units 03/27/24  0333   CHOLESTEROL mg/dL 79   TRIGLYCERIDES mg/dL 48   HDL CHOL mg/dL 39*         Results from last 7 days   Lab Units 03/27/24  0333   HEMOGLOBIN A1C % 5.00     Glucose   Date/Time Value Ref Range Status   03/26/2024 1733 94 70 - 130 mg/dL Final   03/26/2024 1146 124 70 - 130 mg/dL Final   03/26/2024 0149 125 70 - 130 mg/dL Final     Results from last 7 days   Lab Units 03/25/24  2314 03/25/24 1958   PROCALCITONIN ng/mL 0.09  --    LACTATE mmol/L  --  1.4     Results from last 7 days   Lab Units 03/25/24 2149 03/25/24 2036   BLOODCX   --  No growth at 2 days  No growth at 2 days   URINECX  >100,000 CFU/mL Mixed Marianne Isolated  --      Results from last 7 days   Lab Units 03/25/24 2149   NITRITE UA  Negative   WBC UA /HPF 21-50*   BACTERIA UA /HPF 4+*   SQUAM EPITHEL UA /HPF 0-2   URINECX  >100,000 CFU/mL Mixed Marianne Isolated     Results from last 7 days   Lab Units 03/25/24 2001   COVID19  Not Detected   ADENOVIRUS DETECTION BY PCR  Not Detected   CORONAVIRUS 229E  Not Detected   CORONAVIRUS HKU1  Not Detected   CORONAVIRUS NL63  Not Detected   CORONAVIRUS OC43  Not Detected   HUMAN METAPNEUMOVIRUS  Not Detected   HUMAN RHINOVIRUS/ENTEROVIRUS  Not Detected   INFLUENZA B PCR  Not Detected   PARAINFLUENZA 1  Not Detected   PARAINFLUENZA VIRUS 2  Not Detected   PARAINFLUENZA VIRUS 3  Not Detected    PARAINFLUENZA VIRUS 4  Not Detected   BORDETELLA PERTUSSIS PCR  Not Detected   BORDETELLA PARAPERTUSSIS PCR  Not Detected   CHLAMYDOPHILA PNEUMONIAE PCR  Not Detected   MYCOPLAMA PNEUMO PCR  Not Detected   RSV, PCR  Not Detected           Left heart cath 3/26/2023  1. Left main: Diffuse 90% stenosis  2. LAD:  at ostium.  Mid to distal vessel fills via patent SVG to LAD.  3. LCX:  at ostium.  Fills via SVG to OM2 graft.  4. RCA:  mid segment.  Fills via collaterals from the distal LAD and septals.  5.  SVG-OM 1: Occluded proximal  6.  SVG-OM2: 99% stenosis at the proximal anastomosis.  7.  SVG-LAD: Discrete 50% mid vessel stenosis.  8.  Successful PCI of the ostial SVG-OM2 with a 4.0 x 15 mm Xience miriam point drug-eluting stent.    Imaging:   Imaging Results (All)            I reviewed the patient's new clinical results.  I personally viewed and interpreted the patient's imaging results:        Medication Review:   aspirin, 81 mg, Oral, Daily  aspirin, 325 mg, Oral, Once  atorvastatin, 80 mg, Oral, Nightly  cefTRIAXone, 2,000 mg, Intravenous, Q24H  clopidogrel, 75 mg, Oral, Daily  famotidine, 20 mg, Oral, BID AC  sepsis fluid NS, 500 mL, Intravenous, Once  sodium chloride, 500 mL, Intravenous, Once  sodium chloride, 10 mL, Intravenous, Q12H        norepinephrine, 0.02-0.3 mcg/kg/min, Last Rate: Stopped (03/26/24 2007)        ASSESSMENT:   Severe sepsis  UTI  Hypotension, secondary to sepsis and BP meds.  Non-STEMI   Status post angioplasty  LANA, resolved  Ischemic cardiomyopathy, EF 45-50%  CAD s/p CABG 1990, PCI 2015 and December 2023.  Chronic indwelling Stanley catheter, to be replaced today    PLAN:  Patient is on no pressors  White count is down to normal and sepsis is under control  He did have the angioplasty on 3/26/2024 and is followed by cardiology, echocardiogram done yesterday is still pending  He denies any shortness of breath  Plan to transition to oral Levaquin at the time of discharge to  finish the rest of the antibiotic course however patient might finish the IV Rocephin tomorrow without the need for any oral regimen if cardiology decided to give him for 1 more day  High-sensitivity troponin keep on trending up, will defer management to cardiology    Discussed with patient , will discuss with the ICU rounding team  Labs/Notes/films were independently reviewed and pertinent results are summarized above  The copied texts in this note were reviewed and they are accurate as of 03/28/24    Disposition: Stepdown unit versus home today if okay with cardiology    Janell Youngblood MD  03/28/24  07:37 EDT            Dictated utilizing Dragon dictation

## 2024-03-28 NOTE — PLAN OF CARE
Goal Outcome Evaluation:      A&Ox4, patient seems slightly confused at times.  Troponin continues to trend up, cardiology aware.  Patient denies any SOA or chest pain.  Heart echo completed (see results), good UOP, tolerating diet.  Patient has been downgraded to telemetry and has a bed on [facility].  Report already called awaiting transport.  No acute events this shift, will continue to monitor.

## 2024-03-28 NOTE — PROGRESS NOTES
"Patient Care Team:  Sherron Gray MD as PCP - General (Pediatrics)  Robin Kingston Jr., MD as Consulting Physician (Cardiology)  Leonard Byrne MD as Consulting Physician (Ophthalmology)    Chief Complaint: Non-ST elevation MI    Interval History:   Status post PCI of the SVG to OM 2 graft.  No chest pain today. TTE pending    Objective   Vital Signs  Temp:  [97.5 °F (36.4 °C)-99.2 °F (37.3 °C)] 97.5 °F (36.4 °C)  Heart Rate:  [60-71] 61  Resp:  [12-16] 16  BP: ()/(50-98) 96/50    Intake/Output Summary (Last 24 hours) at 3/28/2024 0908  Last data filed at 3/28/2024 0503  Gross per 24 hour   Intake 920 ml   Output 1100 ml   Net -180 ml     Flowsheet Rows      Flowsheet Row First Filed Value   Admission Height 177.8 cm (70\") Documented at 03/25/2024 1928   Admission Weight 70.3 kg (154 lb 15.7 oz) Documented at 03/25/2024 1928            Temp:  [97.5 °F (36.4 °C)-99.2 °F (37.3 °C)] 97.5 °F (36.4 °C)  Heart Rate:  [60-71] 61  Resp:  [12-16] 16  BP: ()/(50-98) 96/50    Intake/Output Summary (Last 24 hours) at 3/28/2024 0908  Last data filed at 3/28/2024 0503  Gross per 24 hour   Intake 920 ml   Output 1100 ml   Net -180 ml     Flowsheet Rows      Flowsheet Row First Filed Value   Admission Height 177.8 cm (70\") Documented at 03/25/2024 1928   Admission Weight 70.3 kg (154 lb 15.7 oz) Documented at 03/25/2024 1928            General Appearance:    Alert, cooperative, in no acute distress   Head:    Normocephalic, without obvious abnormality, atraumatic       Neck/Lymph   No adenopathy, supple, no thyromegaly, no carotid bruit, no    JVD   Lungs:     Clear to auscultation bilaterally, no wheezes, rales, or     rhonchi    Cardiac:    Normal rate, regular rhythm, no murmur, no rub, no gallop   Chest Wall:  Sternotomy   GI:     Normal bowel sounds, soft, nontender, nondistended,            no rebound tenderness   Extremities:   No cyanosis, clubbing, or edema   Circulatory/Peripheral Vascular :   Pulses " palpable and equal bilaterally             aspirin, 81 mg, Oral, Daily  aspirin, 325 mg, Oral, Once  atorvastatin, 80 mg, Oral, Nightly  cefTRIAXone, 2,000 mg, Intravenous, Q24H  clopidogrel, 75 mg, Oral, Daily  famotidine, 20 mg, Oral, BID AC  sepsis fluid NS, 500 mL, Intravenous, Once  sodium chloride, 500 mL, Intravenous, Once  sodium chloride, 10 mL, Intravenous, Q12H        norepinephrine, 0.02-0.3 mcg/kg/min, Last Rate: Stopped (03/26/24 2007)        Results Review:    Results from last 7 days   Lab Units 03/27/24  0333   SODIUM mmol/L 140   POTASSIUM mmol/L 3.7   CHLORIDE mmol/L 111*   CO2 mmol/L 22.0   BUN mg/dL 23   CREATININE mg/dL 1.09   GLUCOSE mg/dL 80   CALCIUM mg/dL 7.3*     Results from last 7 days   Lab Units 03/27/24  1602 03/27/24  1206 03/26/24  0244   HSTROP T ng/L 2,783* 2,486* 134*     Results from last 7 days   Lab Units 03/27/24  0333   WBC 10*3/mm3 9.69   HEMOGLOBIN g/dL 9.1*   HEMATOCRIT % 26.5*   PLATELETS 10*3/mm3 114*     Results from last 7 days   Lab Units 03/26/24  1209 03/26/24  0244 03/25/24 1958   INR   --   --  1.14*   APTT seconds 127.1* 53.1* 28.4     Results from last 7 days   Lab Units 03/27/24  0333   CHOLESTEROL mg/dL 79         Results from last 7 days   Lab Units 03/27/24  0333   CHOLESTEROL mg/dL 79   TRIGLYCERIDES mg/dL 48   HDL CHOL mg/dL 39*   LDL CHOL mg/dL 27     @LABRCNT(bnp)@  I reviewed the patient's new clinical results.  I personally viewed and interpreted the patient's EKG/Telemetry data        3/26/2023  1. Left main: Diffuse 90% stenosis  2. LAD:  at ostium.  Mid to distal vessel fills via patent SVG to LAD.  3. LCX:  at ostium.  Fills via SVG to OM2 graft.  4. RCA:  mid segment.  Fills via collaterals from the distal LAD and septals.  5.  SVG-OM 1: Occluded proximal  6.  SVG-OM2: 99% stenosis at the proximal anastomosis.  7.  SVG-LAD: Discrete 50% mid vessel stenosis.  8.  Successful PCI of the ostial SVG-OM2 with a 4.0 x 15 mm Xience miriam point  drug-eluting stent.         Assessment & Plan   1.  Sepsis  2.  Non-ST elevation MI.  Status post PCI of the ostial SVG to OM 2 graft yesterday  3.  Coronary artery disease with prior CABG.  Occluded native vessels.    4.  Indwelling Stanley catheter  5.  Sick sinus syndrome with permanent pacemaker  6.  Urinary tract infection     -Aspirin, Plavix, and atorvastatin have been continued.  Hold carvedilol in the setting of lower blood pressure  -Status post PCI of the SVG to OM 2 graft.  -Repeat troponin still uptrending.  No additional interventional targets.  Repeat in the morning.  -Transthoracic echocardiogram pending  -Troponin slightly up from yesterday. Recommend keeping one more day.

## 2024-03-28 NOTE — PLAN OF CARE
Goal Outcome Evaluation:              Outcome Evaluation: Patient remains in ICU on room air. No reports of SOB or pain. No acute events overnight. VSS. Plan for echo today.

## 2024-03-29 ENCOUNTER — TELEPHONE (OUTPATIENT)
Dept: CARDIOLOGY | Facility: CLINIC | Age: 84
End: 2024-03-29
Payer: MEDICARE

## 2024-03-29 ENCOUNTER — READMISSION MANAGEMENT (OUTPATIENT)
Dept: CALL CENTER | Facility: HOSPITAL | Age: 84
End: 2024-03-29
Payer: MEDICARE

## 2024-03-29 VITALS
WEIGHT: 169 LBS | HEIGHT: 70 IN | SYSTOLIC BLOOD PRESSURE: 116 MMHG | OXYGEN SATURATION: 96 % | DIASTOLIC BLOOD PRESSURE: 71 MMHG | BODY MASS INDEX: 24.2 KG/M2 | HEART RATE: 60 BPM | TEMPERATURE: 98.5 F | RESPIRATION RATE: 18 BRPM

## 2024-03-29 PROBLEM — I21.9 TYPE 1 MYOCARDIAL INFARCTION: Status: ACTIVE | Noted: 2024-03-29

## 2024-03-29 LAB
BASOPHILS # BLD AUTO: 0.02 10*3/MM3 (ref 0–0.2)
BASOPHILS NFR BLD AUTO: 0.2 % (ref 0–1.5)
DEPRECATED RDW RBC AUTO: 44.9 FL (ref 37–54)
EOSINOPHIL # BLD AUTO: 0.16 10*3/MM3 (ref 0–0.4)
EOSINOPHIL NFR BLD AUTO: 2 % (ref 0.3–6.2)
ERYTHROCYTE [DISTWIDTH] IN BLOOD BY AUTOMATED COUNT: 12.7 % (ref 12.3–15.4)
HCT VFR BLD AUTO: 31.3 % (ref 37.5–51)
HGB BLD-MCNC: 10.3 G/DL (ref 13–17.7)
LYMPHOCYTES # BLD AUTO: 1.07 10*3/MM3 (ref 0.7–3.1)
LYMPHOCYTES NFR BLD AUTO: 13.1 % (ref 19.6–45.3)
MCH RBC QN AUTO: 32.2 PG (ref 26.6–33)
MCHC RBC AUTO-ENTMCNC: 32.9 G/DL (ref 31.5–35.7)
MCV RBC AUTO: 97.8 FL (ref 79–97)
MONOCYTES # BLD AUTO: 0.82 10*3/MM3 (ref 0.1–0.9)
MONOCYTES NFR BLD AUTO: 10 % (ref 5–12)
NEUTROPHILS NFR BLD AUTO: 6.06 10*3/MM3 (ref 1.7–7)
NEUTROPHILS NFR BLD AUTO: 74.3 % (ref 42.7–76)
PLATELET # BLD AUTO: 142 10*3/MM3 (ref 140–450)
PMV BLD AUTO: 11.1 FL (ref 6–12)
RBC # BLD AUTO: 3.2 10*6/MM3 (ref 4.14–5.8)
WBC NRBC COR # BLD AUTO: 8.16 10*3/MM3 (ref 3.4–10.8)

## 2024-03-29 PROCEDURE — 85025 COMPLETE CBC W/AUTO DIFF WBC: CPT | Performed by: INTERNAL MEDICINE

## 2024-03-29 PROCEDURE — 93010 ELECTROCARDIOGRAM REPORT: CPT | Performed by: INTERNAL MEDICINE

## 2024-03-29 PROCEDURE — 99232 SBSQ HOSP IP/OBS MODERATE 35: CPT | Performed by: NURSE PRACTITIONER

## 2024-03-29 PROCEDURE — 93005 ELECTROCARDIOGRAM TRACING: CPT | Performed by: INTERNAL MEDICINE

## 2024-03-29 RX ORDER — CHLORHEXIDINE GLUCONATE ORAL RINSE 1.2 MG/ML
15 SOLUTION DENTAL EVERY 12 HOURS SCHEDULED
Status: DISCONTINUED | OUTPATIENT
Start: 2024-03-29 | End: 2024-03-29 | Stop reason: HOSPADM

## 2024-03-29 RX ORDER — CARVEDILOL 3.12 MG/1
3.12 TABLET ORAL 2 TIMES DAILY WITH MEALS
Status: DISCONTINUED | OUTPATIENT
Start: 2024-03-29 | End: 2024-03-29 | Stop reason: HOSPADM

## 2024-03-29 RX ADMIN — 0.12% CHLORHEXIDINE GLUCONATE 15 ML: 1.2 RINSE ORAL at 08:17

## 2024-03-29 RX ADMIN — Medication 10 ML: at 08:17

## 2024-03-29 RX ADMIN — CLOPIDOGREL BISULFATE 75 MG: 75 TABLET, FILM COATED ORAL at 08:17

## 2024-03-29 RX ADMIN — MUPIROCIN 1 APPLICATION: 20 OINTMENT TOPICAL at 08:17

## 2024-03-29 RX ADMIN — CARVEDILOL 3.12 MG: 3.12 TABLET, FILM COATED ORAL at 11:41

## 2024-03-29 RX ADMIN — FAMOTIDINE 20 MG: 20 TABLET, FILM COATED ORAL at 06:47

## 2024-03-29 RX ADMIN — ASPIRIN 81 MG: 81 TABLET, COATED ORAL at 08:17

## 2024-03-29 NOTE — PROGRESS NOTES
HOSPITAL FOLLOW UP NOTE    Patient Name: Javier Grant  Patient : 1940        Date of Service:24  Provider of Service: COLLIN Villela  Place of Service: [facility]  Referral Provider: Griffin Jorge MD          Follow Up:Non ST elevation MI    Interval Hx: VSS, no chest pain/pressure, no shortness of air or lightheadedness, out of bed in chair for the first time this am, appears euvolemic      OBJECTIVE  Temp:  [98.2 °F (36.8 °C)-98.9 °F (37.2 °C)] 98.9 °F (37.2 °C)  Heart Rate:  [60-66] 60  Resp:  [18] 18  BP: ()/(52-86) 107/75     Intake/Output Summary (Last 24 hours) at 3/29/2024 0855  Last data filed at 3/29/2024 0659  Gross per 24 hour   Intake --   Output 800 ml   Net -800 ml     Body mass index is 24.25 kg/m².      24  1928 24  0600 24  1203   Weight: 70.3 kg (154 lb 15.7 oz) 77 kg (169 lb 12.1 oz) 76.7 kg (169 lb)         Physical Exam:     General Appearance:    Alert, cooperative, in no acute distress   Head:    Normocephalic, without obvious abnormality, atraumatic   Eyes:            Conjunctivae and sclerae normal, no   icterus, no pallor, corneas clear, PERRLA   Neck:   No adenopathy, supple, trachea midline, no thyromegaly, no   carotid bruit, no JVD   Lungs:     Clear to auscultation,respirations regular, even and unlabored    Heart:    Regular rhythm and normal rate, normal S1 and S2, no murmur, no gallop, no rub, no click   Chest Wall:    No abnormalities observed   Abdomen:     Normal bowel sounds, no masses, no organomegaly, soft, nontender, nondistended, no guarding, no rebound  tenderness   Extremities:   Moves all extremities well, no edema, no cyanosis, no redness   Pulses:   Pulses palpable and equal bilaterally.          CURRENT MEDS    Scheduled Meds:aspirin, 81 mg, Oral, Daily  aspirin, 325 mg, Oral, Once  atorvastatin, 80 mg, Oral, Nightly  chlorhexidine, 15 mL, Mouth/Throat, Q12H  clopidogrel, 75 mg, Oral,  Daily  famotidine, 20 mg, Oral, BID AC  mupirocin, 1 Application, Each Nare, BID  sepsis fluid NS, 500 mL, Intravenous, Once  sodium chloride, 500 mL, Intravenous, Once  sodium chloride, 10 mL, Intravenous, Q12H      Continuous Infusions:       Lab Review:   Results from last 7 days   Lab Units 03/28/24  0853 03/27/24  0333 03/25/24  2314 03/25/24 1958   SODIUM mmol/L 135* 140   < > 135*   POTASSIUM mmol/L 3.7 3.7   < > 3.8   CHLORIDE mmol/L 104 111*   < > 100   CO2 mmol/L 21.6* 22.0   < > 25.0   BUN mg/dL 25* 23   < > 32*   CREATININE mg/dL 1.16 1.09   < > 1.48*   GLUCOSE mg/dL 149* 80   < > 101*   CALCIUM mg/dL 8.1* 7.3*   < > 8.9   AST (SGOT) U/L  --   --   --  55*   ALT (SGPT) U/L  --   --   --  35    < > = values in this interval not displayed.         Results from last 7 days   Lab Units 03/29/24  0410 03/28/24  0854   WBC 10*3/mm3 8.16 9.08   HEMOGLOBIN g/dL 10.3* 9.6*   HEMATOCRIT % 31.3* 28.5*   PLATELETS 10*3/mm3 142 135*     Results from last 7 days   Lab Units 03/26/24  1209 03/26/24  0244 03/25/24 1958   INR   --   --  1.14*   APTT seconds 127.1* 53.1* 28.4         Results from last 7 days   Lab Units 03/27/24  0333   CHOLESTEROL mg/dL 79   TRIGLYCERIDES mg/dL 48   HDL CHOL mg/dL 39*   LDL CHOL mg/dL 27     Results from last 7 days   Lab Units 03/25/24 1958   PROBNP pg/mL 3,133.0*         2D Echocardiogram 03/28/2024:    Left ventricular systolic function is moderately decreased. Calculated left ventricular EF = 30.8%    The following left ventricular wall segments are hypokinetic: mid anterior, apical anterior, basal anterolateral, mid anterolateral, apical lateral and basal anterior.    Left ventricular diastolic function is consistent with (grade I) impaired relaxation.    The left atrial cavity is severely dilated.    Mild to moderate aortic valve stenosis is present. Aortic valve area is 1.4 cm2.Peak velocity of the flow distal to the aortic valve is 263.3 cm/s. Aortic valve maximum pressure  gradient is 28 mmHg. Aortic valve mean pressure gradient is 16 mmHg. Aortic valve dimensionless index is 0.3 .    Calculated right ventricular systolic pressure from tricuspid regurgitation is 35 mmHg.    Severe mitral annular calcification is present. Mild mitral valve regurgitation is present. Mild mitral valve stenosis is present. The mitral valve mean gradient is 2.8 mmHg.      ASSESSMENT & PLAN    Non-ST elevation MI (NSTEMI)    Coronary artery disease involving native coronary artery of native heart without angina pectoris    Hypertension    Hyperlipidemia    CRISTY on autoCPAP    Sick sinus syndrome    Hx of CABG    Presence of cardiac pacemaker    Chest pain    Sepsis    1.  Non-ST elevation MI: Status post PCI of ostial SVG to OM 2 graft 3/26/2024.  Continue aspirin Plavix and atorvastatin.    2.  Coronary artery disease: prior  CABG (SVG to LAD, SVG to OM1, and SVG to OM2)  in 1990 .  Occluded native vessels  3.  Sepsis  4.  Ischemic cardiomyopathy: EF 30% on echo yesterday (previously 60% on echo 03/2023).  GDMT as tolerated. Will start low dose coreg.   5.  Sick sinus syndrome with permanent pacemaker placed 03/04/2022  6.  Indwelling Stanley catheter  7.  Urinary tract infection  8.  Aortic stenosis: mild to moderate    Plan will follow-up with me in the office in 1 week.  He will follow-up with Dr. Mcneal in about 6 weeks.  At each  visit we will evaluate ability to advance GDMT as tolerated.     Hodan Avila, COLLIN  03/29/24

## 2024-03-29 NOTE — PLAN OF CARE
Goal Outcome Evaluation:           Patient resting at this time, vital signs stable, pt on room air, paced on the monitor,cont on antibiotics, pt on bed alarm educate to call for assist, call light in reach, cont to monitor.

## 2024-03-29 NOTE — DISCHARGE SUMMARY
DISCHARGE SUMMARY    Patient Name: Javier Grant  Age/Sex: 83 y.o. male  : 1940  MRN: [id number]  Patient Care Team:  Sherron Gray MD as PCP - General (Pediatrics)  Robin Kingston Jr., MD as Consulting Physician (Cardiology)  Leonard Byrne MD as Consulting Physician (Ophthalmology)       Date of Admit: 3/25/2024  Date of Discharge:  24  Discharge Condition: Stable    Discharge Diagnoses:  Severe sepsis, resolved (unable to specify what type of sepsis because the cultures were negative, unable to put the orders on discharge in [facility] for the sepsis diagnosis because it request a specific infectious organism which is not available)  UTI, with chronic indwelling Green catheter, present on admission  Hypotension, secondary to sepsis and BP meds, resolved  Non-STEMI   Status post angioplasty  LANA, resolved  Ischemic cardiomyopathy, EF 45-50%  CAD s/p CABG , PCI  and 2023.  Chronic indwelling Green catheter, to be replaced today       History of present illness from H&P from 3/25/2024:   Javier Grant is an 83 year old male with past medical history CABG x 3, MI, CAD, cardiac stenting, pacemaker, HTN, HLD, CRISTY with cpap, TURP, chronic indwelling green catheter, ILD who presented to the ED with complaints of chest pain starting around 4PM today unrelieved by nitro at home.  He was given an additional nitro per EMS.  Chest pain was initially resolved by the time of ED arrival, EKG initially with RBBB, LAFB, sinus rhythm.  Patient initially febrile Tmax 101, hypotensive in ED.   He denies any other complaints.  Lactate negative, procalcitonin negative.  WBC 17.25, creatinine 1.48.  The patient began to have another episode of chest pain/pressure in the ED.  Patient follows with Dr. Mcneal, he was contacted per ED provider, he was placed on heparin drip per cardiology rec.  He was given sepsis bolus of 2.5L NS.  Decision was made to admit to ICU.       Follows with [facility], he reports he  has chronic indwelling Stanley catheter since December after TURP for chronic retention.  It was was exchanged in the office on 3/25/24.     Patient follows with [facility], Dr. Martinez for ILD.  He was a 1 pack/day x 25 years smoker, quit in 1980.  He does have CRISTY and wears a CPAP at home.     Pt had 3V CABG in 1990s.  Per patient he had a heart cath December 2023 which revealed occlusion of grafts leaving 1 bypass graft open.  He is on aspirin and Plavix at home.  He reports some fatigue for the last few days, but otherwise in his normal state of health until the chest pain started today.  He did note some lower blood pressures at home with SBP 90s.    Hospital Course:   Javier Grant presented to [facility] with  hypotension, patient has UTI with sepsis, has chronic indwelling Stanley catheter, his culture showed mixed organism but he was treated empirically because of the low blood pressure.  He also had increase in the liver enzyme with consultation requested by cardiology who further evaluated and diagnosed the patient with acute ischemic cardiomyopathy and he had a angioplasty done with good clinical response  We did replace his old Stanley catheter which was less than 1 month old  Patient improved, he was treated with IV fluid for the sepsis and his initial antihypertensive medication were held however with supportive measures his blood pressure got better and he will be reintroduced to his home antihypertensive medication on discharge  He is going to be discharged on the Coreg, he is on the aspirin and he will follow-up with cardiology in 1 week and then will follow on the recommendation afterwards  He has been on room air with no respiratory complaints  He denies any urological complaints  He has been afebrile for several days  He is neurologically intact    Consults:   IP CONSULT TO HOSPITALIST  IP CONSULT TO CARDIOLOGY  IP CONSULT TO INFECTION CONTROL NURSE  IP CONSULT TO  PULMONOLOGY    Significant Discharge Diagnostics   Procedures Performed:  Procedure(s):  Left Heart Cath  Native mammary injection  Stent JORGE bypass graft  Percutaneous Coronary Intervention  Saphenous Vein Graft       Pertinent Lab Results:  Results from last 7 days   Lab Units 03/28/24  0853 03/27/24  0333 03/25/24  2314 03/25/24 1958   SODIUM mmol/L 135* 140 134* 135*   POTASSIUM mmol/L 3.7 3.7 3.7 3.8   CHLORIDE mmol/L 104 111* 101 100   CO2 mmol/L 21.6* 22.0 21.0* 25.0   BUN mg/dL 25* 23 33* 32*   CREATININE mg/dL 1.16 1.09 1.35* 1.48*   GLUCOSE mg/dL 149* 80 113* 101*   CALCIUM mg/dL 8.1* 7.3* 8.2* 8.9   AST (SGOT) U/L  --   --   --  55*   ALT (SGPT) U/L  --   --   --  35     Results from last 7 days   Lab Units 03/28/24  0853 03/27/24  1602 03/27/24  1206 03/26/24  0244 03/26/24  0120 03/25/24  2314 03/25/24 1958   HSTROP T ng/L 3,197* 2,783* 2,486* 134* 102* 100* 42*     Results from last 7 days   Lab Units 03/29/24  0410 03/28/24  0854 03/27/24  0333 03/26/24  0244 03/25/24 1958   WBC 10*3/mm3 8.16 9.08 9.69 21.18* 17.25*   HEMOGLOBIN g/dL 10.3* 9.6* 9.1* 10.0* 10.6*   HEMATOCRIT % 31.3* 28.5* 26.5* 30.2* 31.8*   PLATELETS 10*3/mm3 142 135* 114* 148 176   MCV fL 97.8* 98.6* 96.7 99.7* 94.9   MCH pg 32.2 33.2* 33.2* 33.0 31.6   MCHC g/dL 32.9 33.7 34.3 33.1 33.3   RDW % 12.7 13.1 13.5 13.9 13.5   RDW-SD fl 44.9 47.6 46.9 51.0 47.0   MPV fL 11.1 11.4 10.7 11.0 10.5   NEUTROPHIL % % 74.3 82.9* 84.5* 90.5* 90.7*   LYMPHOCYTE % % 13.1* 9.3* 8.7* 3.3* 3.3*   MONOCYTES % % 10.0 6.3 6.0 5.1 5.2   EOSINOPHIL % % 2.0 0.8 0.2* 0.0* 0.2*   BASOPHIL % % 0.2 0.3 0.2 0.2 0.1   IMM GRAN % %  --   --   --  0.9* 0.5   NEUTROS ABS 10*3/mm3 6.06 7.53* 8.19* 19.15* 15.65*   LYMPHS ABS 10*3/mm3 1.07 0.84 0.84 0.70 0.57*   MONOS ABS 10*3/mm3 0.82 0.57 0.58 1.08* 0.89   EOS ABS 10*3/mm3 0.16 0.07 0.02 0.01 0.03   BASOS ABS 10*3/mm3 0.02 0.03 0.02 0.04 0.02   IMMATURE GRANS (ABS) 10*3/mm3  --   --   --  0.20* 0.09*   NRBC /100  WBC  --   --   --  0.0 0.0     Results from last 7 days   Lab Units 03/26/24  1209 03/26/24  0244 03/25/24 1958   INR   --   --  1.14*   APTT seconds 127.1* 53.1* 28.4         Results from last 7 days   Lab Units 03/27/24  0333   CHOLESTEROL mg/dL 79   TRIGLYCERIDES mg/dL 48   HDL CHOL mg/dL 39*   LDL CHOL mg/dL 27     Results from last 7 days   Lab Units 03/25/24 1958   PROBNP pg/mL 3,133.0*             Results from last 7 days   Lab Units 03/25/24  2314 03/25/24 1958   PROCALCITONIN ng/mL 0.09  --    LACTATE mmol/L  --  1.4             Results from last 7 days   Lab Units 03/25/24 2149 03/25/24 2036   BLOODCX   --  No growth at 3 days  No growth at 3 days   URINECX  >100,000 CFU/mL Mixed Marianne Isolated  --      Results from last 7 days   Lab Units 03/25/24 2149   NITRITE UA  Negative   WBC UA /HPF 21-50*   BACTERIA UA /HPF 4+*   SQUAM EPITHEL UA /HPF 0-2   URINECX  >100,000 CFU/mL Mixed Marianne Isolated     Results from last 7 days   Lab Units 03/25/24 2001   ADENOVIRUS DETECTION BY PCR  Not Detected   CORONAVIRUS 229E  Not Detected   CORONAVIRUS HKU1  Not Detected   CORONAVIRUS NL63  Not Detected   CORONAVIRUS OC43  Not Detected   HUMAN METAPNEUMOVIRUS  Not Detected   HUMAN RHINOVIRUS/ENTEROVIRUS  Not Detected   INFLUENZA B PCR  Not Detected   PARAINFLUENZA 1  Not Detected   PARAINFLUENZA VIRUS 2  Not Detected   PARAINFLUENZA VIRUS 3  Not Detected   PARAINFLUENZA VIRUS 4  Not Detected   BORDETELLA PERTUSSIS PCR  Not Detected   BORDETELLA PARAPERTUSSIS PCR  Not Detected   CHLAMYDOPHILA PNEUMONIAE PCR  Not Detected   MYCOPLAMA PNEUMO PCR  Not Detected   RSV, PCR  Not Detected           Imaging Results:  Imaging Results (All)       Procedure Component Value Units Date/Time    XR Chest 1 View [[id number]] Collected: 03/25/24 2028     Updated: 03/25/24 2031    Narrative:      PORTABLE CHEST X-RAY     HISTORY: Chest pain, shortness of breath and fever.     Portable chest x-ray is provided. Correlation: January 26,  2024.     FINDINGS: Sternal wires with cardiac pacing device. The  cardiomediastinal silhouette is normal. The lungs are clear. The  costophrenic sulci are dry and the bones appear normal. There is no  pneumothorax.       Impression:      Negative.     This report was finalized on 3/25/2024 8:28 PM by Dr. Corbin Kendrick M.D on Workstation: [id number]               Objective:   Temp:  [98.2 °F (36.8 °C)-98.9 °F (37.2 °C)] 98.3 °F (36.8 °C)  Heart Rate:  [60-65] 62  Resp:  [18] 18  BP: (107-134)/(59-79) 134/72   SpO2:  [94 %-99 %] 99 %  on    Device (Oxygen Therapy): room air    Intake/Output Summary (Last 24 hours) at 3/29/2024 1620  Last data filed at 3/29/2024 1430  Gross per 24 hour   Intake 480 ml   Output 1275 ml   Net -795 ml     Body mass index is 24.25 kg/m².      03/25/24  1928 03/26/24  0600 03/28/24  1203   Weight: 70.3 kg (154 lb 15.7 oz) 77 kg (169 lb 12.1 oz) 76.7 kg (169 lb)     Weight change:     Physical Exam:      General: Alert, cooperative, in no acute distress.         HEENT:  No oral lesions. No thrush. Oral mucosa moist.   Chest Wall:  No abnormalities observed.             Neck:  Trachea midline. No JVD.   Pulmonary:  CTAB. No wheezes. Respirations regular, even and unlabored.          Cardio:  Regular rhythm and normal rate. Normal S1 and S2. No murmur, gallop, rub or click.    Abdominal:  Soft, non-tender and non-distended. Normal bowel sounds. No masses. No organomegaly. No guarding. No rebound tenderness.  Extremities:  Moves all extremities well. No cyanosis. No redness. No edema.     Discharge Medications and Instructions:     Discharge Medications     Discharge Medications        Continue These Medications        Instructions Start Date   acetaminophen 325 MG tablet  Commonly known as: TYLENOL   650 mg, Oral, Nightly      allopurinol 300 MG tablet  Commonly known as: ZYLOPRIM   300 mg, Oral, Daily      aspirin 81 MG tablet   81 mg, Oral, Daily      atorvastatin 80 MG tablet  Commonly  known as: LIPITOR   TAKE 1 TABLET EVERY NIGHT      carvedilol 3.125 MG tablet  Commonly known as: COREG   TAKE 1 TABLET TWICE DAILY  WITH MEALS      Cholecalciferol 50 MCG (2000 UT) capsule   2,000 Units, Oral, Daily      Claritin 10 MG tablet  Generic drug: loratadine   mg Tab, Oral, Daily, 0 Refill(s)      clopidogrel 75 MG tablet  Commonly known as: PLAVIX   75 mg, Oral, Daily      famotidine 20 MG tablet  Commonly known as: PEPCID   20 mg, Oral, Daily      FLUoxetine 20 MG capsule  Commonly known as: PROzac   20 mg, Oral, Daily      isosorbide mononitrate 30 MG 24 hr tablet  Commonly known as: IMDUR   30 mg, Oral, Every 24 Hours Scheduled      NIFEdipine XL 30 MG 24 hr tablet  Commonly known as: PROCARDIA XL   TAKE ONE TABLET BY MOUTH DAILY      nitroglycerin 0.4 MG SL tablet  Commonly known as: Nitrostat   0.4 mg, Oral, Every 5 Minutes PRN      omega-3 acid ethyl esters 1 g capsule  Commonly known as: LOVAZA   TAKE 4 CAPSULES DAILY (TO  HOLD MEDICATION PRIOR TO   OPERATING ROOM)      PRESERVISION AREDS 2 PO   Oral, 2 Times Daily      ramipril 10 MG capsule  Commonly known as: ALTACE   10 mg, Oral, Every Evening      tamsulosin 0.4 MG capsule 24 hr capsule  Commonly known as: FLOMAX   0.4 mg             Stop These Medications      furosemide 40 MG tablet  Commonly known as: LASIX              Discharge Diet:    Dietary Orders (From admission, onward)       Start     Ordered    03/26/24 1545  Diet: Cardiac, Diabetic; Healthy Heart (2-3 Na+); Consistent Carbohydrate; Fluid Consistency: Thin (IDDSI 0)  Diet Effective Now        References:    Diet Order [place]   Question Answer Comment   Diets: Cardiac    Diets: Diabetic    Cardiac Diet: Healthy Heart (2-3 Na+)    Diabetic Diet: Consistent Carbohydrate    Fluid Consistency: Thin (IDDSI 0)        03/26/24 1544                    Activity at Discharge:       Discharge disposition: Home    Discharge Instructions and Follow ups:  As tolerated   Additional  Instructions for the Follow-ups that You Need to Schedule       Ambulatory Referral to Cardiac Rehab   As directed             Contact information for follow-up providers       [facility] .    Specialty: Cardiac Rehabilitation  Contact information:  [address] [facility] [id number] [phone]             Hodan Avila APRN Follow up in 1 week(s).    Specialties: Cardiology, Nurse Practitioner  Why: office to call patient to schedule  Contact information:  [address]  [place] [address]  [phone]               Evelio Mcneal MD Follow up in 6 week(s).    Specialty: Cardiology  Why: office to call patient to schedule  Contact information:  [address]  [place] [address]  [phone]               Sherron Gray MD .    Specialty: Pediatrics  Contact information:  [address] [place] [address]  [phone]                       Contact information for after-discharge care       Home Medical Care       [facility] .    Service: Home Health Services  Contact information:  [address]  [place] [facility] [address]  [phone]                                 Future Appointments   Date Time Provider Department Center   4/5/2024  2:00 PM Hodan Avila APRN [id number] CD [id number]   5/15/2024  9:40 AM Evelio Mcneal MD [facility] CD [id number] None        Medication Reconciliation: Please see electronically completed Med Rec.    Total time spent discharging patient including evaluation, medication reconciliation, arranging follow up, and post hospitalization instructions and education total time exceeds 30 minutes.     Janell Youngblood MD  03/29/24  16:20 EDT      Dictated utilizing Dragon dictation

## 2024-03-29 NOTE — TELEPHONE ENCOUNTER
Caller: naif pickering    Relationship: Emergency Contact    Best call back number:[phone]    What is the best time to reach you: ANY    Who are you requesting to speak with (clinical staff, provider,  specific staff member): ANY      What was the call regarding: PATIENT IS IN THE HOSPITAL THEY HAVE A BEEN REFERRED TO CARDIAC REHAB, PATIENT LIVES IN [place] AND THEY WERE WANTING TO KNOW IF WE KNEW OF LOCATIONS THERE THAT HE COULD CARDIAC REHAB, PLEASE CALL PATIENT TO DISCUSS.

## 2024-03-30 LAB
BACTERIA SPEC AEROBE CULT: NORMAL
BACTERIA SPEC AEROBE CULT: NORMAL
QT INTERVAL: 449 MS
QTC INTERVAL: 464 MS

## 2024-03-30 NOTE — OUTREACH NOTE
Prep Survey      Flowsheet Row Responses   [facility] facility patient discharged from? [facility]   Is LACE score < 7 ? No   Eligibility Readm Mgmt   Discharge diagnosis STEMI-heart cath   Does the patient have one of the following disease processes/diagnoses(primary or secondary)? Acute MI (STEMI,NSTEMI)   Does the patient have Home health ordered? Yes   What is the Home health agency?  [facility]    Is there a DME ordered? No   Prep survey completed? Yes            FRANCISCO BARRIENTOS - Registered Nurse

## 2024-03-30 NOTE — CASE MANAGEMENT/SOCIAL WORK
Case Management Discharge Note      Final Note: home w/HH         Selected Continued Care - Discharged on 3/29/2024 Admission date: 3/25/2024 - Discharge disposition: Home or Self Care      Destination    No services have been selected for the patient.                Durable Medical Equipment    No services have been selected for the patient.                Dialysis/Infusion    No services have been selected for the patient.                Home Medical Care Coordination complete.      Service Provider Selected Services Address Phone Fax Patient Preferred    [facility] [address]  [place] [address] [phone] --              Therapy    No services have been selected for the patient.                Community Resources    No services have been selected for the patient.                Community & DME    No services have been selected for the patient.                         Final Discharge Disposition Code: 06 - home with home health care

## 2024-04-01 ENCOUNTER — TELEPHONE (OUTPATIENT)
Dept: CARDIAC REHAB | Facility: HOSPITAL | Age: 84
End: 2024-04-01
Payer: MEDICARE

## 2024-04-01 NOTE — TELEPHONE ENCOUNTER
I just spoke with pt's daughter, Layla.  She had called to get more information about our program.  She and her sister are trying to figure out how to help the patient get to cardiac rehab but they both work.  I provided our times and logistics.  We also discussed two other programs which would be closer to her dad's home, her son's school, and her home.  We also discussed possibly delaying her dad starting the program until she retires in May which would also the patient more time to recover after recent hospitalization. I did review a progressive walking program that her dad could start at home in the meantime.     She will discuss all information with her dad and sister.  She will call us back when ready to schedule.      Thank you for the referral!

## 2024-04-02 NOTE — PROGRESS NOTES
[facility] Clinical Pharmacy Services: National Cardiology Data Registry (NCDR) Medication Review    Javier Grant is s/p PCI with drug-eluting stent placement for NSTEMI . Pharmacy to review discharge medications to make sure appropriate medications have been prescribed.    Patient has been discharged on the following:  Aspirin: 81 mg once a day  High Intensity Statin:  atorvastatin 80 mg once a day  Beta-blocker: carvedilol  3.125 mg twice a day  P2Y12 Inhibitor: clopidogrel 75 mg once a day  LVEF <40: yes, ramipril 10 mg once a day    These medications meet the requirements for NCDR discharge medication for chest pain and MI.    Aman Carrero, [facility]  Clinical Pharmacist

## 2024-04-03 ENCOUNTER — READMISSION MANAGEMENT (OUTPATIENT)
Dept: CALL CENTER | Facility: HOSPITAL | Age: 84
End: 2024-04-03
Payer: MEDICARE

## 2024-04-03 NOTE — OUTREACH NOTE
AMI Week 1 Survey      Flowsheet Row Responses   [facility] patient discharged from? [facility]   Does the patient have one of the following disease processes/diagnoses(primary or secondary)? Acute MI (STEMI,NSTEMI)   Week 1 attempt successful? Yes   Call start time 1022   Call end time 1026   Discharge diagnosis STEMI-heart cath   Meds reviewed with patient/caregiver? Yes   Is the patient having any side effects they believe may be caused by any medication additions or changes? No   Does the patient have all prescriptions related to this admission filled (includes statins,anticoagulants,HTN meds,anti-arrhythmia meds) N/A   Is the patient taking all medications as directed (includes completed medication regime)? Yes   Does the patient have a primary care provider?  Yes   Does the patient have an appointment with their PCP,cardiologist,or clinic within 7 days of discharge? Yes   Has the patient kept scheduled appointments due by today? N/A   What is the Home health agency?  [facility]    Has home health visited the patient within 72 hours of discharge? No   Psychosocial issues? No   Did the patient receive a copy of their discharge instructions? Yes   What is the patient's perception of their health status since discharge? Improving   Nursing interventions Nurse provided patient education   Is the patient/caregiver able to teach back signs and symptoms of when to call for help immediately: Sudden chest discomfort, Dizziness or lightheadedness, Nausea or vomiting, Sudden sweating or clammy skin, Shortness of breath at any time, Irregular or rapid heart rate, Sudden discomfort in arms, back, neck or jaw   Nursing interventions Nurse provided patient education   Is the patient/caregiver able to teach back lifestyle changes to help prevent MIs Regular exercise as approved by provider, Heart healthy diet, Maintaining a healthy weight, Reducing stress, Limiting alcohol intake   Is the patient/caregiver able to teach  back ways to prevent a second heart attack: Take medications, Follow up with MD, Manage risk factors   If the patient is a current smoker, are they able to teach back resources for cessation? Not a smoker   Is the patient/caregiver able to teach back the hierarchy of who to call/visit for symptoms/problems? PCP, Specialist, Home health nurse, Urgent Care, ED, 911 Yes   Additional teach back comments States he is doing well.  He just received a notice that home health has been approved by his insurance and he will reach out to home health.  States he had them previously due to having a catheter.   Week 1 call completed? Yes   Graduated/Revoked comments Denies questions or needs at this time.   Call end time 1026            Bibiana WALTERS - Licensed Nurse

## 2024-04-05 ENCOUNTER — OFFICE VISIT (OUTPATIENT)
Dept: CARDIOLOGY | Facility: CLINIC | Age: 84
End: 2024-04-05
Payer: MEDICARE

## 2024-04-05 VITALS
DIASTOLIC BLOOD PRESSURE: 68 MMHG | HEART RATE: 62 BPM | SYSTOLIC BLOOD PRESSURE: 116 MMHG | WEIGHT: 160 LBS | BODY MASS INDEX: 22.9 KG/M2 | HEIGHT: 70 IN

## 2024-04-05 DIAGNOSIS — Z95.0 PRESENCE OF CARDIAC PACEMAKER: ICD-10-CM

## 2024-04-05 DIAGNOSIS — Z95.5 S/P CORONARY ARTERY STENT PLACEMENT: ICD-10-CM

## 2024-04-05 DIAGNOSIS — G47.33 OSA ON CPAP: Chronic | ICD-10-CM

## 2024-04-05 DIAGNOSIS — I25.810 CORONARY ARTERY DISEASE INVOLVING CORONARY BYPASS GRAFT OF NATIVE HEART WITHOUT ANGINA PECTORIS: Primary | ICD-10-CM

## 2024-04-05 DIAGNOSIS — Z95.1 HX OF CABG: ICD-10-CM

## 2024-04-05 DIAGNOSIS — I49.5 SICK SINUS SYNDROME: ICD-10-CM

## 2024-04-05 DIAGNOSIS — I35.0 NONRHEUMATIC AORTIC VALVE STENOSIS: ICD-10-CM

## 2024-04-05 DIAGNOSIS — I45.0 RIGHT FASCICULAR BLOCK: ICD-10-CM

## 2024-04-05 DIAGNOSIS — I10 PRIMARY HYPERTENSION: ICD-10-CM

## 2024-04-05 RX ORDER — ATORVASTATIN CALCIUM 80 MG/1
80 TABLET, FILM COATED ORAL NIGHTLY
Qty: 90 TABLET | Refills: 3 | Status: SHIPPED | OUTPATIENT
Start: 2024-04-05

## 2024-04-05 RX ORDER — CARVEDILOL 6.25 MG/1
6.25 TABLET ORAL 2 TIMES DAILY WITH MEALS
Qty: 180 TABLET | Refills: 3 | Status: SHIPPED | OUTPATIENT
Start: 2024-04-05

## 2024-04-05 RX ORDER — RAMIPRIL 5 MG/1
10 CAPSULE ORAL EVERY EVENING
Start: 2024-04-05 | End: 2024-04-05

## 2024-04-05 RX ORDER — RAMIPRIL 5 MG/1
5 CAPSULE ORAL EVERY EVENING
Start: 2024-04-05

## 2024-04-05 RX ORDER — CLOPIDOGREL BISULFATE 75 MG/1
75 TABLET ORAL DAILY
Qty: 90 TABLET | Refills: 3 | Status: SHIPPED | OUTPATIENT
Start: 2024-04-05

## 2024-04-05 RX ORDER — ISOSORBIDE MONONITRATE 30 MG/1
30 TABLET, EXTENDED RELEASE ORAL
Qty: 90 TABLET | Refills: 3 | Status: SHIPPED | OUTPATIENT
Start: 2024-04-05

## 2024-04-05 NOTE — PROGRESS NOTES
Date of Office Visit: 2024  Encounter Provider: COLLIN Villela  Place of Service: [facility] CARDIOLOGY  Patient Name: Javier Grant  :1940    No chief complaint on file.  : coronary artery disease    HPI: Javier Grant is a 83 y.o. male who previously followed with Dr. Carmona and now follows with Dr. Mcneal.  He presents for hospital follow-up.  He has a history of coronary artery disease, aortic valve stenosis, hypertension, sick sinus syndrome with presence of pacemaker, PVCs and right bundle branch block.  In , he underwent CABG x3 (SVG to LAD, SVG to OM1 and SVG to OM 2).  His anginal equivalent has always been chest pain, mainly in the center of his chest.      In , he underwent left heart cath which showed occlusion of all native vessels proximally, although there was 70% stenosis in mid SVG to OM 2.  He underwent placement of 4.0 x 15 mm Resolute JORGE to this lesion, as well as, placement of 3.0 x 12 mm Xience Alpine JORGE to anastomotic site of one of the grafts to the OM branches.  The LAD and saphenous vein graft to LAD collateralized all 3 major branches distally (circumflex, RCA and ramus).  Patient has history of fairly severe nosebleeds on Plavix.    He wore a 24-hour Holter monitor in  secondary to episodes of near syncope.  He was found to have significant intermittent bradycardia along with significant pauses.  He underwent placement of [employer] dual-chamber permanent pacemaker on 3/4/2022 by Dr. Francois.    Patient had chest discomfort in 2022.  He underwent stress test which showed no ischemia.    He presented to [place] in 2023 for UTI related to chronic indwelling Stanley catheter.  He had a non-STEMI with elevated troponin.  EF was found to be newly reduced in the 45% range, mild to moderate aortic stenosis.  He underwent cardiac cath that showed 1 bypass graft open and all of his native vessels occluded.  At  that time, he was noted to have occluded SVG to OM 2 and distal vessel closure after attempted PCI of SVG to OM1.  SVG to LAD was patent and gave flow basically to everything.      He presented to [facility] on 3/26/2024 with complaints of chest pain, shortness of breath and what sounded to be rigors.  He was noted to be hypotensive and required aggressive fluid resuscitation.  EKG showed new ST depression and T wave abnormalities.  Troponin continued to increase and he continued to complain of chest discomfort.  Films from cardiac cath at [place] were reviewed and it was decided to take patient back for left heart cath.  He underwent PCI of SVG to OM 2 graft.  2D echocardiogram showed LVEF 30%, mild to moderate aortic valve stenosis and mild mitral regurgitation.  Patient was continued on carvedilol starting with low-dose with plans to increase as BP tolerated.  He was also continued on ACE inhibitor.      Mr. Grant presents today with no complaints.  He has more energy.  He has no complaints of chest discomfort, shortness of breath or lightheadedness.  Blood pressure is well-controlled.  He appears euvolemic on physical exam.  No orthopnea or PND.  He has no bleeding issues on aspirin and Plavix.  EKG is stable.     Previous testing and notes have been reviewed by  me.   Past Medical History:   Diagnosis Date    Abnormal ECG 1980    Arthritis     Bleeds easily     WAS TOLD THAT AFTER CABG    CAD (coronary artery disease)     Clotting disorder 1990    Noted bu surgeon when I had the heart bypasses    Colon polyp     Disorder of aorta     Dizziness     SKELTON (dyspnea on exertion)     Enlarged prostate     Gastritis     Gout     Heart murmur ?    Hiatal hernia     History of MI (myocardial infarction)     1990    HL (hearing loss)     Hyperlipidemia     Hypertension     Macular degeneration     rt eye    Mitral valve prolapse 1990    Myocardial infarction 1990    Nonrheumatic aortic (valve) insufficiency     Nonrheumatic  aortic (valve) stenosis     RBBB (right bundle branch block)     Sleep apnea     USES CPAP    Umbilical hernia     Vitamin D deficiency        Past Surgical History:   Procedure Laterality Date    CARDIAC CATHETERIZATION Left 10/09/2015    Dr. Robin Kingston    CARDIAC CATHETERIZATION N/A 3/26/2024    Procedure: Left Heart Cath;  Surgeon: Evelio Mcneal MD;  Location: [facility] CATH INVASIVE LOCATION;  Service: Cardiovascular;  Laterality: N/A;    CARDIAC CATHETERIZATION N/A 3/26/2024    Procedure: Native mammary injection;  Surgeon: Evelio Mcneal MD;  [facility];  Service: Cardiovascular;  Laterality: N/A;    CARDIAC CATHETERIZATION N/A 3/26/2024    Procedure: Stent JORGE bypass graft;  Surgeon: Evelio Mcneal MD;  [facility];  Service: Cardiovascular;  Laterality: N/A;    CARDIAC CATHETERIZATION N/A 3/26/2024    Procedure: Percutaneous Coronary Intervention;  Surgeon: Evelio Mcneal MD;  Location: [facility] CATH INVASIVE LOCATION;  Service: Cardiovascular;  Laterality: N/A;    CARDIAC CATHETERIZATION  3/26/2024    Procedure: Saphenous Vein Graft;  Surgeon: Evelio Mcneal MD;  Location: [facility] CATH INVASIVE LOCATION;  Service: Cardiovascular;;    CARDIAC ELECTROPHYSIOLOGY PROCEDURE N/A 03/04/2022    Procedure: Pacemaker DC new  [place];  Surgeon: Serafin Francois MD;  Location: [facility] CATH INVASIVE LOCATION;  Service: Cardiology;  Laterality: N/A;    CARDIAC SURGERY  1990    triple bypass    CATARACT EXTRACTION Bilateral     CORONARY ANGIOPLASTY WITH STENT PLACEMENT  2015    CORONARY ARTERY BYPASS GRAFT  1990    3 VESSELS    ENDOSCOPY N/A 05/26/2017    Procedure: ESOPHAGOGASTRODUODENOSCOPY WITH COLD BIOIPSIES AND BALLOON DILITATION SIZE 15, 16.5, 18;  Surgeon: Jerry SNOWDEN MD;  Location: [facility] ENDOSCOPY;  Service:     INSERT / REPLACE / REMOVE PACEMAKER  03/05/2022    MCCOY'S NEUROMA EXCISION Right      TONSILLECTOMY      UMBILICAL HERNIA REPAIR N/A 10/09/2017    Procedure: UMBILICAL HERNIA REPAIR;  Surgeon: Blake Kelly Jr., MD;  Location: [facility] OR;  Service:        Social History     Socioeconomic History    Marital status:    Tobacco Use    Smoking status: Former     Current packs/day: 0.00     Types: Cigarettes     Start date: 1957     Quit date: 1980     Years since quittin.2    Smokeless tobacco: Never   Vaping Use    Vaping status: Never Used   Substance and Sexual Activity    Alcohol use: No    Drug use: Never    Sexual activity: Not Currently     Partners: Female     Birth control/protection: Post-menopausal, None       Family History   Problem Relation Age of Onset    Depression Mother     Colon polyps Mother     Heart attack Father     Aneurysm Father     Stomach cancer Brother     Malig Hyperthermia Neg Hx        Review of Systems   Constitutional: Negative.   HENT: Negative.     Eyes: Negative.    Cardiovascular: Negative.    Respiratory: Negative.     Endocrine: Negative.    Hematologic/Lymphatic:        Asa/ plavix   Skin: Negative.    Musculoskeletal: Negative.    Gastrointestinal: Negative.    Genitourinary: Negative.    Neurological: Negative.    Psychiatric/Behavioral: Negative.     Allergic/Immunologic: Negative.        Allergies   Allergen Reactions    Ciprofloxacin Diarrhea         Current Outpatient Medications:     acetaminophen (TYLENOL) 325 MG tablet, Take 2 tablets by mouth Every Night., Disp: , Rfl:     allopurinol (ZYLOPRIM) 300 MG tablet, Take 1 tablet by mouth Daily., Disp: 90 tablet, Rfl: 3    aspirin 81 MG tablet, Take 1 tablet by mouth Daily., Disp: , Rfl:     atorvastatin (LIPITOR) 80 MG tablet, Take 1 tablet by mouth Every Night., Disp: 90 tablet, Rfl: 3    carvedilol (COREG) 6.25 MG tablet, Take 1 tablet by mouth 2 (Two) Times a Day With Meals., Disp: 180 tablet, Rfl: 3    Cholecalciferol 2000 units capsule, Take 1 capsule by mouth Daily., Disp: ,  "Rfl:     clopidogrel (PLAVIX) 75 MG tablet, Take 1 tablet by mouth Daily., Disp: 90 tablet, Rfl: 3    famotidine (PEPCID) 20 MG tablet, Take 1 tablet by mouth Daily., Disp: , Rfl:     FLUoxetine (PROzac) 20 MG capsule, Take 1 capsule by mouth Daily., Disp: , Rfl:     isosorbide mononitrate (IMDUR) 30 MG 24 hr tablet, Take 1 tablet by mouth Daily., Disp: 90 tablet, Rfl: 3    loratadine (Claritin) 10 MG tablet, mg Tab, Oral, Daily, 0 Refill(s), Disp: , Rfl:     Multiple Vitamins-Minerals (PRESERVISION AREDS 2 PO), Take  by mouth 2 (Two) Times a Day., Disp: , Rfl:     nitroglycerin (Nitrostat) 0.4 MG SL tablet, Take 1 tablet by mouth Every 5 (Five) Minutes As Needed (CHEST PAIN: MAX DOSE 3 TABLETS)., Disp: 25 tablet, Rfl: 6    omega-3 acid ethyl esters (LOVAZA) 1 g capsule, TAKE 4 CAPSULES DAILY (TO  HOLD MEDICATION PRIOR TO   OPERATING ROOM), Disp: 360 capsule, Rfl: 3    ramipril (ALTACE) 5 MG capsule, Take 1 capsule by mouth Every Evening., Disp: , Rfl:     tamsulosin (FLOMAX) 0.4 MG capsule 24 hr capsule, 1 capsule., Disp: , Rfl:       Objective:     Vitals:    04/05/24 1408   BP: 116/68   Pulse: 62   Weight: 72.6 kg (160 lb)   Height: 177.8 cm (70\")     Body mass index is 22.96 kg/m².      2D Echocardiogram 3/28/2024:    Left ventricular systolic function is moderately decreased. Calculated left ventricular EF = 30.8%    The following left ventricular wall segments are hypokinetic: mid anterior, apical anterior, basal anterolateral, mid anterolateral, apical lateral and basal anterior.    Left ventricular diastolic function is consistent with (grade I) impaired relaxation.    The left atrial cavity is severely dilated.    Mild to moderate aortic valve stenosis is present. Aortic valve area is 1.4 cm2.Peak velocity of the flow distal to the aortic valve is 263.3 cm/s. Aortic valve maximum pressure gradient is 28 mmHg. Aortic valve mean pressure gradient is 16 mmHg. Aortic valve dimensionless index is 0.3 .    " Calculated right ventricular systolic pressure from tricuspid regurgitation is 35 mmHg.    Severe mitral annular calcification is present. Mild mitral valve regurgitation is present. Mild mitral valve stenosis is present. The mitral valve mean gradient is 2.8 mmHg.    Left Heart Cath 03/26/2024:  1. Left main: Diffuse 90% stenosis  2. LAD:  at ostium.  Mid to distal vessel fills via patent SVG to LAD.  3. LCX:  at ostium.  Fills via SVG to OM2 graft.  4. RCA:  mid segment.  Fills via collaterals from the distal LAD and septals.  5.  SVG-OM 1: Occluded proximal  6.  SVG-OM2: 99% stenosis at the proximal anastomosis.  7.  SVG-LAD: Discrete 50% mid vessel stenosis.  8.  Successful PCI of the ostial SVG-OM2 with a 4.0 x 15 mm Xience miriam point drug-eluting stent.     2D Echocardiogram 03/03/2023:    Left ventricular systolic function is normal. Calculated left ventricular EF = 60.1% Normal left ventricular cavity size noted. Left ventricular wall thickness is consistent with concentric hypertrophy. Left ventricular diastolic function is consistent with (grade I) impaired relaxation.    Mildly reduced right ventricular systolic function noted.    The interatrial septum appears redundant. Saline test for shunting not performed. History of positive PFO    There is severe calcification of the aortic valve. No aortic valve regurgitation is present. Mild aortic valve stenosis is present. Aortic valve area is 1.38 cm2. Aortic valve mean pressure gradient is 17.5 mmHg. Aortic valve dimensionless index is 0.30 .    Severe mitral annular calcification is present. There is severe, bileaflet mitral valve thickening present. Trace mitral valve regurgitation is present. No significant mitral valve stenosis is present.    There is a probable left pleural (less likely pericardial) effusion. This is not well visualized.    Lexiscan Stress test 07/13/2022:  Myocardial perfusion imaging indicates a normal myocardial perfusion  study with no evidence of ischemia.  Left ventricular ejection fraction is normal. (Calculated EF = 63%).  Diaphragmatic attenuation artifact is present.  There is no prior study available for comparison.    24-hour Holter monitor 2/25/2022:  An abnormal monitor study.  There were episodes of significant sinus bradycardia in the afternoon hours between 2:43 PM and 4:11 PM. The lowest recorded heart rate was 32 bpm.  There were also episodes of significant sinus bradycardia as low as 31 bpm between 7:48 PM and 8:49 PM.  There was a sinus pause of 4.2 seconds at 9:07 PM     2D Echocardiogram 6/17/2021:  Left ventricular wall thickness is consistent with mild concentric hypertrophy.  Estimated left ventricular EF = 61% Left ventricular systolic function is normal.  Left ventricular diastolic function was normal.  There is moderate calcification of the aortic valve.  Mild tricuspid valve regurgitation is present. Estimated right ventricular systolic pressure from tricuspid regurgitation is normal (<35 mmHg).  Mitral annular calcification is present. Mild mitral valve regurgitation is present. No significant mitral valve stenosis is present.    PHYSICAL EXAM:    Constitutional:       Appearance: Not in distress. Frail.   Neck:      Vascular: No JVR. JVD normal.   Pulmonary:      Effort: Pulmonary effort is normal.      Breath sounds: Normal breath sounds. No wheezing. No rhonchi. No rales.   Chest:      Chest wall: Not tender to palpatation.   Cardiovascular:      PMI at left midclavicular line. Normal rate. Regular rhythm. Normal S1. Normal S2.       Murmurs: There is a systolic murmur.      No gallop.  No click. No rub.   Pulses:     Intact distal pulses.   Edema:     Peripheral edema absent.   Abdominal:      General: Bowel sounds are normal.      Palpations: Abdomen is soft.      Tenderness: There is no abdominal tenderness.   Musculoskeletal: Normal range of motion.         General: No tenderness. Skin:     General:  Skin is warm and dry.   Neurological:      General: No focal deficit present.      Mental Status: Alert and oriented to person, place and time.           ECG 12 Lead    Date/Time: 4/5/2024 3:56 PM  Performed by: Hodan Avila APRN    Authorized by: Hodan Avila APRN  Comparison: compared with previous ECG from 3/29/2024  Similar to previous ECG  Rhythm: paced  BPM: 62  T inversion: V5, V6 and aVF  Pacing: atrial sensed rhythm and ventricular paced rhythm          Assessment:       Diagnosis Plan   1. Coronary artery disease involving coronary bypass graft of native heart without angina pectoris        2. Primary hypertension        3. Right fascicular block        4. Nonrheumatic aortic valve stenosis        5. Sick sinus syndrome        6. Hx of CABG        7. S/P coronary artery stent placement        8. Presence of cardiac pacemaker        9. CRISTY on autoCPAP            Orders Placed This Encounter   Procedures    ECG 12 Lead     This order was created via procedure documentation     Order Specific Question:   Release to patient     Answer:   Routine Release [[id number]]          Plan:       1.  Coronary artery disease: Status post CABG x 3 (SVG to LAD, SVG to OM1 and SVG to OM 2) in 1991.  Status post PCI of SVG to OM 2 (2015), status post PCI of SVG to OM 2 graft (03/2024).    On asa/ plavix, statin, beta blocker and long-acting nitrate.  No angina.  Stable EKG    2.  Ischemic cardiomyopathy: LVEF 30%.  GDMT as tolerated.  Increase carvedilol to 6.25 mg twice daily, continue ramipril 5 mg daily.  Stop nifedipine.     3. Nonrheumatic aortic valve stenosis: Mild.  Positive systolic murmur    4.  Hypertension: Controlled    5.  Hyperlipidemia: Lipid panel in target range.  On statin therapy.    6.  Sick sinus syndrome: Pacemaker placement in 2022.  Last remote download 10/2023 shows no events.  Device interrogation in device clinic annually.    7. CRISTY: CPAP compliant    Mr. Grant is a scheduled  follow-up with Dr. Mcneal in May.  He will call sooner for any questions or concerns.             Your medication list            Accurate as of April 5, 2024  3:58 PM. If you have any questions, ask your nurse or doctor.                CHANGE how you take these medications        Instructions Last Dose Given Next Dose Due   atorvastatin 80 MG tablet  Commonly known as: LIPITOR  What changed: how to take this  Changed by: COLLIN Villela      Take 1 tablet by mouth Every Night.       carvedilol 6.25 MG tablet  Commonly known as: COREG  What changed:   medication strength  how much to take  Changed by: COLLIN Villela      Take 1 tablet by mouth 2 (Two) Times a Day With Meals.       ramipril 5 MG capsule  Commonly known as: ALTACE  What changed:   medication strength  how much to take  Changed by: COLLIN Villela      Take 1 capsule by mouth Every Evening.              CONTINUE taking these medications        Instructions Last Dose Given Next Dose Due   acetaminophen 325 MG tablet  Commonly known as: TYLENOL      Take 2 tablets by mouth Every Night.       allopurinol 300 MG tablet  Commonly known as: ZYLOPRIM      Take 1 tablet by mouth Daily.       aspirin 81 MG tablet      Take 1 tablet by mouth Daily.       Cholecalciferol 50 MCG (2000 UT) capsule      Take 1 capsule by mouth Daily.       Claritin 10 MG tablet  Generic drug: loratadine      mg Tab, Oral, Daily, 0 Refill(s)       clopidogrel 75 MG tablet  Commonly known as: PLAVIX      Take 1 tablet by mouth Daily.       famotidine 20 MG tablet  Commonly known as: PEPCID      Take 1 tablet by mouth Daily.       FLUoxetine 20 MG capsule  Commonly known as: PROzac      Take 1 capsule by mouth Daily.       isosorbide mononitrate 30 MG 24 hr tablet  Commonly known as: IMDUR      Take 1 tablet by mouth Daily.       nitroglycerin 0.4 MG SL tablet  Commonly known as: Nitrostat      Take 1 tablet by mouth Every 5 (Five) Minutes As Needed (CHEST  PAIN: MAX DOSE 3 TABLETS).       omega-3 acid ethyl esters 1 g capsule  Commonly known as: LOVAZA      TAKE 4 CAPSULES DAILY (TO  HOLD MEDICATION PRIOR TO   OPERATING ROOM)       PRESERVISION AREDS 2 PO      Take  by mouth 2 (Two) Times a Day.       tamsulosin 0.4 MG capsule 24 hr capsule  Commonly known as: FLOMAX      1 capsule.              STOP taking these medications      NIFEdipine XL 30 MG 24 hr tablet  Commonly known as: PROCARDIA XL  Stopped by: COLLIN Villela                  Where to Get Your Medications        These medications were sent to [facility] - [facility] [address] [facility] - [phone]  - [phone] [address], [facility] [address]      Phone: [phone]   atorvastatin 80 MG tablet  carvedilol 6.25 MG tablet  clopidogrel 75 MG tablet  isosorbide mononitrate 30 MG 24 hr tablet       Information about where to get these medications is not yet available    Ask your nurse or doctor about these medications  ramipril 5 MG capsule           As always, it has been a pleasure to participate in your patient's care.      Sincerely,       COLLIN Baumann

## 2024-04-08 NOTE — PROGRESS NOTES
"Enter Query Response Below      Query Response: type I due to plaque rupture             If applicable, please update the problem list.       Patient: Javier Grant        : 1940  Account: [id number]           Admit Date: 3/25/2024        How to Respond to this query:       a. Click New Note     b. Answer query within the yellow box.                c. Update the Problem List, if applicable.      If you have any questions about this query contact me at: [email or web address]     Dr. Mcneal,    Patient presented on 3/25 with shortness of breath and chest pain and was determined to have sepsis and UTI.  Your consultation dated 3/26 includes, \"Patient did complain of chest pain in the ER and a repeat EKG was performed showing new ST depression and T wave abnormalities\" and, \"As he effectively has 1 bypass graft open and all of his native vessels are occluded I think is very likely that in the setting of his low blood pressure and sepsis that he is having angina in the setting of a type II non-ST elevation MI.\"  On 3/26 patient underwent placement of drug eluting stent to \"ostial SVG to OM2.\"    Please clarify the type of NSTEMI the patient was treated/monitored for:    Type 1 MI due to ______ (please specify- plaque erosion, rupture, fissure or thrombus)  Type 2 MI due to demand ischemia from chronic CAD  Type 2 MI due to ____________________________  Other- specify______  Unable to determine    By submitting this query, we are merely seeking further clarification of documentation to accurately reflect all conditions that you are monitoring, evaluating, treating or that extend the hospitalization or utilize additional resources of care. Please utilize your independent clinical judgment when addressing the question(s) above.     This query and your response, once completed, will be entered into the legal medical record.    Sincerely,  Nunu Nunez RN CCDS [facility]  Clinical Documentation Integrity Program "

## 2024-04-10 NOTE — PROGRESS NOTES
"Enter Query Response Below      Query Response: Acute kidney injury was supported by the fact that the patient had a creatinine that was abnormal and it did improve with supportive measures, there is no baseline to compare to but the fact that the creatinine improved from 1.48 on admission to 1.09 in 2 days with supportive measures is indicative that this was an acute process             If applicable, please update the problem list.       Patient: Javier Grant        : 1940  Account: [id number]           Admit Date: 3/25/2024        How to Respond to this query:       a. Click New Note     b. Answer query within the yellow box.                c. Update the Problem List, if applicable.      If you have any questions about this query contact me at: [email or web address]     Dr. Youngblood,    Patient presented on 3/25.  No documentation of chronic kidney disease and no documentation of baseline creatinine is noted.    Discharge summary includes, \"LANA, resolved.\"  Patient's creatinine levels during this encounter include:  3/25 - 1.48  3/25 - 1.35  3/27 - 1.09  3/28 - 1.16    After study, was acute kidney injury (LANA) clinically supported during this admission?    - acute kidney injury (LANA) was supported with additional clinical indicators:____________  - acute kidney injury (LANA) was not supported  - other- specify_____________  - unable to determine     LANA is defined by KDIGO as any of the following:  Increase in creatinine > 0.3 mg/dl within 48 hours or less; or   Increase in creatinine level to > 1.5x baseline (historical or measure), which is known or presumed to have occurred within the prior 7 days   Urine volume <0.5 ml/kg/h for 6 hours.  Reference article: http://www.kdigo.org/clinical_practice_guidelines/pdf/KDIGO%20AKI%20Guideline.pdf (as published in [employer] Supplements, The Official Journal of the International Society of Nephrology, vol. 2, issue 1, 2012.)      By submitting " this query, we are merely seeking further clarification of documentation to accurately reflect all conditions that you are monitoring, evaluating, treating or that extend the hospitalization or utilize additional resources of care. Please utilize your independent clinical judgment when addressing the question(s) above.     This query and your response, once completed, will be entered into the legal medical record.    Sincerely,  Nunu Nunez RN CCDS [facility]  Clinical Documentation Integrity Program

## 2024-04-10 NOTE — PROGRESS NOTES
Enter Query Response Below      Query Response: UTI due to chronic indwelling urinary catheter              If applicable, please update the problem list.       Patient: Javier Grant        : 1940  Account: [id number]           Admit Date: 3/25/2024        How to Respond to this query:       a. Click New Note     b. Answer query within the yellow box.                c. Update the Problem List, if applicable.      If you have any questions about this query contact me at: [email or web address]     Dr. Youngblood,    Patient presented on 3/25 and was determined to have UTI which was treated with IV antibiotics.  Patient with indwelling urinary catheter which was changed.    Please clarify the etiology of the UTI:    - UTI due to urinary catheter  - UTI not due to urinary catheter  - other- specify_______  - unable to determine    By submitting this query, we are merely seeking further clarification of documentation to accurately reflect all conditions that you are monitoring, evaluating, treating or that extend the hospitalization or utilize additional resources of care. Please utilize your independent clinical judgment when addressing the question(s) above.     This query and your response, once completed, will be entered into the legal medical record.    Sincerely,  Nunu Nunez RN CCDS [facility]  Clinical Documentation Integrity Program

## 2024-04-14 ENCOUNTER — APPOINTMENT (OUTPATIENT)
Dept: GENERAL RADIOLOGY | Facility: HOSPITAL | Age: 84
End: 2024-04-14
Payer: MEDICARE

## 2024-04-14 ENCOUNTER — HOSPITAL ENCOUNTER (OUTPATIENT)
Facility: HOSPITAL | Age: 84
LOS: 1 days | Discharge: HOME | End: 2024-04-15
Attending: EMERGENCY MEDICINE | Admitting: HOSPITALIST
Payer: MEDICARE

## 2024-04-14 DIAGNOSIS — R79.89 ELEVATED BRAIN NATRIURETIC PEPTIDE (BNP) LEVEL: ICD-10-CM

## 2024-04-14 DIAGNOSIS — R06.09 DYSPNEA ON EXERTION: Primary | ICD-10-CM

## 2024-04-14 DIAGNOSIS — J18.9 MULTIFOCAL PNEUMONIA: ICD-10-CM

## 2024-04-14 PROBLEM — I50.23 ACUTE ON CHRONIC SYSTOLIC CHF (CONGESTIVE HEART FAILURE): Status: ACTIVE | Noted: 2024-04-14

## 2024-04-14 LAB
ALBUMIN SERPL-MCNC: 3.8 G/DL (ref 3.5–5.2)
ALBUMIN/GLOB SERPL: 1.4 G/DL
ALP SERPL-CCNC: 125 U/L (ref 39–117)
ALT SERPL W P-5'-P-CCNC: 26 U/L (ref 1–41)
ANION GAP SERPL CALCULATED.3IONS-SCNC: 9 MMOL/L (ref 5–15)
AST SERPL-CCNC: 23 U/L (ref 1–40)
B PARAPERT DNA SPEC QL NAA+PROBE: NOT DETECTED
B PERT DNA SPEC QL NAA+PROBE: NOT DETECTED
BASOPHILS # BLD AUTO: 0.04 10*3/MM3 (ref 0–0.2)
BASOPHILS NFR BLD AUTO: 0.6 % (ref 0–1.5)
BILIRUB SERPL-MCNC: 0.5 MG/DL (ref 0–1.2)
BUN SERPL-MCNC: 16 MG/DL (ref 8–23)
BUN/CREAT SERPL: 13.4 (ref 7–25)
C PNEUM DNA NPH QL NAA+NON-PROBE: NOT DETECTED
CALCIUM SPEC-SCNC: 8.6 MG/DL (ref 8.6–10.5)
CHLORIDE SERPL-SCNC: 100 MMOL/L (ref 98–107)
CO2 SERPL-SCNC: 24 MMOL/L (ref 22–29)
CREAT SERPL-MCNC: 1.19 MG/DL (ref 0.76–1.27)
D-LACTATE SERPL-SCNC: 1.8 MMOL/L (ref 0.5–2)
DEPRECATED RDW RBC AUTO: 46.1 FL (ref 37–54)
EGFRCR SERPLBLD CKD-EPI 2021: 60.2 ML/MIN/1.73
EOSINOPHIL # BLD AUTO: 0.15 10*3/MM3 (ref 0–0.4)
EOSINOPHIL NFR BLD AUTO: 2.4 % (ref 0.3–6.2)
ERYTHROCYTE [DISTWIDTH] IN BLOOD BY AUTOMATED COUNT: 13.3 % (ref 12.3–15.4)
FLUAV SUBTYP SPEC NAA+PROBE: NOT DETECTED
FLUBV RNA ISLT QL NAA+PROBE: NOT DETECTED
GEN 5 2HR TROPONIN T REFLEX: 46 NG/L
GLOBULIN UR ELPH-MCNC: 2.8 GM/DL
GLUCOSE SERPL-MCNC: 142 MG/DL (ref 65–99)
HADV DNA SPEC NAA+PROBE: NOT DETECTED
HCOV 229E RNA SPEC QL NAA+PROBE: NOT DETECTED
HCOV HKU1 RNA SPEC QL NAA+PROBE: NOT DETECTED
HCOV NL63 RNA SPEC QL NAA+PROBE: NOT DETECTED
HCOV OC43 RNA SPEC QL NAA+PROBE: NOT DETECTED
HCT VFR BLD AUTO: 32.5 % (ref 37.5–51)
HGB BLD-MCNC: 10.8 G/DL (ref 13–17.7)
HMPV RNA NPH QL NAA+NON-PROBE: NOT DETECTED
HOLD SPECIMEN: NORMAL
HOLD SPECIMEN: NORMAL
HPIV1 RNA ISLT QL NAA+PROBE: NOT DETECTED
HPIV2 RNA SPEC QL NAA+PROBE: NOT DETECTED
HPIV3 RNA NPH QL NAA+PROBE: NOT DETECTED
HPIV4 P GENE NPH QL NAA+PROBE: NOT DETECTED
IMM GRANULOCYTES # BLD AUTO: 0.02 10*3/MM3 (ref 0–0.05)
IMM GRANULOCYTES NFR BLD AUTO: 0.3 % (ref 0–0.5)
LYMPHOCYTES # BLD AUTO: 0.77 10*3/MM3 (ref 0.7–3.1)
LYMPHOCYTES NFR BLD AUTO: 12.5 % (ref 19.6–45.3)
M PNEUMO IGG SER IA-ACNC: NOT DETECTED
MCH RBC QN AUTO: 32 PG (ref 26.6–33)
MCHC RBC AUTO-ENTMCNC: 33.2 G/DL (ref 31.5–35.7)
MCV RBC AUTO: 96.2 FL (ref 79–97)
MONOCYTES # BLD AUTO: 0.38 10*3/MM3 (ref 0.1–0.9)
MONOCYTES NFR BLD AUTO: 6.2 % (ref 5–12)
NEUTROPHILS NFR BLD AUTO: 4.8 10*3/MM3 (ref 1.7–7)
NEUTROPHILS NFR BLD AUTO: 78 % (ref 42.7–76)
NRBC BLD AUTO-RTO: 0 /100 WBC (ref 0–0.2)
NT-PROBNP SERPL-MCNC: ABNORMAL PG/ML (ref 0–1800)
PLATELET # BLD AUTO: 227 10*3/MM3 (ref 140–450)
PMV BLD AUTO: 10.6 FL (ref 6–12)
POTASSIUM SERPL-SCNC: 4.4 MMOL/L (ref 3.5–5.2)
PROT SERPL-MCNC: 6.6 G/DL (ref 6–8.5)
QT INTERVAL: 495 MS
QTC INTERVAL: 499 MS
RBC # BLD AUTO: 3.38 10*6/MM3 (ref 4.14–5.8)
RHINOVIRUS RNA SPEC NAA+PROBE: NOT DETECTED
RSV RNA NPH QL NAA+NON-PROBE: NOT DETECTED
SARS-COV-2 RNA NPH QL NAA+NON-PROBE: NOT DETECTED
SODIUM SERPL-SCNC: 133 MMOL/L (ref 136–145)
TROPONIN T DELTA: -7 NG/L
TROPONIN T SERPL HS-MCNC: 53 NG/L
WBC NRBC COR # BLD AUTO: 6.16 10*3/MM3 (ref 3.4–10.8)
WHOLE BLOOD HOLD COAG: NORMAL
WHOLE BLOOD HOLD SPECIMEN: NORMAL

## 2024-04-14 PROCEDURE — 93005 ELECTROCARDIOGRAM TRACING: CPT | Performed by: EMERGENCY MEDICINE

## 2024-04-14 PROCEDURE — 96376 TX/PRO/DX INJ SAME DRUG ADON: CPT

## 2024-04-14 PROCEDURE — 71045 X-RAY EXAM CHEST 1 VIEW: CPT

## 2024-04-14 PROCEDURE — 84484 ASSAY OF TROPONIN QUANT: CPT | Performed by: HOSPITALIST

## 2024-04-14 PROCEDURE — 85025 COMPLETE CBC W/AUTO DIFF WBC: CPT | Performed by: EMERGENCY MEDICINE

## 2024-04-14 PROCEDURE — 96374 THER/PROPH/DIAG INJ IV PUSH: CPT

## 2024-04-14 PROCEDURE — 25010000002 FUROSEMIDE PER 20 MG: Performed by: HOSPITALIST

## 2024-04-14 PROCEDURE — 93005 ELECTROCARDIOGRAM TRACING: CPT

## 2024-04-14 PROCEDURE — 84484 ASSAY OF TROPONIN QUANT: CPT | Performed by: EMERGENCY MEDICINE

## 2024-04-14 PROCEDURE — 0202U NFCT DS 22 TRGT SARS-COV-2: CPT

## 2024-04-14 PROCEDURE — 80053 COMPREHEN METABOLIC PANEL: CPT | Performed by: EMERGENCY MEDICINE

## 2024-04-14 PROCEDURE — 83605 ASSAY OF LACTIC ACID: CPT

## 2024-04-14 PROCEDURE — 87040 BLOOD CULTURE FOR BACTERIA: CPT

## 2024-04-14 PROCEDURE — 87040 BLOOD CULTURE FOR BACTERIA: CPT | Performed by: HOSPITALIST

## 2024-04-14 PROCEDURE — 83880 ASSAY OF NATRIURETIC PEPTIDE: CPT | Performed by: EMERGENCY MEDICINE

## 2024-04-14 PROCEDURE — 93010 ELECTROCARDIOGRAM REPORT: CPT | Performed by: INTERNAL MEDICINE

## 2024-04-14 PROCEDURE — 99285 EMERGENCY DEPT VISIT HI MDM: CPT

## 2024-04-14 RX ORDER — FUROSEMIDE 10 MG/ML
40 INJECTION INTRAMUSCULAR; INTRAVENOUS EVERY 12 HOURS
Status: DISCONTINUED | OUTPATIENT
Start: 2024-04-14 | End: 2024-04-15

## 2024-04-14 RX ORDER — ATORVASTATIN CALCIUM 80 MG/1
80 TABLET, FILM COATED ORAL NIGHTLY
Status: DISCONTINUED | OUTPATIENT
Start: 2024-04-14 | End: 2024-04-15 | Stop reason: HOSPADM

## 2024-04-14 RX ORDER — ALLOPURINOL 300 MG/1
300 TABLET ORAL DAILY
Status: DISCONTINUED | OUTPATIENT
Start: 2024-04-15 | End: 2024-04-15 | Stop reason: HOSPADM

## 2024-04-14 RX ORDER — ISOSORBIDE MONONITRATE 30 MG/1
30 TABLET, EXTENDED RELEASE ORAL
Status: DISCONTINUED | OUTPATIENT
Start: 2024-04-15 | End: 2024-04-15 | Stop reason: HOSPADM

## 2024-04-14 RX ORDER — BISACODYL 5 MG/1
5 TABLET, DELAYED RELEASE ORAL DAILY PRN
Status: DISCONTINUED | OUTPATIENT
Start: 2024-04-14 | End: 2024-04-15 | Stop reason: HOSPADM

## 2024-04-14 RX ORDER — LISINOPRIL 5 MG/1
5 TABLET ORAL
Status: DISCONTINUED | OUTPATIENT
Start: 2024-04-14 | End: 2024-04-15 | Stop reason: HOSPADM

## 2024-04-14 RX ORDER — TAMSULOSIN HYDROCHLORIDE 0.4 MG/1
1 CAPSULE ORAL 2 TIMES DAILY
COMMUNITY
Start: 2024-03-22 | End: 2024-04-15 | Stop reason: HOSPADM

## 2024-04-14 RX ORDER — SODIUM CHLORIDE 0.9 % (FLUSH) 0.9 %
10 SYRINGE (ML) INJECTION AS NEEDED
Status: DISCONTINUED | OUTPATIENT
Start: 2024-04-14 | End: 2024-04-15 | Stop reason: HOSPADM

## 2024-04-14 RX ORDER — ACETAMINOPHEN 650 MG/1
650 SUPPOSITORY RECTAL EVERY 4 HOURS PRN
Status: DISCONTINUED | OUTPATIENT
Start: 2024-04-14 | End: 2024-04-15 | Stop reason: HOSPADM

## 2024-04-14 RX ORDER — ONDANSETRON 4 MG/1
4 TABLET, ORALLY DISINTEGRATING ORAL EVERY 6 HOURS PRN
Status: DISCONTINUED | OUTPATIENT
Start: 2024-04-14 | End: 2024-04-15 | Stop reason: HOSPADM

## 2024-04-14 RX ORDER — BISACODYL 10 MG
10 SUPPOSITORY, RECTAL RECTAL DAILY PRN
Status: DISCONTINUED | OUTPATIENT
Start: 2024-04-14 | End: 2024-04-15 | Stop reason: HOSPADM

## 2024-04-14 RX ORDER — TAMSULOSIN HYDROCHLORIDE 0.4 MG/1
0.4 CAPSULE ORAL 2 TIMES DAILY
Status: DISCONTINUED | OUTPATIENT
Start: 2024-04-14 | End: 2024-04-14

## 2024-04-14 RX ORDER — SODIUM CHLORIDE 9 MG/ML
40 INJECTION, SOLUTION INTRAVENOUS AS NEEDED
Status: DISCONTINUED | OUTPATIENT
Start: 2024-04-14 | End: 2024-04-15 | Stop reason: HOSPADM

## 2024-04-14 RX ORDER — ONDANSETRON 2 MG/ML
4 INJECTION INTRAMUSCULAR; INTRAVENOUS EVERY 6 HOURS PRN
Status: DISCONTINUED | OUTPATIENT
Start: 2024-04-14 | End: 2024-04-15 | Stop reason: HOSPADM

## 2024-04-14 RX ORDER — ACETAMINOPHEN 325 MG/1
650 TABLET ORAL EVERY 4 HOURS PRN
Status: DISCONTINUED | OUTPATIENT
Start: 2024-04-14 | End: 2024-04-15 | Stop reason: HOSPADM

## 2024-04-14 RX ORDER — SODIUM CHLORIDE 0.9 % (FLUSH) 0.9 %
10 SYRINGE (ML) INJECTION EVERY 12 HOURS SCHEDULED
Status: DISCONTINUED | OUTPATIENT
Start: 2024-04-14 | End: 2024-04-15 | Stop reason: HOSPADM

## 2024-04-14 RX ORDER — FLUOXETINE HYDROCHLORIDE 20 MG/1
20 CAPSULE ORAL DAILY
Status: DISCONTINUED | OUTPATIENT
Start: 2024-04-15 | End: 2024-04-15 | Stop reason: HOSPADM

## 2024-04-14 RX ORDER — POLYETHYLENE GLYCOL 3350 17 G/17G
17 POWDER, FOR SOLUTION ORAL DAILY PRN
Status: DISCONTINUED | OUTPATIENT
Start: 2024-04-14 | End: 2024-04-15 | Stop reason: HOSPADM

## 2024-04-14 RX ORDER — CLOPIDOGREL BISULFATE 75 MG/1
75 TABLET ORAL DAILY
Status: DISCONTINUED | OUTPATIENT
Start: 2024-04-15 | End: 2024-04-15 | Stop reason: HOSPADM

## 2024-04-14 RX ORDER — CARVEDILOL 6.25 MG/1
6.25 TABLET ORAL 2 TIMES DAILY WITH MEALS
Status: DISCONTINUED | OUTPATIENT
Start: 2024-04-14 | End: 2024-04-15 | Stop reason: HOSPADM

## 2024-04-14 RX ORDER — FAMOTIDINE 20 MG/1
20 TABLET, FILM COATED ORAL DAILY
Status: DISCONTINUED | OUTPATIENT
Start: 2024-04-15 | End: 2024-04-15 | Stop reason: HOSPADM

## 2024-04-14 RX ORDER — AMOXICILLIN 250 MG
2 CAPSULE ORAL 2 TIMES DAILY PRN
Status: DISCONTINUED | OUTPATIENT
Start: 2024-04-14 | End: 2024-04-15 | Stop reason: HOSPADM

## 2024-04-14 RX ORDER — NITROGLYCERIN 0.4 MG/1
0.4 TABLET SUBLINGUAL
Status: DISCONTINUED | OUTPATIENT
Start: 2024-04-14 | End: 2024-04-15 | Stop reason: HOSPADM

## 2024-04-14 RX ORDER — FUROSEMIDE 10 MG/ML
40 INJECTION INTRAMUSCULAR; INTRAVENOUS ONCE
Status: COMPLETED | OUTPATIENT
Start: 2024-04-14 | End: 2024-04-14

## 2024-04-14 RX ORDER — ASPIRIN 81 MG/1
81 TABLET ORAL DAILY
Status: DISCONTINUED | OUTPATIENT
Start: 2024-04-15 | End: 2024-04-15 | Stop reason: HOSPADM

## 2024-04-14 RX ORDER — ACETAMINOPHEN 160 MG/5ML
650 SOLUTION ORAL EVERY 4 HOURS PRN
Status: DISCONTINUED | OUTPATIENT
Start: 2024-04-14 | End: 2024-04-15 | Stop reason: HOSPADM

## 2024-04-14 RX ADMIN — LISINOPRIL 5 MG: 5 TABLET ORAL at 17:59

## 2024-04-14 RX ADMIN — ATORVASTATIN CALCIUM 80 MG: 80 TABLET, FILM COATED ORAL at 21:23

## 2024-04-14 RX ADMIN — FUROSEMIDE 40 MG: 10 INJECTION, SOLUTION INTRAMUSCULAR; INTRAVENOUS at 16:32

## 2024-04-14 RX ADMIN — Medication 10 ML: at 21:23

## 2024-04-14 RX ADMIN — FUROSEMIDE 40 MG: 10 INJECTION, SOLUTION INTRAMUSCULAR; INTRAVENOUS at 17:59

## 2024-04-14 RX ADMIN — CARVEDILOL 6.25 MG: 6.25 TABLET, FILM COATED ORAL at 17:59

## 2024-04-14 NOTE — ED NOTES
"Nursing report ED to floor  Javier Grant  84 y.o.  male    HPI :  HPI (Adult)  Stated Reason for Visit: Soa on exertion x 2 days.  had cardiac stent placed 3 wks ago  History Obtained From: EMS    Chief Complaint  Chief Complaint   Patient presents with    Shortness of Breath       Admitting doctor:   Maikel Sams MD    Admitting diagnosis:   The primary encounter diagnosis was Dyspnea on exertion. Diagnoses of Multifocal pneumonia and Elevated brain natriuretic peptide (BNP) level were also pertinent to this visit.    Code status:   Current Code Status       Date Active Code Status Order ID Comments User Context       Prior            Allergies:   Ciprofloxacin    Isolation:   Enhanced Droplet/Contact     Intake and Output    Intake/Output Summary (Last 24 hours) at 4/14/2024 1136  Last data filed at 4/14/2024 1001  Gross per 24 hour   Intake 250 ml   Output --   Net 250 ml       Weight:       04/14/24  1015   Weight: 72.6 kg (160 lb)       Most recent vitals:   Vitals:    04/14/24 1001 04/14/24 1015 04/14/24 1021   BP: 96/52  114/58   Pulse: 62  61   Resp: 20     Temp: 98.2 °F (36.8 °C)     SpO2: 96%  97%   Weight:  72.6 kg (160 lb)    Height:  167.6 cm (66\")        Active LDAs/IV Access:   Lines, Drains & Airways       Active LDAs       Name Placement date Placement time Site Days    Peripheral IV 04/14/24 Right Forearm 04/14/24  --  Forearm  less than 1    Urethral Catheter 16 Fr. 03/27/24  1123  -- 18                    Labs (abnormal labs have a star):   Labs Reviewed   COMPREHENSIVE METABOLIC PANEL - Abnormal; Notable for the following components:       Result Value    Glucose 142 (*)     Sodium 133 (*)     Alkaline Phosphatase 125 (*)     All other components within normal limits    Narrative:     GFR Normal >60  Chronic Kidney Disease <60  Kidney Failure <15    The GFR formula is only valid for adults with stable renal function between ages 18 and 70.   BNP (IN-HOUSE) - Abnormal; Notable for the following " components:    proBNP 17,506.0 (*)     All other components within normal limits    Narrative:     This assay is used as an aid in the diagnosis of individuals suspected of having heart failure. It can be used as an aid in the diagnosis of acute decompensated heart failure (ADHF) in patients presenting with signs and symptoms of ADHF to the emergency department (ED). In addition, NT-proBNP of <300 pg/mL indicates ADHF is not likely.    Age Range Result Interpretation  NT-proBNP Concentration (pg/mL:      <50             Positive            >450                   Gray                 300-450                    Negative             <300    50-75           Positive            >900                  Gray                300-900                  Negative            <300      >75             Positive            >1800                  Gray                300-1800                  Negative            <300   SINGLE HS TROPONIN T - Abnormal; Notable for the following components:    HS Troponin T 53 (*)     All other components within normal limits    Narrative:     High Sensitive Troponin T Reference Range:  <14.0 ng/L- Negative Female for AMI  <22.0 ng/L- Negative Male for AMI  >=14 - Abnormal Female indicating possible myocardial injury.  >=22 - Abnormal Male indicating possible myocardial injury.   Clinicians would have to utilize clinical acumen, EKG, Troponin, and serial changes to determine if it is an Acute Myocardial Infarction or myocardial injury due to an underlying chronic condition.        CBC WITH AUTO DIFFERENTIAL - Abnormal; Notable for the following components:    RBC 3.38 (*)     Hemoglobin 10.8 (*)     Hematocrit 32.5 (*)     Neutrophil % 78.0 (*)     Lymphocyte % 12.5 (*)     All other components within normal limits   BLOOD CULTURE   BLOOD CULTURE   RESPIRATORY PANEL PCR W/ COVID-19 (SARS-COV-2), NP SWAB IN UTM/VTP, 2 HR TAT   RAINBOW DRAW    Narrative:     The following orders were created for panel order  [place] Draw.  Procedure                               Abnormality         Status                     ---------                               -----------         ------                     Green Top (Gel)[[id number]]                                  In process                 Lavender Top[[id number]]                                     In process                 Gold Top - SST[[id number]]                                   In process                 Light Blue Top[[id number]]                                   In process                   Please view results for these tests on the individual orders.   LACTIC ACID, PLASMA   HIGH SENSITIVITIY TROPONIN T 2HR   CBC AND DIFFERENTIAL    Narrative:     The following orders were created for panel order CBC & Differential.  Procedure                               Abnormality         Status                     ---------                               -----------         ------                     CBC Auto Differential[[id number]]        Abnormal            Final result                 Please view results for these tests on the individual orders.   GREEN TOP   LAVENDER TOP   GOLD TOP - SST   LIGHT BLUE TOP       EKG:   ECG 12 Lead Dyspnea   Preliminary Result   HEART RATE= 61  bpm   RR Interval= 984  ms   CT Interval= 245  ms   P Horizontal Axis=   deg   P Front Axis= 58  deg   QRSD Interval= 168  ms   QT Interval= 495  ms   QTcB= 499  ms   QRS Axis= -58  deg   T Wave Axis= 126  deg   - ABNORMAL ECG -   Atrial-sensed ventricular-paced complexes   No further analysis attempted due to paced rhythm   Electronically Signed By:    Date and Time of Study: 2024-04-14 10:21:56          Meds given in ED:   Medications   sodium chloride 0.9 % flush 10 mL (has no administration in time range)       Imaging results:  XR Chest 1 View    Result Date: 4/14/2024   1. Ill-defined right mid and bibasilar opacities. Could be multifocal pneumonia, edema or hemorrhage. 2. Airways disease  This report was  finalized on 2024 10:24 AM by Dr. Jerry Burden M.D on Workstation: [id number]       Ambulatory status:   - stand by    Social issues:   Social History     Socioeconomic History    Marital status:    Tobacco Use    Smoking status: Former     Current packs/day: 0.00     Types: Cigarettes     Start date: 1957     Quit date: 1980     Years since quittin.3    Smokeless tobacco: Never   Vaping Use    Vaping status: Never Used   Substance and Sexual Activity    Alcohol use: No    Drug use: Never    Sexual activity: Not Currently     Partners: Female     Birth control/protection: Post-menopausal, None       Peripheral Neurovascular  Peripheral Neurovascular (Adult)  Peripheral Neurovascular WDL: WDL    Neuro Cognitive  Neuro Cognitive (Adult)  Cognitive/Neuro/Behavioral WDL: WDL    Learning  Learning Assessment (Adult)  Learning Readiness and Ability: no barriers identified    Respiratory  Respiratory (Adult)  Airway WDL: WDL  Respiratory WDL  Respiratory WDL: .WDL except, cough  Rhythm/Pattern, Respiratory: shortness of breath    Abdominal Pain       Pain Assessments  Pain (Adult)  (0-10) Pain Rating: Rest: 0    NIH Stroke Scale       Evelio Barrett RN  24 11:36 EDT

## 2024-04-14 NOTE — NURSING NOTE
Patient c/o increasing SOA at rest; oxygen still 97% on room air but 2L nasal cannula applied for comfort and HOB raised. Dr. Sams did call back; see orders.

## 2024-04-14 NOTE — PLAN OF CARE
Goal Outcome Evaluation:  Plan of Care Reviewed With: patient        Progress: no change  Outcome Evaluation: Patient arrived to unit from ED alert, oriented x 4, glasses and hearing aids in place, on room air; patient c/o SOA but does not appear tachynpeic or in distress. O2 sat 98% Trace edema to ankles. Patient reports about an 8 pound weight gain over last couple weeks (he does his daily weights), and states his Lasix was stopped at his last cardiology follow up on April 5. Dr. Sams contacted to notify of arrival to unit. Will continue supportive care.

## 2024-04-14 NOTE — H&P
HISTORY AND PHYSICAL   [facility]        Patient Identification:  Name: Javier Grant  Age: 84 y.o.  Sex: male  :  1940  MRN: [id number]                     Primary Care Physician: Sherron Gray MD    Chief Complaint: Short of air    History of Present Illness:      The patient  is a 84 y.o. year old male who presents to the emergency department for evaluation of SOA that he reports started yesterday.  He advises it seemed to have improved throughout the evening but on the morning of admission he noted his shortness of air had increased especially with exertion.  He denies any chest pain.  He has no formal diagnosis of COPD but was diagnosed with pulmonary fibrosis about a year ago.  He is also recently had a cough and sinus drainage.  He was recently admitted to this facility for NSTEMI and PCI which resulted in stent placement.  The patient had been on Lasix but was taken off Lasix recently when he went for cardiology follow-up.  The patient was admitted to the hospital for further evaluation treatment.    Past Medical History:  Past Medical History:   Diagnosis Date    Abnormal ECG     Arthritis     Bleeds easily     WAS TOLD THAT AFTER CABG    CAD (coronary artery disease)     Clotting disorder     Noted bu surgeon when I had the heart bypasses    Colon polyp     Disorder of aorta     Dizziness     SKELTON (dyspnea on exertion)     Enlarged prostate     Gastritis     Gout     Heart murmur ?    Hiatal hernia     History of MI (myocardial infarction)         HL (hearing loss)     Hyperlipidemia     Hypertension     Macular degeneration     rt eye    Mitral valve prolapse     Myocardial infarction     Nonrheumatic aortic (valve) insufficiency     Nonrheumatic aortic (valve) stenosis     RBBB (right bundle branch block)     Sleep apnea     USES CPAP    Umbilical hernia     Vitamin D deficiency      Past Surgical History:  Past Surgical History:   Procedure Laterality Date     CARDIAC CATHETERIZATION Left 10/09/2015    Dr. Robin Kingston    CARDIAC CATHETERIZATION N/A 3/26/2024    Procedure: Left Heart Cath;  Surgeon: Evelio Mcneal MD;  Location: [facility] CATH INVASIVE LOCATION;  Service: Cardiovascular;  Laterality: N/A;    CARDIAC CATHETERIZATION N/A 3/26/2024    Procedure: Native mammary injection;  Surgeon: Evelio Mcneal MD;  Location: [facility] CATH INVASIVE LOCATION;  Service: Cardiovascular;  Laterality: N/A;    CARDIAC CATHETERIZATION N/A 3/26/2024    Procedure: Stent JORGE bypass graft;  Surgeon: Evelio Mcneal MD;  Location: [facility] CATH INVASIVE LOCATION;  Service: Cardiovascular;  Laterality: N/A;    CARDIAC CATHETERIZATION N/A 3/26/2024    Procedure: Percutaneous Coronary Intervention;  Surgeon: Evelio Mcneal MD;  Location: [facility] CATH INVASIVE LOCATION;  Service: Cardiovascular;  Laterality: N/A;    CARDIAC CATHETERIZATION  3/26/2024    Procedure: Saphenous Vein Graft;  Surgeon: Evelio Mcneal MD;  Location: [facility] CATH INVASIVE LOCATION;  Service: Cardiovascular;;    CARDIAC ELECTROPHYSIOLOGY PROCEDURE N/A 03/04/2022    Procedure: Pacemaker DC new  [place];  Surgeon: Serafin Francois MD;  Location: [facility] CATH INVASIVE LOCATION;  Service: Cardiology;  Laterality: N/A;    CARDIAC SURGERY  1990    triple bypass    CATARACT EXTRACTION Bilateral     CORONARY ANGIOPLASTY WITH STENT PLACEMENT  2015    CORONARY ARTERY BYPASS GRAFT  1990    3 VESSELS    ENDOSCOPY N/A 05/26/2017    Procedure: ESOPHAGOGASTRODUODENOSCOPY WITH COLD BIOIPSIES AND BALLOON DILITATION SIZE 15, 16.5, 18;  Surgeon: Jerry SNOWDEN MD;  Location: [facility] ENDOSCOPY;  Service:     INSERT / REPLACE / REMOVE PACEMAKER  03/05/2022    MCCOY'S NEUROMA EXCISION Right     TONSILLECTOMY      UMBILICAL HERNIA REPAIR N/A 10/09/2017    Procedure: UMBILICAL HERNIA REPAIR;  Surgeon: Blake Kelly Jr., MD;  Location: [facility] MAIN OR;  Service:       Home Meds:  Medications Prior to  Admission   Medication Sig Dispense Refill Last Dose    acetaminophen (TYLENOL) 325 MG tablet Take 2 tablets by mouth Every Night.   4/13/2024    allopurinol (ZYLOPRIM) 300 MG tablet Take 1 tablet by mouth Daily. 90 tablet 3 4/14/2024    aspirin 81 MG tablet Take 1 tablet by mouth Daily.   4/14/2024    atorvastatin (LIPITOR) 80 MG tablet Take 1 tablet by mouth Every Night. 90 tablet 3 4/13/2024    carvedilol (COREG) 6.25 MG tablet Take 1 tablet by mouth 2 (Two) Times a Day With Meals. 180 tablet 3 4/14/2024    Cholecalciferol 2000 units capsule Take 1 capsule by mouth Daily.   4/14/2024    clopidogrel (PLAVIX) 75 MG tablet Take 1 tablet by mouth Daily. 90 tablet 3 4/14/2024    famotidine (PEPCID) 20 MG tablet Take 1 tablet by mouth Daily.   4/14/2024    FLUoxetine (PROzac) 20 MG capsule Take 1 capsule by mouth Daily.   4/14/2024    isosorbide mononitrate (IMDUR) 30 MG 24 hr tablet Take 1 tablet by mouth Daily. 90 tablet 3 4/14/2024    loratadine (Claritin) 10 MG tablet mg Tab, Oral, Daily, 0 Refill(s)   4/13/2024    Multiple Vitamins-Minerals (PRESERVISION AREDS 2 PO) Take 1 tablet by mouth Daily.   4/13/2024    nitroglycerin (Nitrostat) 0.4 MG SL tablet Take 1 tablet by mouth Every 5 (Five) Minutes As Needed (CHEST PAIN: MAX DOSE 3 TABLETS). 25 tablet 6 Past Month    omega-3 acid ethyl esters (LOVAZA) 1 g capsule TAKE 4 CAPSULES DAILY (TO  HOLD MEDICATION PRIOR TO   OPERATING ROOM) 360 capsule 3 4/14/2024    ramipril (ALTACE) 5 MG capsule Take 1 capsule by mouth Every Evening.   4/13/2024    tamsulosin (FLOMAX) 0.4 MG capsule 24 hr capsule Take 1 capsule by mouth 2 (Two) Times a Day.       tamsulosin (FLOMAX) 0.4 MG capsule 24 hr capsule 1 capsule.        Current meds    Current Facility-Administered Medications:     acetaminophen (TYLENOL) tablet 650 mg, 650 mg, Oral, Q4H PRN **OR** acetaminophen (TYLENOL) 160 MG/5ML oral solution 650 mg, 650 mg, Oral, Q4H PRN **OR** acetaminophen (TYLENOL) suppository 650 mg, 650  mg, Rectal, Q4H PRN, Maikel Sams MD    [START ON 4/15/2024] allopurinol (ZYLOPRIM) tablet 300 mg, 300 mg, Oral, Daily, Maikel Sams MD    [START ON 4/15/2024] aspirin EC tablet 81 mg, 81 mg, Oral, Daily, Maikel Sams MD    atorvastatin (LIPITOR) tablet 80 mg, 80 mg, Oral, Nightly, Maikel Sams MD    sennosides-docusate (PERICOLACE) 8.6-50 MG per tablet 2 tablet, 2 tablet, Oral, BID PRN **AND** polyethylene glycol (MIRALAX) packet 17 g, 17 g, Oral, Daily PRN **AND** bisacodyl (DULCOLAX) EC tablet 5 mg, 5 mg, Oral, Daily PRN **AND** bisacodyl (DULCOLAX) suppository 10 mg, 10 mg, Rectal, Daily PRN, Maikel Sams MD    carvedilol (COREG) tablet 6.25 mg, 6.25 mg, Oral, BID With Meals, Maikel Sams MD    [START ON 4/15/2024] clopidogrel (PLAVIX) tablet 75 mg, 75 mg, Oral, Daily, Maikel Sams MD    [START ON 4/15/2024] famotidine (PEPCID) tablet 20 mg, 20 mg, Oral, Daily, Maikel Sams MD    [START ON 4/15/2024] FLUoxetine (PROzac) capsule 20 mg, 20 mg, Oral, Daily, Maikel Sams MD    furosemide (LASIX) injection 40 mg, 40 mg, Intravenous, Q12H, Maikel Sams MD    [START ON 4/15/2024] isosorbide mononitrate (IMDUR) 24 hr tablet 30 mg, 30 mg, Oral, Q24H, Maikel Sams MD    lisinopril (PRINIVIL,ZESTRIL) tablet 5 mg, 5 mg, Oral, Q24H, Maikel Sams MD    nitroglycerin (NITROSTAT) SL tablet 0.4 mg, 0.4 mg, Sublingual, Q5 Min PRN, Maikel Sams MD    ondansetron ODT (ZOFRAN-ODT) disintegrating tablet 4 mg, 4 mg, Oral, Q6H PRN **OR** ondansetron (ZOFRAN) injection 4 mg, 4 mg, Intravenous, Q6H PRN, Maikel Sams MD    sodium chloride 0.9 % flush 10 mL, 10 mL, Intravenous, PRNBonilla Tadd N, MD    sodium chloride 0.9 % flush 10 mL, 10 mL, Intravenous, Q12H, Maikel Sams MD    sodium chloride 0.9 % flush 10 mL, 10 mL, Intravenous, PRN, Maikel Sams MD    sodium chloride 0.9 % infusion 40 mL, 40 mL, Intravenous, PRN, Maikel Sams MD    tamsulosin (FLOMAX) 24 hr capsule 0.4 mg, 0.4 mg, Oral, BID,  Maikel Sams MD  Allergies:  Allergies   Allergen Reactions    Ciprofloxacin Diarrhea     Immunizations:  Immunization History   Administered Date(s) Administered    COVID-19 (MODERNA) Monovalent Original Booster 2022    COVID-19 (PFIZER) BIVALENT 12+YRS 2023    COVID-19 (PFIZER) Purple Cap Monovalent 2021, 2021, 2021    COVID-19 F23 (MODERNA) 12YRS+ (SPIKEVAX) 10/02/2023    Fluad Quad 65+ 2021    Fluzone High Dose =>65 Years ([employer]axcare ONLY) 2018    Fluzone High-Dose 65+yrs 2020, 2021, 2022, 10/02/2023    Influenza, Unspecified 2016    PEDS-Pneumococcal Conjugate (PCV7) 2012    Pneumococcal Conjugate 13-Valent (PCV13) 2017, 2019    Pneumococcal, Unspecified 2012     Social History:   Social History     Social History Narrative    Not on file     Social History     Socioeconomic History    Marital status:    Tobacco Use    Smoking status: Former     Current packs/day: 0.00     Types: Cigarettes     Start date: 1957     Quit date: 1980     Years since quittin.3    Smokeless tobacco: Never   Vaping Use    Vaping status: Never Used   Substance and Sexual Activity    Alcohol use: No    Drug use: Never    Sexual activity: Not Currently     Partners: Female     Birth control/protection: Post-menopausal, None       Family History:  Family History   Problem Relation Age of Onset    Depression Mother     Colon polyps Mother     Heart attack Father     Aneurysm Father     Stomach cancer Brother     Malig Hyperthermia Neg Hx         Review of Systems  See history of present illness and past medical history.  Patient denies headache, dizziness, syncope, falls, trauma, change in vision, change in hearing, change in taste, changes in weight, changes in appetite, focal weakness, numbness, or paresthesia.  Patient denies chest pain, palpitations, dyspnea, orthopnea, PND, cough, sinus pressure, rhinorrhea, epistaxis,  "hemoptysis, nausea, vomiting, hematemesis, diarrhea, constipation or hematochezia. Denies cold or heat intolerance, polydipsia, polyuria, polyphagia. Denies hematuria, pyuria, dysuria, hesitancy, frequency or urgency. Denies consumption of raw and under cooked meats foods or change in water source.  Denies fever, chills, sweats, night sweats.  Denies missing any routine medications. Remainder of ROS is negative.    Objective:  tMax 24 hrs: Temp (24hrs), Av.9 °F (36.6 °C), Min:97.7 °F (36.5 °C), Max:98.2 °F (36.8 °C)    Vitals Ranges:   Temp:  [97.7 °F (36.5 °C)-98.2 °F (36.8 °C)] 97.8 °F (36.6 °C)  Heart Rate:  [58-82] 73  Resp:  [16-20] 16  BP: ()/() 137/90      Exam:  /90 (BP Location: Right arm, Patient Position: Lying)   Pulse 73   Temp 97.8 °F (36.6 °C) (Oral)   Resp 16   Ht 167.6 cm (66\")   Wt 72.6 kg (160 lb)   SpO2 97% Comment: Simultaneous filing. User may be unaware of other data.  BMI 25.82 kg/m²     General Appearance:    Alert, cooperative, no distress, appears stated age   Head:    Normocephalic, without obvious abnormality, atraumatic   Eyes:    PERRL, conjunctivae/corneas clear, EOM's intact, both eyes   Ears:    Normal external ear canals, both ears   Nose:   Nares normal, septum midline, mucosa normal, no drainage    or sinus tenderness   Throat:   Lips, mucosa, and tongue normal   Neck:   Supple, symmetrical, trachea midline, no adenopathy;     thyroid:  no enlargement/tenderness/nodules; no carotid    bruit or JVD   Back:     Symmetric, no curvature, ROM normal, no CVA tenderness   Lungs:   Crackles in the bases bilaterally, respirations unlabored   Chest Wall:    No tenderness or deformity    Heart:    Regular rate and rhythm, S1 and S2 normal, no murmur, rub   or gallop   Abdomen:     Soft, nontender, bowel sounds active all four quadrants,     no masses, no hepatomegaly, no splenomegaly   Extremities:   Extremities normal, atraumatic, no cyanosis or edema   Pulses: "   2+ and symmetric all extremities   Skin:   Skin color, texture, turgor normal, no rashes or lesions   Lymph nodes:   Cervical, supraclavicular, and axillary nodes normal   Neurologic:   CNII-XII intact, normal strength, sensation intact throughout      .    Data Review:  Lab Results (last 72 hours)       Procedure Component Value Units Date/Time    Respiratory Panel PCR w/COVID-19(SARS-CoV-2) KELLI/ZA/GERSON/PAD/COR/SABINO In-House, NP Swab in UTM/VTM, 2 HR TAT - Swab, Nasopharynx [[id number]]  (Normal) Collected: 04/14/24 1228    Specimen: Swab from Nasopharynx Updated: 04/14/24 1408     ADENOVIRUS, PCR Not Detected     Coronavirus 229E Not Detected     Coronavirus HKU1 Not Detected     Coronavirus NL63 Not Detected     Coronavirus OC43 Not Detected     COVID19 Not Detected     Human Metapneumovirus Not Detected     Human Rhinovirus/Enterovirus Not Detected     Influenza A PCR Not Detected     Influenza B PCR Not Detected     Parainfluenza Virus 1 Not Detected     Parainfluenza Virus 2 Not Detected     Parainfluenza Virus 3 Not Detected     Parainfluenza Virus 4 Not Detected     RSV, PCR Not Detected     Bordetella pertussis pcr Not Detected     Bordetella parapertussis PCR Not Detected     Chlamydophila pneumoniae PCR Not Detected     Mycoplasma pneumo by PCR Not Detected    Narrative:      In the setting of a positive respiratory panel with a viral infection PLUS a negative procalcitonin without other underlying concern for bacterial infection, consider observing off antibiotics or discontinuation of antibiotics and continue supportive care. If the respiratory panel is positive for atypical bacterial infection (Bordetella pertussis, Chlamydophila pneumoniae, or Mycoplasma pneumoniae), consider antibiotic de-escalation to target atypical bacterial infection.    Lactic Acid, Plasma [[id number]]  (Normal) Collected: 04/14/24 1226    Specimen: Blood from Arm, Right Updated: 04/14/24 1327     Lactate 1.8 mmol/L     Blood  Culture - Blood, Arm, Right [[id number]] Collected: 04/14/24 1226    Specimen: Blood from Arm, Right Updated: 04/14/24 1244    [place] Draw [[id number]] Collected: 04/14/24 1041    Specimen: Blood Updated: 04/14/24 1145    Narrative:      The following orders were created for panel order [place] Draw.  Procedure                               Abnormality         Status                     ---------                               -----------         ------                     Green Top (Gel)[[id number]]                                  Final result               Lavender Top[[id number]]                                     Final result               Gold Top - SST[[id number]]                                   Final result               Light Blue Top[[id number]]                                   Final result                 Please view results for these tests on the individual orders.    Green Top (Gel) [[id number]] Collected: 04/14/24 1041    Specimen: Blood Updated: 04/14/24 1145     Extra Tube Hold for add-ons.     Comment: Auto resulted.       Lavender Top [[id number]] Collected: 04/14/24 1041    Specimen: Blood Updated: 04/14/24 1145     Extra Tube hold for add-on     Comment: Auto resulted       Gold Top - SST [[id number]] Collected: 04/14/24 1041    Specimen: Blood Updated: 04/14/24 1145     Extra Tube Hold for add-ons.     Comment: Auto resulted.       Light Blue Top [[id number]] Collected: 04/14/24 1041    Specimen: Blood Updated: 04/14/24 1145     Extra Tube Hold for add-ons.     Comment: Auto resulted       Single High Sensitivity Troponin T [[id number]]  (Abnormal) Collected: 04/14/24 1041    Specimen: Blood Updated: 04/14/24 1111     HS Troponin T 53 ng/L     Narrative:      High Sensitive Troponin T Reference Range:  <14.0 ng/L- Negative Female for AMI  <22.0 ng/L- Negative Male for AMI  >=14 - Abnormal Female indicating possible myocardial injury.  >=22 - Abnormal Male indicating possible myocardial injury.    Clinicians would have to utilize clinical acumen, EKG, Troponin, and serial changes to determine if it is an Acute Myocardial Infarction or myocardial injury due to an underlying chronic condition.         Comprehensive Metabolic Panel [[id number]]  (Abnormal) Collected: 04/14/24 1041    Specimen: Blood Updated: 04/14/24 1110     Glucose 142 mg/dL      BUN 16 mg/dL      Creatinine 1.19 mg/dL      Sodium 133 mmol/L      Potassium 4.4 mmol/L      Comment: Slight hemolysis detected by analyzer. Result may be falsely elevated.        Chloride 100 mmol/L      CO2 24.0 mmol/L      Calcium 8.6 mg/dL      Total Protein 6.6 g/dL      Albumin 3.8 g/dL      ALT (SGPT) 26 U/L      AST (SGOT) 23 U/L      Alkaline Phosphatase 125 U/L      Total Bilirubin 0.5 mg/dL      Globulin 2.8 gm/dL      A/G Ratio 1.4 g/dL      BUN/Creatinine Ratio 13.4     Anion Gap 9.0 mmol/L      eGFR 60.2 mL/min/1.73     Narrative:      GFR Normal >60  Chronic Kidney Disease <60  Kidney Failure <15    The GFR formula is only valid for adults with stable renal function between ages 18 and 70.    BNP [[id number]]  (Abnormal) Collected: 04/14/24 1041    Specimen: Blood Updated: 04/14/24 1108     proBNP 17,506.0 pg/mL     Narrative:      This assay is used as an aid in the diagnosis of individuals suspected of having heart failure. It can be used as an aid in the diagnosis of acute decompensated heart failure (ADHF) in patients presenting with signs and symptoms of ADHF to the emergency department (ED). In addition, NT-proBNP of <300 pg/mL indicates ADHF is not likely.    Age Range Result Interpretation  NT-proBNP Concentration (pg/mL:      <50             Positive            >450                   Gray                 300-450                    Negative             <300    50-75           Positive            >900                  Gray                300-900                  Negative            <300      >75             Positive            >1800                   Verma                300-1800                  Negative            <300    CBC & Differential [[id number]]  (Abnormal) Collected: 04/14/24 1041    Specimen: Blood Updated: 04/14/24 1050    Narrative:      The following orders were created for panel order CBC & Differential.  Procedure                               Abnormality         Status                     ---------                               -----------         ------                     CBC Auto Differential[[id number]]        Abnormal            Final result                 Please view results for these tests on the individual orders.    CBC Auto Differential [[id number]]  (Abnormal) Collected: 04/14/24 1041    Specimen: Blood Updated: 04/14/24 1050     WBC 6.16 10*3/mm3      RBC 3.38 10*6/mm3      Hemoglobin 10.8 g/dL      Hematocrit 32.5 %      MCV 96.2 fL      MCH 32.0 pg      MCHC 33.2 g/dL      RDW 13.3 %      RDW-SD 46.1 fl      MPV 10.6 fL      Platelets 227 10*3/mm3      Neutrophil % 78.0 %      Lymphocyte % 12.5 %      Monocyte % 6.2 %      Eosinophil % 2.4 %      Basophil % 0.6 %      Immature Grans % 0.3 %      Neutrophils, Absolute 4.80 10*3/mm3      Lymphocytes, Absolute 0.77 10*3/mm3      Monocytes, Absolute 0.38 10*3/mm3      Eosinophils, Absolute 0.15 10*3/mm3      Basophils, Absolute 0.04 10*3/mm3      Immature Grans, Absolute 0.02 10*3/mm3      nRBC 0.0 /100 WBC                      Imaging Results (All)       Procedure Component Value Units Date/Time    XR Chest 1 View [[id number]] Collected: 04/14/24 1022     Updated: 04/14/24 1027    Narrative:      XR CHEST 1 VW-        INDICATION: Shortness of air     COMPARISON: Chest radiograph March 25, 2024     TECHNIQUE: 1 view chest     FINDINGS:      Increased lung markings. Ill-defined right mid and lower lung opacities.  Ill-defined left lung base opacities. Calcified pulmonary nodule in the  left lower lobe, consistent with prior granulomatous infection. No  effusions. Stable mediastinum.  Suspect CABG. Left-sided pacemaker with a  right atrial and right ventricular lead.       Impression:         1. Ill-defined right mid and bibasilar opacities. Could be multifocal  pneumonia, edema or hemorrhage.  2. Airways disease     This report was finalized on 4/14/2024 10:24 AM by Dr. Jerry Burden M.D on Workstation: [id number]             Past Medical History:   Diagnosis Date    Abnormal ECG 1980    Arthritis     Bleeds easily     WAS TOLD THAT AFTER CABG    CAD (coronary artery disease)     Clotting disorder 1990    Noted bu surgeon when I had the heart bypasses    Colon polyp     Disorder of aorta     Dizziness     SKELTON (dyspnea on exertion)     Enlarged prostate     Gastritis     Gout     Heart murmur ?    Hiatal hernia     History of MI (myocardial infarction)     1990    HL (hearing loss)     Hyperlipidemia     Hypertension     Macular degeneration     rt eye    Mitral valve prolapse 1990    Myocardial infarction 1990    Nonrheumatic aortic (valve) insufficiency     Nonrheumatic aortic (valve) stenosis     RBBB (right bundle branch block)     Sleep apnea     USES CPAP    Umbilical hernia     Vitamin D deficiency        Assessment:  Active Hospital Problems    Diagnosis  POA    **Acute on chronic systolic CHF (congestive heart failure) [I50.23]  Unknown    Elevated brain natriuretic peptide (BNP) level [R79.89]  Unknown    Hx of CABG [Z95.1]  Not Applicable    Presence of cardiac pacemaker [Z95.0]  Yes    Hypertension [I10]  Yes    Hyperlipidemia [E78.5]  Yes      Resolved Hospital Problems   No resolved problems to display.       Plan:  The patient admitted to the hospital and will ask for cardiology consult.  Will continue with some IV Lasix.  Will get follow-up lab studies.    Maikel Sams MD  4/14/2024  17:38 EDT

## 2024-04-14 NOTE — ED PROVIDER NOTES
MD ATTESTATION NOTE    The MATT and I have discussed this patient's history, physical exam, and treatment plan.  I have reviewed the documentation and personally had a face to face interaction with the patient. I affirm the documentation and agree with the treatment and plan.  The attached note describes my personal findings.      I provided a substantive portion of the care of the patient.  I personally performed the physical exam in its entirety, and below are my findings.  For this patient encounter, the patient wore surgical mask, I wore full protective PPE including N95 and eye protection.      Brief HPI: Patient presents for shortness of breath.  Patient states started yesterday.  Shortness of breath with exertion.  Seems to be worse today.  Has had some cough.  Has had a recent stent placement.  No chest pain.  No leg swelling.    PHYSICAL EXAM  ED Triage Vitals [04/14/24 1001]   Temp Heart Rate Resp BP SpO2   98.2 °F (36.8 °C) 62 20 96/52 96 %      Temp src Heart Rate Source Patient Position BP Location FiO2 (%)   -- -- -- -- --         GENERAL: no acute distress  HENT: nares patent  EYES: no scleral icterus  CV: regular rhythm, normal rate  RESPIRATORY: normal effort  ABDOMEN: soft  MUSCULOSKELETAL: no deformity  NEURO: alert, moves all extremities, follows commands  PSYCH:  calm, cooperative  SKIN: warm, dry    Vital signs and nursing notes reviewed.    Chest x-ray independently interpreted by me and shows probable pneumonia versus asymmetric CHF    ED Course as of 04/14/24 1319   Sun Apr 14, 2024   1045 Patient arrived with indwelling Stanley cath due to history of urinary retention and TURP that was completed last fall. [MS]   1049 I have discussed this pt with ER attending Dr. Rao  [MS]   1122 Order placed for [facility] consult at this time.  [MS]   1125 I have spoke with Dr. Sams who has agreed to admit the pt.  [MS]      ED Course User Index  [MS] Anya Schuler, APRN         Plan:  admit    SHARED VISIT: This visit was performed by BOTH a physician and an APC. The substantive portion of the medical decision making was performed by this attesting physician who made or approved the management plan and takes responsibility for patient management. All studies in the APC note (if performed) were independently interpreted by me.         Flynn Rao MD  04/14/24 0004

## 2024-04-14 NOTE — ED PROVIDER NOTES
Time: 10:18 AM EDT  Date of encounter:  4/14/2024  Room number: 17/17  Independent Historian/Clinical History and Information was obtained by:   Patient    History is limited by: N/A    Chief Complaint: SOA      History of Present Illness:  Patient is a 84 y.o. year old male who presents to the emergency department for evaluation of SOA that he reports started yesterday.  He advises it seemed to have proved throughout the evening but this morning he noted his shortness of air had increased especially with exertion.  He denies any chest pain.  He has no formal diagnosis of COPD but was diagnosed with pulmonary fibrosis about a year ago.  He is also recently had a cough and sinus drainage.  He was recently admitted to this facility for NSTEMI and PCI which resulted in stent placement.     HPI    Patient Care Team  Primary Care Provider: Sherron Gray MD    Past Medical History:     Allergies   Allergen Reactions    Ciprofloxacin Diarrhea     Past Medical History:   Diagnosis Date    Abnormal ECG 1980    Arthritis     Bleeds easily     WAS TOLD THAT AFTER CABG    CAD (coronary artery disease)     Clotting disorder 1990    Noted bu surgeon when I had the heart bypasses    Colon polyp     Disorder of aorta     Dizziness     SKELTON (dyspnea on exertion)     Enlarged prostate     Gastritis     Gout     Heart murmur ?    Hiatal hernia     History of MI (myocardial infarction)     1990    HL (hearing loss)     Hyperlipidemia     Hypertension     Macular degeneration     rt eye    Mitral valve prolapse 1990    Myocardial infarction 1990    Nonrheumatic aortic (valve) insufficiency     Nonrheumatic aortic (valve) stenosis     RBBB (right bundle branch block)     Sleep apnea     USES CPAP    Umbilical hernia     Vitamin D deficiency      Past Surgical History:   Procedure Laterality Date    CARDIAC CATHETERIZATION Left 10/09/2015    Dr. Robin Kingston    CARDIAC CATHETERIZATION N/A 3/26/2024    Procedure: Left Heart Cath;   Surgeon: Evelio Mcneal MD;  Location: [facility] CATH INVASIVE LOCATION;  Service: Cardiovascular;  Laterality: N/A;    CARDIAC CATHETERIZATION N/A 3/26/2024    Procedure: Native mammary injection;  Surgeon: Evelio Mcneal MD;  Location: [facility] CATH INVASIVE LOCATION;  Service: Cardiovascular;  Laterality: N/A;    CARDIAC CATHETERIZATION N/A 3/26/2024    Procedure: Stent JORGE bypass graft;  Surgeon: Evelio Mcneal MD;  Location: [facility] C[facility]ASIVE LOC[facility];  Service: Cardiovascular;  Laterality: N/A;    CARDIAC CATHETERIZATION N/A 3/26/2024    Procedure: Percutaneous Coronary Intervention;  Surgeon: Evelio Mcneal MD;  Location: [facility] CATH INVASIVE LOCATION;  Service: Cardiovascular;  Laterality: N/A;    CARDIAC CATHETERIZATION  3/26/2024    Procedure: Saphenous Vein Graft;  Surgeon: Evelio Mcneal MD;  Location: [facility] CATH INVASIVE LOCATION;  Service: Cardiovascular;;    CARDIAC ELECTROPHYSIOLOGY PROCEDURE N/A 03/04/2022    Procedure: Pacemaker DC new  [place];  Surgeon: Serafin Francois MD;  Location: [facility] CATH INVASIVE LOCATION;  Service: Cardiology;  Laterality: N/A;    CARDIAC SURGERY  1990    triple bypass    CATARACT EXTRACTION Bilateral     CORONARY ANGIOPLASTY WITH STENT PLACEMENT  2015    CORONARY ARTERY BYPASS GRAFT  1990    3 VESSELS    ENDOSCOPY N/A 05/26/2017    Procedure: ESOPHAGOGASTRODUODENOSCOPY WITH COLD BIOIPSIES AND BALLOON DILITATION SIZE 15, 16.5, 18;  Surgeon: Jerry SNOWDEN MD;  Location: [facility] ENDOSCOPY;  Service:     INSERT / REPLACE / REMOVE PACEMAKER  03/05/2022    MCCOY'S NEUROMA EXCISION Right     TONSILLECTOMY      UMBILICAL HERNIA REPAIR N/A 10/09/2017    Procedure: UMBILICAL HERNIA REPAIR;  Surgeon: Blake Kelly Jr., MD;  Location: [facility] MAIN OR;  Service:      Family History   Problem Relation Age of Onset    Depression Mother     Colon polyps Mother     Heart attack Father     Aneurysm Father     Stomach cancer Brother      Malig Hyperthermia Neg Hx        Home Medications:  Prior to Admission medications    Medication Sig Start Date End Date Taking? Authorizing Provider   acetaminophen (TYLENOL) 325 MG tablet Take 2 tablets by mouth Every Night.    Doretha Renee MD   allopurinol (ZYLOPRIM) 300 MG tablet Take 1 tablet by mouth Daily. 7/31/20   Stanton Sotomayor Jr., MD   aspirin 81 MG tablet Take 1 tablet by mouth Daily. 7/14/14   Doretha Renee MD   atorvastatin (LIPITOR) 80 MG tablet Take 1 tablet by mouth Every Night. 4/5/24   Hodan Avila APRN   carvedilol (COREG) 6.25 MG tablet Take 1 tablet by mouth 2 (Two) Times a Day With Meals. 4/5/24   Hodan Avila APRN   Cholecalciferol 2000 units capsule Take 1 capsule by mouth Daily.    Doretha Renee MD   clopidogrel (PLAVIX) 75 MG tablet Take 1 tablet by mouth Daily. 4/5/24   Hodan Avila APRN   famotidine (PEPCID) 20 MG tablet Take 1 tablet by mouth Daily.    Doretha Renee MD   FLUoxetine (PROzac) 20 MG capsule Take 1 capsule by mouth Daily.    Doretha Renee MD   isosorbide mononitrate (IMDUR) 30 MG 24 hr tablet Take 1 tablet by mouth Daily. 4/5/24   Hodan Avila APRN   loratadine (Claritin) 10 MG tablet mg Tab, Oral, Daily, 0 Refill(s) 11/8/22   Doretha Renee MD   Multiple Vitamins-Minerals (PRESERVISION AREDS 2 PO) Take  by mouth 2 (Two) Times a Day.    Doretha Renee MD   nitroglycerin (Nitrostat) 0.4 MG SL tablet Take 1 tablet by mouth Every 5 (Five) Minutes As Needed (CHEST PAIN: MAX DOSE 3 TABLETS). 7/17/23   Evelio Mcneal MD   omega-3 acid ethyl esters (LOVAZA) 1 g capsule TAKE 4 CAPSULES DAILY (TO  HOLD MEDICATION PRIOR TO   OPERATING ROOM) 10/2/23   Evelio Mcneal MD   ramipril (ALTACE) 5 MG capsule Take 1 capsule by mouth Every Evening. 4/5/24   Hodan Avila APRN   tamsulosin (FLOMAX) 0.4 MG capsule 24 hr capsule 1 capsule. 11/8/22   Provider, MD Doretha        Social  "History:   Social History     Tobacco Use    Smoking status: Former     Current packs/day: 0.00     Types: Cigarettes     Start date: 1957     Quit date: 1980     Years since quittin.3    Smokeless tobacco: Never   Vaping Use    Vaping status: Never Used   Substance Use Topics    Alcohol use: No    Drug use: Never         Review of Systems:  Review of Systems     Physical Exam:  /70   Pulse 58   Temp 98.2 °F (36.8 °C)   Resp 20   Ht 167.6 cm (66\")   Wt 72.6 kg (160 lb)   SpO2 96%   BMI 25.82 kg/m²     Physical Exam  Vitals and nursing note reviewed.   Constitutional:       General: He is not in acute distress.     Appearance: Normal appearance. He is not ill-appearing, toxic-appearing or diaphoretic.   HENT:      Head: Normocephalic and atraumatic.      Mouth/Throat:      Mouth: Mucous membranes are moist.   Eyes:      General: No scleral icterus.  Neck:      Vascular: No JVD.   Cardiovascular:      Rate and Rhythm: Normal rate and regular rhythm.      Pulses: Normal pulses.      Heart sounds: Murmur heard.      Comments: paced  Pulmonary:      Effort: Pulmonary effort is normal. No tachypnea, bradypnea, accessory muscle usage or respiratory distress.      Breath sounds: Normal breath sounds.   Abdominal:      General: Abdomen is flat.      Palpations: Abdomen is soft.      Tenderness: There is no abdominal tenderness.   Musculoskeletal:         General: Normal range of motion.      Cervical back: Normal range of motion and neck supple.      Right lower leg: No edema.      Left lower leg: No edema.   Skin:     General: Skin is warm and dry.      Coloration: Skin is not cyanotic or pale.      Findings: No ecchymosis, erythema or rash.      Nails: There is no clubbing.   Neurological:      Mental Status: He is alert and oriented to person, place, and time. Mental status is at baseline.                  Procedures:  Procedures      Medical Decision Making:      Comorbidities that affect " care:    Cardiac disease, MVP, heart murmur, pacemaker, previous MI, hypertension, hyperlipidemia, arthritis    External Notes reviewed:    Previous Admission Note: I reviewed patient's previous hospital admission note that occurred on 3/25/2024.  He was admitted to this facility for NSTEMI and SIRS.       The following orders were placed and all results were independently analyzed by me:  Orders Placed This Encounter   Procedures    Blood Culture - Blood,    Blood Culture - Blood,    Respiratory Panel PCR w/COVID-19(SARS-CoV-2) KELLI/ZA/GERSON/PAD/COR/SABINO In-House, NP Swab in UTM/VTM, 2 HR TAT - Swab, Nasopharynx    XR Chest 1 View    [place]ry Draw    Comprehensive Metabolic Panel    BNP    Single High Sensitivity Troponin T    CBC Auto Differential    Lactic Acid, Plasma    High Sensitivity Troponin T 2Hr    NPO Diet NPO Type: Strict NPO    Undress & Gown    Continuous Pulse Oximetry    Vital Signs    [facility] (on-call MD unless specified) Details    Oxygen Therapy- Nasal Cannula; Titrate 1-6 LPM Per SpO2; 90 - 95%    ECG 12 Lead Dyspnea    Insert Peripheral IV    Inpatient Admission    CBC & Differential    Green Top (Gel)    Lavender Top    Gold Top - SST    Light Blue Top       Medications Given in the Emergency Department:  Medications   sodium chloride 0.9 % flush 10 mL (has no administration in time range)        ED Course:    ED Course as of 04/14/24 1225   Sun Apr 14, 2024   1045 Patient arrived with indwelling Stanley cath due to history of urinary retention and TURP that was completed last fall. [MS]   1049 I have discussed this pt with ER attending Dr. Rao  [MS]   1122 Order placed for [facility] consult at this time.  [MS]   1125 I have spoke with Dr. Sams who has agreed to admit the pt.  [MS]      ED Course User Index  [MS] Anya Schuler, APRN       Labs:    Lab Results (last 24 hours)       Procedure Component Value Units Date/Time    CBC & Differential [[id number]]  (Abnormal) Collected: 04/14/24 1041     Specimen: Blood Updated: 04/14/24 1050    Narrative:      The following orders were created for panel order CBC & Differential.  Procedure                               Abnormality         Status                     ---------                               -----------         ------                     CBC Auto Differential[[id number]]        Abnormal            Final result                 Please view results for these tests on the individual orders.    Comprehensive Metabolic Panel [[id number]]  (Abnormal) Collected: 04/14/24 1041    Specimen: Blood Updated: 04/14/24 1110     Glucose 142 mg/dL      BUN 16 mg/dL      Creatinine 1.19 mg/dL      Sodium 133 mmol/L      Potassium 4.4 mmol/L      Comment: Slight hemolysis detected by analyzer. Result may be falsely elevated.        Chloride 100 mmol/L      CO2 24.0 mmol/L      Calcium 8.6 mg/dL      Total Protein 6.6 g/dL      Albumin 3.8 g/dL      ALT (SGPT) 26 U/L      AST (SGOT) 23 U/L      Alkaline Phosphatase 125 U/L      Total Bilirubin 0.5 mg/dL      Globulin 2.8 gm/dL      A/G Ratio 1.4 g/dL      BUN/Creatinine Ratio 13.4     Anion Gap 9.0 mmol/L      eGFR 60.2 mL/min/1.73     Narrative:      GFR Normal >60  Chronic Kidney Disease <60  Kidney Failure <15    The GFR formula is only valid for adults with stable renal function between ages 18 and 70.    BNP [[id number]]  (Abnormal) Collected: 04/14/24 1041    Specimen: Blood Updated: 04/14/24 1108     proBNP 17,506.0 pg/mL     Narrative:      This assay is used as an aid in the diagnosis of individuals suspected of having heart failure. It can be used as an aid in the diagnosis of acute decompensated heart failure (ADHF) in patients presenting with signs and symptoms of ADHF to the emergency department (ED). In addition, NT-proBNP of <300 pg/mL indicates ADHF is not likely.    Age Range Result Interpretation  NT-proBNP Concentration (pg/mL:      <50             Positive            >450                   Verma                  300-450                    Negative             <300    50-75           Positive            >900                  Gray                300-900                  Negative            <300      >75             Positive            >1800                  Gray                300-1800                  Negative            <300    Single High Sensitivity Troponin T [[id number]]  (Abnormal) Collected: 04/14/24 1041    Specimen: Blood Updated: 04/14/24 1111     HS Troponin T 53 ng/L     Narrative:      High Sensitive Troponin T Reference Range:  <14.0 ng/L- Negative Female for AMI  <22.0 ng/L- Negative Male for AMI  >=14 - Abnormal Female indicating possible myocardial injury.  >=22 - Abnormal Male indicating possible myocardial injury.   Clinicians would have to utilize clinical acumen, EKG, Troponin, and serial changes to determine if it is an Acute Myocardial Infarction or myocardial injury due to an underlying chronic condition.         CBC Auto Differential [[id number]]  (Abnormal) Collected: 04/14/24 1041    Specimen: Blood Updated: 04/14/24 1050     WBC 6.16 10*3/mm3      RBC 3.38 10*6/mm3      Hemoglobin 10.8 g/dL      Hematocrit 32.5 %      MCV 96.2 fL      MCH 32.0 pg      MCHC 33.2 g/dL      RDW 13.3 %      RDW-SD 46.1 fl      MPV 10.6 fL      Platelets 227 10*3/mm3      Neutrophil % 78.0 %      Lymphocyte % 12.5 %      Monocyte % 6.2 %      Eosinophil % 2.4 %      Basophil % 0.6 %      Immature Grans % 0.3 %      Neutrophils, Absolute 4.80 10*3/mm3      Lymphocytes, Absolute 0.77 10*3/mm3      Monocytes, Absolute 0.38 10*3/mm3      Eosinophils, Absolute 0.15 10*3/mm3      Basophils, Absolute 0.04 10*3/mm3      Immature Grans, Absolute 0.02 10*3/mm3      nRBC 0.0 /100 WBC              Imaging:    XR Chest 1 View    Result Date: 4/14/2024  XR CHEST 1 VW-   INDICATION: Shortness of air  COMPARISON: Chest radiograph March 25, 2024  TECHNIQUE: 1 view chest  FINDINGS:  Increased lung markings. Ill-defined right mid and  lower lung opacities. Ill-defined left lung base opacities. Calcified pulmonary nodule in the left lower lobe, consistent with prior granulomatous infection. No effusions. Stable mediastinum. Suspect CABG. Left-sided pacemaker with a right atrial and right ventricular lead.       1. Ill-defined right mid and bibasilar opacities. Could be multifocal pneumonia, edema or hemorrhage. 2. Airways disease  This report was finalized on 4/14/2024 10:24 AM by Dr. Jerry Burden M.D on Workstation: [id number]         Differential Diagnosis and Discussion:    Dyspnea: Differential diagnosis includes but is not limited to metabolic acidosis, neurological disorders, psychogenic, asthma, pneumothorax, upper airway obstruction, COPD, pneumonia, noncardiogenic pulmonary edema, interstitial lung disease, anemia, congestive heart failure, and pulmonary embolism    All labs were reviewed and interpreted by me.  All X-rays impressions were independently interpreted by me.    MDM     Amount and/or Complexity of Data Reviewed  Decide to obtain previous medical records or to obtain history from someone other than the patient: yes                 Patient Care Considerations:    CONSULT: I considered consulting cardiology, however there was no ST elevation appreciated on EKG and no other lethal or fatal dysrhythmias.  Patient's x-ray showed multifocal pneumonia.  If warranted hospitalist/admitting physician will consult cardiology if they feel appropriate.      Consultants/Shared Management Plan:    I have discussed this patient with ER attending Dr. Rao as well as Dr. Sams with .  Dr. Sams has agreed to admit the patient to the hospital.   Social Determinants of Health:    Patient is independent, reliable, and has access to care.       Disposition and Care Coordination:    Admit:   Through independent evaluation of the patient's history, physical, and imperical data, the patient meets criteria for inpatient admission to the  [facility].        Final diagnoses:   Dyspnea on exertion   Multifocal pneumonia   Elevated brain natriuretic peptide (BNP) level        ED Disposition       ED Disposition   Decision to Admit    Condition   --    Comment   Level of Care: Telemetry [5]   Diagnosis: Pneumonia [374443]   Admitting Physician: GABBIE WORKMAN [[id number]]   Attending Physician: GABBIE WORKMAN [[id number]]   Isolate for COVID?: No [0]   Certification: I Certify That Inpatient Hospital Services Are Medically Necessary For Greater Than 2 Midnights                 This medical record created using voice recognition software.       Anya Schuler, APRN  04/14/24 2097

## 2024-04-15 VITALS
SYSTOLIC BLOOD PRESSURE: 114 MMHG | HEART RATE: 67 BPM | DIASTOLIC BLOOD PRESSURE: 72 MMHG | RESPIRATION RATE: 18 BRPM | HEIGHT: 66 IN | TEMPERATURE: 97.5 F | BODY MASS INDEX: 25.71 KG/M2 | WEIGHT: 160 LBS | OXYGEN SATURATION: 94 %

## 2024-04-15 PROBLEM — I50.9 CHF (CONGESTIVE HEART FAILURE): Status: ACTIVE | Noted: 2024-04-15

## 2024-04-15 LAB
ANION GAP SERPL CALCULATED.3IONS-SCNC: 9.4 MMOL/L (ref 5–15)
BASOPHILS # BLD AUTO: 0.05 10*3/MM3 (ref 0–0.2)
BASOPHILS NFR BLD AUTO: 0.6 % (ref 0–1.5)
BUN SERPL-MCNC: 17 MG/DL (ref 8–23)
BUN/CREAT SERPL: 16.5 (ref 7–25)
CALCIUM SPEC-SCNC: 8.7 MG/DL (ref 8.6–10.5)
CHLORIDE SERPL-SCNC: 99 MMOL/L (ref 98–107)
CO2 SERPL-SCNC: 24.6 MMOL/L (ref 22–29)
CREAT SERPL-MCNC: 1.03 MG/DL (ref 0.76–1.27)
DEPRECATED RDW RBC AUTO: 48.2 FL (ref 37–54)
EGFRCR SERPLBLD CKD-EPI 2021: 71.6 ML/MIN/1.73
EOSINOPHIL # BLD AUTO: 0.19 10*3/MM3 (ref 0–0.4)
EOSINOPHIL NFR BLD AUTO: 2.4 % (ref 0.3–6.2)
ERYTHROCYTE [DISTWIDTH] IN BLOOD BY AUTOMATED COUNT: 13.7 % (ref 12.3–15.4)
GLUCOSE SERPL-MCNC: 98 MG/DL (ref 65–99)
HCT VFR BLD AUTO: 34.1 % (ref 37.5–51)
HGB BLD-MCNC: 11.5 G/DL (ref 13–17.7)
IMM GRANULOCYTES # BLD AUTO: 0.02 10*3/MM3 (ref 0–0.05)
IMM GRANULOCYTES NFR BLD AUTO: 0.3 % (ref 0–0.5)
LYMPHOCYTES # BLD AUTO: 1.07 10*3/MM3 (ref 0.7–3.1)
LYMPHOCYTES NFR BLD AUTO: 13.5 % (ref 19.6–45.3)
MCH RBC QN AUTO: 33 PG (ref 26.6–33)
MCHC RBC AUTO-ENTMCNC: 33.7 G/DL (ref 31.5–35.7)
MCV RBC AUTO: 97.7 FL (ref 79–97)
MONOCYTES # BLD AUTO: 0.72 10*3/MM3 (ref 0.1–0.9)
MONOCYTES NFR BLD AUTO: 9.1 % (ref 5–12)
NEUTROPHILS NFR BLD AUTO: 5.86 10*3/MM3 (ref 1.7–7)
NEUTROPHILS NFR BLD AUTO: 74.1 % (ref 42.7–76)
NRBC BLD AUTO-RTO: 0 /100 WBC (ref 0–0.2)
PLATELET # BLD AUTO: 206 10*3/MM3 (ref 140–450)
PMV BLD AUTO: 10.6 FL (ref 6–12)
POTASSIUM SERPL-SCNC: 3.9 MMOL/L (ref 3.5–5.2)
RBC # BLD AUTO: 3.49 10*6/MM3 (ref 4.14–5.8)
SODIUM SERPL-SCNC: 133 MMOL/L (ref 136–145)
WBC NRBC COR # BLD AUTO: 7.91 10*3/MM3 (ref 3.4–10.8)

## 2024-04-15 PROCEDURE — 80048 BASIC METABOLIC PNL TOTAL CA: CPT | Performed by: HOSPITALIST

## 2024-04-15 PROCEDURE — 96376 TX/PRO/DX INJ SAME DRUG ADON: CPT

## 2024-04-15 PROCEDURE — 25010000002 FUROSEMIDE PER 20 MG: Performed by: HOSPITALIST

## 2024-04-15 PROCEDURE — 85025 COMPLETE CBC W/AUTO DIFF WBC: CPT | Performed by: HOSPITALIST

## 2024-04-15 PROCEDURE — G0378 HOSPITAL OBSERVATION PER HR: HCPCS

## 2024-04-15 RX ORDER — FUROSEMIDE 40 MG/1
40 TABLET ORAL
Status: DISCONTINUED | OUTPATIENT
Start: 2024-04-15 | End: 2024-04-15 | Stop reason: HOSPADM

## 2024-04-15 RX ORDER — FUROSEMIDE 40 MG/1
40 TABLET ORAL 2 TIMES DAILY
Qty: 60 TABLET | Refills: 0 | Status: SHIPPED | OUTPATIENT
Start: 2024-04-15 | End: 2024-04-23 | Stop reason: SDUPTHER

## 2024-04-15 RX ADMIN — CLOPIDOGREL BISULFATE 75 MG: 75 TABLET, FILM COATED ORAL at 08:12

## 2024-04-15 RX ADMIN — FAMOTIDINE 20 MG: 20 TABLET, FILM COATED ORAL at 08:12

## 2024-04-15 RX ADMIN — ALLOPURINOL 300 MG: 300 TABLET ORAL at 08:12

## 2024-04-15 RX ADMIN — FLUOXETINE HYDROCHLORIDE 20 MG: 20 CAPSULE ORAL at 08:12

## 2024-04-15 RX ADMIN — ISOSORBIDE MONONITRATE 30 MG: 30 TABLET, EXTENDED RELEASE ORAL at 08:12

## 2024-04-15 RX ADMIN — CARVEDILOL 6.25 MG: 6.25 TABLET, FILM COATED ORAL at 08:12

## 2024-04-15 RX ADMIN — LISINOPRIL 5 MG: 5 TABLET ORAL at 08:12

## 2024-04-15 RX ADMIN — ASPIRIN 81 MG: 81 TABLET, COATED ORAL at 08:12

## 2024-04-15 RX ADMIN — FUROSEMIDE 40 MG: 10 INJECTION, SOLUTION INTRAMUSCULAR; INTRAVENOUS at 06:00

## 2024-04-15 NOTE — PLAN OF CARE
Goal Outcome Evaluation:               Patient to discharge home per MD order.

## 2024-04-15 NOTE — DISCHARGE SUMMARY
PHYSICIAN DISCHARGE SUMMARY                                                                        [facility]    Patient Identification:  Name: Javier Grant  Age: 84 y.o.  Sex: male  :  1940  MRN: [id number]  Primary Care Physician: Sherron Gray MD    Admit date: 2024  Discharge date and time:4/15/2024  Discharged Condition: good    Discharge Diagnoses:  Active Hospital Problems    Diagnosis  POA    **Acute on chronic systolic CHF (congestive heart failure) [I50.23]  Unknown    CHF (congestive heart failure) [I50.9]  Yes    Elevated brain natriuretic peptide (BNP) level [R79.89]  Unknown    Hx of CABG [Z95.1]  Not Applicable    Presence of cardiac pacemaker [Z95.0]  Yes    Hypertension [I10]  Yes    Hyperlipidemia [E78.5]  Yes      Resolved Hospital Problems   No resolved problems to display.          PMHX:   Past Medical History:   Diagnosis Date    Abnormal ECG     Arthritis     Bleeds easily     WAS TOLD THAT AFTER CABG    CAD (coronary artery disease)     CHF (congestive heart failure) 4/15/2024    Clotting disorder     Noted bu surgeon when I had the heart bypasses    Colon polyp     Disorder of aorta     Dizziness     SKELTON (dyspnea on exertion)     Enlarged prostate     Gastritis     Gout     Heart murmur ?    Hiatal hernia     History of MI (myocardial infarction)         HL (hearing loss)     Hyperlipidemia     Hypertension     Macular degeneration     rt eye    Mitral valve prolapse     Myocardial infarction     Nonrheumatic aortic (valve) insufficiency     Nonrheumatic aortic (valve) stenosis     RBBB (right bundle branch block)     Sleep apnea     USES CPAP    Umbilical hernia     Vitamin D deficiency      PSHX:   Past Surgical History:   Procedure Laterality Date    CARDIAC CATHETERIZATION Left 10/09/2015    Dr. Robin Kingston    CARDIAC CATHETERIZATION N/A 3/26/2024    Procedure: Left Heart  Cath;  Surgeon: Evelio Mcneal MD;  Location: [facility] CATH INVASIVE LOCATION;  Service: Cardiovascular;  Laterality: N/A;    CARDIAC CATHETERIZATION N/A 3/26/2024    Procedure: Native mammary injection;  Surgeon: Evelio Mcneal MD;  Location: [facility] CATH INVASIVE LOCATION;  Service: Cardiovascular;  Laterality: N/A;    CARDIAC CATHETERIZATION N/A 3/26/2024    Procedure: Stent JORGE bypass graft;  Surgeon: Evelio Mcneal MD;  Location: [facility] CATH INVASIVE LOCATION;  Service: Cardiovascular;  Laterality: N/A;    CARDIAC CATHETERIZATION N/A 3/26/2024    Procedure: Percutaneous Coronary Intervention;  Surgeon: Evelio Mcneal MD;  Location: [facility] CATH INVASIVE LOCATION;  Service: Cardiovascular;  Laterality: N/A;    CARDIAC CATHETERIZATION  3/26/2024    Procedure: Saphenous Vein Graft;  Surgeon: Evelio Mcneal MD;  Location: [facility] CATH INVASIVE LOCATION;  Service: Cardiovascular;;    CARDIAC ELECTROPHYSIOLOGY PROCEDURE N/A 03/04/2022    Procedure: Pacemaker DC new  [place];  Surgeon: Serafin Francois MD;  Location: [facility] CATH INVASIVE LOCATION;  Service: Cardiology;  Laterality: N/A;    CARDIAC SURGERY  1990    triple bypass    CATARACT EXTRACTION Bilateral     CORONARY ANGIOPLASTY WITH STENT PLACEMENT  2015    CORONARY ARTERY BYPASS GRAFT  1990    3 VESSELS    ENDOSCOPY N/A 05/26/2017    Procedure: ESOPHAGOGASTRODUODENOSCOPY WITH COLD BIOIPSIES AND BALLOON DILITATION SIZE 15, 16.5, 18;  Surgeon: Jerry SNOWDEN MD;  Location: [facility] ENDOSCOPY;  Service:     INSERT / REPLACE / REMOVE PACEMAKER  03/05/2022    MCCOY'S NEUROMA EXCISION Right     TONSILLECTOMY      UMBILICAL HERNIA REPAIR N/A 10/09/2017    Procedure: UMBILICAL HERNIA REPAIR;  Surgeon: Blake Kelly Jr., MD;  Location: [facility] OR;  Service:        Hospital Course: Javier Grant    is a 84 y.o. year old male who presents to the emergency department for evaluation of SOA that he reports started yesterday.   He advises it seemed to have improved throughout the evening but on the morning of admission he noted his shortness of air had increased especially with exertion.  He denies any chest pain.  He has no formal diagnosis of COPD but was diagnosed with pulmonary fibrosis about a year ago.  He is also recently had a cough and sinus drainage.  He was recently admitted to this facility for NSTEMI and PCI which resulted in stent placement.  The patient had been on Lasix but was taken off Lasix recently when he went for cardiology follow-up.  The patient was admitted to the hospital for further evaluation treatment.  The patient was given some IV Lasix and seen by cardiology.  He had good diuresis and looked to be good enough to switch to oral Lasix and go home.  He will follow-up with his primary care in 1 week and also follow-up with cardiology.    Consults:     Consults       Date and Time Order Name Status Description    4/14/2024  4:23 PM Inpatient Cardiology Consult Completed     4/14/2024 11:20 AM [facility] (on-call MD unless specified) Details      3/25/2024  9:53 PM Inpatient Cardiology Consult Completed           Results from last 7 days   Lab Units 04/15/24  0731   WBC 10*3/mm3 7.91   HEMOGLOBIN g/dL 11.5*   HEMATOCRIT % 34.1*   PLATELETS 10*3/mm3 206     Results from last 7 days   Lab Units 04/15/24  0731   SODIUM mmol/L 133*   POTASSIUM mmol/L 3.9   CHLORIDE mmol/L 99   CO2 mmol/L 24.6   BUN mg/dL 17   CREATININE mg/dL 1.03   GLUCOSE mg/dL 98   CALCIUM mg/dL 8.7     Significant Diagnostic Studies:   WBC   Date Value Ref Range Status   04/15/2024 7.91 3.40 - 10.80 10*3/mm3 Final     Hemoglobin   Date Value Ref Range Status   04/15/2024 11.5 (L) 13.0 - 17.7 g/dL Final     Hematocrit   Date Value Ref Range Status   04/15/2024 34.1 (L) 37.5 - 51.0 % Final     Platelets   Date Value Ref Range Status   04/15/2024 206 140 - 450 10*3/mm3 Final     Sodium   Date Value Ref Range Status   04/15/2024 133 (L) 136 - 145 mmol/L  "Final     Potassium   Date Value Ref Range Status   04/15/2024 3.9 3.5 - 5.2 mmol/L Final     Chloride   Date Value Ref Range Status   04/15/2024 99 98 - 107 mmol/L Final     CO2   Date Value Ref Range Status   04/15/2024 24.6 22.0 - 29.0 mmol/L Final     BUN   Date Value Ref Range Status   04/15/2024 17 8 - 23 mg/dL Final     Creatinine   Date Value Ref Range Status   04/15/2024 1.03 0.76 - 1.27 mg/dL Final     Glucose   Date Value Ref Range Status   04/15/2024 98 65 - 99 mg/dL Final     Calcium   Date Value Ref Range Status   04/15/2024 8.7 8.6 - 10.5 mg/dL Final     AST (SGOT)   Date Value Ref Range Status   04/14/2024 23 1 - 40 U/L Final     ALT (SGPT)   Date Value Ref Range Status   04/14/2024 26 1 - 41 U/L Final     Alkaline Phosphatase   Date Value Ref Range Status   04/14/2024 125 (H) 39 - 117 U/L Final     No results found for: \"APTT\", \"INR\"  No results found for: \"COLORU\", \"CLARITYU\", \"SPECGRAV\", \"PHUR\", \"PROTEINUR\", \"GLUCOSEU\", \"KETONESU\", \"BLOODU\", \"NITRITE\", \"LEUKOCYTESUR\", \"BILIRUBINUR\", \"UROBILINOGEN\", \"RBCUA\", \"WBCUA\", \"BACTERIA\", \"UACOMMENT\"  HS Troponin T   Date Value Ref Range Status   04/14/2024 46 (H) <22 ng/L Final   04/14/2024 53 (C) <22 ng/L Final     No components found for: \"HGBA1C;2\"  No components found for: \"TSH;2\"  Imaging Results (All)       Procedure Component Value Units Date/Time    XR Chest 1 View [[id number]] Collected: 04/14/24 1022     Updated: 04/14/24 1027    Narrative:      XR CHEST 1 VW-        INDICATION: Shortness of air     COMPARISON: Chest radiograph March 25, 2024     TECHNIQUE: 1 view chest     FINDINGS:      Increased lung markings. Ill-defined right mid and lower lung opacities.  Ill-defined left lung base opacities. Calcified pulmonary nodule in the  left lower lobe, consistent with prior granulomatous infection. No  effusions. Stable mediastinum. Suspect CABG. Left-sided pacemaker with a  right atrial and right ventricular lead.       Impression:         1. " Ill-defined right mid and bibasilar opacities. Could be multifocal  pneumonia, edema or hemorrhage.  2. Airways disease     This report was finalized on 4/14/2024 10:24 AM by Dr. Jerry Burden M.D on Workstation: [id number]             Lab Results (last 7 days)       Procedure Component Value Units Date/Time    Blood Culture - Blood, Arm, Right [[id number]]  (Normal) Collected: 04/14/24 1226    Specimen: Blood from Arm, Right Updated: 04/15/24 1245     Blood Culture No growth at 24 hours    Basic Metabolic Panel [[id number]]  (Abnormal) Collected: 04/15/24 0731    Specimen: Blood Updated: 04/15/24 0806     Glucose 98 mg/dL      BUN 17 mg/dL      Creatinine 1.03 mg/dL      Sodium 133 mmol/L      Potassium 3.9 mmol/L      Chloride 99 mmol/L      CO2 24.6 mmol/L      Calcium 8.7 mg/dL      BUN/Creatinine Ratio 16.5     Anion Gap 9.4 mmol/L      eGFR 71.6 mL/min/1.73     Narrative:      GFR Normal >60  Chronic Kidney Disease <60  Kidney Failure <15    The GFR formula is only valid for adults with stable renal function between ages 18 and 70.    CBC & Differential [[id number]]  (Abnormal) Collected: 04/15/24 0731    Specimen: Blood Updated: 04/15/24 0747    Narrative:      The following orders were created for panel order CBC & Differential.  Procedure                               Abnormality         Status                     ---------                               -----------         ------                     CBC Auto Differential[[id number]]        Abnormal            Final result                 Please view results for these tests on the individual orders.    CBC Auto Differential [[id number]]  (Abnormal) Collected: 04/15/24 0731    Specimen: Blood Updated: 04/15/24 0747     WBC 7.91 10*3/mm3      RBC 3.49 10*6/mm3      Hemoglobin 11.5 g/dL      Hematocrit 34.1 %      MCV 97.7 fL      MCH 33.0 pg      MCHC 33.7 g/dL      RDW 13.7 %      RDW-SD 48.2 fl      MPV 10.6 fL      Platelets 206 10*3/mm3      Neutrophil %  74.1 %      Lymphocyte % 13.5 %      Monocyte % 9.1 %      Eosinophil % 2.4 %      Basophil % 0.6 %      Immature Grans % 0.3 %      Neutrophils, Absolute 5.86 10*3/mm3      Lymphocytes, Absolute 1.07 10*3/mm3      Monocytes, Absolute 0.72 10*3/mm3      Eosinophils, Absolute 0.19 10*3/mm3      Basophils, Absolute 0.05 10*3/mm3      Immature Grans, Absolute 0.02 10*3/mm3      nRBC 0.0 /100 WBC     High Sensitivity Troponin T 2Hr [[id number]]  (Abnormal) Collected: 04/14/24 1750    Specimen: Blood Updated: 04/14/24 1822     HS Troponin T 46 ng/L      Troponin T Delta -7 ng/L     Narrative:      High Sensitive Troponin T Reference Range:  <14.0 ng/L- Negative Female for AMI  <22.0 ng/L- Negative Male for AMI  >=14 - Abnormal Female indicating possible myocardial injury.  >=22 - Abnormal Male indicating possible myocardial injury.   Clinicians would have to utilize clinical acumen, EKG, Troponin, and serial changes to determine if it is an Acute Myocardial Infarction or myocardial injury due to an underlying chronic condition.         Blood Culture - Blood, Arm, Left [[id number]] Collected: 04/14/24 1750    Specimen: Blood from Arm, Left Updated: 04/14/24 1800    Blood Culture - Blood, Arm, Right [[id number]] Collected: 04/14/24 1750    Specimen: Blood from Arm, Right Updated: 04/14/24 1800    Respiratory Panel PCR w/COVID-19(SARS-CoV-2) KELLI/ZA/GERSON/PAD/COR/SABINO In-House, NP Swab in UTM/[facility], 2 HR TAT - Swab, Nasopharynx [[id number]]  (Normal) Collected: 04/14/24 1228    Specimen: Swab from Nasopharynx Updated: 04/14/24 1408     ADENOVIRUS, PCR Not Detected     Coronavirus 229E Not Detected     Coronavirus HKU1 Not Detected     Coronavirus NL63 Not Detected     Coronavirus OC43 Not Detected     COVID19 Not Detected     Human Metapneumovirus Not Detected     Human Rhinovirus/Enterovirus Not Detected     Influenza A PCR Not Detected     Influenza B PCR Not Detected     Parainfluenza Virus 1 Not Detected     Parainfluenza Virus  2 Not Detected     Parainfluenza Virus 3 Not Detected     Parainfluenza Virus 4 Not Detected     RSV, PCR Not Detected     Bordetella pertussis pcr Not Detected     Bordetella parapertussis PCR Not Detected     Chlamydophila pneumoniae PCR Not Detected     Mycoplasma pneumo by PCR Not Detected    Narrative:      In the setting of a positive respiratory panel with a viral infection PLUS a negative procalcitonin without other underlying concern for bacterial infection, consider observing off antibiotics or discontinuation of antibiotics and continue supportive care. If the respiratory panel is positive for atypical bacterial infection (Bordetella pertussis, Chlamydophila pneumoniae, or Mycoplasma pneumoniae), consider antibiotic de-escalation to target atypical bacterial infection.    Lactic Acid, Plasma [[id number]]  (Normal) Collected: 04/14/24 1226    Specimen: Blood from Arm, Right Updated: 04/14/24 1327     Lactate 1.8 mmol/L     Gackle Draw [[id number]] Collected: 04/14/24 1041    Specimen: Blood Updated: 04/14/24 1145    Narrative:      The following orders were created for panel order Gackle Draw.  Procedure                               Abnormality         Status                     ---------                               -----------         ------                     Green Top (Gel)[[id number]]                                  Final result               Lavender Top[[id number]]                                     Final result               Gold Top - SST[[id number]]                                   Final result               Light Blue Top[[id number]]                                   Final result                 Please view results for these tests on the individual orders.    Green Top (Gel) [[id number]] Collected: 04/14/24 1041    Specimen: Blood Updated: 04/14/24 1145     Extra Tube Hold for add-ons.     Comment: Auto resulted.       Lavender Top [[id number]] Collected: 04/14/24 1041    Specimen: Blood  Updated: 04/14/24 1145     Extra Tube hold for add-on     Comment: Auto resulted       Gold Top - SST [[id number]] Collected: 04/14/24 1041    Specimen: Blood Updated: 04/14/24 1145     Extra Tube Hold for add-ons.     Comment: Auto resulted.       Light Blue Top [[id number]] Collected: 04/14/24 1041    Specimen: Blood Updated: 04/14/24 1145     Extra Tube Hold for add-ons.     Comment: Auto resulted       Single High Sensitivity Troponin T [[id number]]  (Abnormal) Collected: 04/14/24 1041    Specimen: Blood Updated: 04/14/24 1111     HS Troponin T 53 ng/L     Narrative:      High Sensitive Troponin T Reference Range:  <14.0 ng/L- Negative Female for AMI  <22.0 ng/L- Negative Male for AMI  >=14 - Abnormal Female indicating possible myocardial injury.  >=22 - Abnormal Male indicating possible myocardial injury.   Clinicians would have to utilize clinical acumen, EKG, Troponin, and serial changes to determine if it is an Acute Myocardial Infarction or myocardial injury due to an underlying chronic condition.         Comprehensive Metabolic Panel [[id number]]  (Abnormal) Collected: 04/14/24 1041    Specimen: Blood Updated: 04/14/24 1110     Glucose 142 mg/dL      BUN 16 mg/dL      Creatinine 1.19 mg/dL      Sodium 133 mmol/L      Potassium 4.4 mmol/L      Comment: Slight hemolysis detected by analyzer. Result may be falsely elevated.        Chloride 100 mmol/L      CO2 24.0 mmol/L      Calcium 8.6 mg/dL      Total Protein 6.6 g/dL      Albumin 3.8 g/dL      ALT (SGPT) 26 U/L      AST (SGOT) 23 U/L      Alkaline Phosphatase 125 U/L      Total Bilirubin 0.5 mg/dL      Globulin 2.8 gm/dL      A/G Ratio 1.4 g/dL      BUN/Creatinine Ratio 13.4     Anion Gap 9.0 mmol/L      eGFR 60.2 mL/min/1.73     Narrative:      GFR Normal >60  Chronic Kidney Disease <60  Kidney Failure <15    The GFR formula is only valid for adults with stable renal function between ages 18 and 70.    BNP [[id number]]  (Abnormal) Collected: 04/14/24 1041     Specimen: Blood Updated: 04/14/24 1108     proBNP 17,506.0 pg/mL     Narrative:      This assay is used as an aid in the diagnosis of individuals suspected of having heart failure. It can be used as an aid in the diagnosis of acute decompensated heart failure (ADHF) in patients presenting with signs and symptoms of ADHF to the emergency department (ED). In addition, NT-proBNP of <300 pg/mL indicates ADHF is not likely.    Age Range Result Interpretation  NT-proBNP Concentration (pg/mL:      <50             Positive            >450                   Gray                 300-450                    Negative             <300    50-75           Positive            >900                  Gray                300-900                  Negative            <300      >75             Positive            >1800                  Gray                300-1800                  Negative            <300    CBC & Differential [[id number]]  (Abnormal) Collected: 04/14/24 1041    Specimen: Blood Updated: 04/14/24 1050    Narrative:      The following orders were created for panel order CBC & Differential.  Procedure                               Abnormality         Status                     ---------                               -----------         ------                     CBC Auto Differential[[id number]]        Abnormal            Final result                 Please view results for these tests on the individual orders.    CBC Auto Differential [[id number]]  (Abnormal) Collected: 04/14/24 1041    Specimen: Blood Updated: 04/14/24 1050     WBC 6.16 10*3/mm3      RBC 3.38 10*6/mm3      Hemoglobin 10.8 g/dL      Hematocrit 32.5 %      MCV 96.2 fL      MCH 32.0 pg      MCHC 33.2 g/dL      RDW 13.3 %      RDW-SD 46.1 fl      MPV 10.6 fL      Platelets 227 10*3/mm3      Neutrophil % 78.0 %      Lymphocyte % 12.5 %      Monocyte % 6.2 %      Eosinophil % 2.4 %      Basophil % 0.6 %      Immature Grans % 0.3 %      Neutrophils, Absolute 4.80  "10*3/mm3      Lymphocytes, Absolute 0.77 10*3/mm3      Monocytes, Absolute 0.38 10*3/mm3      Eosinophils, Absolute 0.15 10*3/mm3      Basophils, Absolute 0.04 10*3/mm3      Immature Grans, Absolute 0.02 10*3/mm3      nRBC 0.0 /100 WBC           /72 (BP Location: Right arm, Patient Position: Lying)   Pulse 67   Temp 97.5 °F (36.4 °C) (Oral)   Resp 18   Ht 167.6 cm (66\")   Wt 72.6 kg (160 lb)   SpO2 94%   BMI 25.82 kg/m²     Discharge Exam:  General Appearance:    Alert, cooperative, no distress                          Head:    Normocephalic, without obvious abnormality, atraumatic                          Eyes:                            Throat:   Lips, tongue, gums normal                          Neck:   Supple, symmetrical, trachea midline, no JVD                        Lungs:     Clear to auscultation bilaterally, respirations unlabored                Chest Wall:    No tenderness or deformity                        Heart:    Regular rate and rhythm, S1 and S2 normal, no murmur,no  Rub  or gallop                  Abdomen:     Soft, non-tender, bowel sounds active, no masses, no organomegaly                  Extremities:   Extremities normal, atraumatic, no cyanosis or edema                             Skin:   Skin is warm and dry,  no rashes or palpable lesions                  Neurologic:   no focal deficits noted     Disposition:  Home    Activity as tolerated    Diet as tolerated  Diet Order   Procedures    Diet: Cardiac; Healthy Heart (2-3 Na+); Fluid Consistency: Thin (IDDSI 0)       Patient Instructions:      Discharge Medications        New Medications        Instructions Start Date   furosemide 40 MG tablet  Commonly known as: LASIX   40 mg, Oral, 2 Times Daily             Changes to Medications        Instructions Start Date   tamsulosin 0.4 MG capsule 24 hr capsule  Commonly known as: FLOMAX  What changed: Another medication with the same name was removed. Continue taking this medication, " and follow the directions you see here.   0.4 mg             Continue These Medications        Instructions Start Date   acetaminophen 325 MG tablet  Commonly known as: TYLENOL   650 mg, Oral, Nightly      allopurinol 300 MG tablet  Commonly known as: ZYLOPRIM   300 mg, Oral, Daily      aspirin 81 MG tablet   81 mg, Oral, Daily      atorvastatin 80 MG tablet  Commonly known as: LIPITOR   80 mg, Oral, Nightly      carvedilol 6.25 MG tablet  Commonly known as: COREG   6.25 mg, Oral, 2 Times Daily With Meals      Cholecalciferol 50 MCG (2000 UT) capsule   2,000 Units, Oral, Daily      Claritin 10 MG tablet  Generic drug: loratadine   mg Tab, Oral, Daily, 0 Refill(s)      clopidogrel 75 MG tablet  Commonly known as: PLAVIX   75 mg, Oral, Daily      famotidine 20 MG tablet  Commonly known as: PEPCID   20 mg, Oral, Daily      FLUoxetine 20 MG capsule  Commonly known as: PROzac   20 mg, Oral, Daily      isosorbide mononitrate 30 MG 24 hr tablet  Commonly known as: IMDUR   30 mg, Oral, Every 24 Hours Scheduled      nitroglycerin 0.4 MG SL tablet  Commonly known as: Nitrostat   0.4 mg, Oral, Every 5 Minutes PRN      omega-3 acid ethyl esters 1 g capsule  Commonly known as: LOVAZA   TAKE 4 CAPSULES DAILY (TO  HOLD MEDICATION PRIOR TO   OPERATING ROOM)      PRESERVISION AREDS 2 PO   1 tablet, Oral, Daily      ramipril 5 MG capsule  Commonly known as: ALTACE   5 mg, Oral, Every Evening             Future Appointments   Date Time Provider Department Center   5/15/2024  9:40 AM Evelio Mcneal MD [id number] None      Follow-up Information       Sherron Gray MD Follow up in 1 week(s).    Specialty: Pediatrics  Contact information:  [address]  [facility] [address] [phone]                           Discharge Order (From admission, onward)       Start     Ordered    04/15/24 1542  Discharge patient  Once        Expected Discharge Date: 04/15/24   Discharge Disposition: Home or Self Care   Physician of  Record for Attribution - Please select from Treatment Team: MAIKEL SAMS [[id number]]   Review needed by CMO to determine Physician of Record: No      Question Answer Comment   Physician of Record for Attribution - Please select from Treatment Team MAIKEL SAMS    Review needed by CMO to determine Physician of Record No        04/15/24 1542                    Total time spent discharging patient including evaluation,post hospitalization follow up,  medication and post hospitalization instructions and education total time exceeds 30 minutes.    Signed:  Maikel Sams MD  4/15/2024  15:43 EDT

## 2024-04-15 NOTE — PLAN OF CARE
Problem: Adult Inpatient Plan of Care  Goal: Plan of Care Review  Outcome: Ongoing, Progressing  Flowsheets (Taken 4/15/2024 8813)  Progress: no change  Plan of Care Reviewed With: patient  Outcome Evaluation: pt denies sob, awake most of shift but stated resting comfortably, large amount urine output noted.   Goal Outcome Evaluation:  Plan of Care Reviewed With: patient        Progress: no change  Outcome Evaluation: pt denies sob, awake most of shift but stated resting comfortably, large amount urine output noted.

## 2024-04-15 NOTE — CONSULTS
Cardiology Hospital Consult    Patient Name: Javier Grant  Age/Sex: 84 y.o. male  : 1940  MRN: [id number]    Date of Admission: 2024  Date of Encounter Visit: 04/15/24  Encounter Provider: COLLIN Villela  Referring Provider: Flynn Rao MD  Place of Service: [facility] CARDIOLOGY  Patient Care Team:  Sherron Gray MD as PCP - General (Pediatrics)  Robin Kingston Jr., MD as Consulting Physician (Cardiology)  Leonard Byrne MD as Consulting Physician (Ophthalmology)    Subjective:     Consulted for: acute heart failure    Chief Complaint: increased dyspnea    History of Present Illness:  Javier Grant is a 84 y.o. male with a history of coronary artery disease, aortic valve stenosis, hypertension, sick sinus syndrome with presence of pacemaker, PVCs, pulmonary fibrosis and right bundle branch block.  Scented to the emergency department yesterday with complaints of increased shortness of air that started yesterday.  Denied chest pain.  No edema. Also endorsed cough and sinus drainage.    Chest x-ray shows right mid and bibasilar opacities as well as airway disease.  Normal WBCs.  BUN and creatinine in normal range.  , K in normal range.  Elevated proBNP 02351.  Vital signs stable.  He has diuresed well on IV lasix.    Mr. Grant has an extensive cardiac history.  In , he underwent CABG x 3 (VG to LAD, SVG to OM1 and SVG to OM 2).  In , he underwent placement of JORGE to mid SVG to OM 2, as well as, JORGE to anastomotic site of one of the grafts to the OM branches.  LAD and saphenous vein graft to LAD collateralized all 3 major branches distally  (circumflex, RCA and ramus ).  In 2022, he underwent placement of [employer] dual-chamber pacemaker secondary to significant bradycardia with significant pauses.  In 2022, he underwent stress test for chest discomfort.  Stress test showed no ischemia    2023, patient presented  to [place] for UTI related chronic indwelling Stanley catheter.  He had elevated troponin, and echocardiogram showed reduced EF in the 45% range, mild to moderate aortic stenosis.  He went cardiac cath that showed 1 bypass graft open and all of his native vessels occluded.  That time, he was noted to have occluded SVG to OM 2 and distal vessel closure after attempted PCI to SVG to OM1.  SVG to LAD was patent and gave flow basically to everything.    On 3/26/2024, patient presented to [facility].  He was hypotensive and required aggressive fluid resuscitation.  EKG showed new ST depression and T wave abnormalities.  Due to continued increase in troponin and complaints of chest discomfort, he underwent coronary angiography and PCI of SVG to OM 2 graft.  2D echocardiogram was repeated and showed LVEF 30%.  Patient was started on carvedilol, continued on ACE inhibitor with plans advance GDMT as BP tolerated.    Mr. Grant was last seen in our office on 4/5/2024.  He had no complaints.  EKG was stable and he appeared euvolemic.  No bleeding on aspirin and Plavix.  Coreg was increased with plans to continue advancing GDMT as tolerated.      Past Medical History:  Past Medical History:   Diagnosis Date    Abnormal ECG 1980    Arthritis     Bleeds easily     WAS TOLD THAT AFTER CABG    CAD (coronary artery disease)     Clotting disorder 1990    Noted bu surgeon when I had the heart bypasses    Colon polyp     Disorder of aorta     Dizziness     SKELTON (dyspnea on exertion)     Enlarged prostate     Gastritis     Gout     Heart murmur ?    Hiatal hernia     History of MI (myocardial infarction)     1990    HL (hearing loss)     Hyperlipidemia     Hypertension     Macular degeneration     rt eye    Mitral valve prolapse 1990    Myocardial infarction 1990    Nonrheumatic aortic (valve) insufficiency     Nonrheumatic aortic (valve) stenosis     RBBB (right bundle branch block)     Sleep apnea     USES CPAP    Umbilical hernia      Vitamin D deficiency        Past Surgical History:   Procedure Laterality Date    CARDIAC CATHETERIZATION Left 10/09/2015    Dr. Robin Kingston    CARDIAC CATHETERIZATION N/A 3/26/2024    Procedure: Left Heart Cath;  Surgeon: Evelio Mcneal MD;  Location: [facility] CATH INVASIVE LOCATION;  Service: Cardiovascular;  Laterality: N/A;    CARDIAC CATHETERIZATION N/A 3/26/2024    Procedure: Native mammary injection;  Surgeon: Evelio Mcneal MD;  Location: [facility] CATH INVASIVE LOCATION;  Service: Cardiovascular;  Laterality: N/A;    CARDIAC CATHETERIZATION N/A 3/26/2024    Procedure: Stent JORGE bypass graft;  Surgeon: Evelio Mcneal MD;  [facility] C[facility]ASIVE LOC[facility];  Service: Cardiovascular;  Laterality: N/A;    CARDIAC CATHETERIZATION N/A 3/26/2024    Procedure: Percutaneous Coronary Intervention;  Surgeon: Evelio Mcneal MD;  Location: [facility] CATH INVASIVE LOCATION;  Service: Cardiovascular;  Laterality: N/A;    CARDIAC CATHETERIZATION  3/26/2024    Procedure: Saphenous Vein Graft;  Surgeon: Evelio Mcneal MD;  Location: [facility] CATH INVASIVE LOCATION;  Service: Cardiovascular;;    CARDIAC ELECTROPHYSIOLOGY PROCEDURE N/A 03/04/2022    Procedure: Pacemaker DC new  [place];  Surgeon: Serafin Francois MD;  Location: [facility] CATH INVASIVE LOCATION;  Service: Cardiology;  Laterality: N/A;    CARDIAC SURGERY  1990    triple bypass    CATARACT EXTRACTION Bilateral     CORONARY ANGIOPLASTY WITH STENT PLACEMENT  2015    CORONARY ARTERY BYPASS GRAFT  1990    3 VESSELS    ENDOSCOPY N/A 05/26/2017    Procedure: ESOPHAGOGASTRODUODENOSCOPY WITH COLD BIOIPSIES AND BALLOON DILITATION SIZE 15, 16.5, 18;  Surgeon: Jerry SNOWDEN MD;  Location: [facility] ENDOSCOPY;  Service:     INSERT / REPLACE / REMOVE PACEMAKER  03/05/2022    MCCOY'S NEUROMA EXCISION Right     TONSILLECTOMY      UMBILICAL HERNIA REPAIR N/A 10/09/2017    Procedure: UMBILICAL HERNIA REPAIR;  Surgeon: Blake Kelly  MD Ashtyn;  Location: [facility] MAIN OR;  Service:        Home Medications:   Medications Prior to Admission   Medication Sig Dispense Refill Last Dose    acetaminophen (TYLENOL) 325 MG tablet Take 2 tablets by mouth Every Night.   4/13/2024    allopurinol (ZYLOPRIM) 300 MG tablet Take 1 tablet by mouth Daily. 90 tablet 3 4/14/2024    aspirin 81 MG tablet Take 1 tablet by mouth Daily.   4/14/2024    atorvastatin (LIPITOR) 80 MG tablet Take 1 tablet by mouth Every Night. 90 tablet 3 4/13/2024    carvedilol (COREG) 6.25 MG tablet Take 1 tablet by mouth 2 (Two) Times a Day With Meals. 180 tablet 3 4/14/2024    Cholecalciferol 2000 units capsule Take 1 capsule by mouth Daily.   4/14/2024    clopidogrel (PLAVIX) 75 MG tablet Take 1 tablet by mouth Daily. 90 tablet 3 4/14/2024    famotidine (PEPCID) 20 MG tablet Take 1 tablet by mouth Daily.   4/14/2024    FLUoxetine (PROzac) 20 MG capsule Take 1 capsule by mouth Daily.   4/14/2024    isosorbide mononitrate (IMDUR) 30 MG 24 hr tablet Take 1 tablet by mouth Daily. 90 tablet 3 4/14/2024    loratadine (Claritin) 10 MG tablet mg Tab, Oral, Daily, 0 Refill(s)   4/13/2024    Multiple Vitamins-Minerals (PRESERVISION AREDS 2 PO) Take 1 tablet by mouth Daily.   4/13/2024    nitroglycerin (Nitrostat) 0.4 MG SL tablet Take 1 tablet by mouth Every 5 (Five) Minutes As Needed (CHEST PAIN: MAX DOSE 3 TABLETS). 25 tablet 6 Past Month    omega-3 acid ethyl esters (LOVAZA) 1 g capsule TAKE 4 CAPSULES DAILY (TO  HOLD MEDICATION PRIOR TO   OPERATING ROOM) 360 capsule 3 4/14/2024    ramipril (ALTACE) 5 MG capsule Take 1 capsule by mouth Every Evening.   4/13/2024    tamsulosin (FLOMAX) 0.4 MG capsule 24 hr capsule Take 1 capsule by mouth 2 (Two) Times a Day.       tamsulosin (FLOMAX) 0.4 MG capsule 24 hr capsule 1 capsule.          Allergies:  Allergies   Allergen Reactions    Ciprofloxacin Diarrhea       Past Social History:  Social History     Socioeconomic History    Marital status:     Tobacco Use    Smoking status: Former     Current packs/day: 0.00     Types: Cigarettes     Start date: 1957     Quit date: 1980     Years since quittin.3    Smokeless tobacco: Never   Vaping Use    Vaping status: Never Used   Substance and Sexual Activity    Alcohol use: No    Drug use: Never    Sexual activity: Not Currently     Partners: Female     Birth control/protection: Post-menopausal, None       Past Family History:  Family History   Problem Relation Age of Onset    Depression Mother     Colon polyps Mother     Heart attack Father     Aneurysm Father     Stomach cancer Brother     Malig Hyperthermia Neg Hx        Review of Systems:     CONSTITUTIONAL: No weight loss, fever, chills, weakness or fatigue.   HEENT: Eyes: No visual loss, blurred vision, double vision or yellow sclerae. Ears, Nose, Throat: No hearing loss, sneezing, congestion, runny nose or sore throat.   SKIN: No rash or itching.     RESPIRATORY: No shortness of breath, hemoptysis, cough or sputum.   GASTROINTESTINAL: No anorexia, nausea, vomiting or diarrhea. No abdominal pain, bright red blood per rectum or melena.  GENITOURINARY: No burning on urination, hematuria or increased frequency.  NEUROLOGICAL: No headache, dizziness, syncope, paralysis, ataxia, numbness or tingling in the extremities. No change in bowel or bladder control.   MUSCULOSKELETAL: No muscle, back pain, joint pain or stiffness.   HEMATOLOGIC: No anemia, bleeding or bruising.   LYMPHATICS: No enlarged nodes. No history of splenectomy.   PSYCHIATRIC: No history of depression, anxiety, hallucinations.   ENDOCRINOLOGIC: No reports of sweating, cold or heat intolerance. No polyuria or polydipsia.     Objective:   Temp:  [97.7 °F (36.5 °C)-98.8 °F (37.1 °C)] 98.8 °F (37.1 °C)  Heart Rate:  [58-82] 69  Resp:  [16-20] 16  BP: ()/() 109/63     Intake/Output Summary (Last 24 hours) at 4/15/2024 0748  Last data filed at 4/15/2024 0623  Gross per 24 hour  "  Intake 580 ml   Output 4650 ml   Net -4070 ml     Body mass index is 25.82 kg/m².      04/14/24  1015   Weight: 72.6 kg (160 lb)     Weight change:     Physical Exam:   General Appearance:    Alert, cooperative, in no acute distress   Head:    Normocephalic, without obvious abnormality, atraumatic   Eyes:            Conjunctivae and sclerae normal, no   icterus, no pallor, corneas clear, PERRLA   Neck:   No adenopathy, supple, trachea midline, no thyromegaly, no   carotid bruit, no JVD   Lungs:     Clear to auscultation, slightly diminished with bilateral bases respirations regular, even and unlabored    Heart:    Regular rhythm and normal rate, normal S1 and S2, no murmur, no gallop, no rub, no click   Chest Wall:    No abnormalities observed   Abdomen:     Normal bowel sounds, no masses, no organomegaly, soft, nontender, nondistended, no guarding, no rebound  tenderness   Extremities:   Moves all extremities well, no edema, no cyanosis, no redness   Pulses:   Pulses palpable and equal bilaterally.      Lab Review:   Results from last 7 days   Lab Units 04/14/24  1041   SODIUM mmol/L 133*   POTASSIUM mmol/L 4.4   CHLORIDE mmol/L 100   CO2 mmol/L 24.0   BUN mg/dL 16   CREATININE mg/dL 1.19   GLUCOSE mg/dL 142*   CALCIUM mg/dL 8.6   AST (SGOT) U/L 23   ALT (SGPT) U/L 26     Results from last 7 days   Lab Units 04/14/24  1750 04/14/24  1041   HSTROP T ng/L 46* 53*     Results from last 7 days   Lab Units 04/14/24  1041   WBC 10*3/mm3 6.16   HEMOGLOBIN g/dL 10.8*   HEMATOCRIT % 32.5*   PLATELETS 10*3/mm3 227                   Invalid input(s): \"LDLCALC\"  Results from last 7 days   Lab Units 04/14/24  1041   PROBNP pg/mL 17,506.0*       2D Echocardiogram 3/28/2024:    Left ventricular systolic function is moderately decreased. Calculated left ventricular EF = 30.8%    The following left ventricular wall segments are hypokinetic: mid anterior, apical anterior, basal anterolateral, mid anterolateral, apical lateral and " basal anterior.    Left ventricular diastolic function is consistent with (grade I) impaired relaxation.    The left atrial cavity is severely dilated.    Mild to moderate aortic valve stenosis is present. Aortic valve area is 1.4 cm2.Peak velocity of the flow distal to the aortic valve is 263.3 cm/s. Aortic valve maximum pressure gradient is 28 mmHg. Aortic valve mean pressure gradient is 16 mmHg. Aortic valve dimensionless index is 0.3 .    Calculated right ventricular systolic pressure from tricuspid regurgitation is 35 mmHg.    Severe mitral annular calcification is present. Mild mitral valve regurgitation is present. Mild mitral valve stenosis is present. The mitral valve mean gradient is 2.8 mmHg.     Left Heart Cath 03/26/2024:  1. Left main: Diffuse 90% stenosis  2. LAD:  at ostium.  Mid to distal vessel fills via patent SVG to LAD.  3. LCX:  at ostium.  Fills via SVG to OM2 graft.  4. RCA:  mid segment.  Fills via collaterals from the distal LAD and septals.  5.  SVG-OM 1: Occluded proximal  6.  SVG-OM2: 99% stenosis at the proximal anastomosis.  7.  SVG-LAD: Discrete 50% mid vessel stenosis.  8.  Successful PCI of the ostial SVG-OM2 with a 4.0 x 15 mm Xience miriam point drug-eluting stent.     2D Echocardiogram 03/03/2023:    Left ventricular systolic function is normal. Calculated left ventricular EF = 60.1% Normal left ventricular cavity size noted. Left ventricular wall thickness is consistent with concentric hypertrophy. Left ventricular diastolic function is consistent with (grade I) impaired relaxation.    Mildly reduced right ventricular systolic function noted.    The interatrial septum appears redundant. Saline test for shunting not performed. History of positive PFO    There is severe calcification of the aortic valve. No aortic valve regurgitation is present. Mild aortic valve stenosis is present. Aortic valve area is 1.38 cm2. Aortic valve mean pressure gradient is 17.5 mmHg. Aortic  valve dimensionless index is 0.30 .    Severe mitral annular calcification is present. There is severe, bileaflet mitral valve thickening present. Trace mitral valve regurgitation is present. No significant mitral valve stenosis is present.    There is a probable left pleural (less likely pericardial) effusion. This is not well visualized.    EK2024:      Imaging:  Imaging Results (Most Recent)       Procedure Component Value Units Date/Time    XR Chest 1 View [[id number]] Collected: 24 1022     Updated: 24 1027    Narrative:      XR CHEST 1 VW-        INDICATION: Shortness of air     COMPARISON: Chest radiograph 2024     TECHNIQUE: 1 view chest     FINDINGS:      Increased lung markings. Ill-defined right mid and lower lung opacities.  Ill-defined left lung base opacities. Calcified pulmonary nodule in the  left lower lobe, consistent with prior granulomatous infection. No  effusions. Stable mediastinum. Suspect CABG. Left-sided pacemaker with a  right atrial and right ventricular lead.       Impression:         1. Ill-defined right mid and bibasilar opacities. Could be multifocal  pneumonia, edema or hemorrhage.  2. Airways disease     This report was finalized on 2024 10:24 AM by Dr. Jerry Burden M.D on Workstation: [id number]                 Assessment:       Acute on chronic systolic CHF (congestive heart failure)    Hypertension    Hyperlipidemia    Hx of CABG    Presence of cardiac pacemaker    Elevated brain natriuretic peptide (BNP) level        Plan:     Acute on chronic heart failure with reduced ejection fraction: EF 30%.  On GMDT with coreg, ACEi.  On iv diuresis with lasix.  -4L fluid balance over last 24 hrs. Change to oral Lasix.  Plan on continuing scheduled diuretic as we are continuing to advance GDMT at discharge.    Ischemic cardiomyopathy: EF 30%. As above.  Non-rheumatic aortic valve stenosis: mild to moderate  Hypertension:  controlled  Sick sinus syndrome with presence of [employer] dual chambered pacemaker (2022)  CRISTY: CPAP compliant  Coronary artery disease: Status post recent PCI with JORGE of SVG to OM 2 graft (03/2024).  Previous CABG x 3 and prior PCI's with stents.  On DAPT with aspirin/Plavix, beta-blocker, statin and long-acting nitrate.  No angina.     OK from a cardiac standpoint for patient to discharge.  He states that he did not get much sleep at night and feels much better.  Unfortunately he has not been out of bed yet.  Would like to see how he does getting out of bed before making the decision.  Will continue on Lasix 40 mg twice daily.  I have discussed the importance of keeping track of weight gain, he states that he had gained about 8 pounds over the last week to week and a half.  Call our office if he notices significant weight gain or increased shortness of breath.  Follow-up with me in the office in about 2 weeks.  Our office will call to schedule follow-up.     Thank you for allowing me to participate in the care of Javier Grant. Feel free to contact me directly with any further questions or concerns.    COLLIN Villela  [facility]  04/15/24  07:35 EDT    EMR Dragon/Transcription disclaimer:   Much of this encounter note is an electronic transcription/translation of spoken language to printed text. The electronic translation of spoken language may permit erroneous, or at times, nonsensical words or phrases to be inadvertently transcribed; Although I have reviewed the note for such errors, some may still exist.

## 2024-04-16 ENCOUNTER — READMISSION MANAGEMENT (OUTPATIENT)
Dept: CALL CENTER | Facility: HOSPITAL | Age: 84
End: 2024-04-16
Payer: MEDICARE

## 2024-04-16 NOTE — CASE MANAGEMENT/SOCIAL WORK
Case Management Discharge Note      Final Note: Discharged home         Selected Continued Care - Discharged on 4/15/2024 Admission date: 4/14/2024 - Discharge disposition: Home or Self Care      Destination    No services have been selected for the patient.                Durable Medical Equipment    No services have been selected for the patient.                Dialysis/Infusion    No services have been selected for the patient.                Home Medical Care    No services have been selected for the patient.                Therapy    No services have been selected for the patient.                Community Resources    No services have been selected for the patient.                Community & DME    No services have been selected for the patient.                    Selected Continued Care - Prior Encounters Includes continued care and service providers with selected services from prior encounters from 1/15/2024 to 4/15/2024      Discharged on 3/29/2024 Admission date: 3/25/2024 - Discharge disposition: Home-Health Care c      Home Medical Care       Service Provider Selected Services Address Phone Fax Patient Preferred    [facility] - [facility] [id number] [place] [address] [place], [place] [id number] [phone] --                          Transportation Services  Private: Car    Final Discharge Disposition Code: 01 - home or self-care

## 2024-04-16 NOTE — OUTREACH NOTE
CHF Week 1 Survey      Flowsheet Row Responses   [facility] patient discharged from? [place]   Does the patient have one of the following disease processes/diagnoses(primary or secondary)? CHF   CHF Week 1 attempt successful? No   Unsuccessful attempts Attempt 1            Cecilia MORALEZ - Licensed Nurse

## 2024-04-16 NOTE — OUTREACH NOTE
Prep Survey      Flowsheet Row Responses   [facility] patient discharged from? [place]   Is LACE score < 7 ? No   Eligibility Readm Mgmt   Discharge diagnosis Acute on chronic systolic CHF (congestive heart failure)   Does the patient have one of the following disease processes/diagnoses(primary or secondary)? CHF   Does the patient have Home health ordered? No   Is there a DME ordered? No   Medication alerts for this patient see avs   Prep survey completed? Yes            Nori OCASIO - Registered Nurse

## 2024-04-17 ENCOUNTER — READMISSION MANAGEMENT (OUTPATIENT)
Dept: CALL CENTER | Facility: HOSPITAL | Age: 84
End: 2024-04-17
Payer: MEDICARE

## 2024-04-17 NOTE — OUTREACH NOTE
CHF Week 1 Survey      Flowsheet Row Responses   [facility] patient discharged from? [place]   Does the patient have one of the following disease processes/diagnoses(primary or secondary)? CHF   CHF Week 1 attempt successful? Yes   Call start time 0917   Call end time 0920   Discharge diagnosis Acute on chronic systolic CHF (congestive heart failure)   Person spoke with today (if not patient) and relationship patient   Meds reviewed with patient/caregiver? Yes   Is the patient having any side effects they believe may be caused by any medication additions or changes? No   Does the patient have all medications ordered at discharge? Yes   Is the patient taking all medications as directed (includes completed medication regime)? Yes   Comments regarding appointments Patient will see cardiology on 5/15   Does the patient have a primary care provider?  Yes   Does the patient have an appointment with their PCP within 7 days of discharge? Yes   Has the patient kept scheduled appointments due by today? N/A   Comments Provided number to the CHF clinic to establish care. Explained benefits. Patient verbalized understanding.   What is the Home health agency?  [facility]   Psychosocial issues? No   Did the patient receive a copy of their discharge instructions? Yes   Nursing interventions Reviewed instructions with patient   What is the patient's perception of their health status since discharge? Improving   Nursing interventions Nurse provided patient education   If the patient is a current smoker, are they able to teach back resources for cessation? Not a smoker   Is the patient/caregiver able to teach back the hierarchy of who to call/visit for symptoms/problems? PCP, Specialist, Home health nurse, Urgent Care, ED, 911 Yes   Is the patient able to teach back Heart Failure Zones? Yes   CHF Zone this Call Green Zone   Green Zone Patient reports doing well, No new or worsening shortness of breath, Physical activity  level is normal for you   Green Zone Interventions Meds as directed, Follow up visits planned    CHF Week 1 call completed? Yes   Revoked No further contact(revokes)-requires comment   Is the patient interested in additional calls from an ambulatory ? No   Would this patient benefit from a Referral to [facility] Social Work? No   Graduated/Revoked comments Denies questions or needs at this time.   Call end time 0920            Nils FERNANDES - Registered Nurse

## 2024-04-19 LAB
BACTERIA SPEC AEROBE CULT: NORMAL

## 2024-04-23 ENCOUNTER — OFFICE VISIT (OUTPATIENT)
Dept: CARDIOLOGY | Facility: CLINIC | Age: 84
End: 2024-04-23
Payer: MEDICARE

## 2024-04-23 VITALS
DIASTOLIC BLOOD PRESSURE: 54 MMHG | WEIGHT: 154.6 LBS | SYSTOLIC BLOOD PRESSURE: 118 MMHG | HEIGHT: 66 IN | BODY MASS INDEX: 24.85 KG/M2 | HEART RATE: 75 BPM

## 2024-04-23 DIAGNOSIS — I10 PRIMARY HYPERTENSION: ICD-10-CM

## 2024-04-23 DIAGNOSIS — Z95.5 S/P CORONARY ARTERY STENT PLACEMENT: ICD-10-CM

## 2024-04-23 DIAGNOSIS — G47.33 OSA ON CPAP: Chronic | ICD-10-CM

## 2024-04-23 DIAGNOSIS — I49.5 SICK SINUS SYNDROME: ICD-10-CM

## 2024-04-23 DIAGNOSIS — Z95.1 HX OF CABG: ICD-10-CM

## 2024-04-23 DIAGNOSIS — I35.0 NONRHEUMATIC AORTIC VALVE STENOSIS: ICD-10-CM

## 2024-04-23 DIAGNOSIS — Z95.0 PRESENCE OF CARDIAC PACEMAKER: ICD-10-CM

## 2024-04-23 DIAGNOSIS — I25.810 CORONARY ARTERY DISEASE INVOLVING CORONARY BYPASS GRAFT OF NATIVE HEART WITHOUT ANGINA PECTORIS: Primary | ICD-10-CM

## 2024-04-23 DIAGNOSIS — E78.49 OTHER HYPERLIPIDEMIA: ICD-10-CM

## 2024-04-23 RX ORDER — RAMIPRIL 5 MG/1
5 CAPSULE ORAL EVERY EVENING
Qty: 90 CAPSULE | Refills: 3
Start: 2024-04-23

## 2024-04-23 RX ORDER — FUROSEMIDE 40 MG/1
40 TABLET ORAL 2 TIMES DAILY
Qty: 180 TABLET | Refills: 3 | Status: SHIPPED | OUTPATIENT
Start: 2024-04-23

## 2024-04-23 NOTE — PROGRESS NOTES
Date of Office Visit: 2024  Encounter Provider: COLLIN Villela  Place of Service: [facility] CARDIOLOGY  Patient Name: Javier Grant  :1940    No chief complaint on file.  : coronary artery disease    HPI: Javier Grant is a 84 y.o. male who previously followed with Dr. Carmona and now follows with Dr. Mcneal.  He presents for hospital follow-up.  He has a history of coronary artery disease, aortic valve stenosis, hypertension, sick sinus syndrome with presence of pacemaker, PVCs and right bundle branch block.  In , he underwent CABG x3 (SVG to LAD, SVG to OM1 and SVG to OM 2).  His anginal equivalent has always been chest pain, mainly in the center of his chest.      In , he underwent left heart cath which showed occlusion of all native vessels proximally, although there was 70% stenosis in mid SVG to OM 2.  He underwent placement of 4.0 x 15 mm Resolute JORGE to this lesion, as well as, placement of 3.0 x 12 mm Xience Alpine JORGE to anastomotic site of one of the grafts to the OM branches.  The LAD and saphenous vein graft to LAD collateralized all 3 major branches distally (circumflex, RCA and ramus).  Patient has history of fairly severe nosebleeds on Plavix.    He wore a 24-hour Holter monitor in  secondary to episodes of near syncope.  He was found to have significant intermittent bradycardia along with significant pauses.  He underwent placement of [employer] dual-chamber permanent pacemaker on 3/4/2022 by Dr. Francois.    Patient had chest discomfort in 2022.  He underwent stress test which showed no ischemia.    He presented to [place] in 2023 for UTI related to chronic indwelling Stanley catheter.  He had a non-STEMI with elevated troponin.  EF was found to be newly reduced in the 45% range, mild to moderate aortic stenosis.  He underwent cardiac cath that showed 1 bypass graft open and all of his native vessels occluded.  At  that time, he was noted to have occluded SVG to OM 2 and distal vessel closure after attempted PCI of SVG to OM1.  SVG to LAD was patent and gave flow basically to everything.      He presented to [facility] on 3/26/2024 with complaints of chest pain, shortness of breath and what sounded to be rigors.  He was noted to be hypotensive and required aggressive fluid resuscitation.  EKG showed new ST depression and T wave abnormalities.  Troponin continued to increase and he continued to complain of chest discomfort.  Films from cardiac cath at [facility] were reviewed and it was decided to take patient back for left heart cath.  He underwent PCI of SVG to OM 2 graft.  2D echocardiogram showed LVEF 30%, mild to moderate aortic valve stenosis and mild mitral regurgitation.  Patient was continued on carvedilol starting with low-dose with plans to increase as BP tolerated.  He was also continued on ACE inhibitor.      Mr. Grant presented back to the emergency department on 4/14/2024 with complaints of increased shortness of air.  He diuresed well on IV Lasix.    Today in the office, he has no complaints.  Is better than he has in a long time.  His blood pressure is well-controlled.  He has no chest pain, lightheadedness or shortness of air.  He has started to walk further distance without any issues and continues to advance his exercise activity.  His weight has been stable as he does weigh himself each morning.  He is euvolemic on physical exam.  EKG shows dual paced rhythm.      Previous testing and notes have been reviewed by  me.   Past Medical History:   Diagnosis Date    Abnormal ECG 1980    Arthritis     Bleeds easily     WAS TOLD THAT AFTER CABG    CAD (coronary artery disease)     CHF (congestive heart failure) 4/15/2024    Clotting disorder 1990    Noted bu surgeon when I had the heart bypasses    Colon polyp     Disorder of aorta     Dizziness     SKELTON (dyspnea on exertion)     Enlarged prostate     Gastritis     Gout      Heart murmur ?    Hiatal hernia     History of MI (myocardial infarction)     1990    HL (hearing loss)     Hyperlipidemia     Hypertension     Macular degeneration     rt eye    Mitral valve prolapse 1990    Myocardial infarction 1990    Nonrheumatic aortic (valve) insufficiency     Nonrheumatic aortic (valve) stenosis     RBBB (right bundle branch block)     Sleep apnea     USES CPAP    Umbilical hernia     Vitamin D deficiency        Past Surgical History:   Procedure Laterality Date    CARDIAC CATHETERIZATION Left 10/09/2015    Dr. Robin Kingston    CARDIAC CATHETERIZATION N/A 3/26/2024    Procedure: Left Heart Cath;  Surgeon: Evelio Mcneal MD;  Location:  [facility] CATH INVASIVE LOCATION;  Service: Cardiovascular;  Laterality: N/A;    CARDIAC CATHETERIZATION N/A 3/26/2024    Procedure: Native mammary injection;  Surgeon: Evelio Mcneal MD;  Location:  [facility] CATH INVASIVE LOCATION;  Service: Cardiovascular;  Laterality: N/A;    CARDIAC CATHETERIZATION N/A 3/26/2024    Procedure: Stent JORGE bypass graft;  Surgeon: Evelio Mcneal MD;  [facility];  Service: Cardiovascular;  Laterality: N/A;    CARDIAC CATHETERIZATION N/A 3/26/2024    Procedure: Percutaneous Coronary Intervention;  Surgeon: Evelio Mcneal MD;  Location: [facility] CATH INVASIVE LOCATION;  Service: Cardiovascular;  Laterality: N/A;    CARDIAC CATHETERIZATION  3/26/2024    Procedure: Saphenous Vein Graft;  Surgeon: Evelio Mcneal MD;  [facility];  Service: Cardiovascular;;    CARDIAC ELECTROPHYSIOLOGY PROCEDURE N/A 03/04/2022    Procedure: Pacemaker DC new  [place];  Surgeon: Serafin Francois MD;  Location: [facility] CATH INVASIVE LOCATION;  Service: Cardiology;  Laterality: N/A;    CARDIAC SURGERY  1990    triple bypass    CATARACT EXTRACTION Bilateral     CORONARY ANGIOPLASTY WITH STENT PLACEMENT  2015    CORONARY ARTERY BYPASS GRAFT  1990    3 VESSELS     ENDOSCOPY N/A 2017    Procedure: ESOPHAGOGASTRODUODENOSCOPY WITH COLD BIOIPSIES AND BALLOON DILITATION SIZE 15, 16.5, 18;  Surgeon: Jerry SNOWDEN MD;  Location: [facility] ENDOSCOPY;  Service:     INSERT / REPLACE / REMOVE PACEMAKER  2022    MCCOY'S NEUROMA EXCISION Right     TONSILLECTOMY      UMBILICAL HERNIA REPAIR N/A 10/09/2017    Procedure: UMBILICAL HERNIA REPAIR;  Surgeon: Blake Kelly Jr., MD;  Location: [facility] MAIN OR;  Service:        Social History     Socioeconomic History    Marital status:    Tobacco Use    Smoking status: Former     Current packs/day: 0.00     Types: Cigarettes     Start date: 1957     Quit date: 1980     Years since quittin.3    Smokeless tobacco: Never   Vaping Use    Vaping status: Never Used   Substance and Sexual Activity    Alcohol use: No    Drug use: Never    Sexual activity: Not Currently     Partners: Female     Birth control/protection: Post-menopausal, None       Family History   Problem Relation Age of Onset    Depression Mother     Colon polyps Mother     Heart attack Father     Aneurysm Father     Stomach cancer Brother     Malig Hyperthermia Neg Hx        Review of Systems   Constitutional: Negative.   HENT: Negative.     Eyes: Negative.    Cardiovascular: Negative.    Respiratory: Negative.     Endocrine: Negative.    Hematologic/Lymphatic:        Asa/ plavix   Skin: Negative.    Musculoskeletal: Negative.    Gastrointestinal: Negative.    Genitourinary: Negative.    Neurological: Negative.    Psychiatric/Behavioral: Negative.     Allergic/Immunologic: Negative.        Allergies   Allergen Reactions    Ciprofloxacin Diarrhea         Current Outpatient Medications:     acetaminophen (TYLENOL) 325 MG tablet, Take 2 tablets by mouth Every Night., Disp: , Rfl:     allopurinol (ZYLOPRIM) 300 MG tablet, Take 1 tablet by mouth Daily., Disp: 90 tablet, Rfl: 3    aspirin 81 MG tablet, Take 1 tablet by mouth Daily., Disp: , Rfl:      "atorvastatin (LIPITOR) 80 MG tablet, Take 1 tablet by mouth Every Night., Disp: 90 tablet, Rfl: 3    carvedilol (COREG) 6.25 MG tablet, Take 1 tablet by mouth 2 (Two) Times a Day With Meals., Disp: 180 tablet, Rfl: 3    Cholecalciferol 2000 units capsule, Take 1 capsule by mouth Daily., Disp: , Rfl:     clopidogrel (PLAVIX) 75 MG tablet, Take 1 tablet by mouth Daily., Disp: 90 tablet, Rfl: 3    famotidine (PEPCID) 20 MG tablet, Take 1 tablet by mouth Daily., Disp: , Rfl:     FLUoxetine (PROzac) 20 MG capsule, Take 1 capsule by mouth Daily., Disp: , Rfl:     furosemide (LASIX) 40 MG tablet, Take 1 tablet by mouth 2 (Two) Times a Day., Disp: 180 tablet, Rfl: 3    isosorbide mononitrate (IMDUR) 30 MG 24 hr tablet, Take 1 tablet by mouth Daily., Disp: 90 tablet, Rfl: 3    loratadine (Claritin) 10 MG tablet, mg Tab, Oral, Daily, 0 Refill(s), Disp: , Rfl:     Multiple Vitamins-Minerals (PRESERVISION AREDS 2 PO), Take 1 tablet by mouth Daily., Disp: , Rfl:     nitroglycerin (Nitrostat) 0.4 MG SL tablet, Take 1 tablet by mouth Every 5 (Five) Minutes As Needed (CHEST PAIN: MAX DOSE 3 TABLETS)., Disp: 25 tablet, Rfl: 6    omega-3 acid ethyl esters (LOVAZA) 1 g capsule, TAKE 4 CAPSULES DAILY (TO  HOLD MEDICATION PRIOR TO   OPERATING ROOM), Disp: 360 capsule, Rfl: 3    ramipril (ALTACE) 5 MG capsule, Take 1 capsule by mouth Every Evening., Disp: 90 capsule, Rfl: 3    tamsulosin (FLOMAX) 0.4 MG capsule 24 hr capsule, 1 capsule., Disp: , Rfl:       Objective:     Vitals:    04/23/24 1427   BP: 118/54   Pulse: 75   Weight: 70.1 kg (154 lb 9.6 oz)   Height: 167.6 cm (65.98\")     Body mass index is 24.97 kg/m².      2D Echocardiogram 3/28/2024:    Left ventricular systolic function is moderately decreased. Calculated left ventricular EF = 30.8%    The following left ventricular wall segments are hypokinetic: mid anterior, apical anterior, basal anterolateral, mid anterolateral, apical lateral and basal anterior.    Left ventricular " diastolic function is consistent with (grade I) impaired relaxation.    The left atrial cavity is severely dilated.    Mild to moderate aortic valve stenosis is present. Aortic valve area is 1.4 cm2.Peak velocity of the flow distal to the aortic valve is 263.3 cm/s. Aortic valve maximum pressure gradient is 28 mmHg. Aortic valve mean pressure gradient is 16 mmHg. Aortic valve dimensionless index is 0.3 .    Calculated right ventricular systolic pressure from tricuspid regurgitation is 35 mmHg.    Severe mitral annular calcification is present. Mild mitral valve regurgitation is present. Mild mitral valve stenosis is present. The mitral valve mean gradient is 2.8 mmHg.    Left Heart Cath 03/26/2024:  1. Left main: Diffuse 90% stenosis  2. LAD:  at ostium.  Mid to distal vessel fills via patent SVG to LAD.  3. LCX:  at ostium.  Fills via SVG to OM2 graft.  4. RCA:  mid segment.  Fills via collaterals from the distal LAD and septals.  5.  SVG-OM 1: Occluded proximal  6.  SVG-OM2: 99% stenosis at the proximal anastomosis.  7.  SVG-LAD: Discrete 50% mid vessel stenosis.  8.  Successful PCI of the ostial SVG-OM2 with a 4.0 x 15 mm Xience miriam point drug-eluting stent.     2D Echocardiogram 03/03/2023:    Left ventricular systolic function is normal. Calculated left ventricular EF = 60.1% Normal left ventricular cavity size noted. Left ventricular wall thickness is consistent with concentric hypertrophy. Left ventricular diastolic function is consistent with (grade I) impaired relaxation.    Mildly reduced right ventricular systolic function noted.    The interatrial septum appears redundant. Saline test for shunting not performed. History of positive PFO    There is severe calcification of the aortic valve. No aortic valve regurgitation is present. Mild aortic valve stenosis is present. Aortic valve area is 1.38 cm2. Aortic valve mean pressure gradient is 17.5 mmHg. Aortic valve dimensionless index is 0.30 .     Severe mitral annular calcification is present. There is severe, bileaflet mitral valve thickening present. Trace mitral valve regurgitation is present. No significant mitral valve stenosis is present.    There is a probable left pleural (less likely pericardial) effusion. This is not well visualized.    Lexiscan Stress test 07/13/2022:  Myocardial perfusion imaging indicates a normal myocardial perfusion study with no evidence of ischemia.  Left ventricular ejection fraction is normal. (Calculated EF = 63%).  Diaphragmatic attenuation artifact is present.  There is no prior study available for comparison.    24-hour Holter monitor 2/25/2022:  An abnormal monitor study.  There were episodes of significant sinus bradycardia in the afternoon hours between 2:43 PM and 4:11 PM. The lowest recorded heart rate was 32 bpm.  There were also episodes of significant sinus bradycardia as low as 31 bpm between 7:48 PM and 8:49 PM.  There was a sinus pause of 4.2 seconds at 9:07 PM     2D Echocardiogram 6/17/2021:  Left ventricular wall thickness is consistent with mild concentric hypertrophy.  Estimated left ventricular EF = 61% Left ventricular systolic function is normal.  Left ventricular diastolic function was normal.  There is moderate calcification of the aortic valve.  Mild tricuspid valve regurgitation is present. Estimated right ventricular systolic pressure from tricuspid regurgitation is normal (<35 mmHg).  Mitral annular calcification is present. Mild mitral valve regurgitation is present. No significant mitral valve stenosis is present.    PHYSICAL EXAM:    Constitutional:       Appearance: Not in distress. Frail.   Neck:      Vascular: No JVR. JVD normal.   Pulmonary:      Effort: Pulmonary effort is normal.      Breath sounds: Normal breath sounds. No wheezing. No rhonchi. No rales.   Chest:      Chest wall: Not tender to palpatation.   Cardiovascular:      PMI at left midclavicular line. Normal rate. Regular  rhythm. Normal S1. Normal S2.       Murmurs: There is a systolic murmur.      No gallop.  No click. No rub.   Pulses:     Intact distal pulses.   Edema:     Peripheral edema absent.   Abdominal:      General: Bowel sounds are normal.      Palpations: Abdomen is soft.      Tenderness: There is no abdominal tenderness.   Musculoskeletal: Normal range of motion.         General: No tenderness. Skin:     General: Skin is warm and dry.   Neurological:      General: No focal deficit present.      Mental Status: Alert and oriented to person, place and time.           ECG 12 Lead    Date/Time: 4/23/2024 3:18 PM  Performed by: Hodan Avila APRN    Authorized by: Hodan Avila APRN  Comparison: compared with previous ECG from 4/14/2024  Similar to previous ECG  Rhythm: paced  Rate: normal  BPM: 75  Pacing: dual chamber pacing          Assessment:       Diagnosis Plan   1. Coronary artery disease involving coronary bypass graft of native heart without angina pectoris        2. Primary hypertension        3. Other hyperlipidemia        4. Nonrheumatic aortic valve stenosis        5. Sick sinus syndrome        6. Hx of CABG        7. S/P coronary artery stent placement        8. Presence of cardiac pacemaker        9. CRISTY on autoCPAP              No orders of the defined types were placed in this encounter.         Plan:       1.  Coronary artery disease: Status post CABG x 3 (SVG to LAD, SVG to OM1 and SVG to OM 2) in 1991.  Status post PCI of SVG to OM 2 (2015), status post PCI of SVG to OM 2 graft (03/2024).    On asa/ plavix, statin, beta blocker and long-acting nitrate.  No angina.  Stable EKG    2.  Ischemic cardiomyopathy: LVEF 30%.  GDMT as tolerated.  On carvedilol, ramipril and lasix.  Euvolemic.  Stable am daily weights. Will call for weight gain.  Will likely have him take extra 20-40mg lasix if weight gain or increased SOA.      3. Nonrheumatic aortic valve stenosis: Mild.  Positive systolic  murmur    4.  Hypertension: Controlled    5.  Hyperlipidemia: Lipid panel in target range.  On statin therapy.    6.  Sick sinus syndrome: Pacemaker placement in 2022.  Last remote download 10/2023 shows no events.  Device interrogation in device clinic annually.    7. CRISTY: CPAP compliant    Mr. Grant is a scheduled follow-up with Dr. Mcneal in May.  He will call sooner for any questions or concerns.             Your medication list            Accurate as of April 23, 2024  3:18 PM. If you have any questions, ask your nurse or doctor.                CONTINUE taking these medications        Instructions Last Dose Given Next Dose Due   acetaminophen 325 MG tablet  Commonly known as: TYLENOL      Take 2 tablets by mouth Every Night.       allopurinol 300 MG tablet  Commonly known as: ZYLOPRIM      Take 1 tablet by mouth Daily.       aspirin 81 MG tablet      Take 1 tablet by mouth Daily.       atorvastatin 80 MG tablet  Commonly known as: LIPITOR      Take 1 tablet by mouth Every Night.       carvedilol 6.25 MG tablet  Commonly known as: COREG      Take 1 tablet by mouth 2 (Two) Times a Day With Meals.       Cholecalciferol 50 MCG (2000 UT) capsule      Take 1 capsule by mouth Daily.       Claritin 10 MG tablet  Generic drug: loratadine      mg Tab, Oral, Daily, 0 Refill(s)       clopidogrel 75 MG tablet  Commonly known as: PLAVIX      Take 1 tablet by mouth Daily.       famotidine 20 MG tablet  Commonly known as: PEPCID      Take 1 tablet by mouth Daily.       FLUoxetine 20 MG capsule  Commonly known as: PROzac      Take 1 capsule by mouth Daily.       furosemide 40 MG tablet  Commonly known as: LASIX      Take 1 tablet by mouth 2 (Two) Times a Day.       isosorbide mononitrate 30 MG 24 hr tablet  Commonly known as: IMDUR      Take 1 tablet by mouth Daily.       nitroglycerin 0.4 MG SL tablet  Commonly known as: Nitrostat      Take 1 tablet by mouth Every 5 (Five) Minutes As Needed (CHEST PAIN: MAX DOSE 3 TABLETS).        omega-3 acid ethyl esters 1 g capsule  Commonly known as: LOVAZA      TAKE 4 CAPSULES DAILY (TO  HOLD MEDICATION PRIOR TO   OPERATING ROOM)       PRESERVISION AREDS 2 PO      Take 1 tablet by mouth Daily.       ramipril 5 MG capsule  Commonly known as: ALTACE      Take 1 capsule by mouth Every Evening.       tamsulosin 0.4 MG capsule 24 hr capsule  Commonly known as: FLOMAX      1 capsule.                 Where to Get Your Medications        These medications were sent to [facility] [id number] - [place], [place] - [address] [facility] [address] - [phone]  - [phone] [address] [address]      Phone: [phone]   furosemide 40 MG tablet       Information about where to get these medications is not yet available    Ask your nurse or doctor about these medications  ramipril 5 MG capsule           As always, it has been a pleasure to participate in your patient's care.      Sincerely,       COLLIN Baumann

## 2024-05-15 ENCOUNTER — OFFICE VISIT (OUTPATIENT)
Age: 84
End: 2024-05-15
Payer: MEDICARE

## 2024-05-15 VITALS
HEIGHT: 66 IN | DIASTOLIC BLOOD PRESSURE: 54 MMHG | SYSTOLIC BLOOD PRESSURE: 118 MMHG | BODY MASS INDEX: 25.23 KG/M2 | WEIGHT: 157 LBS | HEART RATE: 60 BPM

## 2024-05-15 DIAGNOSIS — I50.21 ACUTE SYSTOLIC CONGESTIVE HEART FAILURE: Primary | ICD-10-CM

## 2024-05-15 DIAGNOSIS — I25.810 CORONARY ARTERY DISEASE INVOLVING CORONARY BYPASS GRAFT OF NATIVE HEART WITHOUT ANGINA PECTORIS: ICD-10-CM

## 2024-05-15 DIAGNOSIS — Z95.5 S/P CORONARY ARTERY STENT PLACEMENT: ICD-10-CM

## 2024-05-15 DIAGNOSIS — I35.0 NONRHEUMATIC AORTIC VALVE STENOSIS: ICD-10-CM

## 2024-05-15 DIAGNOSIS — E78.49 OTHER HYPERLIPIDEMIA: ICD-10-CM

## 2024-05-15 NOTE — PROGRESS NOTES
Javier Grant  1940  Date of Office Visit: 05/15/24  Encounter Provider: Evelio Mcneal MD  Place of Service: [facility] CARDIOLOGY      CHIEF COMPLAINT:  Acute on chronic heart failure with reduced ejection fraction  Coronary artery disease with prior CABG      HISTORY OF PRESENT ILLNESS:  84 y.o. male with a medical history of coronary artery disease, aortic valve stenosis, hypertension, sick sinus syndrome with presence of pacemaker, PVCs and right bundle branch block.  In 1990, he underwent CABG x3 (SVG to LAD, SVG to OM1 and SVG to OM 2).    In 2015, he underwent left heart cath which showed occlusion of all native vessels proximally, although there was 70% stenosis in mid SVG to OM 2.  He underwent placement of 4.0 x 15 mm Resolute JORGE to this lesion, as well as, placement of 3.0 x 12 mm Xience Alpine JORGE to anastomotic site of one of the grafts to the OM branches.  The LAD and saphenous vein graft to LAD collateralized all 3 major branches distally (circumflex, RCA and ramus).  Patient has history of fairly severe nosebleeds on Plavix.     He wore a 24-hour Holter monitor in 2022 secondary to episodes of near syncope.  He was found to have significant intermittent bradycardia along with significant pauses.  He underwent placement of [employer] dual-chamber permanent pacemaker on 3/4/2022 by Dr. Francois.     He presented to [facility] on 3/26/2024 with complaints of chest pain, shortness of breath and what sounded to be rigors.  He was noted to be hypotensive and required aggressive fluid resuscitation.  EKG showed new ST depression and T wave abnormalities.  Troponin continued to increase and he continued to complain of chest discomfort.  Films from cardiac cath at [place] were reviewed and it was decided to take patient back for left heart cath.  He underwent PCI of SVG to OM 2 graft.  2D echocardiogram showed LVEF 30%, mild to moderate aortic valve stenosis and  mild mitral regurgitation.   Mr. Grant presented back to the emergency department on 4/14/2024 with complaints of increased shortness of air.  He diuresed well on IV Lasix.  He has been doing well since that time.  He is currently taking Lasix 40 mg p.o. twice daily.  He states that his fluid status has been fine since then.  He denies any lower extremity edema or orthopnea.    Review of Systems   Constitutional: Negative for fever and malaise/fatigue.   HENT:  Negative for nosebleeds and sore throat.    Eyes:  Negative for blurred vision and double vision.   Cardiovascular:  Negative for chest pain, claudication, palpitations and syncope.   Respiratory:  Negative for cough, shortness of breath and snoring.    Endocrine: Negative for cold intolerance, heat intolerance and polydipsia.   Skin:  Negative for itching, poor wound healing and rash.   Musculoskeletal:  Negative for joint pain, joint swelling, muscle weakness and myalgias.   Gastrointestinal:  Negative for abdominal pain, melena, nausea and vomiting.   Neurological:  Negative for light-headedness, loss of balance, seizures, vertigo and weakness.   Psychiatric/Behavioral:  Negative for altered mental status and depression.          Past Medical History:   Diagnosis Date    Abnormal ECG 1980    Arthritis     Bleeds easily     WAS TOLD THAT AFTER CABG    CAD (coronary artery disease)     CHF (congestive heart failure) 4/15/2024    Clotting disorder 1990    Noted bu surgeon when I had the heart bypasses    Colon polyp     Disorder of aorta     Dizziness     SKELTON (dyspnea on exertion)     Enlarged prostate     Gastritis     Gout     Heart murmur ?    Hiatal hernia     History of MI (myocardial infarction)     1990    HL (hearing loss)     Hyperlipidemia     Hypertension     Macular degeneration     rt eye    Mitral valve prolapse 1990    Myocardial infarction 1990    Nonrheumatic aortic (valve) insufficiency     Nonrheumatic aortic (valve) stenosis     RBBB  (right bundle branch block)     Sleep apnea     USES CPAP    Umbilical hernia     Vitamin D deficiency        The following portions of the patient's history were reviewed and updated as appropriate: Social history , Family history, and Surgical history     Current Outpatient Medications on File Prior to Visit   Medication Sig Dispense Refill    acetaminophen (TYLENOL) 325 MG tablet Take 2 tablets by mouth Every Night.      allopurinol (ZYLOPRIM) 300 MG tablet Take 1 tablet by mouth Daily. 90 tablet 3    aspirin 81 MG tablet Take 1 tablet by mouth Daily.      atorvastatin (LIPITOR) 80 MG tablet Take 1 tablet by mouth Every Night. 90 tablet 3    carvedilol (COREG) 6.25 MG tablet Take 1 tablet by mouth 2 (Two) Times a Day With Meals. 180 tablet 3    Cholecalciferol 2000 units capsule Take 1 capsule by mouth Daily.      clopidogrel (PLAVIX) 75 MG tablet Take 1 tablet by mouth Daily. 90 tablet 3    famotidine (PEPCID) 20 MG tablet Take 1 tablet by mouth Daily.      FLUoxetine (PROzac) 20 MG capsule Take 1 capsule by mouth Daily.      furosemide (LASIX) 40 MG tablet Take 1 tablet by mouth 2 (Two) Times a Day. 180 tablet 3    isosorbide mononitrate (IMDUR) 30 MG 24 hr tablet Take 1 tablet by mouth Daily. 90 tablet 3    loratadine (Claritin) 10 MG tablet mg Tab, Oral, Daily, 0 Refill(s)      Multiple Vitamins-Minerals (PRESERVISION AREDS 2 PO) Take 1 tablet by mouth Daily.      nitroglycerin (Nitrostat) 0.4 MG SL tablet Take 1 tablet by mouth Every 5 (Five) Minutes As Needed (CHEST PAIN: MAX DOSE 3 TABLETS). 25 tablet 6    omega-3 acid ethyl esters (LOVAZA) 1 g capsule TAKE 4 CAPSULES DAILY (TO  HOLD MEDICATION PRIOR TO   OPERATING ROOM) 360 capsule 3    ramipril (ALTACE) 5 MG capsule Take 1 capsule by mouth Every Evening. 90 capsule 3    tamsulosin (FLOMAX) 0.4 MG capsule 24 hr capsule 1 capsule.       No current facility-administered medications on file prior to visit.       Allergies   Allergen Reactions     "Ciprofloxacin Diarrhea       Vitals:    05/15/24 0936   BP: 118/54   Pulse: 60   Weight: 71.2 kg (157 lb)   Height: 167.6 cm (66\")     Body mass index is 25.34 kg/m².   Constitutional:       Appearance: Well-developed.   Eyes:      General: No scleral icterus.     Conjunctiva/sclera: Conjunctivae normal.   HENT:      Head: Normocephalic and atraumatic.   Neck:      Thyroid: No thyromegaly.      Vascular: Normal carotid pulses. No carotid bruit, hepatojugular reflux or JVD.      Trachea: No tracheal deviation.   Pulmonary:      Effort: No respiratory distress.      Breath sounds: Normal breath sounds. No decreased breath sounds. No wheezing. No rhonchi. No rales.   Chest:      Chest wall: Not tender to palpatation.   Cardiovascular:      Normal rate. Regular rhythm.      No gallop.    Pulses:     Carotid: 2+ bilaterally.     Radial: 2+ bilaterally.     Femoral: 2+ bilaterally.     Dorsalis pedis: 2+ bilaterally.     Posterior tibial: 2+ bilaterally.  Edema:     Peripheral edema absent.   Abdominal:      General: Bowel sounds are normal. There is no distension.      Palpations: Abdomen is soft.      Tenderness: There is no abdominal tenderness.   Musculoskeletal:         General: No deformity.      Cervical back: Normal range of motion and neck supple. Skin:     Findings: No erythema or rash.   Neurological:      Mental Status: Alert and oriented to person, place, and time.      Sensory: No sensory deficit.   Psychiatric:         Behavior: Behavior normal.           Lab Results   Component Value Date    WBC 7.91 04/15/2024    HGB 11.5 (L) 04/15/2024    HCT 34.1 (L) 04/15/2024    MCV 97.7 (H) 04/15/2024     04/15/2024       Lab Results   Component Value Date    GLUCOSE 98 04/15/2024    BUN 17 04/15/2024    CREATININE 1.03 04/15/2024    EGFR 71.6 04/15/2024    BCR 16.5 04/15/2024    K 3.9 04/15/2024    CO2 24.6 04/15/2024    CALCIUM 8.7 04/15/2024    ALBUMIN 3.8 04/14/2024    BILITOT 0.5 04/14/2024    AST 23 " 04/14/2024    ALT 26 04/14/2024       Lab Results   Component Value Date    GLUCOSE 98 04/15/2024    CALCIUM 8.7 04/15/2024     (L) 04/15/2024    K 3.9 04/15/2024    CO2 24.6 04/15/2024    CL 99 04/15/2024    BUN 17 04/15/2024    CREATININE 1.03 04/15/2024    EGFR 71.6 04/15/2024    BCR 16.5 04/15/2024    ANIONGAP 9.4 04/15/2024       Lab Results   Component Value Date    CHOL 79 03/27/2024    CHLPL 120 07/17/2020    TRIG 48 03/27/2024    HDL 39 (L) 03/27/2024    LDL 27 03/27/2024         ECG 12 Lead    Date/Time: 5/15/2024 10:16 AM  Performed by: Evelio Mcneal MD    Authorized by: Evelio Mcneal MD  Comparison: compared with previous ECG from 4/23/2024  Similar to previous ECG  Rhythm: paced           Results for orders placed during the hospital encounter of 03/25/24    Adult Transthoracic Echo Complete W/ Cont if Necessary Per Protocol    Interpretation Summary    Left ventricular systolic function is moderately decreased. Calculated left ventricular EF = 30.8%    The following left ventricular wall segments are hypokinetic: mid anterior, apical anterior, basal anterolateral, mid anterolateral, apical lateral and basal anterior.    Left ventricular diastolic function is consistent with (grade I) impaired relaxation.    The left atrial cavity is severely dilated.    Mild to moderate aortic valve stenosis is present. Aortic valve area is 1.4 cm2.Peak velocity of the flow distal to the aortic valve is 263.3 cm/s. Aortic valve maximum pressure gradient is 28 mmHg. Aortic valve mean pressure gradient is 16 mmHg. Aortic valve dimensionless index is 0.3 .    Calculated right ventricular systolic pressure from tricuspid regurgitation is 35 mmHg.    Severe mitral annular calcification is present. Mild mitral valve regurgitation is present. Mild mitral valve stenosis is present. The mitral valve mean gradient is 2.8 mmHg.      DISCUSSION/SUMMARY very pleasant 84-year-old male with a medical history  of coronary artery disease with prior CABG and PCI to bypass graft, essential hypertension, sick sinus syndrome with permanent pacemaker, mild aortic valve stenosis, who has had some issues as of late with fluid overload and acute on chronic heart failure with reduced ejection fraction.  His ejection fraction was more decreased in March 2024 than it had been previously.  His aortic valve looked stable.  He is currently euvolemic and status post PCI in March 2024    1.  Acute on chronic heart failure with reduced ejection fraction  - He currently looks euvolemic  - Recommend continuing carvedilol and ramipril at current dose.  - Plan on repeat transthoracic echocardiogram in 6 months and if ejection fraction is still decreased consider up titration of goal-directed medical therapy.    2.  Coronary disease with prior CABG: No angina.  - Continue aspirin and Plavix lifelong.  No bleeding complications.  - Continue carvedilol 6.25 mg p.o. every 12 hours.  Tolerating well.    3.  Hyperlipidemia: On atorvastatin 80 mg p.o. nightly.  Last LDL was 27.  Excellent control.    Will see him back in 6 months with a repeat echo

## 2024-06-19 NOTE — TELEPHONE ENCOUNTER
Faxed orders for a new machine to [facility] on 8/12/2021.  F/u appt scheduled on 11/17/2021 @ 10:45 with Dr. Jackson.  CV     History  Chief Complaint   Patient presents with    Fall     Fell off a 6ft ladder resulting in a laceration of the left leg.      Patient is a 41-year-old male presenting to the ED for evaluation of a left lower leg laceration.  Patient states that he was on a 6 foot ladder when it started to give way underneath of him and he jumped off to avoid falling.  Patient states that he landed on his feet but scraped his left shin on a concrete block and sustained a large laceration. He is complaining of pain around the laceration site but was able to ambulate on the extremity and denies any other pain in the leg, ankle or foot. The bleeding is controlled at this time and he is not on any blood thinners or aspirin.  He denies any head strike or loss of consciousness.  He denies any neck pain, back pain or any other injuries in the fall.  His last tetanus shot was in 2016.        Prior to Admission Medications   Prescriptions Last Dose Informant Patient Reported? Taking?   HYDROcodone-acetaminophen (NORCO) 5-325 mg per tablet   No No   Sig: Take 1 tablet by mouth every 6 (six) hours as needed for pain for up to 20 doses Max Daily Amount: 4 tablets   docusate sodium (COLACE) 100 mg capsule   No No   Sig: Take 1 capsule by mouth 2 (two) times a day   predniSONE 20 mg tablet   No No   Sig: Three tablets daily x3 days, 2 tablets daily x2 days, 1 tablet daily x2 days.      Facility-Administered Medications: None       Past Medical History:   Diagnosis Date    Cervical radiculopathy        Past Surgical History:   Procedure Laterality Date    LASER ABLATION OF CONDYLOMAS N/A 6/8/2017    Procedure: EXCISION OF CONDYLOMA, CO2 LASER ABLATION;  Surgeon: Chandler Coronado MD;  Location: [facility];  Service:     SHOULDER SURGERY         History reviewed. No pertinent family history.  I have reviewed and agree with the history as documented.    E-Cigarette/Vaping     E-Cigarette/Vaping Substances     Social History     Tobacco Use     Smoking status: Every Day     Current packs/day: 1.00     Types: Cigarettes   Substance Use Topics    Alcohol use: Yes     Comment: social    Drug use: No       Review of Systems   Constitutional:  Negative for chills and fever.   HENT:  Negative for congestion and sore throat.    Eyes:  Negative for visual disturbance.   Respiratory:  Negative for cough and shortness of breath.    Cardiovascular:  Negative for chest pain and palpitations.   Gastrointestinal:  Negative for abdominal pain, diarrhea, nausea and vomiting.   Genitourinary:  Negative for flank pain and hematuria.   Musculoskeletal:  Negative for back pain and neck pain.   Skin:  Positive for wound (left lower leg laceration). Negative for rash.   Neurological:  Negative for seizures, syncope and headaches.   All other systems reviewed and are negative.      Physical Exam  Physical Exam  Vitals and nursing note reviewed.   Constitutional:       General: He is awake. He is not in acute distress.     Appearance: Normal appearance. He is well-developed. He is not ill-appearing or diaphoretic.   HENT:      Head: Normocephalic and atraumatic.      Right Ear: External ear normal.      Left Ear: External ear normal.      Nose: Nose normal.      Mouth/Throat:      Lips: Pink.      Mouth: Mucous membranes are moist.   Eyes:      General: Lids are normal. No scleral icterus.     Conjunctiva/sclera: Conjunctivae normal.      Pupils: Pupils are equal, round, and reactive to light.   Cardiovascular:      Rate and Rhythm: Normal rate and regular rhythm.      Pulses: Normal pulses.           Radial pulses are 2+ on the right side and 2+ on the left side.      Heart sounds: Normal heart sounds, S1 normal and S2 normal.   Pulmonary:      Effort: Pulmonary effort is normal. No accessory muscle usage.      Breath sounds: Normal breath sounds. No stridor. No decreased breath sounds, wheezing, rhonchi or rales.   Abdominal:      General: Abdomen is flat. Bowel sounds are  normal. There is no distension.      Palpations: Abdomen is soft.      Tenderness: There is no abdominal tenderness. There is no right CVA tenderness, left CVA tenderness, guarding or rebound.   Musculoskeletal:      Cervical back: Full passive range of motion without pain, normal range of motion and neck supple. No signs of trauma. No pain with movement. Normal range of motion.      Right lower leg: No edema.      Left lower leg: No edema.   Lymphadenopathy:      Cervical: No cervical adenopathy.   Skin:     General: Skin is warm and dry.      Capillary Refill: Capillary refill takes less than 2 seconds.      Coloration: Skin is not cyanotic, jaundiced or pale.          Neurological:      Mental Status: He is alert and oriented to person, place, and time.      GCS: GCS eye subscore is 4. GCS verbal subscore is 5. GCS motor subscore is 6.      Cranial Nerves: No dysarthria or facial asymmetry.      Gait: Gait normal.   Psychiatric:         Attention and Perception: Attention normal.         Mood and Affect: Mood normal.         Speech: Speech normal.         Behavior: Behavior normal. Behavior is cooperative.         Vital Signs  ED Triage Vitals   Temperature Pulse Respirations Blood Pressure SpO2   06/19/24 0848 06/19/24 0851 06/19/24 0848 06/19/24 0851 06/19/24 0848   98.3 °F (36.8 °C) 81 16 139/91 98 %      Temp Source Heart Rate Source Patient Position - Orthostatic VS BP Location FiO2 (%)   06/19/24 0848 06/19/24 0848 06/19/24 0848 06/19/24 0848 --   Tympanic Monitor Sitting Left arm       Pain Score       06/19/24 0848       No Pain           Vitals:    06/19/24 0848 06/19/24 0851 06/19/24 0900   BP:  139/91 139/91   Pulse:  81 79   Patient Position - Orthostatic VS: Sitting           Visual Acuity      ED Medications  Medications   ketorolac (TORADOL) injection 15 mg (15 mg Intramuscular Not Given 6/19/24 0911)   lidocaine-epinephrine (XYLOCAINE/EPINEPHRINE) 1 %-1:100,000 injection 20 mL (20 mL Infiltration  "Given 6/19/24 0911)   bacitracin topical ointment 1 small application (1 small application Topical Given 6/19/24 0911)   ondansetron (ZOFRAN-ODT) dispersible tablet 4 mg (4 mg Oral Given 6/19/24 0910)   ceFAZolin (ANCEF) IVPB (premix in dextrose) 2,000 mg 50 mL (0 mg Intravenous Stopped 6/19/24 1004)   tetanus-diphtheria-acellular pertussis (BOOSTRIX) IM injection 0.5 mL (0.5 mL Intramuscular Given 6/19/24 0935)       Diagnostic Studies  Results Reviewed       None                   XR tibia fibula 2 views LEFT    (Results Pending)              Procedures  Universal Protocol:  Consent: Verbal consent obtained.  Risks and benefits: risks, benefits and alternatives were discussed  Consent given by: patient  Time out: Immediately prior to procedure a \"time out\" was called to verify the correct patient, procedure, equipment, support staff and site/side marked as required.  Timeout called at: 6/19/2024 9:06 AM.  Patient understanding: patient states understanding of the procedure being performed  Required items: required blood products, implants, devices, and special equipment available  Patient identity confirmed: verbally with patient  Laceration repair    Date/Time: 6/19/2024 9:06 AM    Performed by: Justyna Wiley PA-C  Authorized by: Justyna Wiley PA-C  Body area: lower extremity  Location details: left lower leg  Laceration length: 6 cm  Tendon involvement: none  Anesthesia: local infiltration    Anesthesia:  Local Anesthetic: lidocaine 1% with epinephrine  Anesthetic total: 8 mL    Sedation:  Patient sedated: no        Procedure Details:  Preparation: Patient was prepped and draped in the usual sterile fashion.  Irrigation solution: saline  Irrigation method: syringe and tap  Amount of cleaning: standard  Debridement: none  Degree of undermining: none  Number of sutures: 14  Technique: simple  Approximation: close  Approximation difficulty: simple  Dressing: antibiotic ointment, gauze roll and 4x4 " sterile gauze  Patient tolerance: patient tolerated the procedure well with no immediate complications               ED Course                               SBIRT 20yo+      Flowsheet Row Most Recent Value   Initial Alcohol Screen: US AUDIT-C     1. How often do you have a drink containing alcohol? 0 Filed at: 06/19/2024 0852   2. How many drinks containing alcohol do you have on a typical day you are drinking?  0 Filed at: 06/19/2024 0852   3a. Male UNDER 65: How often do you have five or more drinks on one occasion? 0 Filed at: 06/19/2024 0852   3b. FEMALE Any Age, or MALE 65+: How often do you have 4 or more drinks on one occassion? 0 Filed at: 06/19/2024 0852   Audit-C Score 0 Filed at: 06/19/2024 0852   MARIA TERESA: How many times in the past year have you...    Used an illegal drug or used a prescription medication for non-medical reasons? Never Filed at: 06/19/2024 0852                      Medical Decision Making  Patient is a 41-year-old male presenting to the ED for evaluation of a left lower leg laceration.    Patient sustained a 6 cm laceration over the anterior aspect of the left tibia with exposed bone. X-ray shows no evidence of fracture or foreign body. The wound was thoroughly irrigated at bedside with saline. The laceration was repaired with 14 simple sutures under local anesthesia.  Patient was given a dose of IV Ancef and a prescription for Keflex due to evidence of exposed bone on exam.  A referral was placed to orthopedics for close follow-up.  Tetanus was updated while in the ED.  I discussed wound care instructions with patient in detail and advised him to return in 1 week for suture removal.  Advised him to rest, ice and elevate the extremity and take Motrin/or Tylenol as needed for pain.    The management plan was discussed in detail with the patient at bedside and all questions were answered. Strict ED return instructions were discussed at bedside. Prior to discharge, both verbal and written  instructions were provided. We discussed the signs and symptoms that should prompt the patient to return to the ED. All questions were answered and the patient was comfortable with the plan of care and discharged home. The patient agrees to return to the Emergency Department for concerns and/or progression of illness.     Amount and/or Complexity of Data Reviewed  Radiology: ordered.    Risk  OTC drugs.  Prescription drug management.             Disposition  Final diagnoses:   Laceration of left lower leg, initial encounter   Need for tetanus booster     Time reflects when diagnosis was documented in both MDM as applicable and the Disposition within this note       Time User Action Codes Description Comment    6/19/2024  9:59 AM Justyna Wiley Add [S81.812A] Laceration of left lower leg, initial encounter     6/19/2024  9:59 AM Justyna Wiley Add [Z23] Need for tetanus booster           ED Disposition       ED Disposition   Discharge    Condition   Stable    Date/Time   Wed Jun 19, 2024 0959    Comment   Shashi Clark discharge to home/self care.                   Follow-up Information       Follow up With Specialties Details Why Contact Info Additional Information    [facility] Emergency Department Emergency Medicine Today For suture removal [address] [facility] [address] [phone] [facility] Emergency Department, [address] [address]            Discharge Medication List as of 6/19/2024 10:01 AM        START taking these medications    Details   cephalexin (KEFLEX) 500 mg capsule Take 1 capsule (500 mg total) by mouth every 8 (eight) hours for 5 days Do not start before June 20, 2024., Starting u 6/20/2024, Until Tue 6/25/2024, Normal           CONTINUE these medications which have NOT CHANGED    Details   docusate sodium (COLACE) 100 mg capsule Take 1 capsule by mouth 2 (two) times a day, Starting 6/8/2017, Until  Discontinued, Print      HYDROcodone-acetaminophen (NORCO) 5-325 mg per tablet Take 1 tablet by mouth every 6 (six) hours as needed for pain for up to 20 doses Max Daily Amount: 4 tablets, Starting 6/8/2017, Until Discontinued, Print      predniSONE 20 mg tablet Three tablets daily x3 days, 2 tablets daily x2 days, 1 tablet daily x2 days., Normal                 PDMP Review       None            ED Provider  Electronically Signed by             Justyna Wiley PA-C  06/19/24 7532

## 2024-07-22 RX ORDER — NITROGLYCERIN 0.4 MG/1
0.4 TABLET SUBLINGUAL AS NEEDED
Qty: 25 TABLET | Refills: 6 | Status: SHIPPED | OUTPATIENT
Start: 2024-07-22

## 2024-10-30 ENCOUNTER — HOSPITAL ENCOUNTER (EMERGENCY)
Facility: HOSPITAL | Age: 84
Discharge: HOME | End: 2024-10-31
Attending: EMERGENCY MEDICINE
Payer: MEDICARE

## 2024-10-30 DIAGNOSIS — D68.9 COAGULOPATHY: ICD-10-CM

## 2024-10-30 DIAGNOSIS — N18.9 CHRONIC RENAL IMPAIRMENT, UNSPECIFIED CKD STAGE: ICD-10-CM

## 2024-10-30 DIAGNOSIS — R31.9 HEMATURIA, UNSPECIFIED TYPE: Primary | ICD-10-CM

## 2024-10-30 DIAGNOSIS — Z97.8 CHRONIC INDWELLING FOLEY CATHETER: ICD-10-CM

## 2024-10-30 PROCEDURE — 87077 CULTURE AEROBIC IDENTIFY: CPT | Performed by: EMERGENCY MEDICINE

## 2024-10-30 PROCEDURE — 99285 EMERGENCY DEPT VISIT HI MDM: CPT

## 2024-10-30 PROCEDURE — 80053 COMPREHEN METABOLIC PANEL: CPT | Performed by: EMERGENCY MEDICINE

## 2024-10-30 PROCEDURE — 87186 SC STD MICRODIL/AGAR DIL: CPT | Performed by: EMERGENCY MEDICINE

## 2024-10-30 PROCEDURE — 85025 COMPLETE CBC W/AUTO DIFF WBC: CPT | Performed by: EMERGENCY MEDICINE

## 2024-10-30 PROCEDURE — 81001 URINALYSIS AUTO W/SCOPE: CPT | Performed by: EMERGENCY MEDICINE

## 2024-10-30 PROCEDURE — 87086 URINE CULTURE/COLONY COUNT: CPT | Performed by: EMERGENCY MEDICINE

## 2024-10-31 ENCOUNTER — APPOINTMENT (OUTPATIENT)
Dept: CT IMAGING | Facility: HOSPITAL | Age: 84
End: 2024-10-31
Payer: MEDICARE

## 2024-10-31 ENCOUNTER — TELEPHONE (OUTPATIENT)
Dept: CARDIOLOGY | Facility: CLINIC | Age: 84
End: 2024-10-31
Payer: MEDICARE

## 2024-10-31 VITALS
WEIGHT: 155 LBS | DIASTOLIC BLOOD PRESSURE: 66 MMHG | HEART RATE: 62 BPM | RESPIRATION RATE: 18 BRPM | HEIGHT: 66 IN | TEMPERATURE: 96.7 F | BODY MASS INDEX: 24.91 KG/M2 | OXYGEN SATURATION: 97 % | SYSTOLIC BLOOD PRESSURE: 115 MMHG

## 2024-10-31 LAB
ALBUMIN SERPL-MCNC: 3.9 G/DL (ref 3.5–5.2)
ALBUMIN/GLOB SERPL: 1.3 G/DL
ALP SERPL-CCNC: 122 U/L (ref 39–117)
ALT SERPL W P-5'-P-CCNC: 19 U/L (ref 1–41)
ANION GAP SERPL CALCULATED.3IONS-SCNC: 9 MMOL/L (ref 5–15)
AST SERPL-CCNC: 22 U/L (ref 1–40)
BACTERIA UR QL AUTO: ABNORMAL /HPF
BASOPHILS # BLD AUTO: 0.04 10*3/MM3 (ref 0–0.2)
BASOPHILS NFR BLD AUTO: 0.6 % (ref 0–1.5)
BILIRUB SERPL-MCNC: 0.5 MG/DL (ref 0–1.2)
BILIRUB UR QL STRIP: NEGATIVE
BUN SERPL-MCNC: 43 MG/DL (ref 8–23)
BUN/CREAT SERPL: 25.9 (ref 7–25)
CALCIUM SPEC-SCNC: 9.1 MG/DL (ref 8.6–10.5)
CHLORIDE SERPL-SCNC: 101 MMOL/L (ref 98–107)
CLARITY UR: ABNORMAL
CO2 SERPL-SCNC: 27 MMOL/L (ref 22–29)
COLOR UR: ABNORMAL
CREAT SERPL-MCNC: 1.66 MG/DL (ref 0.76–1.27)
DEPRECATED RDW RBC AUTO: 47.3 FL (ref 37–54)
EGFRCR SERPLBLD CKD-EPI 2021: 40.4 ML/MIN/1.73
EOSINOPHIL # BLD AUTO: 0.35 10*3/MM3 (ref 0–0.4)
EOSINOPHIL NFR BLD AUTO: 5.3 % (ref 0.3–6.2)
ERYTHROCYTE [DISTWIDTH] IN BLOOD BY AUTOMATED COUNT: 13.6 % (ref 12.3–15.4)
GLOBULIN UR ELPH-MCNC: 3.1 GM/DL
GLUCOSE SERPL-MCNC: 96 MG/DL (ref 65–99)
GLUCOSE UR STRIP-MCNC: NEGATIVE MG/DL
HCT VFR BLD AUTO: 33.9 % (ref 37.5–51)
HGB BLD-MCNC: 11.4 G/DL (ref 13–17.7)
HGB UR QL STRIP.AUTO: ABNORMAL
HYALINE CASTS UR QL AUTO: ABNORMAL /LPF
IMM GRANULOCYTES # BLD AUTO: 0.01 10*3/MM3 (ref 0–0.05)
IMM GRANULOCYTES NFR BLD AUTO: 0.2 % (ref 0–0.5)
KETONES UR QL STRIP: NEGATIVE
LEUKOCYTE ESTERASE UR QL STRIP.AUTO: ABNORMAL
LYMPHOCYTES # BLD AUTO: 1.78 10*3/MM3 (ref 0.7–3.1)
LYMPHOCYTES NFR BLD AUTO: 26.8 % (ref 19.6–45.3)
MCH RBC QN AUTO: 32.4 PG (ref 26.6–33)
MCHC RBC AUTO-ENTMCNC: 33.6 G/DL (ref 31.5–35.7)
MCV RBC AUTO: 96.3 FL (ref 79–97)
MONOCYTES # BLD AUTO: 0.56 10*3/MM3 (ref 0.1–0.9)
MONOCYTES NFR BLD AUTO: 8.4 % (ref 5–12)
NEUTROPHILS NFR BLD AUTO: 3.91 10*3/MM3 (ref 1.7–7)
NEUTROPHILS NFR BLD AUTO: 58.7 % (ref 42.7–76)
NITRITE UR QL STRIP: NEGATIVE
NRBC BLD AUTO-RTO: 0 /100 WBC (ref 0–0.2)
PH UR STRIP.AUTO: 5.5 [PH] (ref 5–8)
PLATELET # BLD AUTO: 162 10*3/MM3 (ref 140–450)
PMV BLD AUTO: 11.1 FL (ref 6–12)
POTASSIUM SERPL-SCNC: 4.2 MMOL/L (ref 3.5–5.2)
PROT SERPL-MCNC: 7 G/DL (ref 6–8.5)
PROT UR QL STRIP: ABNORMAL
RBC # BLD AUTO: 3.52 10*6/MM3 (ref 4.14–5.8)
RBC # UR STRIP: ABNORMAL /HPF
REF LAB TEST METHOD: ABNORMAL
SODIUM SERPL-SCNC: 137 MMOL/L (ref 136–145)
SP GR UR STRIP: 1.01 (ref 1–1.03)
SQUAMOUS #/AREA URNS HPF: ABNORMAL /HPF
UROBILINOGEN UR QL STRIP: ABNORMAL
WBC # UR STRIP: ABNORMAL /HPF
WBC NRBC COR # BLD AUTO: 6.65 10*3/MM3 (ref 3.4–10.8)

## 2024-10-31 PROCEDURE — 25510000001 IOPAMIDOL 61 % SOLUTION: Performed by: EMERGENCY MEDICINE

## 2024-10-31 PROCEDURE — 74177 CT ABD & PELVIS W/CONTRAST: CPT

## 2024-10-31 RX ORDER — IOPAMIDOL 612 MG/ML
100 INJECTION, SOLUTION INTRAVASCULAR
Status: COMPLETED | OUTPATIENT
Start: 2024-10-31 | End: 2024-10-31

## 2024-10-31 RX ADMIN — IOPAMIDOL 95 ML: 612 INJECTION, SOLUTION INTRAVENOUS at 01:09

## 2024-10-31 NOTE — ED PROVIDER NOTES
EMERGENCY DEPARTMENT ENCOUNTER    Room Number:  17/17  Date of encounter:  10/31/2024  PCP: Sherron Gray MD  Historian: Patient, family members  Relevant information and history provided by sources other than the patient will be included below and in the ED Course.  Review of pertinent past medical records may also be included in record below and ED Course.    HPI:  Chief Complaint: Blood in urine  A complete HPI/ROS/PMH/PSH/SH/FH are unobtainable due to: Not applicable  Context: Javier Grant is a 84 y.o. male who presents to the ED c/o night at about 830 or 9:00 and noticed some blood in the catheter bag.  Never had blood in the urine before no history of kidney stones.  He otherwise is asymptomatic.  He has no pelvic pain or back pain.  He has had no fevers or chills.  He has had the catheter for urinary retention for about 1 year he goes to [facility] urology.  It was changed 2 weeks ago.  He is on aspirin and Plavix.  No new medicine change.  He is completely asymptomatic other than blood in the urine.        Previous Episodes: No  Current Symptoms: See above    MEDICAL HISTORY REVIEWED    History of coronary artery disease with myocardial infarction in the past history of bypass history of congestive heart failure chronic catheter for urinary retention chronically history of hypertension hyperlipidemia.  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    Hyperlipidemia 05/16/2016    CRISTY on autoCPAP 10/29/2016    Nonrheumatic aortic valve stenosis 11/16/2016    Nonrheumatic aortic valve insufficiency 11/16/2016    Umbilical hernia without obstruction and without gangrene  09/26/2017    Dizziness 12/15/2018    Vertigo 12/15/2018    History of heart attack 12/18/2018    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/18/2018    Bradycardia, sinus 02/28/2022    Near syncope 02/28/2022    Sick sinus syndrome 02/28/2022    Hx of CABG 10/17/2023    S/P coronary artery stent placement 10/17/2023    Presence of cardiac pacemaker 10/17/2023    Chest pain 03/25/2024    Sepsis 03/25/2024    Non-ST elevation MI (NSTEMI) 03/26/2024    Type 1 myocardial infarction 03/29/2024    Pneumonia 04/14/2024    Elevated brain natriuretic peptide (BNP) level 04/14/2024    Acute on chronic systolic CHF (congestive heart failure) 04/14/2024    CHF (congestive heart failure) 04/15/2024     Resolved Ambulatory Problems     Diagnosis Date Noted    Hypersomnia due to medical condition - CRISTY 10/29/2016    Cor pulmonale 11/16/2016    Obesity (BMI 30-39.9) 11/20/2017    Hypertensive encephalopathy 12/15/2018    Chest pain 01/26/2024     Past Medical History:   Diagnosis Date    Abnormal ECG 1980    Arthritis     Bleeds easily     CAD (coronary artery disease)     Clotting disorder 1990    Colon polyp     SKELTON (dyspnea on exertion)     Enlarged prostate     Gastritis     Gout     Heart murmur ?    Hiatal hernia     History of MI (myocardial infarction)     HL (hearing loss)     Macular degeneration     Mitral valve prolapse 1990    Myocardial infarction 1990    Nonrheumatic aortic (valve) insufficiency     Nonrheumatic aortic (valve) stenosis     RBBB (right bundle branch block)     Sleep apnea     Umbilical hernia     Vitamin D deficiency          PAST SURGICAL HISTORY  Past Surgical History:   Procedure Laterality Date    CARDIAC CATHETERIZATION Left 10/09/2015    Dr. Robin Kingston    CARDIAC CATHETERIZATION N/A 3/26/2024    Procedure: Left Heart Cath;  Surgeon: Evelio Mcneal MD;  Location: [facility] CATH INVASIVE LOCATION;  Service: Cardiovascular;  Laterality: N/A;    CARDIAC CATHETERIZATION N/A  3/26/2024    Procedure: Native mammary injection;  Surgeon: Evelio Mcneal MD;  Location: [facility] CATH INVASIVE LOCATION;  Service: Cardiovascular;  Laterality: N/A;    CARDIAC CATHETERIZATION N/A 3/26/2024    Procedure: Stent JORGE bypass graft;  Surgeon: Evelio Mcneal MD;  Location: [facility] CATH INVASIVE LOCATION;  Service: Cardiovascular;  Laterality: N/A;    CARDIAC CATHETERIZATION N/A 3/26/2024    Procedure: Percutaneous Coronary Intervention;  Surgeon: Evelio Mcneal MD;  Location: [facility] CATH INVASIVE LOCATION;  Service: Cardiovascular;  Laterality: N/A;    CARDIAC CATHETERIZATION  3/26/2024    Procedure: Saphenous Vein Graft;  Surgeon: Evelio Mcneal MD;  Location: [facility] CATH INVASIVE LOCATION;  Service: Cardiovascular;;    CARDIAC ELECTROPHYSIOLOGY PROCEDURE N/A 03/04/2022    Procedure: Pacemaker DC new  [place]ST[place];  Surgeon: Serafin Francois MD;  Location: [facility] CATH INVASIVE LOCATION;  Service: Cardiology;  Laterality: N/A;    CARDIAC SURGERY  1990    triple bypass    CATARACT EXTRACTION Bilateral     CORONARY ANGIOPLASTY WITH STENT PLACEMENT  2015    CORONARY ARTERY BYPASS GRAFT  1990    3 VESSELS    ENDOSCOPY N/A 05/26/2017    Procedure: ESOPHAGOGASTRODUODENOSCOPY WITH COLD BIOIPSIES AND BALLOON DILITATION SIZE 15, 16.5, 18;  Surgeon: Jerry SNOWDEN MD;  Location: [facility] ENDOSCOPY;  Service:     INSERT / REPLACE / REMOVE PACEMAKER  03/05/2022    MCCOY'S NEUROMA EXCISION Right     TONSILLECTOMY      UMBILICAL HERNIA REPAIR N/A 10/09/2017    Procedure: UMBILICAL HERNIA REPAIR;  Surgeon: Blake Kelly Jr., MD;  Location: [facility] MAIN OR;  Service:          FAMILY HISTORY  Family History   Problem Relation Age of Onset    Depression Mother     Colon polyps Mother     Heart attack Father     Aneurysm Father     Stomach cancer Brother     Malig Hyperthermia Neg Hx          SOCIAL HISTORY  Social History     Socioeconomic History    Marital status:    Tobacco Use     Smoking status: Former     Current packs/day: 0.00     Types: Cigarettes     Start date: 1957     Quit date: 1980     Years since quittin.8    Smokeless tobacco: Never   Vaping Use    Vaping status: Never Used   Substance and Sexual Activity    Alcohol use: No    Drug use: Never    Sexual activity: Not Currently     Partners: Female     Birth control/protection: Post-menopausal, None         ALLERGIES  Ciprofloxacin        REVIEW OF SYSTEMS  Review of Systems     All systems reviewed and negative except for those discussed in HPI.       PHYSICAL EXAM    I have reviewed the triage vital signs and nursing notes.    ED Triage Vitals   Temp Heart Rate Resp BP SpO2   10/30/24 2322 10/30/24 2315 10/30/24 2315 10/30/24 2322 10/30/24 2315   96.7 °F (35.9 °C) 62 18 127/71 95 %      Temp src Heart Rate Source Patient Position BP Location FiO2 (%)   10/30/24 2322 -- 10/30/24 2322 10/30/24 2322 --   Tympanic  Sitting Right arm        GENERAL: Elderly male.  No acute distress.Vital signs on my initial evaluation vital signs are unremarkable.  HENT: nares patent  Head/neck/ face are symmetric without gross deformity, signs of trauma, or swelling  EYES: no scleral icterus, no conjunctival pallor.  NECK: Supple, no meningismus  CV: regular rhythm, regular rate with intact distal pulses.  RESPIRATORY: normal effort and no respiratory distress.  ABDOMEN: soft and nontender.  No pain on diffuse exam normal inspection no bladder distention.  He has no urine around the catheter.  You can see some blood-tinged urine in the catheter tubing and some dark blood in the collection bag.  MUSCULOSKELETAL: no deformity.  Intact distal pulses that are equal strong symmetric.  NEURO: alert and appropriate, moves all extremities, follows commands.  No focal motor or sensory changes  SKIN: warm, dry    Vital signs and nursing notes reviewed.        LAB RESULTS  Recent Results (from the past 24 hours)   Comprehensive Metabolic Panel     Collection Time: 10/30/24 11:46 PM    Specimen: Blood   Result Value Ref Range    Glucose 96 65 - 99 mg/dL    BUN 43 (H) 8 - 23 mg/dL    Creatinine 1.66 (H) 0.76 - 1.27 mg/dL    Sodium 137 136 - 145 mmol/L    Potassium 4.2 3.5 - 5.2 mmol/L    Chloride 101 98 - 107 mmol/L    CO2 27.0 22.0 - 29.0 mmol/L    Calcium 9.1 8.6 - 10.5 mg/dL    Total Protein 7.0 6.0 - 8.5 g/dL    Albumin 3.9 3.5 - 5.2 g/dL    ALT (SGPT) 19 1 - 41 U/L    AST (SGOT) 22 1 - 40 U/L    Alkaline Phosphatase 122 (H) 39 - 117 U/L    Total Bilirubin 0.5 0.0 - 1.2 mg/dL    Globulin 3.1 gm/dL    A/G Ratio 1.3 g/dL    BUN/Creatinine Ratio 25.9 (H) 7.0 - 25.0    Anion Gap 9.0 5.0 - 15.0 mmol/L    eGFR 40.4 (L) >60.0 mL/min/1.73   CBC Auto Differential    Collection Time: 10/30/24 11:46 PM    Specimen: Blood   Result Value Ref Range    WBC 6.65 3.40 - 10.80 10*3/mm3    RBC 3.52 (L) 4.14 - 5.80 10*6/mm3    Hemoglobin 11.4 (L) 13.0 - 17.7 g/dL    Hematocrit 33.9 (L) 37.5 - 51.0 %    MCV 96.3 79.0 - 97.0 fL    MCH 32.4 26.6 - 33.0 pg    MCHC 33.6 31.5 - 35.7 g/dL    RDW 13.6 12.3 - 15.4 %    RDW-SD 47.3 37.0 - 54.0 fl    MPV 11.1 6.0 - 12.0 fL    Platelets 162 140 - 450 10*3/mm3    Neutrophil % 58.7 42.7 - 76.0 %    Lymphocyte % 26.8 19.6 - 45.3 %    Monocyte % 8.4 5.0 - 12.0 %    Eosinophil % 5.3 0.3 - 6.2 %    Basophil % 0.6 0.0 - 1.5 %    Immature Grans % 0.2 0.0 - 0.5 %    Neutrophils, Absolute 3.91 1.70 - 7.00 10*3/mm3    Lymphocytes, Absolute 1.78 0.70 - 3.10 10*3/mm3    Monocytes, Absolute 0.56 0.10 - 0.90 10*3/mm3    Eosinophils, Absolute 0.35 0.00 - 0.40 10*3/mm3    Basophils, Absolute 0.04 0.00 - 0.20 10*3/mm3    Immature Grans, Absolute 0.01 0.00 - 0.05 10*3/mm3    nRBC 0.0 0.0 - 0.2 /100 WBC   Urinalysis With Culture If Indicated - Indwelling Urethral Catheter    Collection Time: 10/30/24 11:48 PM    Specimen: Indwelling Urethral Catheter; Urine   Result Value Ref Range    Color, UA Red (A) Yellow, Straw    Appearance, UA Turbid (A) Clear    pH,  UA 5.5 5.0 - 8.0    Specific Gravity, UA 1.010 1.005 - 1.030    Glucose, UA Negative Negative    Ketones, UA Negative Negative    Bilirubin, UA Negative Negative    Blood, UA Large (3+) (A) Negative    Protein,  mg/dL (2+) (A) Negative    Leuk Esterase, UA Large (3+) (A) Negative    Nitrite, UA Negative Negative    Urobilinogen, UA 1.0 E.U./dL 0.2 - 1.0 E.U./dL   Urinalysis, Microscopic Only - Indwelling Urethral Catheter    Collection Time: 10/30/24 11:48 PM    Specimen: Indwelling Urethral Catheter; Urine   Result Value Ref Range    RBC, UA Too Numerous to Count (A) None Seen, 0-2 /HPF    WBC, UA Too Numerous to Count (A) None Seen, 0-2 /HPF    Bacteria, UA 4+ (A) None Seen /HPF    Squamous Epithelial Cells, UA 0-2 None Seen, 0-2 /HPF    Hyaline Casts, UA 7-12 None Seen /LPF    Methodology Automated Microscopy        Ordered the above labs and independently reviewed the results.        RADIOLOGY  CT Abdomen Pelvis With Contrast    Result Date: 10/31/2024  CT OF THE ABDOMEN AND PELVIS WITH CONTRAST  HISTORY: Hematuria  COMPARISON: None available.  TECHNIQUE: Axial CT imaging was obtained through the abdomen and pelvis. IV contrast was administered.  FINDINGS: Fibrotic changes are noted at the lung bases. No suspicious hepatic lesions are seen. There is a small hiatal hernia. The right adrenal gland, spleen, gallbladder, and pancreas are normal. Left adrenal nodule is stable, and is statistically most likely an adenoma. Kidneys enhance symmetrically. No hydronephrosis is seen. There are simple appearing bilateral renal cysts. There are extensive aortoiliac calcifications. A Stanley catheter is present within the urinary bladder. Small amount of air is noted within the bladder, as expected. The prostate gland is markedly enlarged. The rectal vault is distended with stool, which could reflect fecal impaction, and the patient's stool burden is overall increased, suggesting constipation. There is no small bowel  obstruction. The appendix is normal. There is lumbar scoliosis, with convexity to the left.       1. No hydronephrosis is seen on either side. Stanley catheter is present within the urinary bladder. Urinary bladder is relatively decompressed. No obvious clot is seen. 2. Increased stool burden within the rectal vault may reflect fecal impaction. Stool burden throughout the colon overall is increased.  Radiation dose reduction techniques were utilized, including automated exposure control and exposure modulation based on body size.   This report was finalized on 10/31/2024 1:32 AM by Dr. Bia Vidal M.D on Workstation: [id number]       I ordered the above noted radiological studies. Reviewed by me and discussed with radiologist.  See dictation for official radiology interpretation.      PROCEDURES    Procedures      MEDICATIONS GIVEN IN ER    Medications   iopamidol (ISOVUE-300) 61 % injection 100 mL (95 mL Intravenous Given 10/31/24 0109)         All labs have been independently reviewed by me.  All radiology studies have been reviewed by me and I discussed with radiologist dictating the report when indicated below.  All EKG's independently viewed and interpreted by me.  Discussion below represents my analysis of pertinent findings related to patient's condition, differential diagnosis, treatment plan and final disposition.        PROGRESS, DATA ANALYSIS, CONSULTS, AND MEDICAL DECISION MAKING    This is a gentleman that has hematuria.  He has no signs of obstruction of the catheter.  Organ to go ahead and insert a three-way catheter and irrigate his bladder.      ED Course as of 10/31/24 0248   Thu Oct 31, 2024   0119 Bacteria, UA(!): 4+ [MM]   0119 WBC, UA(!): Too Numerous to Count [MM]   0119 Leukocytes, UA(!): Large (3+)  This patient has a chronic catheter.  Irrigated manually and urine essentially became clear other than a little bit of blood tinge to the urine.  Went ahead and replaced the catheter here.   We have sent a urine culture on this gentleman. [MM]   0120 Creatinine(!): 1.66  6 months ago had a normal creatinine [MM]   0120 Hemoglobin(!): 11.4  Stable no acute change [MM]   0148 I reviewed the CT scan of the abdomen pelvis.  There is no hydronephrosis seen no obvious bladder wall thickening.  Stanley catheter seems to be in appropriate position and no obvious blood or fluid in the bladder.  Please see complete dictated report from radiologist. [MM]   0201 Patient's creatinine was 1.46 and a BUN of 37 on 9/25/2024.  And patient's creatinine was 1.26 on 6/21/2024 [MM]   0201 Talked with the patient and family at length.  Because of her worsening renal function this gentleman is scheduled to see a nephrologist in the next week to 2 weeks.  He is on diuretics he does have heart failure.  He supposed to be on fluid restriction diet.  He does not eat much at baseline is just has no appetite. [MM]   0202 This gentleman has hematuria.  I clinically do not think he has an infection he has no symptoms of an infection we have sent a culture off to his urine.  I talked at length with the patient and family they are agree with no antibiotics at this time we will see what the culture shows.  If things change where he develops any fever or abdominal pain or new concerns he will return to the emergency department.  [facility]. [MM]   0202 Patient and family do inform me that today he was riding a mower lifting some pots that were heavy and very well might of pulled and strained on the catheter that he had in place. [MM]   0204 I would like to also add about the hematuria that there was a couple small clots that were present when the nurse irrigated.  But then as soon as those were were irrigated his urine was clear.  She did change the catheter and the urine is currently clear and no signs of hematuria or any pink urine. [MM]      ED Course User Index  [MM] Barney Nicholson MD       AS OF 02:48 EDT  VITALS:    BP - 115/66  HR - 62  TEMP - 96.7 °F (35.9 °C) (Tympanic)  02 SATS - 97%    SOCIAL DETERMINANTS OF HEALTH THAT IMPACT OR LIMIT CARE (For example..Homelessness,safe discharge, inability to obtain care, follow up, or prescriptions):      DIAGNOSIS  Final diagnoses:   Hematuria, unspecified type   Chronic indwelling Stanley catheter   Chronic renal impairment, unspecified CKD stage   Coagulopathy aspirin and Plavix induced         DISPOSITION  DISCHARGE    Patient discharged in stable condition.    Reviewed implications of results, diagnosis, meds, responsibility to follow up, warning signs and symptoms of possible worsening, potential complications and reasons to return to ER, including worsening of symptoms, any pain, fevers or chills or any worsening of condition.  Urine culture is pending..    Patient/Family voiced understanding of above instructions.    Discussed plan for discharge, as there is no emergent indication for admission. Pt/family is agreeable and understands need for follow up and repeat testing.  Pt is aware that discharge does not mean that nothing is wrong but it indicates no emergency is present that requires admission and they must continue care with follow-up as given below or physician of their choice.     FOLLOW-UP  No follow-up provider specified.       Medication List      No changes were made to your prescriptions during this visit.                 DICTATED UTILIZING DRAGON DICTATION    Note Disclaimer: At [facility], we believe that sharing information builds trust and better relationships. You are receiving this note because you recently visited [facility]. It is possible you will see health information before a provider has talked with you about it. This kind of information can be easy to misunderstand. To help you fully understand what it means for your health, we urge you to discuss this note with your provider.       Barney Nicholson MD  10/31/24 0247

## 2024-10-31 NOTE — DISCHARGE INSTRUCTIONS
As we discussed [facility]-up with [facility] in this next week.  Urine culture is pending.  Follow-up with nephrology as scheduled in the next 1 to 2 weeks.  Return if you develop any new pelvic pain, fevers or chills, or any other concerns.

## 2024-11-01 ENCOUNTER — HOSPITAL ENCOUNTER (OUTPATIENT)
Dept: CARDIOLOGY | Facility: HOSPITAL | Age: 84
Discharge: HOME | End: 2024-11-01
Payer: MEDICARE

## 2024-11-01 VITALS
WEIGHT: 155 LBS | BODY MASS INDEX: 24.91 KG/M2 | DIASTOLIC BLOOD PRESSURE: 50 MMHG | SYSTOLIC BLOOD PRESSURE: 110 MMHG | HEIGHT: 66 IN | HEART RATE: 60 BPM

## 2024-11-01 DIAGNOSIS — I50.21 ACUTE SYSTOLIC CONGESTIVE HEART FAILURE: ICD-10-CM

## 2024-11-01 LAB
AORTIC ARCH: 2.8 CM
AORTIC DIMENSIONLESS INDEX: 0.3 (DI)
BH CV ECHO LEFT VENTRICLE GLOBAL LONGITUDINAL STRAIN: -13.2 %
BH CV ECHO MEAS - ACS: 0.87 CM
BH CV ECHO MEAS - AI P1/2T: 580.2 MSEC
BH CV ECHO MEAS - AO MAX PG: 35.2 MMHG
BH CV ECHO MEAS - AO MEAN PG: 23.2 MMHG
BH CV ECHO MEAS - AO ROOT AREA (BSA CORRECTED): 2.2 CM2
BH CV ECHO MEAS - AO ROOT DIAM: 3.9 CM
BH CV ECHO MEAS - AO V2 MAX: 296.8 CM/SEC
BH CV ECHO MEAS - AO V2 VTI: 77.9 CM
BH CV ECHO MEAS - AVA(I,D): 1.22 CM2
BH CV ECHO MEAS - EDV(CUBED): 74.5 ML
BH CV ECHO MEAS - EDV(MOD-SP2): 212 ML
BH CV ECHO MEAS - EDV(MOD-SP4): 279 ML
BH CV ECHO MEAS - EF(MOD-BP): 36.5 %
BH CV ECHO MEAS - EF(MOD-SP2): 33 %
BH CV ECHO MEAS - EF(MOD-SP4): 37.6 %
BH CV ECHO MEAS - EF_3D-VOL: 35 %
BH CV ECHO MEAS - ESV(CUBED): 44 ML
BH CV ECHO MEAS - ESV(MOD-SP2): 142 ML
BH CV ECHO MEAS - ESV(MOD-SP4): 174 ML
BH CV ECHO MEAS - FS: 16.1 %
BH CV ECHO MEAS - IVS/LVPW: 0.95 CM
BH CV ECHO MEAS - IVSD: 1.1 CM
BH CV ECHO MEAS - LA 3D VOL INDEX: 44
BH CV ECHO MEAS - LAT PEAK E' VEL: 8.2 CM/SEC
BH CV ECHO MEAS - LV DIASTOLIC VOL/BSA (35-75): 155.4 CM2
BH CV ECHO MEAS - LV MASS(C)D: 163.8 GRAMS
BH CV ECHO MEAS - LV MAX PG: 3.8 MMHG
BH CV ECHO MEAS - LV MEAN PG: 2 MMHG
BH CV ECHO MEAS - LV SYSTOLIC VOL/BSA (12-30): 96.9 CM2
BH CV ECHO MEAS - LV V1 MAX: 98 CM/SEC
BH CV ECHO MEAS - LV V1 VTI: 21.3 CM
BH CV ECHO MEAS - LVIDD: 4.2 CM
BH CV ECHO MEAS - LVIDS: 3.5 CM
BH CV ECHO MEAS - LVOT AREA: 4.4 CM2
BH CV ECHO MEAS - LVOT DIAM: 2.38 CM
BH CV ECHO MEAS - LVPWD: 1.16 CM
BH CV ECHO MEAS - MED PEAK E' VEL: 5.1 CM/SEC
BH CV ECHO MEAS - MV A DUR: 0.17 SEC
BH CV ECHO MEAS - MV A MAX VEL: 104 CM/SEC
BH CV ECHO MEAS - MV DEC SLOPE: 136.2 CM/SEC2
BH CV ECHO MEAS - MV DEC TIME: 0.27 SEC
BH CV ECHO MEAS - MV E MAX VEL: 66.6 CM/SEC
BH CV ECHO MEAS - MV E/A: 0.64
BH CV ECHO MEAS - MV MAX PG: 4.3 MMHG
BH CV ECHO MEAS - MV MEAN PG: 1.89 MMHG
BH CV ECHO MEAS - MV P1/2T: 159.5 MSEC
BH CV ECHO MEAS - MV V2 VTI: 28.8 CM
BH CV ECHO MEAS - MVA(P1/2T): 1.38 CM2
BH CV ECHO MEAS - MVA(VTI): 3.3 CM2
BH CV ECHO MEAS - PA ACC TIME: 0.11 SEC
BH CV ECHO MEAS - PA V2 MAX: 104.1 CM/SEC
BH CV ECHO MEAS - PULM A REVS DUR: 0.17 SEC
BH CV ECHO MEAS - PULM A REVS VEL: 25.1 CM/SEC
BH CV ECHO MEAS - PULM DIAS VEL: 35.7 CM/SEC
BH CV ECHO MEAS - PULM S/D: 1.19
BH CV ECHO MEAS - PULM SYS VEL: 42.5 CM/SEC
BH CV ECHO MEAS - QP/QS: 0.43
BH CV ECHO MEAS - RAP SYSTOLE: 3 MMHG
BH CV ECHO MEAS - RV MAX PG: 1.38 MMHG
BH CV ECHO MEAS - RV V1 MAX: 58.7 CM/SEC
BH CV ECHO MEAS - RV V1 VTI: 10.9 CM
BH CV ECHO MEAS - RVOT DIAM: 2.18 CM
BH CV ECHO MEAS - RVSP: 24.3 MMHG
BH CV ECHO MEAS - SV(LVOT): 94.7 ML
BH CV ECHO MEAS - SV(MOD-SP2): 70 ML
BH CV ECHO MEAS - SV(MOD-SP4): 105 ML
BH CV ECHO MEAS - SV(RVOT): 40.7 ML
BH CV ECHO MEAS - SVI(LVOT): 52.8 ML/M2
BH CV ECHO MEAS - SVI(MOD-SP2): 39 ML/M2
BH CV ECHO MEAS - SVI(MOD-SP4): 58.5 ML/M2
BH CV ECHO MEAS - TAPSE (>1.6): 1.9 CM
BH CV ECHO MEAS - TR MAX PG: 21.3 MMHG
BH CV ECHO MEAS - TR MAX VEL: 231 CM/SEC
BH CV ECHO MEASUREMENTS AVERAGE E/E' RATIO: 10.02
BH CV XLRA - RV BASE: 3.8 CM
BH CV XLRA - RV LENGTH: 8.7 CM
BH CV XLRA - RV MID: 3.5 CM
BH CV XLRA - TDI S': 11.7 CM/SEC
LEFT ATRIUM VOLUME INDEX: 53.1 ML/M2
SINUS: 3.7 CM
STJ: 2.9 CM

## 2024-11-01 PROCEDURE — 93306 TTE W/DOPPLER COMPLETE: CPT

## 2024-11-01 PROCEDURE — 25510000001 PERFLUTREN 6.52 MG/ML SUSPENSION 2 ML VIAL: Performed by: INTERNAL MEDICINE

## 2024-11-01 PROCEDURE — 93356 MYOCRD STRAIN IMG SPCKL TRCK: CPT

## 2024-11-01 RX ADMIN — PERFLUTREN 2 ML: 6.52 INJECTION, SUSPENSION INTRAVENOUS at 09:59

## 2024-11-04 LAB
BACTERIA SPEC AEROBE CULT: ABNORMAL

## 2024-11-15 ENCOUNTER — HOSPITAL ENCOUNTER (OUTPATIENT)
Dept: CARDIOLOGY | Facility: HOSPITAL | Age: 84
Discharge: HOME | End: 2024-11-15
Payer: MEDICARE

## 2024-11-15 ENCOUNTER — CLINICAL SUPPORT NO REQUIREMENTS (OUTPATIENT)
Age: 84
End: 2024-11-15
Payer: MEDICARE

## 2024-11-15 ENCOUNTER — OFFICE VISIT (OUTPATIENT)
Dept: CARDIOLOGY | Facility: CLINIC | Age: 84
End: 2024-11-15
Payer: MEDICARE

## 2024-11-15 ENCOUNTER — TELEPHONE (OUTPATIENT)
Dept: CARDIOLOGY | Facility: CLINIC | Age: 84
End: 2024-11-15

## 2024-11-15 VITALS
HEIGHT: 66 IN | WEIGHT: 156.6 LBS | BODY MASS INDEX: 25.17 KG/M2 | HEART RATE: 66 BPM | SYSTOLIC BLOOD PRESSURE: 110 MMHG | DIASTOLIC BLOOD PRESSURE: 72 MMHG

## 2024-11-15 DIAGNOSIS — I49.5 SICK SINUS SYNDROME: Primary | ICD-10-CM

## 2024-11-15 DIAGNOSIS — I25.810 CORONARY ARTERY DISEASE INVOLVING CORONARY BYPASS GRAFT OF NATIVE HEART WITHOUT ANGINA PECTORIS: Primary | ICD-10-CM

## 2024-11-15 DIAGNOSIS — E78.49 OTHER HYPERLIPIDEMIA: ICD-10-CM

## 2024-11-15 DIAGNOSIS — I35.0 NONRHEUMATIC AORTIC VALVE STENOSIS: ICD-10-CM

## 2024-11-15 DIAGNOSIS — I10 PRIMARY HYPERTENSION: ICD-10-CM

## 2024-11-15 DIAGNOSIS — Z95.5 S/P CORONARY ARTERY STENT PLACEMENT: ICD-10-CM

## 2024-11-15 DIAGNOSIS — Z95.1 HX OF CABG: ICD-10-CM

## 2024-11-15 DIAGNOSIS — Z95.0 PRESENCE OF CARDIAC PACEMAKER: ICD-10-CM

## 2024-11-15 DIAGNOSIS — I49.5 SICK SINUS SYNDROME: ICD-10-CM

## 2024-11-15 DIAGNOSIS — I50.22 CHRONIC HFREF (HEART FAILURE WITH REDUCED EJECTION FRACTION): ICD-10-CM

## 2024-11-15 LAB
ANION GAP SERPL CALCULATED.3IONS-SCNC: 11.1 MMOL/L (ref 5–15)
BUN SERPL-MCNC: 46 MG/DL (ref 8–23)
BUN/CREAT SERPL: 25.8 (ref 7–25)
CALCIUM SPEC-SCNC: 9.1 MG/DL (ref 8.6–10.5)
CHLORIDE SERPL-SCNC: 104 MMOL/L (ref 98–107)
CO2 SERPL-SCNC: 23.9 MMOL/L (ref 22–29)
CREAT SERPL-MCNC: 1.78 MG/DL (ref 0.76–1.27)
EGFRCR SERPLBLD CKD-EPI 2021: 37.2 ML/MIN/1.73
GLUCOSE SERPL-MCNC: 104 MG/DL (ref 65–99)
POTASSIUM SERPL-SCNC: 4 MMOL/L (ref 3.5–5.2)
SODIUM SERPL-SCNC: 139 MMOL/L (ref 136–145)

## 2024-11-15 PROCEDURE — 80048 BASIC METABOLIC PNL TOTAL CA: CPT | Performed by: NURSE PRACTITIONER

## 2024-11-15 PROCEDURE — 36415 COLL VENOUS BLD VENIPUNCTURE: CPT

## 2024-11-15 RX ORDER — CHLORAL HYDRATE 500 MG
CAPSULE ORAL
COMMUNITY

## 2024-11-15 NOTE — TELEPHONE ENCOUNTER
Called patient with BMP results.  Creatinine is elevated at 1.78.  Will hold off on starting Entresto.  He will continue current medications.  At his office visit today, he noted that he was taking Lasix 40 mg in the morning and 20 mg at night.  He is can go back to taking 40 mg twice daily.  This was a miscommunication at his last office visit with me when I told him to continue taking the Lasix 40 mg twice daily and he could take a half a tablet extra of the Lasix if he had over 2 pound weight gain in 24-hour period.  Verbalized understanding.

## 2024-11-15 NOTE — PROGRESS NOTES
Date of Office Visit: 11/15/2024  Encounter Provider: COLLIN Villela  Place of Service: [facility] CARDIOLOGY  Patient Name: Javier Grant  :1940    No chief complaint on file.  : ischemic cardiomyopathy  CAD    HPI: Javier Grant is a 84 y.o. male who is a patient of  Dr. Mcneal.  He has a history of coronary artery disease, aortic valve stenosis, hypertension, sick sinus syndrome with presence of pacemaker, PVCs and right bundle branch block.  In , he underwent CABG x 3 (SVG to LAD, SVG to OM1 and SVG to OM 2).      , he underwent left heart cath which showed occlusion of all native vessels proximally, although there was 70% stenosis in mid SVG to OM 2.  He underwent placement of 4.0 x 15 mm resolute JORGE at this lesion, as well as, placement of 3.0 x 12 mm Xience Alpine JORGE anastomotic site of one of the grafts to the OM branches.  The LAD and SVG to LAD collateralized all 3 major branches distally (circumflex, RCA and ramus).  Patient has history of fairly severe nosebleeds on Plavix.    He wore 24 Holter monitor in  secondary to episodes of near syncope.  He was found to have significant intermittent bradycardia along with significant pauses.  He underwent placement of [employer] dual-chamber pacemaker on 3/4/2022 by Dr. Francois.    He presented to [facility] on 3/26/2024 with complaints of chest pain, shortness of breath and what sounded to be rigors.  He was noted to be hypotensive and required aggressive fluid resuscitation.  EKG showed new ST depression and T wave abnormalities.  Troponin continued to increase and he continued to complain of chest pain.Cardiac catheter and Dorton were reviewed and it was decided to take patient back for left heart cath.  He underwent PCI of SVG to OM2 graft.  2D echocardiogram showed LVEF 30%, mild to moderate aortic valve stenosis and mild mitral regurgitation.    On 2024, he presented back to the emergency  department with complaints of increased shortness of air.  He diuresed well on IV Lasix and had been doing well since that time on Lasix 40 mg p.o. twice daily.    Recent echocardiogram continues to show reduced LV systolic function however slightly improved from previous (LVEF 31-> 37%).  Mr. Garnt presents today with no new complaints.  He actually states that he feels a little bit better than he did previously.  He states that his daughter is now retired and moved in with he and his wife.  He had become very tired and was not getting any sleep as he had been caring for his wife, who has advanced dementia, by himself.   Patient states that he is not able to get out and do some things that he was unable to do before.  He has some intermittent chest discomfort that resolves very quickly on its own.  Blood pressure well-controlled.  After further discussion, he is only taking lasix 40 mg in the morning and 20 mg at night, rather than 40 mg twice daily.  He is euvolemic on physical exam.    Previous testing and notes have been reviewed by me.   Past Medical History:   Diagnosis Date    Abnormal ECG 1980    Arthritis     Bleeds easily     WAS TOLD THAT AFTER CABG    CAD (coronary artery disease)     CHF (congestive heart failure) 4/15/2024    Clotting disorder 1990    Noted bu surgeon when I had the heart bypasses    Colon polyp     Disorder of aorta     Dizziness     SKELTON (dyspnea on exertion)     Enlarged prostate     Gastritis     Gout     Heart murmur ?    Hiatal hernia     History of MI (myocardial infarction)     1990    HL (hearing loss)     Hyperlipidemia     Hypertension     Macular degeneration     rt eye    Mitral valve prolapse 1990    Myocardial infarction 1990    Nonrheumatic aortic (valve) insufficiency     Nonrheumatic aortic (valve) stenosis     RBBB (right bundle branch block)     Sleep apnea     USES CPAP    Umbilical hernia     Vitamin D deficiency        Past Surgical History:   Procedure Laterality  Date    CARDIAC CATHETERIZATION Left 10/09/2015    Dr. Robin Kingston    CARDIAC CATHETERIZATION N/A 3/26/2024    Procedure: Left Heart Cath;  Surgeon: Evelio Mcneal MD;  Location: [facility] CATH INVASIVE LOCATION;  Service: Cardiovascular;  Laterality: N/A;    CARDIAC CATHETERIZATION N/A 3/26/2024    Procedure: Native mammary injection;  Surgeon: Evelio Mcneal MD;  Location: [facility] CATH INVASIVE LOCATION;  Service: Cardiovascular;  Laterality: N/A;    CARDIAC CATHETERIZATION N/A 3/26/2024    Procedure: Stent JORGE bypass graft;  Surgeon: Evelio Mcneal MD;  Location: [facility] CATH INVASIVE LOCATION;  Service: Cardiovascular;  Laterality: N/A;    CARDIAC CATHETERIZATION N/A 3/26/2024    Procedure: Percutaneous Coronary Intervention;  Surgeon: Evelio Mcneal MD;  Location: [facility] CATH INVASIVE LOCATION;  Service: Cardiovascular;  Laterality: N/A;    CARDIAC CATHETERIZATION  3/26/2024    Procedure: Saphenous Vein Graft;  Surgeon: Evelio Mcneal MD;  Location: [facility] CATH INVASIVE LOCATION;  Service: Cardiovascular;;    CARDIAC ELECTROPHYSIOLOGY PROCEDURE N/A 03/04/2022    Procedure: Pacemaker DC new  [place];  Surgeon: Serafin Francois MD;  Location: [facility] CATH INVASIVE LOCATION;  Service: Cardiology;  Laterality: N/A;    CARDIAC SURGERY  1990    triple bypass    CATARACT EXTRACTION Bilateral     CORONARY ANGIOPLASTY WITH STENT PLACEMENT  2015    CORONARY ARTERY BYPASS GRAFT  1990    3 VESSELS    ENDOSCOPY N/A 05/26/2017    Procedure: ESOPHAGOGASTRODUODENOSCOPY WITH COLD BIOIPSIES AND BALLOON DILITATION SIZE 15, 16.5, 18;  Surgeon: Jerry SNOWDEN MD;  Location: [facility] ENDOSCOPY;  Service:     INSERT / REPLACE / REMOVE PACEMAKER  03/05/2022    MCCOY'S NEUROMA EXCISION Right     TONSILLECTOMY      UMBILICAL HERNIA REPAIR N/A 10/09/2017    Procedure: UMBILICAL HERNIA REPAIR;  Surgeon: Blake Kelly Jr., MD;  Location: [facility] MAIN OR;  Service:        Social History      Socioeconomic History    Marital status:    Tobacco Use    Smoking status: Former     Current packs/day: 0.00     Types: Cigarettes     Start date: 1957     Quit date: 1980     Years since quittin.9    Smokeless tobacco: Never   Vaping Use    Vaping status: Never Used   Substance and Sexual Activity    Alcohol use: No    Drug use: Never    Sexual activity: Not Currently     Partners: Female     Birth control/protection: Post-menopausal, None       Family History   Problem Relation Age of Onset    Depression Mother     Colon polyps Mother     Heart attack Father     Aneurysm Father     Stomach cancer Brother     Malig Hyperthermia Neg Hx        Review of Systems   Constitutional: Negative.   HENT: Negative.     Eyes: Negative.    Cardiovascular: Negative.    Respiratory: Negative.     Endocrine: Negative.    Hematologic/Lymphatic:        Asa/ plavix   Skin: Negative.    Musculoskeletal: Negative.    Gastrointestinal: Negative.    Genitourinary: Negative.    Neurological: Negative.    Psychiatric/Behavioral: Negative.     Allergic/Immunologic: Negative.        Allergies   Allergen Reactions    Ciprofloxacin Diarrhea         Current Outpatient Medications:     acetaminophen (TYLENOL) 325 MG tablet, Take 2 tablets by mouth Every Night., Disp: , Rfl:     allopurinol (ZYLOPRIM) 300 MG tablet, Take 1 tablet by mouth Daily., Disp: 90 tablet, Rfl: 3    aspirin 81 MG tablet, Take 1 tablet by mouth Daily., Disp: , Rfl:     atorvastatin (LIPITOR) 80 MG tablet, Take 1 tablet by mouth Every Night., Disp: 90 tablet, Rfl: 3    carvedilol (COREG) 6.25 MG tablet, Take 1 tablet by mouth 2 (Two) Times a Day With Meals., Disp: 180 tablet, Rfl: 3    Cholecalciferol 2000 units capsule, Take 1 capsule by mouth Daily., Disp: , Rfl:     clopidogrel (PLAVIX) 75 MG tablet, Take 1 tablet by mouth Daily., Disp: 90 tablet, Rfl: 3    famotidine (PEPCID) 20 MG tablet, Take 1 tablet by mouth Daily., Disp: , Rfl:      "furosemide (LASIX) 40 MG tablet, Take 1 tablet by mouth 2 (Two) Times a Day., Disp: 180 tablet, Rfl: 3    isosorbide mononitrate (IMDUR) 30 MG 24 hr tablet, Take 1 tablet by mouth Daily., Disp: 90 tablet, Rfl: 3    loratadine (Claritin) 10 MG tablet, mg Tab, Oral, Daily, 0 Refill(s), Disp: , Rfl:     Multiple Vitamins-Minerals (PRESERVISION AREDS 2 PO), Take 1 tablet by mouth Daily., Disp: , Rfl:     nitroglycerin (NITROSTAT) 0.4 MG SL tablet, DISSOLVE 1 TAB UNDER TONGUE FOR CHEST PAIN - IF PAIN REMAINS AFTER 5 MIN, CALL 911 AND REPEAT DOSE. MAX 3 TABS IN 15 MINUTES, Disp: 25 tablet, Rfl: 6    Omega-3 Fatty Acids (fish oil) 1000 MG capsule capsule, Take  by mouth Daily With Breakfast., Disp: , Rfl:     ramipril (ALTACE) 5 MG capsule, Take 1 capsule by mouth Every Evening., Disp: 90 capsule, Rfl: 3    tamsulosin (FLOMAX) 0.4 MG capsule 24 hr capsule, 1 capsule., Disp: , Rfl:     FLUoxetine (PROzac) 20 MG capsule, Take 1 capsule by mouth Daily. (Patient not taking: Reported on 11/15/2024), Disp: , Rfl:       Objective:     Vitals:    11/15/24 1143   BP: 110/72   Pulse: 66   Weight: 71 kg (156 lb 9.6 oz)   Height: 167.6 cm (65.98\")     Body mass index is 25.29 kg/m².    TTE 11/1/2024:    Left ventricular systolic function is moderately decreased. Calculated left ventricular EF = 36.5%    The following left ventricular wall segments are hypokinetic: mid anterior, apical anterior, basal anterolateral, mid anterolateral, apical lateral, basal inferolateral, mid inferolateral, apical inferior, mid inferior, apical septal, basal inferoseptal, mid inferoseptal, apex hypokinetic, mid anteroseptal, basal anterior, basal inferior and basal inferoseptal.    Left ventricular diastolic function is consistent with (grade I) impaired relaxation.    The left atrial cavity is mildly dilated.    Left atrial volume is mildly increased.    Moderate aortic valve regurgitation is present.    Moderate aortic valve stenosis is present. " Aortic valve area is 1.2 cm2.    Peak velocity of the flow distal to the aortic valve is 296.8 cm/s. Aortic valve maximum pressure gradient is 35 mmHg. Aortic valve mean pressure gradient is 23 mmHg. Aortic valve dimensionless index is 0.3 .    Estimated right ventricular systolic pressure from tricuspid regurgitation is normal (<35 mmHg).    TTE 03/28/2024:    Left ventricular systolic function is moderately decreased. Calculated left ventricular EF = 30.8%    The following left ventricular wall segments are hypokinetic: mid anterior, apical anterior, basal anterolateral, mid anterolateral, apical lateral and basal anterior.    Left ventricular diastolic function is consistent with (grade I) impaired relaxation.    The left atrial cavity is severely dilated.    Mild to moderate aortic valve stenosis is present. Aortic valve area is 1.4 cm2.Peak velocity of the flow distal to the aortic valve is 263.3 cm/s. Aortic valve maximum pressure gradient is 28 mmHg. Aortic valve mean pressure gradient is 16 mmHg. Aortic valve dimensionless index is 0.3 .    Calculated right ventricular systolic pressure from tricuspid regurgitation is 35 mmHg.    Severe mitral annular calcification is present. Mild mitral valve regurgitation is present. Mild mitral valve stenosis is present. The mitral valve mean gradient is 2.8 mmHg.    Left Heart Cath  03/26/2024:  1. Left main: Diffuse 90% stenosis  2. LAD:  at ostium.  Mid to distal vessel fills via patent SVG to LAD.  3. LCX:  at ostium.  Fills via SVG to OM2 graft.  4. RCA:  mid segment.  Fills via collaterals from the distal LAD and septals.  5.  SVG-OM 1: Occluded proximal  6.  SVG-OM2: 99% stenosis at the proximal anastomosis.  7.  SVG-LAD: Discrete 50% mid vessel stenosis.  8.  Successful PCI of the ostial SVG-OM2 with a 4.0 x 15 mm Xience miriam point drug-eluting stent.    TTE 03/03/2023:    Left ventricular systolic function is normal. Calculated left ventricular EF =  60.1% Normal left ventricular cavity size noted. Left ventricular wall thickness is consistent with concentric hypertrophy. Left ventricular diastolic function is consistent with (grade I) impaired relaxation.    Mildly reduced right ventricular systolic function noted.    The interatrial septum appears redundant. Saline test for shunting not performed. History of positive PFO    There is severe calcification of the aortic valve. No aortic valve regurgitation is present. Mild aortic valve stenosis is present. Aortic valve area is 1.38 cm2. Aortic valve mean pressure gradient is 17.5 mmHg. Aortic valve dimensionless index is 0.30 .    Severe mitral annular calcification is present. There is severe, bileaflet mitral valve thickening present. Trace mitral valve regurgitation is present. No significant mitral valve stenosis is present.    There is a probable left pleural (less likely pericardial) effusion. This is not well visualized.     PHYSICAL EXAM:    Constitutional:       Appearance: Healthy appearance. Not in distress.   Neck:      Vascular: No JVR. JVD normal.   Pulmonary:      Effort: Pulmonary effort is normal.      Breath sounds: Normal breath sounds. No wheezing. No rhonchi. No rales.   Chest:      Chest wall: Not tender to palpatation.   Cardiovascular:      PMI at left midclavicular line. Normal rate. Regular rhythm. Normal S1. Normal S2.       Murmurs: There is a systolic murmur.      No gallop.  No click. No rub.   Pulses:     Intact distal pulses.   Edema:     Peripheral edema absent.   Abdominal:      General: Bowel sounds are normal.      Palpations: Abdomen is soft.      Tenderness: There is no abdominal tenderness.   Musculoskeletal: Normal range of motion.         General: No tenderness. Skin:     General: Skin is warm and dry.   Neurological:      General: No focal deficit present.      Mental Status: Alert and oriented to person, place and time.           ECG 12 Lead    Date/Time: 11/15/2024  12:39 PM  Performed by: Hodan Avila APRN    Authorized by: Hodan Avila APRN  Comparison: compared with previous ECG from 5/15/2024  Similar to previous ECG  Rhythm: paced  BPM: 66  Pacing: dual chamber pacing          Assessment:       Diagnosis Plan   1. Coronary artery disease involving coronary bypass graft of native heart without angina pectoris  Basic Metabolic Panel      2. Primary hypertension  Basic Metabolic Panel      3. Other hyperlipidemia  Basic Metabolic Panel      4. Sick sinus syndrome  Basic Metabolic Panel      5. Hx of CABG  Basic Metabolic Panel      6. S/P coronary artery stent placement  Basic Metabolic Panel      7. Presence of cardiac pacemaker  Basic Metabolic Panel      8. Chronic HFrEF (heart failure with reduced ejection fraction)  Basic Metabolic Panel      9. Nonrheumatic aortic valve stenosis          Orders Placed This Encounter   Procedures    Basic Metabolic Panel     Order Specific Question:   Release to patient     Answer:   Routine Release [[id number]]          Plan:       1.  Chronic heart failure with reduced ejection fraction: On carvedilol, ACE inhibitor, Lasix.  LVEF remains decreased on recent echocardiogram, however slightly improved (EF 30-->26%).  Most recent labs were obtained when he had a urinary tract infection and showed elevated creatinine.  Will check creatinine today.  He will also start taking his Lasix 40 mg twice daily again.  Creatinine stable, will stop ramipril for 36 hours then start Entresto.  Will plan to recheck BMP in about 2 weeks if Entresto was started.  Gave samples to patient.  He verbalized understanding.    2.  Coronary artery disease with prior CABG: No angina.  On DAPT with aspirin/Plavix lifelong.  On statin and beta-blocker.    3.  Hyperlipidemia: On high-dose statin.    4.  Sick sinus syndrome: Status post permanent pacemaker.  Recent download shows 6 years battery life, no new events.    5.  Nonrheumatic aortic valve  stenosis: Moderate    Mr. Grant will follow-up with Dr. Mcneal in 6 months.  He will call sooner for any questions or concerns.         Your medication list            Accurate as of November 15, 2024 12:39 PM. If you have any questions, ask your nurse or doctor.                CONTINUE taking these medications        Instructions Last Dose Given Next Dose Due   acetaminophen 325 MG tablet  Commonly known as: TYLENOL      Take 2 tablets by mouth Every Night.       allopurinol 300 MG tablet  Commonly known as: ZYLOPRIM      Take 1 tablet by mouth Daily.       aspirin 81 MG tablet      Take 1 tablet by mouth Daily.       atorvastatin 80 MG tablet  Commonly known as: LIPITOR      Take 1 tablet by mouth Every Night.       carvedilol 6.25 MG tablet  Commonly known as: COREG      Take 1 tablet by mouth 2 (Two) Times a Day With Meals.       Cholecalciferol 50 MCG (2000 UT) capsule      Take 1 capsule by mouth Daily.       Claritin 10 MG tablet  Generic drug: loratadine      mg Tab, Oral, Daily, 0 Refill(s)       clopidogrel 75 MG tablet  Commonly known as: PLAVIX      Take 1 tablet by mouth Daily.       famotidine 20 MG tablet  Commonly known as: PEPCID      Take 1 tablet by mouth Daily.       fish oil 1000 MG capsule capsule      Take  by mouth Daily With Breakfast.       FLUoxetine 20 MG capsule  Commonly known as: PROzac      Take 1 capsule by mouth Daily.       furosemide 40 MG tablet  Commonly known as: LASIX      Take 1 tablet by mouth 2 (Two) Times a Day.       isosorbide mononitrate 30 MG 24 hr tablet  Commonly known as: IMDUR      Take 1 tablet by mouth Daily.       nitroglycerin 0.4 MG SL tablet  Commonly known as: NITROSTAT      DISSOLVE 1 TAB UNDER TONGUE FOR CHEST PAIN - IF PAIN REMAINS AFTER 5 MIN, CALL 911 AND REPEAT DOSE. MAX 3 TABS IN 15 MINUTES       PRESERVISION AREDS 2 PO      Take 1 tablet by mouth Daily.       ramipril 5 MG capsule  Commonly known as: ALTACE      Take 1 capsule by mouth Every  Evening.       tamsulosin 0.4 MG capsule 24 hr capsule  Commonly known as: FLOMAX      1 capsule.                  As always, it has been a pleasure to participate in your patient's care.      Sincerely,       COLLIN Baumann

## 2025-04-01 RX ORDER — ATORVASTATIN CALCIUM 80 MG/1
80 TABLET, FILM COATED ORAL
Qty: 90 TABLET | Refills: 0 | Status: SHIPPED | OUTPATIENT
Start: 2025-04-01

## 2025-04-02 RX ORDER — ISOSORBIDE MONONITRATE 30 MG/1
30 TABLET, EXTENDED RELEASE ORAL
Qty: 90 TABLET | Refills: 3 | Status: SHIPPED | OUTPATIENT
Start: 2025-04-02

## 2025-04-02 RX ORDER — CARVEDILOL 6.25 MG/1
6.25 TABLET ORAL 2 TIMES DAILY WITH MEALS
Qty: 180 TABLET | Refills: 3 | Status: SHIPPED | OUTPATIENT
Start: 2025-04-02

## 2025-05-06 RX ORDER — FUROSEMIDE 40 MG/1
40 TABLET ORAL 2 TIMES DAILY
Qty: 180 TABLET | Refills: 3 | Status: SHIPPED | OUTPATIENT
Start: 2025-05-06

## 2025-05-19 ENCOUNTER — OFFICE VISIT (OUTPATIENT)
Age: 85
End: 2025-05-19
Payer: MEDICARE

## 2025-05-19 VITALS
SYSTOLIC BLOOD PRESSURE: 112 MMHG | OXYGEN SATURATION: 97 % | BODY MASS INDEX: 24.75 KG/M2 | DIASTOLIC BLOOD PRESSURE: 50 MMHG | HEIGHT: 66 IN | WEIGHT: 154 LBS | HEART RATE: 65 BPM

## 2025-05-19 DIAGNOSIS — Z95.0 PRESENCE OF CARDIAC PACEMAKER: ICD-10-CM

## 2025-05-19 DIAGNOSIS — I25.810 CORONARY ARTERY DISEASE INVOLVING CORONARY BYPASS GRAFT OF NATIVE HEART WITHOUT ANGINA PECTORIS: ICD-10-CM

## 2025-05-19 DIAGNOSIS — Z95.5 S/P CORONARY ARTERY STENT PLACEMENT: ICD-10-CM

## 2025-05-19 DIAGNOSIS — I10 PRIMARY HYPERTENSION: ICD-10-CM

## 2025-05-19 DIAGNOSIS — I49.5 SICK SINUS SYNDROME: Primary | ICD-10-CM

## 2025-05-19 PROCEDURE — 93000 ELECTROCARDIOGRAM COMPLETE: CPT | Performed by: INTERNAL MEDICINE

## 2025-05-19 PROCEDURE — 99214 OFFICE O/P EST MOD 30 MIN: CPT | Performed by: INTERNAL MEDICINE

## 2025-05-19 PROCEDURE — 3078F DIAST BP <80 MM HG: CPT | Performed by: INTERNAL MEDICINE

## 2025-05-19 PROCEDURE — 3074F SYST BP LT 130 MM HG: CPT | Performed by: INTERNAL MEDICINE

## 2025-05-19 NOTE — PROGRESS NOTES
Date of Office Visit: 25    Encounter Provider: Evelio Mcneal MD  Place of Service: [facility] CARDIOLOGY  Patient Name: Javier Grant  :1940    Chief Complaint   Patient presents with    Coronary Artery Disease    Follow-up   Ischemic cardiomyopathy  Moderate aortic valve stenosis    HPI: Javier Grant is a 85 y.o. male with a history of coronary artery disease, aortic valve stenosis, hypertension, sick sinus syndrome with presence of pacemaker, PVCs and right bundle branch block.  In , he underwent CABG x 3 (SVG to LAD, SVG to OM1 and SVG to OM 2).      In , he underwent left heart cath which showed occlusion of all native vessels proximally, although there was 70% stenosis in mid SVG to OM 2.  He underwent placement of 4.0 x 15 mm resolute JORGE at this lesion, as well as, placement of 3.0 x 12 mm Xience Alpine JORGE anastomotic site of one of the grafts to the OM branches.  The LAD and SVG to LAD collateralized all 3 major branches distally (circumflex, RCA and ramus).  Patient has history of fairly severe nosebleeds on Plavix.  He wore 24 Holter monitor in  secondary to episodes of near syncope.  He was found to have significant intermittent bradycardia along with significant pauses.  He underwent placement of [employer] dual-chamber pacemaker on 3/4/2022 by Dr. Francois.    [facility]24 with complaints of chest pain, shortness of breath and what sounded to be rigors.  He was noted to be hypotensive and required aggressive fluid resuscitation.  EKG showed new ST depression and T wave abnormalities.  Troponin continued to increase and he continued to complain of chest pain.he was taken back for coronary and graft angiography.  He underwent PCI of SVG to OM2 graft.  2D echocardiogram showed LVEF 30%, mild to moderate aortic valve stenosis and mild mitral regurgitation.    On 2024, he presented back to the emergency  department with complaints of increased shortness of air.  He diuresed well on IV Lasix and had been doing well since that time on Lasix 40 mg p.o. twice daily.    His last transthoracic echocardiogram was performed on 11/1/2024.  Ejection fraction was still moderately depressed at around 35 to 40%.  Aortic valve was noted to be moderately stenotic with a mean gradient of 23 mmHg and a dimensionless index of 0.3.  Also noted to have moderate aortic valve regurgitation.  He states he has been doing well as of late.  He denies any orthopnea or PND.  He has no lower extremity edema.  His blood pressure and heart rate have been well-controlled at home    Previous testing and notes have been reviewed by me.     Past Medical History:   Diagnosis Date    Abnormal ECG 1980    Arthritis     Bleeds easily     WAS TOLD THAT AFTER CABG    CAD (coronary artery disease)     CHF (congestive heart failure) 4/15/2024    Clotting disorder 1990    Noted bu surgeon when I had the heart bypasses    Colon polyp     Disorder of aorta     Dizziness     SKELTON (dyspnea on exertion)     Enlarged prostate     Gastritis     Gout     Heart murmur ?    Hiatal hernia     History of MI (myocardial infarction)     1990    HL (hearing loss)     Hyperlipidemia     Hypertension     Macular degeneration     rt eye    Mitral valve prolapse 1990    Myocardial infarction 1990    Nonrheumatic aortic (valve) insufficiency     Nonrheumatic aortic (valve) stenosis     RBBB (right bundle branch block)     Sleep apnea     USES CPAP    Umbilical hernia     Vitamin D deficiency        Past Surgical History:   Procedure Laterality Date    CARDIAC CATHETERIZATION Left 10/09/2015    Dr. Robin Kingston    CARDIAC CATHETERIZATION N/A 3/26/2024    Procedure: Left Heart Cath;  Surgeon: Evelio Mcneal MD;  Location: [facility] CATH INVASIVE LOCATION;  Service: Cardiovascular;  Laterality: N/A;    CARDIAC CATHETERIZATION N/A 3/26/2024    Procedure: Native mammary  injection;  Surgeon: Evelio Mcneal MD;  Location: [facility] CATH INVASIVE LOCATION;  Service: Cardiovascular;  Laterality: N/A;    CARDIAC CATHETERIZATION N/A 3/26/2024    Procedure: Stent JORGE bypass graft;  Surgeon: Evelio Mcneal MD;  [facility] C[facility];  Service: Cardiovascular;  Laterality: N/A;    CARDIAC CATHETERIZATION N/A 3/26/2024    Procedure: Percutaneous Coronary Intervention;  Surgeon: Evelio Mcneal MD;  [facility];  Service: Cardiovascular;  Laterality: N/A;    CARDIAC CATHETERIZATION  3/26/2024    Procedure: Saphenous Vein Graft;  Surgeon: Evelio Mcneal MD;  Location: [facility] CATH INVASIVE LOCATION;  Service: Cardiovascular;;    CARDIAC ELECTROPHYSIOLOGY PROCEDURE N/A 2022    Procedure: Pacemaker DC new  [place];  Surgeon: Serafin Francois MD;  Location: [facility] CATH INVASIVE LOCATION;  Service: Cardiology;  Laterality: N/A;    CARDIAC SURGERY      triple bypass    CATARACT EXTRACTION Bilateral     CORONARY ANGIOPLASTY WITH STENT PLACEMENT      CORONARY ARTERY BYPASS GRAFT      3 VESSELS    ENDOSCOPY N/A 2017    Procedure: ESOPHAGOGASTRODUODENOSCOPY WITH COLD BIOIPSIES AND BALLOON DILITATION SIZE 15, 16.5, 18;  Surgeon: Jerry SNOWDEN MD;  Location: [facility] ENDOSCOPY;  Service:     INSERT / REPLACE / REMOVE PACEMAKER  2022    MCCOY'S NEUROMA EXCISION Right     TONSILLECTOMY      UMBILICAL HERNIA REPAIR N/A 10/09/2017    Procedure: UMBILICAL HERNIA REPAIR;  Surgeon: Blake Kelly Jr., MD;  Location: [facility] OR;  Service:        Social History     Socioeconomic History    Marital status:    Tobacco Use    Smoking status: Former     Current packs/day: 0.00     Types: Cigarettes     Start date: 1957     Quit date: 1980     Years since quittin.4    Smokeless tobacco: Never   Vaping Use    Vaping status: Never Used   Substance and Sexual Activity    Alcohol use: No     Drug use: Never    Sexual activity: Not Currently     Partners: Female     Birth control/protection: Post-menopausal, None       Family History   Problem Relation Age of Onset    Depression Mother     Colon polyps Mother     Heart attack Father     Aneurysm Father     Stomach cancer Brother     Malig Hyperthermia Neg Hx        Review of Systems   Constitutional: Negative. Negative for fever and malaise/fatigue.   HENT: Negative.  Negative for nosebleeds and sore throat.    Eyes: Negative.  Negative for blurred vision and double vision.   Cardiovascular: Negative.  Negative for chest pain, claudication, palpitations and syncope.   Respiratory: Negative.  Negative for cough, shortness of breath and snoring.    Endocrine: Negative.  Negative for cold intolerance, heat intolerance and polydipsia.   Skin: Negative.  Negative for itching, poor wound healing and rash.   Musculoskeletal: Negative.  Negative for joint pain, joint swelling, muscle weakness and myalgias.   Gastrointestinal: Negative.  Negative for abdominal pain, melena, nausea and vomiting.   Genitourinary: Negative.    Neurological: Negative.  Negative for light-headedness, loss of balance, seizures, vertigo and weakness.   Psychiatric/Behavioral: Negative.  Negative for altered mental status and depression.    Allergic/Immunologic: Negative.        Allergies   Allergen Reactions    Ciprofloxacin Diarrhea         Current Outpatient Medications:     acetaminophen (TYLENOL) 325 MG tablet, Take 2 tablets by mouth Every Night., Disp: , Rfl:     allopurinol (ZYLOPRIM) 300 MG tablet, Take 1 tablet by mouth Daily., Disp: 90 tablet, Rfl: 3    aspirin 81 MG tablet, Take 1 tablet by mouth Daily., Disp: , Rfl:     atorvastatin (LIPITOR) 80 MG tablet, TAKE ONE TABLET BY MOUTH EVERY NIGHT, Disp: 90 tablet, Rfl: 0    carvedilol (COREG) 6.25 MG tablet, TAKE ONE TABLET BY MOUTH TWICE A DAY WITH MEALS, Disp: 180 tablet, Rfl: 3    Cholecalciferol 2000 units capsule, Take 1  "capsule by mouth Daily., Disp: , Rfl:     clopidogrel (PLAVIX) 75 MG tablet, Take 1 tablet by mouth Daily., Disp: 90 tablet, Rfl: 3    famotidine (PEPCID) 20 MG tablet, Take 1 tablet by mouth Daily., Disp: , Rfl:     FLUoxetine (PROzac) 20 MG capsule, Take 1 capsule by mouth Daily., Disp: , Rfl:     furosemide (LASIX) 40 MG tablet, TAKE 1 TABLET BY MOUTH 2 TIMES A DAY, Disp: 180 tablet, Rfl: 3    isosorbide mononitrate (IMDUR) 30 MG 24 hr tablet, TAKE ONE TABLET BY MOUTH EVERY DAY, Disp: 90 tablet, Rfl: 3    loratadine (Claritin) 10 MG tablet, mg Tab, Oral, Daily, 0 Refill(s), Disp: , Rfl:     Multiple Vitamins-Minerals (PRESERVISION AREDS 2 PO), Take 1 tablet by mouth Daily., Disp: , Rfl:     nitroglycerin (NITROSTAT) 0.4 MG SL tablet, DISSOLVE 1 TAB UNDER TONGUE FOR CHEST PAIN - IF PAIN REMAINS AFTER 5 MIN, CALL 911 AND REPEAT DOSE. MAX 3 TABS IN 15 MINUTES, Disp: 25 tablet, Rfl: 6    Omega-3 Fatty Acids (fish oil) 1000 MG capsule capsule, Take  by mouth Daily With Breakfast., Disp: , Rfl:     ramipril (ALTACE) 5 MG capsule, Take 1 capsule by mouth Every Evening., Disp: 90 capsule, Rfl: 3    tamsulosin (FLOMAX) 0.4 MG capsule 24 hr capsule, 1 capsule., Disp: , Rfl:       Objective:     Vitals:    05/19/25 1528   BP: 112/50   Pulse: 65   SpO2: 97%   Weight: 69.9 kg (154 lb)   Height: 167.6 cm (65.98\")       Body mass index is 24.87 kg/m².       PHYSICAL EXAM:    Constitutional:       Appearance: Healthy appearance. Not in distress.   Neck:      Vascular: No JVR. JVD normal.   Pulmonary:      Effort: Pulmonary effort is normal.      Breath sounds: Normal breath sounds. No wheezing. No rhonchi. No rales.   Chest:      Chest wall: Not tender to palpatation.   Cardiovascular:      PMI at left midclavicular line. Normal rate. Regular rhythm. Normal S1. Normal S2.       Murmurs: There is a systolic murmur.      No gallop.  No click. No rub.   Pulses:     Intact distal pulses.   Edema:     Peripheral edema absent. "   Abdominal:      General: Bowel sounds are normal.      Palpations: Abdomen is soft.      Tenderness: There is no abdominal tenderness.   Musculoskeletal: Normal range of motion.         General: No tenderness. Skin:     General: Skin is warm and dry.   Neurological:      General: No focal deficit present.      Mental Status: Alert and oriented to person, place and time.           ECG 12 Lead    Date/Time: 5/19/2025 3:41 PM  Performed by: Evelio Mcneal MD    Authorized by: Evelio Mcneal MD  Comparison: compared with previous ECG from 11/15/2024  Similar to previous ECG  Rhythm: paced  Comments: Atrial sensed, ventricular paced          TTE 11/1/2024:    Left ventricular systolic function is moderately decreased. Calculated left ventricular EF = 36.5%    The following left ventricular wall segments are hypokinetic: mid anterior, apical anterior, basal anterolateral, mid anterolateral, apical lateral, basal inferolateral, mid inferolateral, apical inferior, mid inferior, apical septal, basal inferoseptal, mid inferoseptal, apex hypokinetic, mid anteroseptal, basal anterior, basal inferior and basal inferoseptal.    Left ventricular diastolic function is consistent with (grade I) impaired relaxation.    The left atrial cavity is mildly dilated.    Left atrial volume is mildly increased.    Moderate aortic valve regurgitation is present.    Moderate aortic valve stenosis is present. Aortic valve area is 1.2 cm2.    Peak velocity of the flow distal to the aortic valve is 296.8 cm/s. Aortic valve maximum pressure gradient is 35 mmHg. Aortic valve mean pressure gradient is 23 mmHg. Aortic valve dimensionless index is 0.3 .    Estimated right ventricular systolic pressure from tricuspid regurgitation is normal (<35 mmHg).    TTE 03/28/2024:    Left ventricular systolic function is moderately decreased. Calculated left ventricular EF = 30.8%    The following left ventricular wall segments are hypokinetic:  mid anterior, apical anterior, basal anterolateral, mid anterolateral, apical lateral and basal anterior.    Left ventricular diastolic function is consistent with (grade I) impaired relaxation.    The left atrial cavity is severely dilated.    Mild to moderate aortic valve stenosis is present. Aortic valve area is 1.4 cm2.Peak velocity of the flow distal to the aortic valve is 263.3 cm/s. Aortic valve maximum pressure gradient is 28 mmHg. Aortic valve mean pressure gradient is 16 mmHg. Aortic valve dimensionless index is 0.3 .    Calculated right ventricular systolic pressure from tricuspid regurgitation is 35 mmHg.    Severe mitral annular calcification is present. Mild mitral valve regurgitation is present. Mild mitral valve stenosis is present. The mitral valve mean gradient is 2.8 mmHg.    Left Heart Cath  03/26/2024:  1. Left main: Diffuse 90% stenosis  2. LAD:  at ostium.  Mid to distal vessel fills via patent SVG to LAD.  3. LCX:  at ostium.  Fills via SVG to OM2 graft.  4. RCA:  mid segment.  Fills via collaterals from the distal LAD and septals.  5.  SVG-OM 1: Occluded proximal  6.  SVG-OM2: 99% stenosis at the proximal anastomosis.  7.  SVG-LAD: Discrete 50% mid vessel stenosis.  8.  Successful PCI of the ostial SVG-OM2 with a 4.0 x 15 mm Xience miriam point drug-eluting stent.      Assessment:     Plan:       1.  Chronic heart failure with reduced ejection fraction/ischemic cardiomyopathy:   - Left ventricular ejection fraction 35 to 40%  - Continue carvedilol at 6.25 mg p.o. twice daily.  Resting heart rate 65 bpm.  - Continue ramipril 5 mg p.o. daily.  - Continue Lasix 40 mg p.o. daily.  Appears to be euvolemic.  - Reasonable to hold off on starting SGLT2 inhibitor with patient's advanced age.  I think he is on reasonable goal-directed medical therapy for    2.  Coronary artery disease with prior CABG: No angina.  On DAPT with aspirin/Plavix lifelong.      3.  Hyperlipidemia: On atorvastatin 80  mg p.o. nightly.  - Last lab work reviewed 4/17/2025: LDL 39.  HDL 56.  Well-controlled.  - Transaminases within normal limits.    4.  Sick sinus syndrome: Status post permanent pacemaker.      5.  Nonrheumatic aortic valve stenosis: Moderate  - Last transthoracic echocardiogram performed 11/1/2024  - Repeat echocardiogram in 1 year.         Your medication list            Accurate as of May 19, 2025  3:34 PM. If you have any questions, ask your nurse or doctor.                CONTINUE taking these medications        Instructions Last Dose Given Next Dose Due   acetaminophen 325 MG tablet  Commonly known as: TYLENOL      Take 2 tablets by mouth Every Night.       allopurinol 300 MG tablet  Commonly known as: ZYLOPRIM      Take 1 tablet by mouth Daily.       aspirin 81 MG tablet      Take 1 tablet by mouth Daily.       atorvastatin 80 MG tablet  Commonly known as: LIPITOR      TAKE ONE TABLET BY MOUTH EVERY NIGHT       carvedilol 6.25 MG tablet  Commonly known as: COREG      TAKE ONE TABLET BY MOUTH TWICE A DAY WITH MEALS       Cholecalciferol 50 MCG (2000 UT) capsule      Take 1 capsule by mouth Daily.       Claritin 10 MG tablet  Generic drug: loratadine      mg Tab, Oral, Daily, 0 Refill(s)       clopidogrel 75 MG tablet  Commonly known as: PLAVIX      Take 1 tablet by mouth Daily.       famotidine 20 MG tablet  Commonly known as: PEPCID      Take 1 tablet by mouth Daily.       fish oil 1000 MG capsule capsule      Take  by mouth Daily With Breakfast.       FLUoxetine 20 MG capsule  Commonly known as: PROzac      Take 1 capsule by mouth Daily.       furosemide 40 MG tablet  Commonly known as: LASIX      TAKE 1 TABLET BY MOUTH 2 TIMES A DAY       isosorbide mononitrate 30 MG 24 hr tablet  Commonly known as: IMDUR      TAKE ONE TABLET BY MOUTH EVERY DAY       nitroglycerin 0.4 MG SL tablet  Commonly known as: NITROSTAT      DISSOLVE 1 TAB UNDER TONGUE FOR CHEST PAIN - IF PAIN REMAINS AFTER 5 MIN, CALL 911 AND REPEAT  DOSE. MAX 3 TABS IN 15 MINUTES       PRESERVISION AREDS 2 PO      Take 1 tablet by mouth Daily.       ramipril 5 MG capsule  Commonly known as: ALTACE      Take 1 capsule by mouth Every Evening.       tamsulosin 0.4 MG capsule 24 hr capsule  Commonly known as: FLOMAX      1 capsule.

## 2025-07-17 ENCOUNTER — TELEPHONE (OUTPATIENT)
Dept: CARDIOLOGY | Age: 85
End: 2025-07-17
Payer: MEDICARE

## 2025-07-17 RX ORDER — CARVEDILOL 3.12 MG/1
3.12 TABLET ORAL 2 TIMES DAILY
Qty: 180 TABLET | Refills: 3 | Status: SHIPPED | OUTPATIENT
Start: 2025-07-17

## 2025-07-17 NOTE — TELEPHONE ENCOUNTER
CS pt LOV 5/19/25    Pt was dx with vertigo few weeks ago by PCP.  Has been doing vestibular therapy for weeks with no help.  He said he has balance issues like being drunk, dizziness with standing and bending over.      For many weeks his BP has been mostly running low 110s/low 60s with some readings lower and some a little higher.  Today at 1100 am BP is 103/65 HR is 64.  The reading today is without ramipril because his PCP stopped it thinking since vestibular rehab has not solved the dizziness possibly could be due to low BP.  He was told to follow up with Dr. Mcneal for possible med adjustments.    Pt remains on the following:  Furosemide 40 mg BID   Imdur 30 mg daily  Coreg 6.25 mg BID (got a letter saying it was recalled)  Plavix      Medlist shows he is on flomax but he has been taken off that already.    Denies CP, SOA, swelling, weight gain.    Today was first day without the ramipril that PCP stopped.  Do you want to stop/lower any of his other meds or see how stopping the ramipril does?  Or any other recommendations you might have?    Thanks!    Bethany Eubanks RN  [facility] T[facility]  07/17/25 12:40 EDT

## 2025-08-19 LAB
MC_CV_MDC_IDC_RATE_1: 160
MC_CV_MDC_IDC_ZONE_ID: 1
MDC_IDC_MSMT_BATTERY_REMAINING_LONGEVITY: 60 MO
MDC_IDC_MSMT_BATTERY_REMAINING_PERCENTAGE: 98 %
MDC_IDC_MSMT_BATTERY_STATUS: NORMAL
MDC_IDC_MSMT_LEADCHNL_RA_DTM: NORMAL
MDC_IDC_MSMT_LEADCHNL_RA_IMPEDANCE_VALUE: 470
MDC_IDC_MSMT_LEADCHNL_RA_PACING_THRESHOLD_AMPLITUDE: 0.5
MDC_IDC_MSMT_LEADCHNL_RA_PACING_THRESHOLD_POLARITY: NORMAL
MDC_IDC_MSMT_LEADCHNL_RA_PACING_THRESHOLD_PULSEWIDTH: 0.4
MDC_IDC_MSMT_LEADCHNL_RA_SENSING_INTR_AMPL: 3.5
MDC_IDC_MSMT_LEADCHNL_RV_DTM: NORMAL
MDC_IDC_MSMT_LEADCHNL_RV_IMPEDANCE_VALUE: 600
MDC_IDC_MSMT_LEADCHNL_RV_PACING_THRESHOLD_AMPLITUDE: 1.3
MDC_IDC_MSMT_LEADCHNL_RV_PACING_THRESHOLD_POLARITY: NORMAL
MDC_IDC_MSMT_LEADCHNL_RV_PACING_THRESHOLD_PULSEWIDTH: 0.4
MDC_IDC_MSMT_LEADCHNL_RV_SENSING_INTR_AMPL: 15.9
MDC_IDC_PG_IMPLANT_DTM: NORMAL
MDC_IDC_PG_MFG: NORMAL
MDC_IDC_PG_MODEL: NORMAL
MDC_IDC_PG_SERIAL: NORMAL
MDC_IDC_PG_TYPE: NORMAL
MDC_IDC_SESS_DTM: NORMAL
MDC_IDC_SESS_TYPE: NORMAL
MDC_IDC_SET_BRADY_AT_MODE_SWITCH_RATE: 170
MDC_IDC_SET_BRADY_LOWRATE: 60
MDC_IDC_SET_BRADY_MAX_SENSOR_RATE: 130
MDC_IDC_SET_BRADY_MAX_TRACKING_RATE: 130
MDC_IDC_SET_BRADY_MODE: NORMAL
MDC_IDC_SET_BRADY_PAV_DELAY: 180
MDC_IDC_SET_BRADY_SAV_DELAY: 170
MDC_IDC_SET_LEADCHNL_RA_PACING_AMPLITUDE: 2
MDC_IDC_SET_LEADCHNL_RA_PACING_POLARITY: NORMAL
MDC_IDC_SET_LEADCHNL_RA_PACING_PULSEWIDTH: 0.4
MDC_IDC_SET_LEADCHNL_RA_SENSING_POLARITY: NORMAL
MDC_IDC_SET_LEADCHNL_RA_SENSING_SENSITIVITY: 0.75
MDC_IDC_SET_LEADCHNL_RV_PACING_AMPLITUDE: 3.5
MDC_IDC_SET_LEADCHNL_RV_PACING_POLARITY: NORMAL
MDC_IDC_SET_LEADCHNL_RV_PACING_PULSEWIDTH: 0.4
MDC_IDC_SET_LEADCHNL_RV_SENSING_POLARITY: NORMAL
MDC_IDC_SET_LEADCHNL_RV_SENSING_SENSITIVITY: 2.5
MDC_IDC_SET_ZONE_STATUS: NORMAL
MDC_IDC_SET_ZONE_TYPE: NORMAL
MDC_IDC_STAT_AT_BURDEN_PERCENT: 1
MDC_IDC_STAT_BRADY_RA_PERCENT_PACED: 60
MDC_IDC_STAT_BRADY_RV_PERCENT_PACED: 82

## (undated) DEVICE — DEV INFL ALLIANCE2 SYS

## (undated) DEVICE — INTRO SHEATH PRELUDE SNAP .038 6F 13CM W/SDPRT

## (undated) DEVICE — ENCORE® LATEX ORTHO SIZE 7.5, STERILE LATEX POWDER-FREE SURGICAL GLOVE: Brand: ENCORE

## (undated) DEVICE — 3M™ STERI-STRIP™ REINFORCED ADHESIVE SKIN CLOSURES, R1547, 1/2 IN X 4 IN (12 MM X 100 MM), 6 STRIPS/ENVELOPE: Brand: 3M™ STERI-STRIP™

## (undated) DEVICE — KT MANIFLD CARDIAC

## (undated) DEVICE — CATH DIAG IMPULSE FR5 5F 100CM

## (undated) DEVICE — CATH DIAG IMPULSE MPA2 SH 5F 100CM

## (undated) DEVICE — TREK CORONARY DILATATION CATHETER 3.50 MM X 12 MM / RAPID-EXCHANGE: Brand: TREK

## (undated) DEVICE — PENCL ES MEGADINE EZ/CLEAN BUTN W/HOLSTR 10FT

## (undated) DEVICE — ESOPHAGEAL BALLOON DILATATION CATHETER: Brand: CRE FIXED WIRE

## (undated) DEVICE — PK CATH CARD 40

## (undated) DEVICE — GLIDESHEATH SLENDER STAINLESS STEEL KIT: Brand: GLIDESHEATH SLENDER

## (undated) DEVICE — SUT MNCRYL PLS ANTIB UD 4/0 PS2 18IN

## (undated) DEVICE — SUREFIT, DUAL DISPERSIVE ELECTRODE, CONTACT QUALITY MONITOR: Brand: SUREFIT

## (undated) DEVICE — ANGIO-SEAL VIP VASCULAR CLOSURE DEVICE: Brand: ANGIO-SEAL

## (undated) DEVICE — DRSNG TELFA ILND ADH 4X6IN

## (undated) DEVICE — SUT VIC 3/0 TIES 18IN J110T

## (undated) DEVICE — FRCP BX RADJAW4 NDL 2.8 240CM LG OG BX40

## (undated) DEVICE — CATH DIAG IMPULSE IMT 5F 100CM

## (undated) DEVICE — LOU MINOR PROCEDURE: Brand: MEDLINE INDUSTRIES, INC.

## (undated) DEVICE — CATH DIAG IMPULSE AL1 5F 100CM

## (undated) DEVICE — IRRIGATOR BULB ASEPTO 60CC STRL

## (undated) DEVICE — DGW .035 FC J3MM 260CM TEF: Brand: EMERALD

## (undated) DEVICE — 3M™ STERI-STRIP™ REINFORCED ADHESIVE SKIN CLOSURES, R1546, 1/4 IN X 4 IN (6 MM X 100 MM), 10 STRIPS/ENVELOPE: Brand: 3M™ STERI-STRIP™

## (undated) DEVICE — RUNTHROUGH NS EXTRA FLOPPY PTCA GUIDEWIRE: Brand: RUNTHROUGH

## (undated) DEVICE — Device: Brand: DEFENDO AIR/WATER/SUCTION AND BIOPSY VALVE

## (undated) DEVICE — 6F .070 JR 4 100CM: Brand: CORDIS

## (undated) DEVICE — Device

## (undated) DEVICE — CATH DIAG IMPULSE LCB 5F 100CM

## (undated) DEVICE — NC TREK NEO™ CORONARY DILATATION CATHETER 4.00 MM X 15 MM / RAPID-EXCHANGE: Brand: NC TREK NEO™

## (undated) DEVICE — DRSNG SURESITE WNDW 4X4.5

## (undated) DEVICE — CANN NASL CO2 TRULINK W/O2 A/

## (undated) DEVICE — TUBING, SUCTION, 1/4" X 10', STRAIGHT: Brand: MEDLINE

## (undated) DEVICE — LOU PACE DEFIB: Brand: MEDLINE INDUSTRIES, INC.

## (undated) DEVICE — CATH DIAG IMPULSE FL4 5F 100CM

## (undated) DEVICE — 3M™ STERI-STRIP™ COMPOUND BENZOIN TINCTURE 40 BAGS/CARTON 4 CARTONS/CASE C1544: Brand: 3M™ STERI-STRIP™

## (undated) DEVICE — 6F .070 MPA 1 100CM: Brand: VISTA BRITE TIP

## (undated) DEVICE — BITEBLOCK OMNI BLOC

## (undated) DEVICE — APPL CHLORAPREP W/TINT 26ML ORNG

## (undated) DEVICE — RADIFOCUS GLIDEWIRE: Brand: GLIDEWIRE

## (undated) DEVICE — NDL HYPO PRECISIONGLIDE REG 25G 1 1/2

## (undated) DEVICE — ANTIBACTERIAL UNDYED BRAIDED (POLYGLACTIN 910), SYNTHETIC ABSORBABLE SUTURE: Brand: COATED VICRYL

## (undated) DEVICE — DEV INDEFLATOR P/N 580289

## (undated) DEVICE — SUT PDS O CT1 CR/8 18IN Z740D

## (undated) DEVICE — PINNACLE INTRODUCER SHEATH: Brand: PINNACLE